# Patient Record
Sex: MALE | Race: WHITE | NOT HISPANIC OR LATINO | Employment: OTHER | URBAN - METROPOLITAN AREA
[De-identification: names, ages, dates, MRNs, and addresses within clinical notes are randomized per-mention and may not be internally consistent; named-entity substitution may affect disease eponyms.]

---

## 2017-01-31 ENCOUNTER — TELEPHONE (OUTPATIENT)
Dept: CARDIOLOGY | Facility: CLINIC | Age: 62
End: 2017-01-31

## 2017-01-31 NOTE — TELEPHONE ENCOUNTER
Prior Authorization Retail Medication Request  Medication/Dose: Vorapaxar 2.08 mg   Diagnosis and ICD code: PAD I73.9/ Critical limb ischemia I99.8  New/Renewal/Insurance Change PA: renewal  Previously Tried and Failed Therapies: clopidogrel    Insurance ID (if provided): [id number]   Insurance Phone (if provided): [phone]   Any additional info from fax request:     If you received a fax notification from an outside Pharmacy:  Pharmacy Name:[facility]  Pharmacy #: [phone]  Pharmacy Fax: [phone]

## 2017-02-16 ENCOUNTER — OFFICE VISIT (OUTPATIENT)
Dept: GASTROENTEROLOGY | Facility: CLINIC | Age: 62
End: 2017-02-16
Attending: INTERNAL MEDICINE
Payer: COMMERCIAL

## 2017-02-16 VITALS
HEART RATE: 60 BPM | WEIGHT: 213 LBS | TEMPERATURE: 97.8 F | BODY MASS INDEX: 31.55 KG/M2 | SYSTOLIC BLOOD PRESSURE: 114 MMHG | DIASTOLIC BLOOD PRESSURE: 75 MMHG | HEIGHT: 69 IN | OXYGEN SATURATION: 94 %

## 2017-02-16 DIAGNOSIS — K74.60 CIRRHOSIS OF LIVER WITHOUT ASCITES, UNSPECIFIED HEPATIC CIRRHOSIS TYPE (H): ICD-10-CM

## 2017-02-16 DIAGNOSIS — K74.60 CIRRHOSIS OF LIVER WITHOUT ASCITES, UNSPECIFIED HEPATIC CIRRHOSIS TYPE (H): Primary | ICD-10-CM

## 2017-02-16 LAB
AFP SERPL-MCNC: 3.4 UG/L (ref 0–8)
ALBUMIN SERPL-MCNC: 3.6 G/DL (ref 3.4–5)
ALP SERPL-CCNC: 65 U/L (ref 40–150)
ALT SERPL W P-5'-P-CCNC: 20 U/L (ref 0–70)
ANION GAP SERPL CALCULATED.3IONS-SCNC: 9 MMOL/L (ref 3–14)
AST SERPL W P-5'-P-CCNC: 20 U/L (ref 0–45)
BILIRUB DIRECT SERPL-MCNC: 0.1 MG/DL (ref 0–0.2)
BILIRUB SERPL-MCNC: 0.4 MG/DL (ref 0.2–1.3)
BUN SERPL-MCNC: 19 MG/DL (ref 7–30)
CALCIUM SERPL-MCNC: 9 MG/DL (ref 8.5–10.1)
CHLORIDE SERPL-SCNC: 102 MMOL/L (ref 94–109)
CO2 SERPL-SCNC: 26 MMOL/L (ref 20–32)
CREAT SERPL-MCNC: 0.8 MG/DL (ref 0.66–1.25)
ERYTHROCYTE [DISTWIDTH] IN BLOOD BY AUTOMATED COUNT: 14.1 % (ref 10–15)
GFR SERPL CREATININE-BSD FRML MDRD: ABNORMAL ML/MIN/1.7M2
GLUCOSE SERPL-MCNC: 175 MG/DL (ref 70–99)
HCT VFR BLD AUTO: 42.9 % (ref 40–53)
HGB BLD-MCNC: 14.7 G/DL (ref 13.3–17.7)
INR PPP: 1.03 (ref 0.86–1.14)
MCH RBC QN AUTO: 29.5 PG (ref 26.5–33)
MCHC RBC AUTO-ENTMCNC: 34.3 G/DL (ref 31.5–36.5)
MCV RBC AUTO: 86 FL (ref 78–100)
PLATELET # BLD AUTO: 176 10E9/L (ref 150–450)
POTASSIUM SERPL-SCNC: 4.2 MMOL/L (ref 3.4–5.3)
PROT SERPL-MCNC: 8 G/DL (ref 6.8–8.8)
RBC # BLD AUTO: 4.99 10E12/L (ref 4.4–5.9)
SODIUM SERPL-SCNC: 137 MMOL/L (ref 133–144)
WBC # BLD AUTO: 7.8 10E9/L (ref 4–11)

## 2017-02-16 PROCEDURE — 80076 HEPATIC FUNCTION PANEL: CPT | Performed by: INTERNAL MEDICINE

## 2017-02-16 PROCEDURE — 80048 BASIC METABOLIC PNL TOTAL CA: CPT | Performed by: INTERNAL MEDICINE

## 2017-02-16 PROCEDURE — G0008 ADMIN INFLUENZA VIRUS VAC: HCPCS | Mod: ZF

## 2017-02-16 PROCEDURE — 85027 COMPLETE CBC AUTOMATED: CPT | Performed by: INTERNAL MEDICINE

## 2017-02-16 PROCEDURE — 90670 PCV13 VACCINE IM: CPT | Mod: ZF

## 2017-02-16 PROCEDURE — G0009 ADMIN PNEUMOCOCCAL VACCINE: HCPCS | Mod: ZF

## 2017-02-16 PROCEDURE — 25000128 H RX IP 250 OP 636: Mod: ZF

## 2017-02-16 PROCEDURE — 82105 ALPHA-FETOPROTEIN SERUM: CPT | Performed by: INTERNAL MEDICINE

## 2017-02-16 PROCEDURE — 90686 IIV4 VACC NO PRSV 0.5 ML IM: CPT | Mod: ZF

## 2017-02-16 PROCEDURE — 36415 COLL VENOUS BLD VENIPUNCTURE: CPT | Performed by: INTERNAL MEDICINE

## 2017-02-16 PROCEDURE — 99212 OFFICE O/P EST SF 10 MIN: CPT | Mod: 25,ZF

## 2017-02-16 PROCEDURE — 85610 PROTHROMBIN TIME: CPT | Performed by: INTERNAL MEDICINE

## 2017-02-16 ASSESSMENT — PAIN SCALES - GENERAL: PAINLEVEL: NO PAIN (0)

## 2017-02-16 NOTE — NURSING NOTE
Alexandro Pool      1.  Has the patient received the information for the influenza vaccine? YES    2.  Does the patient have any of the following contraindications?     Allergy to eggs? No     Allergic reaction to previous influenza vaccines? No     Any other problems to previous influenza vaccines? No     Paralyzed by Guillain-Maumelle syndrome? No     Currently pregnant? NO     Current moderate or severe illness? No     Allergy to contact lens solution? No    3.  The vaccine has been administered in the usual fashion and the patient was instructed to wait 20 minutes before leaving the building in the event of an allergic reaction: YES    Vaccination given by Didier Shrestha CMA.  Recorded by Didier Shrestha

## 2017-02-16 NOTE — PROGRESS NOTES
HISTORY OF PRESENT ILLNESS:  I had the pleasure of seeing Alexandro Pool for followup in the Liver Clinic at the [facility] on 02/16/2017.  Mr. 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  He is a sustained virologic responder to hepatitis C treatment.        He reports he is feeling very well.  He has noticed a boost in energy since we cured his hepatitis C.  He denies any abdominal pain, itching or skin rash or fatigue.  He denies any increased abdominal girth or lower extremity edema.  He denies any fevers or chills, cough or shortness of breath.  He denies any nausea, vomiting, diarrhea or constipation.  He has not had any gastrointestinal bleeding or any overt signs of encephalopathy.  His appetite has been good, and his weight has been relatively stable.       Current Outpatient Prescriptions   Medication     lisinopril (PRINIVIL,ZESTRIL) 2.5 MG tablet     spironolactone (ALDACTONE) 25 MG tablet     order for DME     Vorapaxar Sulfate (ZONTIVITY) 2.08 MG tablet     atorvastatin (LIPITOR) 10 MG tablet     insulin pen needle (BD KWABENA U/F) 32G X 4 MM     insulin lispro (HUMALOG VIAL) SOLN 100 UNIT/ML     hydrocortisone 1 % cream     SENNA PO     morphine (MS CONTIN) 15 MG 12 hr tablet     oxyCODONE (ROXICODONE) 10 MG immediate release tablet     gabapentin (NEURONTIN) 100 MG capsule     insulin glargine (LANTUS) SOLN 100 UNIT/ML     ASPIRIN EC PO     METOPROLOL SUCCINATE ER PO     amLODIPine (NORVASC) 2.5 MG tablet     acetaminophen (TYLENOL) 325 MG tablet     No current facility-administered medications for this visit.      B/P: 114/75, T: 97.8, P: 60, R: Data Unavailable    HEENT exam shows no scleral icterus and no temporal muscle wasting.  Chest is clear.  Abdominal exam shows no increase in girth.  No masses or tenderness to palpation are present.  His liver is 10 cm in span without left lobe enlargement.  No spleen tip is palpable.  Extremity exam shows  no edema.  He is missing his right leg above the knee.  Skin exam shows no stigmata of chronic liver disease.  Neurologic exam shows no asterixis.       Recent Results (from the past 168 hour(s))    Hepatic Panel [LAB20]    Collection Time: 02/16/17  7:55 AM   Result Value Ref Range    Bilirubin Direct 0.1 0.0 - 0.2 mg/dL    Bilirubin Total 0.4 0.2 - 1.3 mg/dL    Albumin 3.6 3.4 - 5.0 g/dL    Protein Total 8.0 6.8 - 8.8 g/dL    Alkaline Phosphatase 65 40 - 150 U/L    ALT 20 0 - 70 U/L    AST 20 0 - 45 U/L   Basic metabolic panel [LAB15]    Collection Time: 02/16/17  7:55 AM   Result Value Ref Range    Sodium 137 133 - 144 mmol/L    Potassium 4.2 3.4 - 5.3 mmol/L    Chloride 102 94 - 109 mmol/L    Carbon Dioxide 26 20 - 32 mmol/L    Anion Gap 9 3 - 14 mmol/L    Glucose 175 (H) 70 - 99 mg/dL    Urea Nitrogen 19 7 - 30 mg/dL    Creatinine 0.80 0.66 - 1.25 mg/dL    GFR Estimate >90  Non  GFR Calc   >60 mL/min/1.7m2    GFR Estimate If Black >90   GFR Calc   >60 mL/min/1.7m2    Calcium 9.0 8.5 - 10.1 mg/dL   CBC with platelets [ETC279]    Collection Time: 02/16/17  7:55 AM   Result Value Ref Range    WBC 7.8 4.0 - 11.0 10e9/L    RBC Count 4.99 4.4 - 5.9 10e12/L    Hemoglobin 14.7 13.3 - 17.7 g/dL    Hematocrit 42.9 40.0 - 53.0 %    MCV 86 78 - 100 fl    MCH 29.5 26.5 - 33.0 pg    MCHC 34.3 31.5 - 36.5 g/dL    RDW 14.1 10.0 - 15.0 %    Platelet Count 176 150 - 450 10e9/L   INR [TNH7494]    Collection Time: 02/16/17  7:55 AM   Result Value Ref Range    INR 1.03 0.86 - 1.14   AFP tumor marker [[id number]]    Collection Time: 02/16/17  7:55 AM   Result Value Ref Range    Alpha Fetoprotein 3.4 0 - 8 ug/L      I did review his ultrasound which shows no mass lesions and no ascites.      My impression is that Mr. 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  His disease is extremely well compensated at this point in time.  He is up-to-date with regard to variceal screening and cancer screening.   He does need a flu shot and will also get a Prevnar 13 vaccine today.  Otherwise, I will not be making any other change to his medical regimen.  I will see him back in the clinic again in 6 months.      Thank you very much for allowing me to participate in the care of this patient.  If you have any questions regarding my recommendations, please do not hesitate to contact me.       Nehemias Cevallos MD      Professor of Medicine  [employer]      Executive Medical Director, Solid Organ Transplant Program  [facility]

## 2017-02-16 NOTE — MR AVS SNAPSHOT
After Visit Summary   2/16/2017    Alexandro Pool    MRN: [id number]           Patient Information     Date Of Birth          1955        Visit Information        Provider Department      2/16/2017 10:15 AM Nehemias Cevallos MD [facility] Hepatology        Today's Diagnoses     Cirrhosis of liver without ascites, unspecified hepatic cirrhosis type (H) [K74.60]    -  1    Cirrhosis of liver without ascites, unspecified hepatic cirrhosis type (H)           Follow-ups after your visit        Follow-up notes from your care team     Return in about 6 months (around 8/16/2017).      Your next 10 appointments already scheduled     May 10, 2017 10:30 AM CDT   US OZZIE DOPPLER NO EXERCISE with UCUSV2   [facility] Imaging Center US ([facility])    [address] [place] [address] [phone]           Please bring a list of your medicines (including vitamins, minerals and over-the-counter drugs). Also, tell your doctor about any allergies you may have. Wear comfortable clothes and leave your valuables at home.  No caffeine or tobacco for 1 hour prior to exam.  Please call the Imaging Department at your exam site with any questions.            May 10, 2017 11:00 AM CDT   US LOWER EXTREMITY ARTERIAL DUPLEX LEFT with UCUSV2   [facility] Imaging Center US ([facility])    [address] [place] [id number] [phone]           Please bring a list of your medicines (including vitamins, minerals and over-the-counter drugs). Also, tell your doctor about any allergies you may have. Wear comfortable clothes and leave your valuables at home.  You do not need to do anything special to prepare for your exam.  Please call the Imaging Department at your exam site with any questions.            May 10, 2017 12:00 PM CDT   (Arrive by 11:45 AM)   Return Visit with Omar Pereyra MD   [facility] Heart Care ([facility])    [address] [place] [id number]   [phone]            Aug 15, 2017  8:00 AM CDT   Lab with  LAB   [facility] Lab ([facility])    [address] [place] [id number] [phone]            Aug 15, 2017  8:30 AM CDT   US ABDOMEN COMPLETE with UCUS1   [facility] Imaging Center US ([facility])    [address] [place] [id number] [phone]           Please bring a list of your medicines (including vitamins, minerals and over-the-counter drugs). Also, tell your doctor about any allergies you may have. Wear comfortable clothes and leave your valuables at home.  Adults: No eating or drinking for 8 hours before the exam. You may take medicine with a small sip of water.  Children: - Children 6+ years: No food or drink for 6 hours before exam. - Children 1-5 years: No food or drink for 4 hours before exam. - Infants, breast-fed: may have breast milk up to 2 hours before exam. - Infants, formula: may have bottle until 4 hours before exam.  Please call the Imaging Department at your exam site with any questions.            Aug 15, 2017  9:30 AM CDT   (Arrive by 9:15 AM)   Return General Liver with Nehemias Cevallos MD   [facility] Hepatology ([facility])    [address] [place] [id number] [phone]              Future tests that were ordered for you today     Open Standing Orders        Priority Remaining Interval Expires Ordered    US abdomen complete [OMT895] Routine 2/2 Every 6 Months 3/18/2018 2/16/2017          Open Future Orders        Priority Expected Expires Ordered     Hepatic Panel [LAB20] Routine 8/15/2017 3/18/2018 2/16/2017    Basic metabolic panel [LAB15] Routine 8/15/2017 3/18/2018 2/16/2017    CBC with platelets [MRK829] Routine 8/15/2017 3/18/2018 2/16/2017    INR [UYP1813] Routine 8/15/2017 3/18/2018  "2017    AFP tumor marker [HOU812] Routine 8/15/2017 3/18/2018 2017            Who to contact     If you have questions or need follow up information about today's clinic visit or your schedule please contact [facility] HEPATOLOGY directly at [phone].  Normal or non-critical lab and imaging results will be communicated to you by MyChart, letter or phone within 4 business days after the clinic has received the results. If you do not hear from us within 7 days, please contact the clinic through [employer] or phone. If you have a critical or abnormal lab result, we will notify you by phone as soon as possible.  Submit refill requests through [employer] or call your pharmacy and they will forward the refill request to us. Please allow 3 business days for your refill to be completed.          Additional Information About Your Visit        [employer] Information     Redux lets you send messages to your doctor, view your test results, renew your prescriptions, schedule appointments and more. To sign up, go to [email or web address] . Click on \"Log in\" on the left side of the screen, which will take you to the Welcome page. Then click on \"Sign up Now\" on the right side of the page.     You will be asked to enter the access code listed below, as well as some personal information. Please follow the directions to create your username and password.     Your access code is: [email or web address]  Expires: 5/3/2017  6:30 AM     Your access code will  in 90 days. If you need help or a new code, please call your [facility] clinic or [phone].        Care EveryWhere ID     This is your Care EveryWhere ID. This could be used by other organizations to access your [facility] medical records  [id number]        Your Vitals Were     Pulse Temperature Height Pulse Oximetry BMI (Body Mass Index)       60 97.8  F (36.6  C) (Oral) 1.753 m (5' 9\") 94% 31.45 kg/m2        Blood Pressure from Last 3 Encounters:   17 114/75   16 " 103/67   08/09/16 127/78    Weight from Last 3 Encounters:   02/16/17 96.6 kg (213 lb)   11/09/16 99.2 kg (218 lb 11.2 oz)   08/09/16 98.4 kg (217 lb)              We Performed the Following     PNEUMOCOCCAL CONJ VACCINE 13 VALENT IM (PCV13)     Schedule follow up appointments          Today's Medication Changes          These changes are accurate as of: 2/16/17 10:45 AM.  If you have any questions, ask your nurse or doctor.               These medicines have changed or have updated prescriptions.        Dose/Directions    insulin glargine 100 UNIT/ML injection   Commonly known as:  LANTUS   This may have changed:  additional instructions   Used for:  Type II or unspecified type diabetes mellitus with neurological manifestations, not stated as uncontrolled        Inject 12 Units Subcutaneous every morning   Quantity:  1 Month   Refills:  0                Primary Care Provider    [facility] Other Clinic                Thank you!     Thank you for choosing [facility] HEPATOLOGY  for your care. Our goal is always to provide you with excellent care. Hearing back from our patients is one way we can continue to improve our services. Please take a few minutes to complete the written survey that you may receive in the mail after your visit with us. Thank you!             Your Updated Medication List - Protect others around you: Learn how to safely use, store and throw away your medicines at www.disposemymeds.org.          This list is accurate as of: 2/16/17 10:45 AM.  Always use your most recent med list.                   Brand Name Dispense Instructions for use    acetaminophen 325 MG tablet    TYLENOL    250 tablet    Take 1 tablet by mouth every 4 hours as needed.       amLODIPine 2.5 MG tablet    NORVASC     Take 2.5 mg by mouth 2 times daily       ASPIRIN EC PO      Take 81 mg by mouth daily       atorvastatin 10 MG tablet    LIPITOR    90 tablet    Take 4 tablets (40 mg) by mouth At Bedtime       gabapentin 100 MG capsule     NEURONTIN    90 capsule    Take 1 capsule (100 mg) by mouth 3 times daily       humaLOG 100 UNIT/ML injection   Generic drug:  insulin lispro      Inject Subcutaneous 3 times daily (before meals) 18 units before each meal       hydrocortisone 1 % cream    CORTAID     Apply topically 2 times daily PRN       insulin glargine 100 UNIT/ML injection    LANTUS    1 Month    Inject 12 Units Subcutaneous every morning       insulin pen needle 32G X 4 MM    BD KWABENA U/F    120 each    Use 4x  daily or as directed.       lisinopril 2.5 MG tablet    PRINIVIL/Zestril    180 tablet    Take 1 tablet (2.5 mg) by mouth 2 times daily       METOPROLOL SUCCINATE ER PO      Take 75 mg by mouth daily       morphine 15 MG 12 hr tablet    MS CONTIN    60 tablet    Take 3 tablets (45 mg) by mouth 2 times daily       order for DME     1 Units    Equipment being ordered: FlexiToNewscron pneumatic compression device.  2-3 times per day to left lower extremity. Current strength - L4       oxyCODONE 10 MG IR tablet    ROXICODONE    60 tablet    Take 1.5 tablets (15 mg) by mouth every 4 hours as needed       SENNA PO      1 Tab , bedtime       spironolactone 25 MG tablet    ALDACTONE    45 tablet    Take 0.5 tablets (12.5 mg) by mouth daily       vorapaxar sulfate 2.08 MG tablet    ZONTIVITY    30 tablet    Take 1 tablet (2.08 mg) by mouth daily

## 2017-03-08 NOTE — TELEPHONE ENCOUNTER
PA Initiation    Medication: Vorapaxar 2.08 mg   Insurance Company: [employer] - Phone [phone] Fax [phone]  Pharmacy Filling the Rx: [facility] - [place], [place] - [facility] [address]  Filling Pharmacy Phone: [phone]  Filling Pharmacy Fax:    Start Date: 3/8/2017    Manually faxed PA to [place] at [phone]

## 2017-03-08 NOTE — TELEPHONE ENCOUNTER
[facility] Prior Authorization Team   Phone: [phone]  Fax: [phone]    [employer] via Cover My Meds is not working, waiting for forms to be faxed to initiate the PA.

## 2017-03-09 NOTE — TELEPHONE ENCOUNTER
Prior Authorization Approval    Authorization Effective Date: 3/7/2017  Authorization Expiration Date: 3/9/2018  Medication: Vorapaxar 2.08 mg - approved  Approved Dose/Quantity:   Reference #: [id number]   Insurance Company: [employer] - Phone [phone] Fax [phone]  Expected CoPay: $0.00     CoPay Card Available:      Foundation Assistance Needed:    Which Pharmacy is filling the prescription (Not needed for infusion/clinic administered): [facility] - [place], [place] - [facility] [address]  Pharmacy Notified: Yes  Patient Notified: Yes

## 2017-05-26 DIAGNOSIS — I25.5 ISCHEMIC CARDIOMYOPATHY: ICD-10-CM

## 2017-05-26 DIAGNOSIS — I10 HYPERTENSION GOAL BP (BLOOD PRESSURE) < 140/90: ICD-10-CM

## 2017-06-05 ENCOUNTER — PRE VISIT (OUTPATIENT)
Dept: CARDIOLOGY | Facility: CLINIC | Age: 62
End: 2017-06-05

## 2017-06-05 NOTE — TELEPHONE ENCOUNTER
OZZIE no exercise : 5/10/17  Patent left SFA to SENIA bypass graft without significant  stenosis. Patent nonligated sidebranch off the in situ bypass at the mid/distal thigh with velocity up to 389 cm/s, previously 568 cm/s.  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 Distal to this branch waveforms are high resistance monophasic  with good systolic upstroke similar prior. Again, correlate with any signs of venous hypertension in the left leg.    Left lower extremity arterial duplex : 5/10/17  Patent left SFA to SENIA bypass graft without significant  stenosis. Patent nonligated sidebranch off the in situ bypass at the mid/distal thigh with velocity up to 389 cm/s, previously 568 cm/s.  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 Distal to this branch waveforms are high resistance monophasic  with good systolic upstroke similar prior. Again, correlate with any signs of venous hypertension in the left leg.    Component      Latest Ref Rng & Units 2/16/2017   Sodium      133 - 144 mmol/L 137   Potassium      3.4 - 5.3 mmol/L 4.2   Chloride      94 - 109 mmol/L 102   Carbon Dioxide      20 - 32 mmol/L 26   Anion Gap      3 - 14 mmol/L 9   Glucose      70 - 99 mg/dL 175 (H)   Urea Nitrogen      7 - 30 mg/dL 19   Creatinine      0.66 - 1.25 mg/dL 0.80   GFR Estimate      >60 mL/min/1.7m2 >90 . . .   GFR Estimate If Black      >60 mL/min/1.7m2 >90 . . .   Calcium      8.5 - 10.1 mg/dL 9.0   Bilirubin Direct      0.0 - 0.2 mg/dL 0.1   Bilirubin Total      0.2 - 1.3 mg/dL 0.4   Albumin      3.4 - 5.0 g/dL 3.6   Protein Total      6.8 - 8.8 g/dL 8.0   Alkaline Phosphatase      40 - 150 U/L 65   ALT      0 - 70 U/L 20   AST      0 - 45 U/L 20

## 2017-06-09 RX ORDER — SPIRONOLACTONE 25 MG/1
TABLET ORAL
Qty: 15 TABLET | Refills: 1 | Status: SHIPPED | OUTPATIENT
Start: 2017-06-09 | End: 2017-06-12

## 2017-06-12 DIAGNOSIS — I25.5 ISCHEMIC CARDIOMYOPATHY: ICD-10-CM

## 2017-06-12 DIAGNOSIS — I10 HYPERTENSION GOAL BP (BLOOD PRESSURE) < 140/90: ICD-10-CM

## 2017-06-12 RX ORDER — SPIRONOLACTONE 25 MG/1
12.5 TABLET ORAL DAILY
Qty: 15 TABLET | Refills: 0 | Status: SHIPPED | OUTPATIENT
Start: 2017-06-12 | End: 2020-09-02

## 2017-06-23 ENCOUNTER — OFFICE VISIT (OUTPATIENT)
Dept: CARDIOLOGY | Facility: CLINIC | Age: 62
End: 2017-06-23
Attending: INTERNAL MEDICINE
Payer: COMMERCIAL

## 2017-06-23 VITALS
HEIGHT: 67 IN | BODY MASS INDEX: 34.23 KG/M2 | DIASTOLIC BLOOD PRESSURE: 77 MMHG | SYSTOLIC BLOOD PRESSURE: 122 MMHG | OXYGEN SATURATION: 94 % | WEIGHT: 218.1 LBS | HEART RATE: 58 BPM

## 2017-06-23 DIAGNOSIS — I10 HYPERTENSION GOAL BP (BLOOD PRESSURE) < 140/90: Primary | ICD-10-CM

## 2017-06-23 DIAGNOSIS — E11.8 TYPE 2 DIABETES MELLITUS WITH COMPLICATION, WITH LONG-TERM CURRENT USE OF INSULIN (H): ICD-10-CM

## 2017-06-23 DIAGNOSIS — E78.5 HYPERLIPIDEMIA LDL GOAL <70: ICD-10-CM

## 2017-06-23 DIAGNOSIS — Z89.611 STATUS POST ABOVE KNEE AMPUTATION OF RIGHT LOWER EXTREMITY: ICD-10-CM

## 2017-06-23 DIAGNOSIS — F17.219 CIGARETTE NICOTINE DEPENDENCE WITH NICOTINE-INDUCED DISORDER: ICD-10-CM

## 2017-06-23 DIAGNOSIS — I73.9 PAD (PERIPHERAL ARTERY DISEASE) (H): ICD-10-CM

## 2017-06-23 DIAGNOSIS — Z79.4 TYPE 2 DIABETES MELLITUS WITH COMPLICATION, WITH LONG-TERM CURRENT USE OF INSULIN (H): ICD-10-CM

## 2017-06-23 PROCEDURE — 99215 OFFICE O/P EST HI 40 MIN: CPT | Mod: ZP | Performed by: INTERNAL MEDICINE

## 2017-06-23 PROCEDURE — 99213 OFFICE O/P EST LOW 20 MIN: CPT | Mod: ZF

## 2017-06-23 PROCEDURE — 99406 BEHAV CHNG SMOKING 3-10 MIN: CPT | Mod: ZP | Performed by: INTERNAL MEDICINE

## 2017-06-23 RX ORDER — NICOTINE 21 MG/24HR
1 PATCH, TRANSDERMAL 24 HOURS TRANSDERMAL EVERY 24 HOURS
Qty: 30 PATCH | Refills: 1 | Status: SHIPPED | OUTPATIENT
Start: 2017-06-23 | End: 2017-08-22

## 2017-06-23 ASSESSMENT — PAIN SCALES - GENERAL: PAINLEVEL: NO PAIN (0)

## 2017-06-23 NOTE — MR AVS SNAPSHOT
After Visit Summary   6/23/2017    Alexandro Pool    MRN: [id number]           Patient Information     Date Of Birth          1955        Visit Information        Provider Department      6/23/2017 1:00 PM Giovani Olson MD [facility]        Today's Diagnoses     Hypertension goal BP (blood pressure) < 140/90    -  1    PAD (peripheral artery disease) (H)        Status post above knee amputation of right lower extremity (H)        Type 2 diabetes mellitus with complication, with long-term current use of insulin (H)        Hyperlipidemia LDL goal <70          Care Instructions    You were seen today in the Cardiovascular Clinic at the [facility].      Cardiology Providers you saw during your visit:  Dr. Olson    Diagnosis:  Critical Limb Ischemia    Results:  OZZIE and left lower extremity duplex reviewed in clinic.    Recommendations:   Continue medications as prescribed     Continue use of Flexitouch pump. Increase to 3 times per day if possible.     Follow up with Dr. Suggs to evaluate the fistula in your left leg and the bypass graft in your left leg.     Follow up with MICHELLE Loja in august     Labs prior to appt with Purvi    Follow-up:  6 months with Dr. Olson      For emergencies call 911.    For any scheduling needs, please call [phone]. Option 1 then option 3    Thank you for your visit today!     Please call if you have any questions or concerns.  Vikas Skinner RN              Follow-ups after your visit        Follow-up notes from your care team     See patient instructions section of the AVS Return in about 6 months (around 12/23/2017) for Dr. Olson, Critical limb ischemia.      Your next 10 appointments already scheduled     Aug 15, 2017  7:45 AM CDT   Lab with [facility] ([facility])    [address] [place] [id number] [phone]            Aug 15, 2017  8:00 AM CDT   Lab  with  LAB    Health Lab ([facility])    [address] [place] [id number]   [phone]            Aug 15, 2017  8:30 AM CDT   US ABDOMEN COMPLETE with [email or web address]   [facility] US ([facility])    [address] [place] [id number] [phone]           Please bring a list of your medicines (including vitamins, minerals and over-the-counter drugs). Also, tell your doctor about any allergies you may have. Wear comfortable clothes and leave your valuables at home.  Adults: No eating or drinking for 8 hours before the exam. You may take medicine with a small sip of water.  Children: - Children 6+ years: No food or drink for 6 hours before exam. - Children 1-5 years: No food or drink for 4 hours before exam. - Infants, breast-fed: may have breast milk up to 2 hours before exam. - Infants, formula: may have bottle until 4 hours before exam.  Please call the Imaging Department at your exam site with any questions.            Aug 15, 2017  9:30 AM CDT   (Arrive by 9:15 AM)   Return General Liver with Nehemias Cevallos MD   [facility] Hepatology ([facility])    [address] [place] [id number]   [phone]            Aug 17, 2017  4:45 PM CDT   (Arrive by 4:30 PM)   Return Vascular Visit with Marisol Suggs MD   [facility] Vascular Clinic ([facility])    [address] [place] [id number]   [phone]            Aug 21, 2017  9:00 AM CDT   (Arrive by 8:45 AM)   New Patient Visit with Purvi Anderson NP   [facility] Heart [facility] ([facility])    [address] [place] [id number]   [phone]            Dec 15, 2017  1:00 PM CST   (Arrive by 12:45 PM)   Return Visit with Giovani Olosn MD   [facility] [place] ([facility])    [address] [place] [address]  "[address] [facility] [address]   [phone]              Future tests that were ordered for you today     Open Future Orders        Priority Expected Expires Ordered    Lipid Profile Routine 7/23/2017 8/23/2017 6/23/2017    Hemoglobin A1c Routine 7/23/2017 8/23/2017 6/23/2017    Albumin Random Urine Quantitative Routine 7/23/2017 8/23/2017 6/23/2017    Basic metabolic panel Routine 7/23/2017 8/23/2017 6/23/2017    T3 Free Routine 7/23/2017 8/23/2017 6/23/2017    T4 free Routine 7/23/2017 8/23/2017 6/23/2017    TSH Routine 7/23/2017 8/23/2017 6/23/2017            Who to contact     If you have questions or need follow up information about today's clinic visit or your schedule please contact [facility] directly at [phone].  Normal or non-critical lab and imaging results will be communicated to you by ValuNethart, letter or phone within 4 business days after the clinic has received the results. If you do not hear from us within 7 days, please contact the clinic through ValuNethart or phone. If you have a critical or abnormal lab result, we will notify you by phone as soon as possible.  Submit refill requests through Chatterous or call your pharmacy and they will forward the refill request to us. Please allow 3 business days for your refill to be completed.          Additional Information About Your Visit        Chatterous Information     Chatterous lets you send messages to your doctor, view your test results, renew your prescriptions, schedule appointments and more. To sign up, go to [email or web address] . Click on \"Log in\" on the left side of the screen, which will take you to the Welcome page. Then click on \"Sign up Now\" on the right side of the page.     You will be asked to enter the access code listed below, as well as some personal information. Please follow the directions to create your username and password.     Your access code is: [email or web address]  Expires: 9/10/2017  6:31 AM     Your access " "code will  in 90 days. If you need help or a new code, please call your [facility] clinic or [phone].        Care EveryWhere ID     This is your Care EveryWhere ID. This could be used by other organizations to access your [facility] medical records  [id number]        Your Vitals Were     Pulse Height Pulse Oximetry BMI (Body Mass Index)          58 1.702 m (5' 7\") 94% 34.16 kg/m2         Blood Pressure from Last 3 Encounters:   17 122/77   17 114/75   16 103/67    Weight from Last 3 Encounters:   17 98.9 kg (218 lb 1.6 oz)   17 96.6 kg (213 lb)   16 99.2 kg (218 lb 11.2 oz)                 Today's Medication Changes          These changes are accurate as of: 17  1:55 PM.  If you have any questions, ask your nurse or doctor.               These medicines have changed or have updated prescriptions.        Dose/Directions    insulin glargine 100 UNIT/ML injection   Commonly known as:  LANTUS   This may have changed:  additional instructions   Used for:  Type II or unspecified type diabetes mellitus with neurological manifestations, not stated as uncontrolled        Inject 12 Units Subcutaneous every morning   Quantity:  1 Month   Refills:  0       oxyCODONE 10 MG IR tablet   Commonly known as:  ROXICODONE   This may have changed:  how much to take   Used for:  S/P AKA (above knee amputation) (H)        Dose:  15 mg   Take 1.5 tablets (15 mg) by mouth every 4 hours as needed   Quantity:  60 tablet   Refills:  0                Primary Care Provider    [facility] Other Clinic                Equal Access to Services     NADEEM MORROW AH: Hadii michelle Freitas, wakayleyda luivonneadaha, qaybta kaalmadavid garcia. So [facility] [phone].    ATENCIÓN: Si habla español, tiene a wong disposición servicios gratuitos de asistencia lingüística. Llame al [phone].    We comply with applicable federal civil rights laws and [place] laws. We do not " discriminate on the basis of race, color, national origin, age, disability sex, sexual orientation or gender identity.            Thank you!     Thank you for choosing [facility]  for your care. Our goal is always to provide you with excellent care. Hearing back from our patients is one way we can continue to improve our services. Please take a few minutes to complete the written survey that you may receive in the mail after your visit with us. Thank you!             Your Updated Medication List - Protect others around you: Learn how to safely use, store and throw away your medicines at www.disposemymeds.org.          This list is accurate as of: 6/23/17  1:55 PM.  Always use your most recent med list.                   Brand Name Dispense Instructions for use Diagnosis    acetaminophen 325 MG tablet    TYLENOL    250 tablet    Take 1 tablet by mouth every 4 hours as needed.    Peripheral arterial disease (H)       amLODIPine 2.5 MG tablet    NORVASC     Take 2.5 mg by mouth 2 times daily        ASPIRIN EC PO      Take 81 mg by mouth daily        atorvastatin 10 MG tablet    LIPITOR    90 tablet    Take 4 tablets (40 mg) by mouth At Bedtime    CAD (coronary artery disease)       gabapentin 100 MG capsule    NEURONTIN    90 capsule    Take 1 capsule (100 mg) by mouth 3 times daily    Critical lower limb ischemia       humaLOG 100 UNIT/ML injection   Generic drug:  insulin lispro      Inject Subcutaneous 3 times daily (before meals) 18 units before each meal        hydrocortisone 1 % cream    CORTAID     Apply topically 2 times daily PRN        insulin glargine 100 UNIT/ML injection    LANTUS    1 Month    Inject 12 Units Subcutaneous every morning    Type II or unspecified type diabetes mellitus with neurological manifestations, not stated as uncontrolled       insulin pen needle 32G X 4 MM    BD KWABENA U/F    120 each    Use 4x  daily or as directed.    Type II or unspecified type diabetes mellitus without  mention of complication, not stated as uncontrolled       lisinopril 2.5 MG tablet    PRINIVIL/Zestril    180 tablet    Take 1 tablet (2.5 mg) by mouth 2 times daily    Hypertension goal BP (blood pressure) < 140/90       METOPROLOL SUCCINATE ER PO      Take 75 mg by mouth daily        morphine 15 MG 12 hr tablet    MS CONTIN    60 tablet    Take 3 tablets (45 mg) by mouth 2 times daily    Critical lower limb ischemia       order for DME     1 Units    Equipment being ordered: FlexiTouch pneumatic compression device.  2-3 times per day to left lower extremity. Current strength - L4        oxyCODONE 10 MG IR tablet    ROXICODONE    60 tablet    Take 1.5 tablets (15 mg) by mouth every 4 hours as needed    S/P AKA (above knee amputation) (H)       SENNA PO      1 Tab , bedtime        spironolactone 25 MG tablet    ALDACTONE    15 tablet    Take 0.5 tablets (12.5 mg) by mouth daily    Ischemic cardiomyopathy, Hypertension goal BP (blood pressure) < 140/90       vorapaxar sulfate 2.08 MG tablet    ZONTIVITY    30 tablet    Take 1 tablet (2.08 mg) by mouth daily    Lower limb ischemia

## 2017-06-23 NOTE — NURSING NOTE
Labs:  Patient was instructed to return for the next laboratory testing in 3 months . Patient demonstrated understanding of this information and agreed to call with further questions or concerns.   Med Reconcile: Reviewed and verified all current medications with the patient. The updated medication list was printed and given to the patient.  New Medication: Nicotine patch per taper protocol.  Patient was educated regarding newly prescribed medication, including discussion of  the indication, administration, side effects, and when to report to MD or RN. Patient demonstrated understanding of this information and agreed to call with further questions or concerns.  Return Appointment: 3 months with Purvi Anderson CNP.  6 months with Dr. Olson.  F/u with Dr. Suggs next 2-3 weeks.  Patient given instructions regarding scheduling next clinic visit. Patient demonstrated understanding of this information and agreed to call with further questions or concerns.  Smoking Cessation: Patient instructed regarding options for smoking cessation and given written information regarding smoking cessation assistance programs. Patient demonstrated understanding of this information and agreed to call with further questions or concerns.  Patient stated he understood all health information given and agreed to call with further questions or concerns.

## 2017-06-23 NOTE — PROGRESS NOTES
HPI:     Alexandro Pool is a 61 year old male who is seen in follow-up for management of severe PAD, right AKA, and left leg critical limb ischemia.     His complex vascular history is notable for gangrenous R foot ulcer resulting in a right AKA in 2011, severe chronic limb ischemia of the left leg status-post SFA stenting in 3/2012 and left femoral to anterior tibial artery bypass with in situ saphenous vein by Dr. Suggs in 9/2013. Mr. Pool also has history of coronary artery disease and a ischemic cardiomyopathy with an LVEF= 49%. He has HTN, HLD, type II DM and continues to smoke about 2 PPD.     Mr. Pool is feeling quite well. 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 He reports occasional cramping in his left leg which typically occurs at night or with use of the Flexitouch however he reports that this is unchanged. He reports a new painful pulsation on the medial aspect of his left thigh which he noticed a few months ago. He has no new foot ulcerations or wounds. He denies chest pain, shortness of breath or palpitations.     PAST MEDICAL HISTORY  Past Medical History:   Diagnosis Date     Acute kidney failure, unspecified (H)      Blood transfusion      CAD (coronary artery disease)      CARDIOVASCULAR SCREENING; LDL GOAL LESS THAN 100      Cirrhosis (H)      Critical lower limb ischemia      Diabetes mellitus (H) 10/2011     Hypertension      Hypertension goal BP (blood pressure) < 140/90      Ischemic cardiomyopathy      Lymphedema of left leg 5/11/2016     Peripheral arterial disease (H)      PVD (peripheral vascular disease) (H)      Right above knee amputation 4/30/2014     Type 2 diabetes, HbA1C goal < 8% (H)        CURRENT MEDICATIONS  Current Outpatient Prescriptions   Medication Sig Dispense Refill     spironolactone (ALDACTONE) 25 MG tablet Take 0.5 tablets (12.5 mg) by mouth daily 15 tablet 0     lisinopril (PRINIVIL,ZESTRIL) 2.5 MG tablet  Take 1 tablet (2.5 mg) by mouth 2 times daily 180 tablet 3     order for DME Equipment being ordered: FlexiTouch pneumatic compression device.  2-3 times per day to left lower extremity.  Current strength - L4 1 Units      Vorapaxar Sulfate (ZONTIVITY) 2.08 MG tablet Take 1 tablet (2.08 mg) by mouth daily 30 tablet 6     atorvastatin (LIPITOR) 10 MG tablet Take 4 tablets (40 mg) by mouth At Bedtime 90 tablet 3     insulin pen needle (BD KWABENA U/F) 32G X 4 MM Use 4x  daily or as directed. 120 each 11     insulin lispro (HUMALOG VIAL) SOLN 100 UNIT/ML Inject Subcutaneous 3 times daily (before meals) 18 units before each meal       hydrocortisone 1 % cream Apply topically 2 times daily PRN       SENNA PO 1 Tab , bedtime       morphine (MS CONTIN) 15 MG 12 hr tablet Take 3 tablets (45 mg) by mouth 2 times daily 60 tablet 0     oxyCODONE (ROXICODONE) 10 MG immediate release tablet Take 1.5 tablets (15 mg) by mouth every 4 hours as needed (Patient taking differently: Take 20 mg by mouth every 4 hours as needed ) 60 tablet 0     gabapentin (NEURONTIN) 100 MG capsule Take 1 capsule (100 mg) by mouth 3 times daily 90 capsule 0     insulin glargine (LANTUS) SOLN 100 UNIT/ML Inject 12 Units Subcutaneous every morning (Patient taking differently: Inject 50 Units Subcutaneous every morning) 1 Month 0     ASPIRIN EC PO Take 81 mg by mouth daily       METOPROLOL SUCCINATE ER PO Take 75 mg by mouth daily       amLODIPine (NORVASC) 2.5 MG tablet Take 2.5 mg by mouth 2 times daily       acetaminophen (TYLENOL) 325 MG tablet Take 1 tablet by mouth every 4 hours as needed. 250 tablet 0       PAST SURGICAL HISTORY:  Past Surgical History:   Procedure Laterality Date     AMPUTATE LEG ABOVE KNEE  11/22/2011    Procedure:AMPUTATE LEG ABOVE KNEE; Revise Right Below Knee Amputation To Above Knee Amputation; Surgeon:MADISON WELLS; Location:[facility] OR     AMPUTATE LEG BELOW KNEE  11/10/2011    Procedure:AMPUTATE LEG BELOW KNEE; Right Below Knee  Amputation; Surgeon:MADISON WELLS; Location:[facility] OR     BYPASS GRAFT FEMOROTIBIAL  9/19/2013    Procedure: BYPASS GRAFT FEMOROTIBIAL;  Left Femorotibial Bypass Graft Insitu ;  Surgeon: Marisol Suggs MD;  Location: [facility] OR     CARDIAC SURGERY      cardiac stent     ESOPHAGOSCOPY, GASTROSCOPY, DUODENOSCOPY (EGD), COMBINED  10/1/2012    Procedure: COMBINED ESOPHAGOSCOPY, GASTROSCOPY, DUODENOSCOPY (EGD);;  Surgeon: Nehemias Cevallos MD;  [facility]: [facility]     ESOPHAGOSCOPY, GASTROSCOPY, DUODENOSCOPY (EGD), COMBINED N/A 3/24/2016    Procedure: COMBINED ESOPHAGOSCOPY, GASTROSCOPY, DUODENOSCOPY (EGD);  Surgeon: Gildardo Marks MD;  Location:  GI     IR STENT VASCULAR LT  3/9/2012          IRRIGATION AND DEBRIDEMENT LOWER EXTREMITY, COMBINED  11/15/2011    Procedure:COMBINED IRRIGATION AND DEBRIDEMENT LOWER EXTREMITY; DRAINAGE OF ABSCESS AT BELOW KNEE AMPUTATION AND KNEE DISARTICULATION.; Surgeon:MADISON WELLS; Location:[facility] OR     NO HISTORY OF SURGERY  7/12/13    derm       ALLERGIES  Allergies   Allergen Reactions     Ciprofloxacin Rash       FAMILY HISTORY  Family History   Problem Relation Age of Onset     Alcohol/Drug Mother      cirrhosis     Alcohol/Drug Father      C.A.D. No family hx of      DIABETES No family hx of      CANCER No family hx of      SOCIAL HISTORY  Social History     Social History     Marital status:      Spouse name: N/A     Number of children: 0     Years of education: N/A     Occupational History      Unknown     Social History Main Topics     Smoking status: Current Every Day Smoker     Packs/day: 2.00     Years: 40.00     Types: Cigarettes     Smokeless tobacco: Never Used      Comment: 1 pack a day     Alcohol use 0.6 oz/week     1 Standard drinks or equivalent per week      Comment: Once a month     Drug use: No      Comment: seldom marijuana     Sexual activity: Not Currently     Other Topics Concern     Parent/Sibling W/ Cabg, Mi Or Angioplasty Before 65f 55m? No     Caffeine Concern  "Yes     Exercise No     Social History Narrative       ROS:   Constitutional: No fever, chills, or sweats. No weight gain/loss   ENT: No visual disturbance, ear ache, epistaxis, sore throat  Allergies/Immunologic: Negative  Respiratory: No cough, hemoptysia  Cardiovascular: As per HPI  GI: No nausea, vomiting, hematemesis, melena, or hematochezia  : No urinary frequency, dysuria, or hematuria  Integument: Negative  Psychiatric: Negative  Neuro: Negative  Endocrinology: Negative   Musculoskeletal: Negative  Vascular: As per HPI    EXAM:  /77 (BP Location: Left arm, Patient Position: Chair, Cuff Size: Adult Large)  Pulse 58  Ht 1.702 m (5' 7\")  Wt 98.9 kg (218 lb 1.6 oz)  SpO2 94%  BMI 34.16 kg/m2  In general, the patient is a wheelchair bound pleasant male in no apparent distress.    HEENT: NC/AT.  PERRLA.  EOMI.  Sclerae white, not injected.  Nares clear.  Pharynx without erythema or exudate.  Dentition intact.    Neck: No adenopathy.  No thyromegaly. Carotids +2/2 bilaterally without bruits.  No jugular venous distension.   Heart: RRR. Normal S1, S2 splits physiologically. No murmur, rub, click, or gallop. The PMI is in the 5th ICS in the midclavicular line. There is no heave.    Lungs: CTA.  Abdomen: Soft, nontender, nondistended. No organomegaly. No AAA.  No bruits.   Extremities: Left lower extremity hyperpigmented with 2-3+ pitting edema. AV fistula present on the medial aspect of thigh. Able to dopple monophasic DP and PT pulses. PT pulse very faint.   Vascular: AV fistula bruit present.    Labs:  LIPID RESULTS:  Lab Results   Component Value Date    CHOL 123 11/26/2012    HDL 20 (L) 11/26/2012    LDL 57 11/26/2012    TRIG 228 (H) 11/26/2012    CHOLHDLRATIO 6.1 (H) 11/26/2012       LIVER ENZYME RESULTS:  Lab Results   Component Value Date    AST 20 02/16/2017    ALT 20 02/16/2017       CBC RESULTS:  Lab Results   Component Value Date    WBC 7.8 02/16/2017    RBC 4.99 02/16/2017    HGB 14.7 " 02/16/2017    HCT 42.9 02/16/2017    MCV 86 02/16/2017    MCH 29.5 02/16/2017    MCHC 34.3 02/16/2017    RDW 14.1 02/16/2017     02/16/2017     BMP RESULTS:  Lab Results   Component Value Date     02/16/2017    POTASSIUM 4.2 02/16/2017    CHLORIDE 102 02/16/2017    CO2 26 02/16/2017    ANIONGAP 9 02/16/2017     (H) 02/16/2017    BUN 19 02/16/2017    CR 0.80 02/16/2017    GFRESTIMATED >90  Non  GFR Calc   02/16/2017    GFRESTBLACK >90   GFR Calc   02/16/2017    BOGDAN 9.0 02/16/2017      A1C RESULTS:  Lab Results   Component Value Date    A1C 6.6 (H) 10/17/2013     Procedures:    Exam: Duplex ultrasound of left lower extremity arteries dated  5/10/2017 11:55 AM      Clinical information: PAD with chronic le ischemia, Peripheral  vascular disease, unspecified . Left SFA to SENIA bypass graft.     Comparison: 11/9/2016.     EIA: Velocity: 157/79 cm/sec.  Waveforms: Monophasic low-resistance  waveforms with significant diastolic flow.  Common femoral artery: 233/100 cm/sec. Waveforms: Monophasic low  resistance waveforms with significant diastolic flow.     SFA to SENIA bypass graft: Monophasic low resistance waveforms with  significant diastolic flow  Proximal anastomosis: 194/91, 189/82 cm/s   Proximal thigh: 93/40  Mid thigh: 112/55     Mid thigh branch off the SFA to SENIA bypass graft: Flow is reversed,  towards the head. Velocity: 388/237 cm/s. Monophasic low resistance  waveforms with significant diastolic flow.     Distal thigh: 60/0 cm/s. Monophasic waveform, without significant  diastolic flow  Knee: 52/0 cm/s. Monophasic waveform, without significant diastolic  flow  Proximal calf: 51/0 cm/s. Monophasic waveforms, without significant  diastolic flow  Mid calf: 57/0 cm/s. Monophasic waveforms, without significant  diastolic flow.  Distal: 43/0 cm/s. Monophasic wave without significant diastolic flow.  Distal anastomosis: 53/12 cm/s. Monophasic waveforms with  good  systolic upstroke and mild diastolic flow.     Distal to anastomosis: 78/0 cm/s. Good systolic upstroke, phasic  waveform with out significant diastolic flow.     ABIs:     Right side:  Arm: 117 mmHg     Left side:   Arm: 118 mmHg   PT at ankle: 76 mmHg   DP at foot: 82 mmHg  First digit: 87 mmHg   OZZIE: 0.69   TBI: 0.74     Impression:  Patent left SFA to SENIA bypass graft without significant  stenosis. Patent nonligated sidebranch off the in situ bypass at the  mid/distal thigh with velocity up to 389 cm/s, previously 568 cm/s.  Waveforms proximal to the side branch are low resistance monophasic  compatible with likely venous fistula through the nonligated side  branch. Distal to this branch waveforms are high resistance monophasic  with good systolic upstroke similar prior. Again, correlate with any  signs of venous hypertension in the left leg.    Vascular ultrasound arterial duplex left lower extremity extremity  bypass graft dated 11/9/2016 12:13 PM     Comparison Study: 11/2/2015 and 4/20/2015     Clinical History: Peripheral arterial disease, previous SFG     Technique: Study includes gray scale images, color Doppler, spectral  Doppler waveforms and velocities with appropriate angles of 60 degrees  or less.     Findings:      Left lower extremity: Left thigh common femoral to anterior tibial  artery bypass graft.     External iliac artery, inflow artery: 242/105 cm/s, monophasic  waveforms.     Common femoral artery proximal to the anastomosis: 211/95 cm/sec with  monophasic waveforms.     Proximal anastomosis: 226/107 cm/sec with monophasic waveforms.     Within graft from proximal to distal: 161/66, 99/49, 115/53, 153/56,  78, 51, 54, and 54 cm/sec with monophasic waveforms.   Non-ligated branch vein off of mid distal thigh graft with velocity of  414/206 cm/s and 568/332 cm/s, previously 375/246 cm/sec. And on  4/20/2015 this measured 425/205 cm/sec.     Distal anastomosis: 95 cm/sec with monophasic  waveforms.     Anterior tibial artery distal to the anastomosis: Antegrade flow with  velocity of 63 cm/sec with monophasic waveforms.  Anterior tibial artery proximal to the anastomosis demonstrates  expected reversal of flow (away from the graft anastomosis) at 85  cm/sec.     Impression:  1. Patent left common femoral to anterior tibial artery bypass graft  demonstrates no evidence of hemodynamically significant stenosis.  Diffuse monophasic waveforms consistent with decreased capillary  resistance/vasodilation.  2. Redemonstration of patent non-ligated side branch coming off of the  in situ bypass at the mid to distal thigh with velocity of 414-568,  increased since prior study, but similar to 4/20/2015. Correlate for  clinical evidence of venous hypertension     Assessment and Plan:     Alexandro Pool is a 61 year old male who is seen in follow-up for management of severe PAD s/p right AKA for non-healing wound in 2011, left leg SFA stenting in 3/2012, as well as left femoral to anterior tibial artery bypass in 9/2013. Mr. Pool reports that he has been feeling quite well lately and denies any new symptoms. He continues to suffer from chronic left leg neuropathic pain secondary to diabetes. He reports very rare left leg cramping that typically occurs at night or with use of the Flexitouch however this has not changed. He recently underwent duplex graft surveillance imaging that shows reduced distal velocities concerning for short to medium term graft failure, as well as an AV fistula which was also noticed on physical exam.     Left lower extremity critical limb ischemia: Patient denies new symptoms however recent arterial duplex shows AV fistula and reduced distal velocities concerning for short-medium term graft failure.   -- Recommend urgent follow-up with Dr. Suggs to evaluate whether angiography is indicated, and to consider need for surgical ligation of fistula  -- Continue ASA 81 mg  -- Continue  use of Flexitouch pump  -- Discussed modifiable risk factors as stated below    Tobacco use disorder: Encourage smoking cessation, patient is willing to try nicotine patch.  -- Nicotine patch 21 mg/24 hours for 2 month followed by 14 mg/24 hours for 2 months then 7 mg/24 hours for 2 month    Dyslipidemia:   -- Continue atorvastatin 40 mg daily.  -- Obtain lipid panel    Diabetes Fannylitus:  -- Obtain hemoglobin A1C    Hypertension: Well controlled, /77.  -- Continue lisinopril 2.5 mg daily and amlodipine 2.5 mg BID    CAD with ischemic cardiomyopathy EF 49%: No active cardiac symptoms.  -- Continue metoprolol succinate 75 mg daily    Giovani Olson MD

## 2017-06-23 NOTE — LETTER
6/23/2017      RE: Alexandro Pool  [address]    [place] [address]       Dear Colleague,    Thank you for the opportunity to participate in the care of your patient, Alexandro Pool, at the [facility] at [facility]. Please see a copy of my visit note below.    HPI:     Alexandro Pool is a 61 year old male who is seen in follow-up for management of severe PAD, right AKA, and left leg critical limb ischemia.     His complex vascular history is notable for gangrenous R foot ulcer resulting in a right AKA in 2011, severe chronic limb ischemia of the left leg status-post SFA stenting in 3/2012 and left femoral to anterior tibial artery bypass with in situ saphenous vein by Dr. Suggs in 9/2013. Mr. Pool also has history of coronary artery disease and a ischemic cardiomyopathy with an LVEF= 49%. He has HTN, HLD, type II DM and continues to smoke about 2 PPD.     Mr. Pool is feeling quite well. 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 He reports occasional cramping in his left leg which typically occurs at night or with use of the Flexitouch however he reports that this is unchanged. He reports a new painful pulsation on the medial aspect of his left thigh which he noticed a few months ago. He has no new foot ulcerations or wounds. He denies chest pain, shortness of breath or palpitations.     PAST MEDICAL HISTORY  Past Medical History:   Diagnosis Date     Acute kidney failure, unspecified (H)      Blood transfusion      CAD (coronary artery disease)      CARDIOVASCULAR SCREENING; LDL GOAL LESS THAN 100      Cirrhosis (H)      Critical lower limb ischemia      Diabetes mellitus (H) 10/2011     Hypertension      Hypertension goal BP (blood pressure) < 140/90      Ischemic cardiomyopathy      Lymphedema of left leg 5/11/2016     Peripheral arterial disease (H)      PVD (peripheral vascular disease)  (H)      Right above knee amputation 4/30/2014     Type 2 diabetes, HbA1C goal < 8% (H)        CURRENT MEDICATIONS  Current Outpatient Prescriptions   Medication Sig Dispense Refill     spironolactone (ALDACTONE) 25 MG tablet Take 0.5 tablets (12.5 mg) by mouth daily 15 tablet 0     lisinopril (PRINIVIL,ZESTRIL) 2.5 MG tablet Take 1 tablet (2.5 mg) by mouth 2 times daily 180 tablet 3     order for DME Equipment being ordered: FlexiTouch pneumatic compression device.  2-3 times per day to left lower extremity.  Current strength - L4 1 Units      Vorapaxar Sulfate (ZONTIVITY) 2.08 MG tablet Take 1 tablet (2.08 mg) by mouth daily 30 tablet 6     atorvastatin (LIPITOR) 10 MG tablet Take 4 tablets (40 mg) by mouth At Bedtime 90 tablet 3     insulin pen needle (BD KWABENA U/F) 32G X 4 MM Use 4x  daily or as directed. 120 each 11     insulin lispro (HUMALOG VIAL) SOLN 100 UNIT/ML Inject Subcutaneous 3 times daily (before meals) 18 units before each meal       hydrocortisone 1 % cream Apply topically 2 times daily PRN       SENNA PO 1 Tab , bedtime       morphine (MS CONTIN) 15 MG 12 hr tablet Take 3 tablets (45 mg) by mouth 2 times daily 60 tablet 0     oxyCODONE (ROXICODONE) 10 MG immediate release tablet Take 1.5 tablets (15 mg) by mouth every 4 hours as needed (Patient taking differently: Take 20 mg by mouth every 4 hours as needed ) 60 tablet 0     gabapentin (NEURONTIN) 100 MG capsule Take 1 capsule (100 mg) by mouth 3 times daily 90 capsule 0     insulin glargine (LANTUS) SOLN 100 UNIT/ML Inject 12 Units Subcutaneous every morning (Patient taking differently: Inject 50 Units Subcutaneous every morning) 1 Month 0     ASPIRIN EC PO Take 81 mg by mouth daily       METOPROLOL SUCCINATE ER PO Take 75 mg by mouth daily       amLODIPine (NORVASC) 2.5 MG tablet Take 2.5 mg by mouth 2 times daily       acetaminophen (TYLENOL) 325 MG tablet Take 1 tablet by mouth every 4 hours as needed. 250 tablet 0       PAST SURGICAL  HISTORY:  Past Surgical History:   Procedure Laterality Date     AMPUTATE LEG ABOVE KNEE  11/22/2011    Procedure:AMPUTATE LEG ABOVE KNEE; Revise Right Below Knee Amputation To Above Knee Amputation; Surgeon:MADISON WELLS; Location:[facility] OR     AMPUTATE LEG BELOW KNEE  11/10/2011    Procedure:AMPUTATE LEG BELOW KNEE; Right Below Knee Amputation; Surgeon:MADISON WELLS; Location:[facility] OR     BYPASS GRAFT FEMOROTIBIAL  9/19/2013    Procedure: BYPASS GRAFT FEMOROTIBIAL;  Left Femorotibial Bypass Graft Insitu ;  Surgeon: Marisol Suggs MD;  Location:  OR     CARDIAC SURGERY      cardiac stent     ESOPHAGOSCOPY, GASTROSCOPY, DUODENOSCOPY (EGD), COMBINED  10/1/2012    Procedure: COMBINED ESOPHAGOSCOPY, GASTROSCOPY, DUODENOSCOPY (EGD);;  Surgeon: Nehemias Cevallos MD;  Location:  GI     ESOPHAGOSCOPY, GASTROSCOPY, DUODENOSCOPY (EGD), COMBINED N/A 3/24/2016    Procedure: COMBINED ESOPHAGOSCOPY, GASTROSCOPY, DUODENOSCOPY (EGD);  Surgeon: Gildardo Marks MD;  Location:  GI     IR STENT VASCULAR LT  3/9/2012          IRRIGATION AND DEBRIDEMENT LOWER EXTREMITY, COMBINED  11/15/2011    Procedure:COMBINED IRRIGATION AND DEBRIDEMENT LOWER EXTREMITY; DRAINAGE OF ABSCESS AT BELOW KNEE AMPUTATION AND KNEE DISARTICULATION.; Surgeon:MADISON WELLS; Location:[facility] OR     NO HISTORY OF SURGERY  7/12/13    derm       ALLERGIES  Allergies   Allergen Reactions     Ciprofloxacin Rash       FAMILY HISTORY  Family History   Problem Relation Age of Onset     Alcohol/Drug Mother      cirrhosis     Alcohol/Drug Father      C.A.D. No family hx of      DIABETES No family hx of      CANCER No family hx of      SOCIAL HISTORY  Social History     Social History     Marital status:      Spouse name: N/A     Number of children: 0     Years of education: N/A     Occupational History      Unknown     Social History Main Topics     Smoking status: Current Every Day Smoker     Packs/day: 2.00     Years: 40.00     Types: Cigarettes     Smokeless  "tobacco: Never Used      Comment: 1 pack a day     Alcohol use 0.6 oz/week     1 Standard drinks or equivalent per week      Comment: Once a month     Drug use: No      Comment: seldom marijuana     Sexual activity: Not Currently     Other Topics Concern     Parent/Sibling W/ Cabg, Mi Or Angioplasty Before 65f 55m? No     Caffeine Concern Yes     Exercise No     Social History Narrative       ROS:   Constitutional: No fever, chills, or sweats. No weight gain/loss   ENT: No visual disturbance, ear ache, epistaxis, sore throat  Allergies/Immunologic: Negative  Respiratory: No cough, hemoptysia  Cardiovascular: As per HPI  GI: No nausea, vomiting, hematemesis, melena, or hematochezia  : No urinary frequency, dysuria, or hematuria  Integument: Negative  Psychiatric: Negative  Neuro: Negative  Endocrinology: Negative   Musculoskeletal: Negative  Vascular: As per HPI    EXAM:  /77 (BP Location: Left arm, Patient Position: Chair, Cuff Size: Adult Large)  Pulse 58  Ht 1.702 m (5' 7\")  Wt 98.9 kg (218 lb 1.6 oz)  SpO2 94%  BMI 34.16 kg/m2  In general, the patient is a wheelchair bound pleasant male in no apparent distress.    HEENT: NC/AT.  PERRLA.  EOMI.  Sclerae white, not injected.  Nares clear.  Pharynx without erythema or exudate.  Dentition intact.    Neck: No adenopathy.  No thyromegaly. Carotids +2/2 bilaterally without bruits.  No jugular venous distension.   Heart: RRR. Normal S1, S2 splits physiologically. No murmur, rub, click, or gallop. The PMI is in the 5th ICS in the midclavicular line. There is no heave.    Lungs: CTA.  Abdomen: Soft, nontender, nondistended. No organomegaly. No AAA.  No bruits.   Extremities: Left lower extremity hyperpigmented with 2-3+ pitting edema. AV fistula present on the medial aspect of thigh. Able to dopple monophasic DP and PT pulses. PT pulse very faint.   Vascular: AV fistula bruit present.    Labs:  LIPID RESULTS:  Lab Results   Component Value Date    CHOL 123 " 11/26/2012    HDL 20 (L) 11/26/2012    LDL 57 11/26/2012    TRIG 228 (H) 11/26/2012    CHOLHDLRATIO 6.1 (H) 11/26/2012       LIVER ENZYME RESULTS:  Lab Results   Component Value Date    AST 20 02/16/2017    ALT 20 02/16/2017       CBC RESULTS:  Lab Results   Component Value Date    WBC 7.8 02/16/2017    RBC 4.99 02/16/2017    HGB 14.7 02/16/2017    HCT 42.9 02/16/2017    MCV 86 02/16/2017    MCH 29.5 02/16/2017    MCHC 34.3 02/16/2017    RDW 14.1 02/16/2017     02/16/2017     BMP RESULTS:  Lab Results   Component Value Date     02/16/2017    POTASSIUM 4.2 02/16/2017    CHLORIDE 102 02/16/2017    CO2 26 02/16/2017    ANIONGAP 9 02/16/2017     (H) 02/16/2017    BUN 19 02/16/2017    CR 0.80 02/16/2017    GFRESTIMATED >90  Non  GFR Calc   02/16/2017    GFRESTBLACK >90   GFR Calc   02/16/2017    BOGDAN 9.0 02/16/2017      A1C RESULTS:  Lab Results   Component Value Date    A1C 6.6 (H) 10/17/2013     Procedures:    Exam: Duplex ultrasound of left lower extremity arteries dated  5/10/2017 11:55 AM      Clinical information: PAD with chronic le ischemia, Peripheral  vascular disease, unspecified . Left SFA to SENIA bypass graft.     Comparison: 11/9/2016.     EIA: Velocity: 157/79 cm/sec.  Waveforms: Monophasic low-resistance  waveforms with significant diastolic flow.  Common femoral artery: 233/100 cm/sec. Waveforms: Monophasic low  resistance waveforms with significant diastolic flow.     SFA to SENIA bypass graft: Monophasic low resistance waveforms with  significant diastolic flow  Proximal anastomosis: 194/91, 189/82 cm/s   Proximal thigh: 93/40  Mid thigh: 112/55     Mid thigh branch off the SFA to SENIA bypass graft: Flow is reversed,  towards the head. Velocity: 388/237 cm/s. Monophasic low resistance  waveforms with significant diastolic flow.     Distal thigh: 60/0 cm/s. Monophasic waveform, without significant  diastolic flow  Knee: 52/0 cm/s. Monophasic waveform,  without significant diastolic  flow  Proximal calf: 51/0 cm/s. Monophasic waveforms, without significant  diastolic flow  Mid calf: 57/0 cm/s. Monophasic waveforms, without significant  diastolic flow.  Distal: 43/0 cm/s. Monophasic wave without significant diastolic flow.  Distal anastomosis: 53/12 cm/s. Monophasic waveforms with good  systolic upstroke and mild diastolic flow.     Distal to anastomosis: 78/0 cm/s. Good systolic upstroke, phasic  waveform with out significant diastolic flow.     ABIs:     Right side:  Arm: 117 mmHg     Left side:   Arm: 118 mmHg   PT at ankle: 76 mmHg   DP at foot: 82 mmHg  First digit: 87 mmHg   OZZIE: 0.69   TBI: 0.74     Impression:  Patent left SFA to SENIA bypass graft without significant  stenosis. Patent nonligated sidebranch off the in situ bypass at the  mid/distal thigh with velocity up to 389 cm/s, previously 568 cm/s.  Waveforms proximal to the side branch are low resistance monophasic  compatible with likely venous fistula through the nonligated side  branch. Distal to this branch waveforms are high resistance monophasic  with good systolic upstroke similar prior. Again, correlate with any  signs of venous hypertension in the left leg.    Vascular ultrasound arterial duplex left lower extremity extremity  bypass graft dated 11/9/2016 12:13 PM     Comparison Study: 11/2/2015 and 4/20/2015     Clinical History: Peripheral arterial disease, previous SFG     Technique: Study includes gray scale images, color Doppler, spectral  Doppler waveforms and velocities with appropriate angles of 60 degrees  or less.     Findings:      Left lower extremity: Left thigh common femoral to anterior tibial  artery bypass graft.     External iliac artery, inflow artery: 242/105 cm/s, monophasic  waveforms.     Common femoral artery proximal to the anastomosis: 211/95 cm/sec with  monophasic waveforms.     Proximal anastomosis: 226/107 cm/sec with monophasic waveforms.     Within graft from  proximal to distal: 161/66, 99/49, 115/53, 153/56,  78, 51, 54, and 54 cm/sec with monophasic waveforms.   Non-ligated branch vein off of mid distal thigh graft with velocity of  414/206 cm/s and 568/332 cm/s, previously 375/246 cm/sec. And on  4/20/2015 this measured 425/205 cm/sec.     Distal anastomosis: 95 cm/sec with monophasic waveforms.     Anterior tibial artery distal to the anastomosis: Antegrade flow with  velocity of 63 cm/sec with monophasic waveforms.  Anterior tibial artery proximal to the anastomosis demonstrates  expected reversal of flow (away from the graft anastomosis) at 85  cm/sec.     Impression:  1. Patent left common femoral to anterior tibial artery bypass graft  demonstrates no evidence of hemodynamically significant stenosis.  Diffuse monophasic waveforms consistent with decreased capillary  resistance/vasodilation.  2. Redemonstration of patent non-ligated side branch coming off of the  in situ bypass at the mid to distal thigh with velocity of 414-568,  increased since prior study, but similar to 4/20/2015. Correlate for  clinical evidence of venous hypertension     Assessment and Plan:     Alexandro Pool is a 61 year old male who is seen in follow-up for management of severe PAD s/p right AKA for non-healing wound in 2011, left leg SFA stenting in 3/2012, as well as left femoral to anterior tibial artery bypass in 9/2013. Mr. Pool reports that he has been feeling quite well lately and denies any new symptoms. He continues to suffer from chronic left leg neuropathic pain secondary to diabetes. He reports very rare left leg cramping that typically occurs at night or with use of the Flexitouch however this has not changed. He recently underwent duplex graft surveillance imaging that shows reduced distal velocities concerning for short to medium term graft failure, as well as an AV fistula which was also noticed on physical exam.     Left lower extremity critical limb ischemia:  Patient denies new symptoms however recent arterial duplex shows AV fistula and reduced distal velocities concerning for short-medium term graft failure.   -- Recommend urgent follow-up with Dr. Suggs to evaluate whether angiography is indicated, and to consider need for surgical ligation of fistula  -- Continue ASA 81 mg  -- Continue use of Flexitouch pump  -- Discussed modifiable risk factors as stated below    Tobacco use disorder: Encourage smoking cessation, patient is willing to try nicotine patch.  -- Nicotine patch 21 mg/24 hours for 2 month followed by 14 mg/24 hours for 2 months then 7 mg/24 hours for 2 month    Dyslipidemia:   -- Continue atorvastatin 40 mg daily.  -- Obtain lipid panel    Diabetes Melllitus:  -- Obtain hemoglobin A1C    Hypertension: Well controlled, /77.  -- Continue lisinopril 2.5 mg daily and amlodipine 2.5 mg BID    CAD with ischemic cardiomyopathy EF 49%: No active cardiac symptoms.  -- Continue metoprolol succinate 75 mg daily    Giovani Olson MD

## 2017-06-23 NOTE — PATIENT INSTRUCTIONS
You were seen today in the Cardiovascular Clinic at the [facility].      Cardiology Providers you saw during your visit:  Dr. Olson    Diagnosis:  Critical Limb Ischemia    Results:  OZZIE and left lower extremity duplex reviewed in clinic.    Recommendations:   Continue medications as prescribed     Continue use of Flexitouch pump. Increase to 3 times per day if possible.     Follow up with Dr. Suggs to evaluate the fistula in your left leg and the bypass graft in your left leg.     Follow up with MICHELLE Loja in august     Labs prior to appt with Purvi     Nicotine patch per prescription.    Follow-up:  6 months with Dr. Olson      For emergencies call [phone].    For any scheduling needs, please call [phone]. Option 1 then option 3    Thank you for your visit today!     Please call if you have any questions or concerns.  Vikas Skinner RN

## 2017-06-23 NOTE — NURSING NOTE
Chief Complaint   Patient presents with     Follow Up For     60 y/o male for follow up of PAD with critical limb ischemia.     Vitals were taken ON BOTH ARMS and medications were reconciled.     ANTONIA Antony  1:00 PM

## 2017-06-29 ENCOUNTER — TELEPHONE (OUTPATIENT)
Dept: OTHER | Facility: CLINIC | Age: 62
End: 2017-06-29

## 2017-06-29 ENCOUNTER — OFFICE VISIT (OUTPATIENT)
Dept: VASCULAR SURGERY | Facility: CLINIC | Age: 62
End: 2017-06-29

## 2017-06-29 VITALS
DIASTOLIC BLOOD PRESSURE: 72 MMHG | SYSTOLIC BLOOD PRESSURE: 130 MMHG | RESPIRATION RATE: 19 BRPM | OXYGEN SATURATION: 99 % | HEART RATE: 67 BPM

## 2017-06-29 DIAGNOSIS — I73.9 PERIPHERAL ARTERY DISEASE (H): Primary | ICD-10-CM

## 2017-06-29 RX ORDER — ATORVASTATIN CALCIUM 10 MG/1
80 TABLET, FILM COATED ORAL AT BEDTIME
Qty: 90 TABLET | Refills: 11 | Status: SHIPPED | OUTPATIENT
Start: 2017-06-29 | End: 2017-06-29

## 2017-06-29 RX ORDER — ATORVASTATIN CALCIUM 80 MG/1
80 TABLET, FILM COATED ORAL AT BEDTIME
Qty: 90 TABLET | Refills: 11 | Status: SHIPPED | OUTPATIENT
Start: 2017-06-29 | End: 2018-07-09

## 2017-06-29 ASSESSMENT — PAIN SCALES - GENERAL: PAINLEVEL: NO PAIN (0)

## 2017-06-29 NOTE — MR AVS SNAPSHOT
After Visit Summary   6/29/2017    Alexandro Pool    MRN: [id number]           Patient Information     Date Of Birth          1955        Visit Information        Provider Department      6/29/2017 2:30 PM Marisol Suggs MD [facility]        Today's Diagnoses     Peripheral artery disease (H)    -  1       Follow-ups after your visit        Additional Services     PAD REHAB REFERRAL       Your provider has referred you to: UM: [facility] ([facility]) - [place] [phone]   [email or web address]    Exercise is limited by claudication: right AKA- is very interested in upper extremity exercise  Any other exercise limitations?  Right above knee ampuation                  Your next 10 appointments already scheduled     Aug 15, 2017  7:45 AM CDT   Lab with  LAB    Health Lab ([facility])    [address] [place] [address] [id number] [phone]            Aug 15, 2017  8:00 AM CDT   Lab with  LAB    Health Lab ([facility])    [address] [place] [address] [id number] [phone]            Aug 15, 2017  8:30 AM CDT   US ABDOMEN COMPLETE with UCUS1   [facility] ([facility])    [address] [place] [address] [id number] [phone]           Please bring a list of your medicines (including vitamins, minerals and over-the-counter drugs). Also, tell your doctor about any allergies you may have. Wear comfortable clothes and leave your valuables at home.  Adults: No eating or drinking for 8 hours before the exam. You may take medicine with a small sip of water.  Children: - Children 6+ years: No food or drink for 6 hours before exam. - Children 1-5 years: No food or drink for 4 hours before exam. - Infants, breast-fed: may have breast milk up to 2 hours before exam. -  Infants, formula: may have bottle until 4 hours before exam.  Please call the Imaging Department at your exam site with any questions.            Aug 15, 2017  9:30 AM CDT   (Arrive by 9:15 AM)   Return General Liver with Nehemias Cevallos MD   [facility] ([facility])    [address] [place] [id number]   [phone]            Aug 21, 2017  9:00 AM CDT   (Arrive by 8:45 AM)   New Patient Visit with Purvi Anderson NP   [facility] ([facility])    [address] [place] [address] [id number] [phone]            Dec 15, 2017  1:00 PM CST   (Arrive by 12:45 PM)   Return Visit with Giovani Olson MD   [facility] ([facility])    [address] [place] [id number] [phone]              Who to contact     Please call your clinic at [phone] to:    Ask questions about your health    Make or cancel appointments    Discuss your medicines    Learn about your test results    Speak to your doctor   If you have compliments or concerns about an experience at your clinic, or if you wish to file a complaint, please contact [facility] Patient Relations at [phone] or email us at [email or web address]         Additional Information About Your Visit        MyChart Information     Infl[employer]et is an electronic gateway that provides easy, online access to your medical records. With Memobox, you can request a clinic appointment, read your test results, renew a prescription or communicate with your care team.     To sign up for [employer] visit the website at [email or web address]   You will be asked to enter the access code listed below, as well as some personal information. Please follow the directions to create your username and password.     Your access code is: [email or web address]  Expires: 9/10/2017  6:31 AM      Your access code will  in 90 days. If you need help or a new code, please contact your [facility] or call [phone] for assistance.        Care EveryWhere ID     This is your Care EveryWhere ID. This could be used by other organizations to access your [facility] medical records  [id number]        Your Vitals Were     Pulse Respirations Pulse Oximetry             67 19 99%          Blood Pressure from Last 3 Encounters:   17 130/72   17 122/77   17 114/75    Weight from Last 3 Encounters:   17 218 lb 1.6 oz   17 213 lb   16 218 lb 11.2 oz              We Performed the Following     PAD REHAB REFERRAL          Today's Medication Changes          These changes are accurate as of: 17  2:31 PM.  If you have any questions, ask your nurse or doctor.               Start taking these medicines.        Dose/Directions    atorvastatin 80 MG tablet   Commonly known as:  LIPITOR   Used for:  Peripheral artery disease (H)   Started by:  Marisol Suggs MD        Dose:  80 mg   Take 1 tablet (80 mg) by mouth At Bedtime   Quantity:  90 tablet   Refills:  11         These medicines have changed or have updated prescriptions.        Dose/Directions    insulin glargine 100 UNIT/ML injection   Commonly known as:  LANTUS   This may have changed:  additional instructions   Used for:  DM neuro manif type II        Inject 12 Units Subcutaneous every morning   Quantity:  1 Month   Refills:  0       oxyCODONE 10 MG IR tablet   Commonly known as:  ROXICODONE   This may have changed:  how much to take   Used for:  S/P AKA (above knee amputation) (H)        Dose:  15 mg   Take 1.5 tablets (15 mg) by mouth every 4 hours as needed   Quantity:  60 tablet   Refills:  0            Where to get your medicines      These medications were sent to [facility] - [place], [place] - [address]  [facility] [address], [place] [address]     Phone:  [phone]      atorvastatin 80 MG tablet                Primary Care Provider    Md Other Clinic                Equal Access to Services     AMRIK MORROW : Hadii michelle Freitas, roberto crane, maryloudavid walter. So [facility] [phone].    ATENCIÓN: Si habla español, tiene a wong disposición servicios gratuitos de asistencia lingüística. Llame al [phone].    We comply with applicable federal civil rights laws and [place] laws. We do not discriminate on the basis of race, color, national origin, age, disability sex, sexual orientation or gender identity.            Thank you!     Thank you for choosing [facility]  for your care. Our goal is always to provide you with excellent care. Hearing back from our patients is one way we can continue to improve our services. Please take a few minutes to complete the written survey that you may receive in the mail after your visit with us. Thank you!             Your Updated Medication List - Protect others around you: Learn how to safely use, store and throw away your medicines at www.disposemymeds.org.          This list is accurate as of: 6/29/17  2:31 PM.  Always use your most recent med list.                   Brand Name Dispense Instructions for use Diagnosis    acetaminophen 325 MG tablet    TYLENOL    250 tablet    Take 1 tablet by mouth every 4 hours as needed.    Peripheral arterial disease (H)       amLODIPine 2.5 MG tablet    NORVASC     Take 2.5 mg by mouth 2 times daily        ASPIRIN EC PO      Take 81 mg by mouth daily        atorvastatin 80 MG tablet    LIPITOR    90 tablet    Take 1 tablet (80 mg) by mouth At Bedtime    Peripheral artery disease (H)       gabapentin 100 MG capsule    NEURONTIN    90 capsule    Take 1 capsule (100 mg) by mouth 3 times daily    Critical lower limb ischemia       humaLOG 100 UNIT/ML injection   Generic drug:  insulin lispro      Inject Subcutaneous 3 times daily  (before meals) 18 units before each meal        hydrocortisone 1 % cream    CORTAID     Apply topically 2 times daily PRN        insulin glargine 100 UNIT/ML injection    LANTUS    1 Month    Inject 12 Units Subcutaneous every morning    DM neuro manif type II       insulin pen needle 32G X 4 MM    BD KWABENA U/F    120 each    Use 4x  daily or as directed.    Type II or unspecified type diabetes mellitus without mention of complication, not stated as uncontrolled       lisinopril 2.5 MG tablet    PRINIVIL/Zestril    180 tablet    Take 1 tablet (2.5 mg) by mouth 2 times daily    Hypertension goal BP (blood pressure) < 140/90       METOPROLOL SUCCINATE ER PO      Take 75 mg by mouth daily        morphine 15 MG 12 hr tablet    MS CONTIN    60 tablet    Take 3 tablets (45 mg) by mouth 2 times daily    Critical lower limb ischemia       * nicotine 21 MG/24HR 24 hr patch    NICODERM CQ    30 patch    Place 1 patch onto the skin every 24 hours    Cigarette nicotine dependence with nicotine-induced disorder       * nicotine 14 MG/24HR 24 hr patch    NICODERM CQ    30 patch    Place 1 patch onto the skin every 24 hours    Cigarette nicotine dependence with nicotine-induced disorder       * nicotine 7 MG/24HR 24 hr patch    NICODERM CQ    30 patch    Place 1 patch onto the skin every 24 hours    Cigarette nicotine dependence with nicotine-induced disorder       order for DME     1 Units    Equipment being ordered: FlexiTouch pneumatic compression device.  2-3 times per day to left lower extremity. Current strength - L4        oxyCODONE 10 MG IR tablet    ROXICODONE    60 tablet    Take 1.5 tablets (15 mg) by mouth every 4 hours as needed    S/P AKA (above knee amputation) (H)       SENNA PO      1 Tab , bedtime        spironolactone 25 MG tablet    ALDACTONE    15 tablet    Take 0.5 tablets (12.5 mg) by mouth daily    Ischemic cardiomyopathy, Hypertension goal BP (blood pressure) < 140/90       vorapaxar sulfate 2.08 MG tablet     ZONTIVITY    30 tablet    Take 1 tablet (2.08 mg) by mouth daily    Lower limb ischemia       * Notice:  This list has 3 medication(s) that are the same as other medications prescribed for you. Read the directions carefully, and ask your doctor or other care provider to review them with you.

## 2017-06-29 NOTE — PROGRESS NOTES
VASCULAR SURGERY CLINIC ESTABLISHED PATIENT NOTE    HPI:  Mr. Pool is a 61- year old male who presents today for follow-up status post left femoral to anterior tibial artery bypass with in-situ saphenous vein and intra-operative ultrasound-guided ligation of parasitic branches. On 6/23/2017 he saw Dr. Olson with concern of left thigh arteriovenous fistula and decreased distal bypass velocities on ultrasound.    SUBJECTIVE: Endorses feeling well. Denies rest pain, however he is neuropathic. Denies tissue loss. He has begun smoking cessation and is using a patch.     OBJECTIVE:  Vital signs:  130/72  67  19    Prior to Admission medications    Medication Sig Start Date End Date Taking? Authorizing Provider   atorvastatin (LIPITOR) 80 MG tablet Take 1 tablet (80 mg) by mouth At Bedtime 6/29/17  Yes Viridiana Smith APRN CNP   nicotine (NICODERM CQ) 21 MG/24HR 24 hr patch Place 1 patch onto the skin every 24 hours 6/23/17 8/22/17  Purvi Anderson NP   nicotine (NICODERM CQ) 14 MG/24HR 24 hr patch Place 1 patch onto the skin every 24 hours 6/23/17 8/22/17  Purvi Anderson NP   nicotine (NICODERM CQ) 7 MG/24HR 24 hr patch Place 1 patch onto the skin every 24 hours 6/23/17 8/22/17  Purvi Anderson NP   spironolactone (ALDACTONE) 25 MG tablet Take 0.5 tablets (12.5 mg) by mouth daily 6/12/17   Giovani Olson MD   lisinopril (PRINIVIL,ZESTRIL) 2.5 MG tablet Take 1 tablet (2.5 mg) by mouth 2 times daily 11/1/16   Greg Olguin MD   order for DME Equipment being ordered: FlexiTouch pneumatic compression device.  2-3 times per day to left lower extremity.  Current strength - L4 5/11/16   Omar Pereyra MD   Vorapaxar Sulfate (ZONTIVITY) 2.08 MG tablet Take 1 tablet (2.08 mg) by mouth daily 3/18/15   Omar Pereyra MD   insulin pen needle (BD KWABENA U/F) 32G X 4 MM Use 4x  daily or as directed. 8/18/14   Kourtney Wong, APRN CNP   insulin lispro (HUMALOG VIAL) SOLN 100 UNIT/ML  Inject Subcutaneous 3 times daily (before meals) 18 units before each meal    Reported, Patient   hydrocortisone 1 % cream Apply topically 2 times daily PRN    Reported, Patient   SENNA PO 1 Tab , bedtime    Reported, Patient   morphine (MS CONTIN) 15 MG 12 hr tablet Take 3 tablets (45 mg) by mouth 2 times daily 10/19/13   Vikas Pop MD   oxyCODONE (ROXICODONE) 10 MG immediate release tablet Take 1.5 tablets (15 mg) by mouth every 4 hours as needed  Patient taking differently: Take 20 mg by mouth every 4 hours as needed  10/19/13   Vikas Pop MD   gabapentin (NEURONTIN) 100 MG capsule Take 1 capsule (100 mg) by mouth 3 times daily 10/19/13   Vikas Pop MD   insulin glargine (LANTUS) SOLN 100 UNIT/ML Inject 12 Units Subcutaneous every morning  Patient taking differently: Inject 50 Units Subcutaneous every morning 10/19/13   Vikas Pop MD   ASPIRIN EC PO Take 81 mg by mouth daily    Unknown, Entered By History   METOPROLOL SUCCINATE ER PO Take 75 mg by mouth daily    Unknown, Entered By History   amLODIPine (NORVASC) 2.5 MG tablet Take 2.5 mg by mouth 2 times daily    Reported, Patient   acetaminophen (TYLENOL) 325 MG tablet Take 1 tablet by mouth every 4 hours as needed. 3/10/12   Faviola Gusman MD       PHYSICAL EXAM:  NEURO/PSYCH: The patient is alert and oriented.  Appropriate.  Moves all extremities.   SKIN: Color appropriate for race, warm, dry.   PULMONARY: Does not require supplemental oxygen; no acute distress.   EXTREMITIES: Left lower extremity dark, however warm and well-perfused. Distal bypass palpable distal to knee on left medial calf. No tissue loss. Palpable arteriovenous fistula left upper medial thigh.     5/10/2017 OZZIE RESULTS:  Right side:  Arm: 117 mmHg     Left side:   Arm: 118 mmHg   PT at ankle: 76 mmHg   DP at foot: 82 mmHg  First digit: 87 mmHg   OZZIE: 0.69   TBI: 0.74    5/10/2017 ULTRASOUND DUPLEX LEFT LOWER EXTREMITY:  Findings:      Left lower  extremity:     External iliac     EIA: Velocity: 157/79 cm/sec.  Waveforms: Monophasic low-resistance  waveforms with significant diastolic flow.  Common femoral artery: 233/100 cm/sec. Waveforms: Monophasic low  resistance waveforms with significant diastolic flow.     SFA to SENIA bypass graft: Monophasic low resistance waveforms with  significant diastolic flow  Proximal anastomosis: 194/91, 189/82 cm/s   Proximal thigh: 93/40  Mid thigh: 112/55     Mid thigh branch off the SFA to SENIA bypass graft: Flow is reversed,  towards the head. Velocity: 388/237 cm/s. Monophasic low resistance  waveforms with significant diastolic flow.     Distal thigh: 60/0 cm/s. Monophasic waveform, without significant  diastolic flow  Knee: 52/0 cm/s. Monophasic waveform, without significant diastolic  flow  Proximal calf: 51/0 cm/s. Monophasic waveforms, without significant  diastolic flow  Mid calf: 57/0 cm/s. Monophasic waveforms, without significant  diastolic flow.  Distal: 43/0 cm/s. Monophasic wave without significant diastolic flow.  Distal anastomosis: 53/12 cm/s. Monophasic waveforms with good  systolic upstroke and mild diastolic flow.     Distal to anastomosis: 78/0 cm/s. Good systolic upstroke, phasic  waveform with out significant diastolic flow.     ABIs:     Right side:  Arm: 117 mmHg     Left side:   Arm: 118 mmHg   PT at ankle: 76 mmHg   DP at foot: 82 mmHg  First digit: 87 mmHg   OZZIE: 0.69   TBI: 0.74  Impression:  Patent left SFA to SENIA bypass graft without significant  stenosis. Patent nonligated sidebranch off the in situ bypass at the  mid/distal thigh with velocity up to 389 cm/s, previously 568 cm/s.  Waveforms proximal to the side branch are low resistance monophasic  compatible with likely venous fistula through the nonligated side  branch. Distal to this branch waveforms are high resistance monophasic  with good systolic upstroke similar prior. Again, correlate with any  signs of venous hypertension in the  left leg.    ASSESSMENT/PLAN:  #1 Critical limb ischemia, secondary to atherosclerosis  #2 Status post right above knee amputation, 2011  #3 Status post left superficial femoral artery stent, 03/2012  #4 Status post status post left femoral to anterior tibial artery bypass with in-situ saphenous vein and intra-operative ultrasound-guided ligation of parasitic branches, 2013 at [facility]  #5 Arteriovenous fistula, left upper medial thigh, likely secondary to parasitic side branch of left lower extremity bypass  - will have IR do diagnostic left lower extremity angiogram  - Dr. Suggs will repair left lower extremity AVF  - refer to PAD rehab; there is evidence for improved lower extremity blood flow with upper extremity exercise  - is quitting smoking, highly encouraged  - increase atorvastatin to 80 mg daily; one-year prescription provided with instruction to have PCP refill when needed  - I will contact Dr. Belle, PCP, [phone]  - I will inbox Dr. Olson with our plan    ANTI-PLATELET: Aspirin  ANTI-COAGULANT: Not warranted  STATIN: Increase atorvastatin to 80 mg daily  DIABETES MEDICATIONS: Insulin  TOBACCO CESSATION: Beginning smoking cessation    Please contact Dr. Suggs's Vascular Surgery Service with questions or concerns.    JOHAN Hubbard, CNP  Division of Vascular Surgery  [facility]  Pager: [phone]

## 2017-06-29 NOTE — NURSING NOTE
Chief Complaint   Patient presents with     RECHECK     Follow up left groin fistula/ PVD       Vitals:    06/29/17 1352   BP: 130/72   BP Location: Right arm   Pulse: 67   Resp: 19   SpO2: 99%       There is no height or weight on file to calculate BMI.              Jenny Borden LPN

## 2017-06-29 NOTE — LETTER
6/29/2017       RE: Alexandro Pool  [address]    [place] [address]     Dear Colleague,    Thank you for referring your patient, Alexandro Pool, to the [facility] at [facility]. Please see a copy of my visit note below.    VASCULAR SURGERY CLINIC ESTABLISHED PATIENT NOTE    HPI:  Mr. Pool is a 61- year old male who presents today for follow-up status post left femoral to anterior tibial artery bypass with in-situ saphenous vein and intra-operative ultrasound-guided ligation of parasitic branches. On 6/23/2017 he saw Dr. Olson with concern of left thigh arteriovenous fistula and decreased distal bypass velocities on ultrasound.    SUBJECTIVE: Endorses feeling well. Denies rest pain, however he is neuropathic. Denies tissue loss. He has begun smoking cessation and is using a patch.     OBJECTIVE:  Vital signs:  130/72  67  19    Prior to Admission medications    Medication Sig Start Date End Date Taking? Authorizing Provider   atorvastatin (LIPITOR) 80 MG tablet Take 1 tablet (80 mg) by mouth At Bedtime 6/29/17  Yes Viridiana Smith, APRN CNP   nicotine (NICODERM CQ) 21 MG/24HR 24 hr patch Place 1 patch onto the skin every 24 hours 6/23/17 8/22/17  Purvi Anderson NP   nicotine (NICODERM CQ) 14 MG/24HR 24 hr patch Place 1 patch onto the skin every 24 hours 6/23/17 8/22/17  Purvi Anderson NP   nicotine (NICODERM CQ) 7 MG/24HR 24 hr patch Place 1 patch onto the skin every 24 hours 6/23/17 8/22/17  Purvi Anderson NP   spironolactone (ALDACTONE) 25 MG tablet Take 0.5 tablets (12.5 mg) by mouth daily 6/12/17   Giovani Olson MD   lisinopril (PRINIVIL,ZESTRIL) 2.5 MG tablet Take 1 tablet (2.5 mg) by mouth 2 times daily 11/1/16   Greg Olguin MD   order for DME Equipment being ordered: FlexiTouch pneumatic compression device.  2-3 times per day to left lower extremity.  Current strength - L4 5/11/16   Roddy  Omar Merino MD   Vorapaxar Sulfate (ZONTIVITY) 2.08 MG tablet Take 1 tablet (2.08 mg) by mouth daily 3/18/15   Omar Pereyra MD   insulin pen needle (BD KWABENA U/F) 32G X 4 MM Use 4x  daily or as directed. 8/18/14   Marvin, Kourtney Garcia, JOHAN MARTINEZ   insulin lispro (HUMALOG VIAL) SOLN 100 UNIT/ML Inject Subcutaneous 3 times daily (before meals) 18 units before each meal    Reported, Patient   hydrocortisone 1 % cream Apply topically 2 times daily PRN    Reported, Patient   SENNA PO 1 Tab , bedtime    Reported, Patient   morphine (MS CONTIN) 15 MG 12 hr tablet Take 3 tablets (45 mg) by mouth 2 times daily 10/19/13   Vikas Pop MD   oxyCODONE (ROXICODONE) 10 MG immediate release tablet Take 1.5 tablets (15 mg) by mouth every 4 hours as needed  Patient taking differently: Take 20 mg by mouth every 4 hours as needed  10/19/13   Vikas Pop MD   gabapentin (NEURONTIN) 100 MG capsule Take 1 capsule (100 mg) by mouth 3 times daily 10/19/13   Vikas Pop MD   insulin glargine (LANTUS) SOLN 100 UNIT/ML Inject 12 Units Subcutaneous every morning  Patient taking differently: Inject 50 Units Subcutaneous every morning 10/19/13   Vikas Pop MD   ASPIRIN EC PO Take 81 mg by mouth daily    Unknown, Entered By History   METOPROLOL SUCCINATE ER PO Take 75 mg by mouth daily    Unknown, Entered By History   amLODIPine (NORVASC) 2.5 MG tablet Take 2.5 mg by mouth 2 times daily    Reported, Patient   acetaminophen (TYLENOL) 325 MG tablet Take 1 tablet by mouth every 4 hours as needed. 3/10/12   Faviola Gusman MD     PHYSICAL EXAM:  NEURO/PSYCH: The patient is alert and oriented.  Appropriate.  Moves all extremities.   SKIN: Color appropriate for race, warm, dry.   PULMONARY: Does not require supplemental oxygen; no acute distress.   EXTREMITIES: Left lower extremity dark, however warm and well-perfused. Distal bypass palpable distal to knee on left medial calf. No tissue loss. Palpable  arteriovenous fistula left upper medial thigh.     5/10/2017 OZZIE RESULTS:  Right side:  Arm: 117 mmHg     Left side:   Arm: 118 mmHg   PT at ankle: 76 mmHg   DP at foot: 82 mmHg  First digit: 87 mmHg   OZZIE: 0.69   TBI: 0.74    5/10/2017 ULTRASOUND DUPLEX LEFT LOWER EXTREMITY:  Findings:      Left lower extremity:     External iliac     EIA: Velocity: 157/79 cm/sec.  Waveforms: Monophasic low-resistance  waveforms with significant diastolic flow.  Common femoral artery: 233/100 cm/sec. Waveforms: Monophasic low  resistance waveforms with significant diastolic flow.     SFA to SENIA bypass graft: Monophasic low resistance waveforms with  significant diastolic flow  Proximal anastomosis: 194/91, 189/82 cm/s   Proximal thigh: 93/40  Mid thigh: 112/55     Mid thigh branch off the SFA to SENIA bypass graft: Flow is reversed,  towards the head. Velocity: 388/237 cm/s. Monophasic low resistance  waveforms with significant diastolic flow.     Distal thigh: 60/0 cm/s. Monophasic waveform, without significant  diastolic flow  Knee: 52/0 cm/s. Monophasic waveform, without significant diastolic  flow  Proximal calf: 51/0 cm/s. Monophasic waveforms, without significant  diastolic flow  Mid calf: 57/0 cm/s. Monophasic waveforms, without significant  diastolic flow.  Distal: 43/0 cm/s. Monophasic wave without significant diastolic flow.  Distal anastomosis: 53/12 cm/s. Monophasic waveforms with good  systolic upstroke and mild diastolic flow.     Distal to anastomosis: 78/0 cm/s. Good systolic upstroke, phasic  waveform with out significant diastolic flow.     ABIs:     Right side:  Arm: 117 mmHg     Left side:   Arm: 118 mmHg   PT at ankle: 76 mmHg   DP at foot: 82 mmHg  First digit: 87 mmHg   OZZIE: 0.69   TBI: 0.74  Impression:  Patent left SFA to SENIA bypass graft without significant  stenosis. Patent nonligated sidebranch off the in situ bypass at the  mid/distal thigh with velocity up to 389 cm/s, previously 568 cm/s.  Waveforms  proximal to the side branch are low resistance monophasic  compatible with likely venous fistula through the nonligated side  branch. Distal to this branch waveforms are high resistance monophasic  with good systolic upstroke similar prior. Again, correlate with any  signs of venous hypertension in the left leg.    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 Suggs will repair left lower extremity AVF  - refer to [facility] re[facility]; there is evidence for improved lower extremity blood flow with upper extremity exercise  - is quitting smoking, highly encouraged  - increase atorvastatin to 80 mg daily; one-year prescription provided with instruction to have PCP refill when needed  - I will contact Dr. Belle, PCP, [phone]  - I will inbox Dr. Olson with our plan    ANTI-PLATELET: Aspirin  ANTI-COAGULANT: Not warranted  STATIN: Increase atorvastatin to 80 mg daily  DIABETES MEDICATIONS: Insulin  TOBACCO CESSATION: Beginning smoking cessation    Please contact Dr. Suggs's Vascular Surgery Service with questions or concerns.    JOHAN Hubbard, CNP  Division of Vascular Surgery  [facility]  Pager: [phone]    I have seen and assessed this patient with the above provider and agree with her above documentation, impression and plan.  Patient has a parasitic branch of of his in situ bypass.  I plan to ligate this: should iprove his graft flow and also alleviate some thigh swelling from venous  hypertension.  I would like an angiogram first however to see that there are not more branches that we need to get rid of.  If this cannot be easily arranged with IR, I will do the angiogram and ligation at the same setting in the hybrid room.  I spent>50% of this 25 min visit in counseling.    Again, thank you for allowing me to participate in the care of your patient.      Sincerely,    Marisol Suggs MD

## 2017-07-08 NOTE — PROGRESS NOTES
I have seen and assessed this patient with the above provider and agree with her above documentation, impression and plan.  Patient has a parasitic branch of of his in situ bypass.  I plan to ligate this: should iprove his graft flow and also alleviate some thigh swelling from venous hypertension.  I would like an angiogram first however to see that there are not more branches that we need to get rid of.  If this cannot be easily arranged with IR, I will do the angiogram and ligation at the same setting in the hybrid room.    I spent>50% of this 25 min visit in counseling.

## 2017-07-17 DIAGNOSIS — T82.898A: Primary | ICD-10-CM

## 2017-07-18 ENCOUNTER — TELEPHONE (OUTPATIENT)
Dept: INTERVENTIONAL RADIOLOGY/VASCULAR | Facility: CLINIC | Age: 62
End: 2017-07-18

## 2017-07-19 ENCOUNTER — TELEPHONE (OUTPATIENT)
Dept: VASCULAR SURGERY | Facility: CLINIC | Age: 62
End: 2017-07-19

## 2017-07-19 NOTE — TELEPHONE ENCOUNTER
Spoke with Alexandro Pool regarding appointment tomorrow in Interventional Radiology.  Mr Pool aware of appointment and pre procedure instuctions.

## 2017-07-20 ENCOUNTER — APPOINTMENT (OUTPATIENT)
Dept: MEDSURG UNIT | Facility: CLINIC | Age: 62
End: 2017-07-20
Attending: SURGERY
Payer: COMMERCIAL

## 2017-07-20 ENCOUNTER — HOSPITAL ENCOUNTER (OUTPATIENT)
Facility: CLINIC | Age: 62
Discharge: HOME | End: 2017-07-20
Attending: SURGERY | Admitting: SURGERY
Payer: COMMERCIAL

## 2017-07-20 ENCOUNTER — APPOINTMENT (OUTPATIENT)
Dept: INTERVENTIONAL RADIOLOGY/VASCULAR | Facility: CLINIC | Age: 62
End: 2017-07-20
Attending: SURGERY
Payer: COMMERCIAL

## 2017-07-20 VITALS
TEMPERATURE: 98.1 F | DIASTOLIC BLOOD PRESSURE: 72 MMHG | HEART RATE: 66 BPM | SYSTOLIC BLOOD PRESSURE: 123 MMHG | RESPIRATION RATE: 16 BRPM | OXYGEN SATURATION: 97 %

## 2017-07-20 DIAGNOSIS — T82.898A: ICD-10-CM

## 2017-07-20 LAB
CREAT SERPL-MCNC: 0.81 MG/DL (ref 0.66–1.25)
ERYTHROCYTE [DISTWIDTH] IN BLOOD BY AUTOMATED COUNT: 14.8 % (ref 10–15)
GFR SERPL CREATININE-BSD FRML MDRD: NORMAL ML/MIN/1.7M2
HCT VFR BLD AUTO: 42.3 % (ref 40–53)
HGB BLD-MCNC: 14.1 G/DL (ref 13.3–17.7)
INR PPP: 1.11 (ref 0.86–1.14)
MCH RBC QN AUTO: 29.1 PG (ref 26.5–33)
MCHC RBC AUTO-ENTMCNC: 33.3 G/DL (ref 31.5–36.5)
MCV RBC AUTO: 87 FL (ref 78–100)
PLATELET # BLD AUTO: 141 10E9/L (ref 150–450)
RBC # BLD AUTO: 4.85 10E12/L (ref 4.4–5.9)
WBC # BLD AUTO: 9.1 10E9/L (ref 4–11)

## 2017-07-20 PROCEDURE — 82565 ASSAY OF CREATININE: CPT | Performed by: RADIOLOGY

## 2017-07-20 PROCEDURE — 27210908 ZZH NEEDLE CR4

## 2017-07-20 PROCEDURE — 85610 PROTHROMBIN TIME: CPT | Performed by: RADIOLOGY

## 2017-07-20 PROCEDURE — 25000128 H RX IP 250 OP 636: Performed by: PHYSICIAN ASSISTANT

## 2017-07-20 PROCEDURE — 40000168 ZZH STATISTIC PP CARE STAGE 3

## 2017-07-20 PROCEDURE — C1769 GUIDE WIRE: HCPCS

## 2017-07-20 PROCEDURE — C1887 CATHETER, GUIDING: HCPCS

## 2017-07-20 PROCEDURE — 99153 MOD SED SAME PHYS/QHP EA: CPT

## 2017-07-20 PROCEDURE — C1760 CLOSURE DEV, VASC: HCPCS

## 2017-07-20 PROCEDURE — 85027 COMPLETE CBC AUTOMATED: CPT | Performed by: RADIOLOGY

## 2017-07-20 PROCEDURE — 27210732 ZZH ACCESSORY CR1

## 2017-07-20 PROCEDURE — 25000128 H RX IP 250 OP 636: Performed by: RADIOLOGY

## 2017-07-20 PROCEDURE — 36246 INS CATH ABD/L-EXT ART 2ND: CPT

## 2017-07-20 PROCEDURE — 25000125 ZZHC RX 250: Performed by: RADIOLOGY

## 2017-07-20 PROCEDURE — 27210804 ZZH SHEATH CR3

## 2017-07-20 PROCEDURE — 75710 ARTERY X-RAYS ARM/LEG: CPT | Mod: LT

## 2017-07-20 PROCEDURE — 27210780 ZZH KIT CR3

## 2017-07-20 PROCEDURE — 27210905 ZZH KIT CR7

## 2017-07-20 PROCEDURE — 40000065 ZZH STATISTIC EKG NON-CHARGEABLE

## 2017-07-20 PROCEDURE — 99152 MOD SED SAME PHYS/QHP 5/>YRS: CPT

## 2017-07-20 PROCEDURE — 75774 ARTERY X-RAY EACH VESSEL: CPT

## 2017-07-20 PROCEDURE — 25000132 ZZH RX MED GY IP 250 OP 250 PS 637: Performed by: RADIOLOGY

## 2017-07-20 RX ORDER — LIDOCAINE 40 MG/G
CREAM TOPICAL
Status: DISCONTINUED | OUTPATIENT
Start: 2017-07-20 | End: 2017-07-20 | Stop reason: HOSPADM

## 2017-07-20 RX ORDER — SODIUM CHLORIDE 9 MG/ML
INJECTION, SOLUTION INTRAVENOUS CONTINUOUS
Status: DISCONTINUED | OUTPATIENT
Start: 2017-07-20 | End: 2017-07-20 | Stop reason: HOSPADM

## 2017-07-20 RX ORDER — ACETAMINOPHEN 500 MG
500-1000 TABLET ORAL EVERY 6 HOURS PRN
Status: DISCONTINUED | OUTPATIENT
Start: 2017-07-20 | End: 2017-07-20 | Stop reason: HOSPADM

## 2017-07-20 RX ORDER — NALOXONE HYDROCHLORIDE 0.4 MG/ML
.1-.4 INJECTION, SOLUTION INTRAMUSCULAR; INTRAVENOUS; SUBCUTANEOUS
Status: DISCONTINUED | OUTPATIENT
Start: 2017-07-20 | End: 2017-07-20 | Stop reason: HOSPADM

## 2017-07-20 RX ORDER — IODIXANOL 320 MG/ML
150 INJECTION, SOLUTION INTRAVASCULAR ONCE
Status: COMPLETED | OUTPATIENT
Start: 2017-07-20 | End: 2017-07-20

## 2017-07-20 RX ORDER — FLUMAZENIL 0.1 MG/ML
0.2 INJECTION, SOLUTION INTRAVENOUS
Status: DISCONTINUED | OUTPATIENT
Start: 2017-07-20 | End: 2017-07-20 | Stop reason: HOSPADM

## 2017-07-20 RX ORDER — FENTANYL CITRATE 50 UG/ML
25-50 INJECTION, SOLUTION INTRAMUSCULAR; INTRAVENOUS EVERY 5 MIN PRN
Status: DISCONTINUED | OUTPATIENT
Start: 2017-07-20 | End: 2017-07-20 | Stop reason: HOSPADM

## 2017-07-20 RX ADMIN — IODIXANOL 50 ML: 320 INJECTION, SOLUTION INTRAVASCULAR at 13:39

## 2017-07-20 RX ADMIN — MIDAZOLAM 4 MG: 1 INJECTION INTRAMUSCULAR; INTRAVENOUS at 13:38

## 2017-07-20 RX ADMIN — ACETAMINOPHEN 1000 MG: 500 TABLET, FILM COATED ORAL at 14:33

## 2017-07-20 RX ADMIN — SODIUM CHLORIDE: 9 INJECTION, SOLUTION INTRAVENOUS at 11:06

## 2017-07-20 RX ADMIN — LIDOCAINE HYDROCHLORIDE 8 ML: 10 INJECTION, SOLUTION EPIDURAL; INFILTRATION; INTRACAUDAL; PERINEURAL at 13:38

## 2017-07-20 RX ADMIN — FENTANYL CITRATE 200 MCG: 50 INJECTION, SOLUTION INTRAMUSCULAR; INTRAVENOUS at 13:37

## 2017-07-20 NOTE — IP AVS SNAPSHOT
MRN:[id number]                      After Visit Summary   7/20/2017    Alexandro Pool    MRN: [id number]           Visit Information        Department      7/20/2017 10:06 AM [address] [facility]          Review of your medicines      UNREVIEWED medicines. Ask your doctor about these medicines        Dose / Directions    acetaminophen 325 MG tablet   Commonly known as:  TYLENOL   Used for:  Peripheral arterial disease (H)        Dose:  1 tablet   Take 1 tablet by mouth every 4 hours as needed.   Quantity:  250 tablet   Refills:  0       amLODIPine 2.5 MG tablet   Commonly known as:  NORVASC        Dose:  2.5 mg   Take 2.5 mg by mouth 2 times daily   Refills:  0       ASPIRIN EC PO        Dose:  81 mg   Take 81 mg by mouth daily   Refills:  0       atorvastatin 80 MG tablet   Commonly known as:  LIPITOR   Used for:  Peripheral artery disease (H)        Dose:  80 mg   Take 1 tablet (80 mg) by mouth At Bedtime   Quantity:  90 tablet   Refills:  11       gabapentin 100 MG capsule   Commonly known as:  NEURONTIN   Used for:  Critical lower limb ischemia        Dose:  100 mg   Take 1 capsule (100 mg) by mouth 3 times daily   Quantity:  90 capsule   Refills:  0       humaLOG 100 UNIT/ML injection   Generic drug:  insulin lispro        Inject Subcutaneous 3 times daily (before meals) 18 units before each meal   Refills:  0       hydrocortisone 1 % cream   Commonly known as:  CORTAID        Apply topically 2 times daily PRN   Refills:  0       insulin glargine 100 UNIT/ML injection   Commonly known as:  LANTUS   Used for:  DM neuro manif type II        Inject 12 Units Subcutaneous every morning   Quantity:  1 Month   Refills:  0       lisinopril 2.5 MG tablet   Commonly known as:  PRINIVIL/Zestril   Used for:  Hypertension goal BP (blood pressure) < 140/90        Dose:  2.5 mg   Take 1 tablet (2.5 mg) by mouth 2 times daily   Quantity:  180 tablet   Refills:  3       METOPROLOL SUCCINATE ER PO         Dose:  75 mg   Take 75 mg by mouth daily   Refills:  0       morphine 15 MG 12 hr tablet   Commonly known as:  MS CONTIN   Used for:  Critical lower limb ischemia        Dose:  45 mg   Take 3 tablets (45 mg) by mouth 2 times daily   Quantity:  60 tablet   Refills:  0       * nicotine 21 MG/24HR 24 hr patch   Commonly known as:  NICODERM CQ   Used for:  Cigarette nicotine dependence with nicotine-induced disorder        Dose:  1 patch   Place 1 patch onto the skin every 24 hours   Quantity:  30 patch   Refills:  1       * nicotine 14 MG/24HR 24 hr patch   Commonly known as:  NICODERM CQ   Used for:  Cigarette nicotine dependence with nicotine-induced disorder        Dose:  1 patch   Place 1 patch onto the skin every 24 hours   Quantity:  30 patch   Refills:  1       * nicotine 7 MG/24HR 24 hr patch   Commonly known as:  NICODERM CQ   Used for:  Cigarette nicotine dependence with nicotine-induced disorder        Dose:  1 patch   Place 1 patch onto the skin every 24 hours   Quantity:  30 patch   Refills:  1       oxyCODONE 10 MG IR tablet   Commonly known as:  ROXICODONE   Used for:  S/P AKA (above knee amputation) (H)        Dose:  15 mg   Take 1.5 tablets (15 mg) by mouth every 4 hours as needed   Quantity:  60 tablet   Refills:  0       SENNA PO        1 Tab , bedtime   Refills:  0       spironolactone 25 MG tablet   Commonly known as:  ALDACTONE   Used for:  Ischemic cardiomyopathy, Hypertension goal BP (blood pressure) < 140/90        Dose:  12.5 mg   Take 0.5 tablets (12.5 mg) by mouth daily   Quantity:  15 tablet   Refills:  0       vorapaxar sulfate 2.08 MG tablet   Commonly known as:  ZONTIVITY   Used for:  Lower limb ischemia        Dose:  2.08 mg   Take 1 tablet (2.08 mg) by mouth daily   Quantity:  30 tablet   Refills:  6       * Notice:  This list has 3 medication(s) that are the same as other medications prescribed for you. Read the directions carefully, and ask your doctor or other care provider to  review them with you.      CONTINUE these medicines which have NOT CHANGED        Dose / Directions    insulin pen needle 32G X 4 MM   Commonly known as:  BD KWABENA U/F   Used for:  Type II or unspecified type diabetes mellitus without mention of complication, not stated as uncontrolled        Use 4x  daily or as directed.   Quantity:  120 each   Refills:  11       order for DME        Equipment being ordered: FlexiTouch pneumatic compression device.  2-3 times per day to left lower extremity. Current strength - L4   Quantity:  1 Units   Refills:  0                Protect others around you: Learn how to safely use, store and throw away your medicines at www.disposemymeds.org.         Follow-ups after your visit        Your next 10 appointments already scheduled     Aug 15, 2017  7:45 AM CDT   Lab with  LAB    [facility] ([facility])    [address] [place] [id number]   [phone]            Aug 15, 2017  8:00 AM CDT   Lab with  LAB   [facility] ([facility])    [address] [place] [id number]   [phone]            Aug 15, 2017  8:30 AM CDT   US ABDOMEN COMPLETE with UCUS1   [facility] ([facility])    [address] [place] [id number] [phone]           Please bring a list of your medicines (including vitamins, minerals and over-the-counter drugs). Also, tell your doctor about any allergies you may have. Wear comfortable clothes and leave your valuables at home.  Adults: No eating or drinking for 8 hours before the exam. You may take medicine with a small sip of water.  Children: - Children 6+ years: No food or drink for 6 hours before exam. - Children 1-5 years: No food or drink for 4 hours before exam. - Infants, breast-fed: may have breast milk up to 2 hours before exam. - Infants, formula: may have bottle until 4 hours before  exam.  Please call the Imaging Department at your exam site with any questions.            Aug 15, 2017  9:30 AM CDT   (Arrive by 9:15 AM)   Return General Liver with Nehemias Cevallos MD   [facility] ([facility])    [address] [place] [id number]   [phone]            Aug 21, 2017  9:30 AM CDT   (Arrive by 9:15 AM)   New Patient Visit with Purvi Anderson NP   [facility] ([facility])    [address] [place] [address]   [phone]            Dec 15, 2017  1:00 PM CST   (Arrive by 12:45 PM)   Return Visit with Giovani Olson MD   [facility] ([facility])    [address] [place] [phone]   [phone]               Care Instructions        Further instructions from your care team       [facility]   Interventional Radiology  Discharge Instructions Post Peripheral Angiogram     AFTER YOU GO HOME          Do relax and take it easy for 24 hours.       Do drink plenty of fluids.       Do resume your regular diet, unless otherwise instructed by your Primary Physician.       DO NOT smoke for at least 24 hours, if you were given any sedation.       DO NOT drink alcoholic beverages the day of your procedure.       Do not drive or operate machinery at home or at work for 24 hours.          DO NOT do any strenuous exercise or lifting for at least 2 days following your Procedure.       DO NOT take a bath or shower for at least 12 hours following your procedure.       DO NOT make any important or legal decisions for 24 hours following your procedure.    CALL THE PHYSICIAN IF:      - You start bleeding from the procedure site.  If you do start to bleed from the site, lie down flat and hold pressure on the site. A small lump or bruise is common at the puncture site.Your physician will tell you if you need to  "return to the hospital.      - You develop numbness, coolness or a change in color of the arm or leg that was punctured.      - You experience increased pain or redness at the puncture site.      - You develop hives or a rash or unexplained itching.      - You develop a temperature of 101 degrees F or greater    Instructions for closure device: Mynx Closure device          [facility] INTERVENTIONAL RADIOLOGY DEPARTMENT         Procedure Physician:  Dr. Fragoso, Dr Szymanski                             Date of Procedure: 2017       Telephone Numbers:   [phone]......Monday-Friday 8:00 to 4:30 pm                                        [phone].....After 4:30 pm Monday-Friday, Weekends and  Holidays. Ask for the Interventional Radiologist on call. Someone is on call 24 hrs/day.              Additional Information About Your Visit        [employer] Information     [employer] lets you send messages to your doctor, view your test results, renew your prescriptions, schedule appointments and more. To sign up, go to [email or web address] . Click on \"Log in\" on the left side of the screen, which will take you to the Welcome page. Then click on \"Sign up Now\" on the right side of the page.     You will be asked to enter the access code listed below, as well as some personal information. Please follow the directions to create your username and password.     Your access code is: [email or web address]  Expires: 9/10/2017  6:31 AM     Your access code will  in 90 days. If you need help or a new code, please call your [place] clinic or [phone].        Care EveryWhere ID     This is your Care EveryWhere ID. This could be used by other organizations to access your [place] medical records  [id number]        Your Vitals Were     Blood Pressure Pulse Temperature Respirations Pulse Oximetry       123/72 66 98.1  F (36.7  C) (Oral) 16 97%        Primary Care Provider    [facility] Other [facility]      Equal Access to Services     AMRIK MORROW " AH: Hadii michelle canoperlitaeduard Fortino, waaxda luqadaha, qaybta kaana arellano, waxcamron ryder zahracourtney gillazchwiliam florez. So [facility] [phone].    ATENCIÓN: Si habla español, tiene a wong disposición servicios gratuitos de asistencia lingüística. Llame al [phone].    We comply with applicable federal civil rights laws and [place] laws. We do not discriminate on the basis of race, color, national origin, age, disability sex, sexual orientation or gender identity.            Thank you!     Thank you for choosing [facility] for your care. Our goal is always to provide you with excellent care. Hearing back from our patients is one way we can continue to improve our services. Please take a few minutes to complete the written survey that you may receive in the mail after you visit with us. Thank you!             Medication List: This is a list of all your medications and when to take them. Check marks below indicate your daily home schedule. Keep this list as a reference.      Medications           Morning Afternoon Evening Bedtime As Needed    acetaminophen 325 MG tablet   Commonly known as:  TYLENOL   Take 1 tablet by mouth every 4 hours as needed.   Last time this was given:  1,000 mg on 7/20/2017  2:33 PM                                amLODIPine 2.5 MG tablet   Commonly known as:  NORVASC   Take 2.5 mg by mouth 2 times daily                                ASPIRIN EC PO   Take 81 mg by mouth daily                                atorvastatin 80 MG tablet   Commonly known as:  LIPITOR   Take 1 tablet (80 mg) by mouth At Bedtime                                gabapentin 100 MG capsule   Commonly known as:  NEURONTIN   Take 1 capsule (100 mg) by mouth 3 times daily                                humaLOG 100 UNIT/ML injection   Inject Subcutaneous 3 times daily (before meals) 18 units before each meal   Generic drug:  insulin lispro                                hydrocortisone 1 % cream   Commonly known as:  CORTAID    Apply topically 2 times daily PRN                                insulin glargine 100 UNIT/ML injection   Commonly known as:  LANTUS   Inject 12 Units Subcutaneous every morning                                insulin pen needle 32G X 4 MM   Commonly known as:  BD KWABENA U/F   Use 4x  daily or as directed.                                lisinopril 2.5 MG tablet   Commonly known as:  PRINIVIL/Zestril   Take 1 tablet (2.5 mg) by mouth 2 times daily                                METOPROLOL SUCCINATE ER PO   Take 75 mg by mouth daily                                morphine 15 MG 12 hr tablet   Commonly known as:  MS CONTIN   Take 3 tablets (45 mg) by mouth 2 times daily                                * nicotine 21 MG/24HR 24 hr patch   Commonly known as:  NICODERM CQ   Place 1 patch onto the skin every 24 hours                                * nicotine 14 MG/24HR 24 hr patch   Commonly known as:  NICODERM CQ   Place 1 patch onto the skin every 24 hours                                * nicotine 7 MG/24HR 24 hr patch   Commonly known as:  NICODERM CQ   Place 1 patch onto the skin every 24 hours                                order for DME   Equipment being ordered: FlexiTouch pneumatic compression device.  2-3 times per day to left lower extremity. Current strength - L4                                oxyCODONE 10 MG IR tablet   Commonly known as:  ROXICODONE   Take 1.5 tablets (15 mg) by mouth every 4 hours as needed                                SENNA PO   1 Tab , bedtime                                spironolactone 25 MG tablet   Commonly known as:  ALDACTONE   Take 0.5 tablets (12.5 mg) by mouth daily                                vorapaxar sulfate 2.08 MG tablet   Commonly known as:  ZONTIVITY   Take 1 tablet (2.08 mg) by mouth daily                                * Notice:  This list has 3 medication(s) that are the same as other medications prescribed for you. Read the directions carefully, and ask your doctor or  other care provider to review them with you.

## 2017-07-20 NOTE — IR NOTE
Interventional Radiology Intra-procedural Nursing Note    Patient Name: Alexandro Pool  Medical Record Number: [id number]  Today's Date: July 20, 2017  Procedure: Diagnostic left lower extremity angiogram and possible intervention.  Report given to: Grace JOE    Other Notes:  Pt arrived from 2A to IR 5. Consent confirmed and questions answered. Pt placed supine on table for procedure. Pt c/o leg cramps in left leg rating 10/10 on scale. Relieved with medications and massage.  [facility]. Mynx closure device deployed @ 1335. Flat bedrest for 3 hours. 1630.  Pt back to 2A until D/C.    Fentanyl:200mcg  Versed:4mg    Michelle Pleitez

## 2017-07-20 NOTE — DISCHARGE INSTRUCTIONS
[facility]   Interventional Radiology  Discharge Instructions Post Peripheral Angiogram     AFTER YOU GO HOME          Do relax and take it easy for 24 hours.       Do drink plenty of fluids.       Do resume your regular diet, unless otherwise instructed by your Primary Physician.       DO NOT smoke for at least 24 hours, if you were given any sedation.       DO NOT drink alcoholic beverages the day of your procedure.       Do not drive or operate machinery at home or at work for 24 hours.          DO NOT do any strenuous exercise or lifting for at least 2 days following your Procedure.       DO NOT take a bath or shower for at least 12 hours following your procedure.       DO NOT make any important or legal decisions for 24 hours following your procedure.    CALL THE PHYSICIAN IF:      - You start bleeding from the procedure site.  If you do start to bleed from the site, lie down flat and hold pressure on the site. A small lump or bruise is common at the puncture site.Your physician will tell you if you need to return to the hospital.      - You develop numbness, coolness or a change in color of the arm or leg that was punctured.      - You experience increased pain or redness at the puncture site.      - You develop hives or a rash or unexplained itching.      - You develop a temperature of 101 degrees F or greater    Instructions for closure device: Mynx Closure device          [facility] INTERVENTIONAL RADIOLOGY DEPARTMENT         Procedure Physician:  Dr. Fragoso, Dr Szymanski                             Date of Procedure: July 20, 2017       Telephone Numbers:   [phone]......Monday-Friday 8:00 to 4:30 pm                                        [phone].....After 4:30 pm Monday-Friday, Weekends and  Holidays. Ask for the Interventional Radiologist on call. Someone is on call 24 hrs/day.

## 2017-07-20 NOTE — BRIEF OP NOTE
Interventional Radiology Brief Post Procedure Note    Procedure:     Proceduralist: Ana Szymanski MD    Assistant: IR Fellow Physician, Sam Fragoso and None    Time Out: Prior to the start of the procedure and with procedural staff participation, I verbally confirmed the patient s identity using two indicators, relevant allergies, that the procedure was appropriate and matched the consent or emergent situation, and that the correct equipment/implants were available. Immediately prior to starting the procedure I conducted the Time Out with the procedural staff and re-confirmed the patient s name, procedure, and site/side. (The Joint Commission universal protocol was followed.)  Yes        Sedation: IR Nurse Monitored Care   Post Procedure Summary:  Prior to the start of the procedure and with procedural staff participation, I verbally confirmed the patient s identity using two indicators, relevant allergies, that the procedure was appropriate and matched the consent or emergent situation, and that the correct equipment/implants were available. Immediately prior to starting the procedure I conducted the Time Out with the procedural staff and re-confirmed the patient s name, procedure, and site/side. (The Joint Commission universal protocol was followed.)  Yes       Sedatives: Fentanyl and Midazolam (Versed)    Vital signs, airway and pulse oximetry were monitored and remained stable throughout the procedure and sedation was maintained until the procedure was complete.  The patient was monitored by staff until sedation discharge criteria were met.    Patient tolerance: Patient tolerated the procedure well with no immediate complications.    Time of sedation in minutes: 60 Minutes minutes from beginning to end of physician one to one monitoring.          Findings: left leg angiogram demonstrating AV fistula from graft in the medial thigh    Estimated Blood Loss: Less than 10 ml    Fluoroscopy Time:   minute(s)    SPECIMENS: None    Complications: 1. None     Condition: Stable    Plan: 2a    Comments: See dictated procedure note for full details.    Giovani Fragoso MD

## 2017-07-20 NOTE — PROGRESS NOTES
Alexandro is here for a lower extremity angiogram of his left leg.  Pre-procedure assessment is complete.  He has not been consented.  Labs have been collected.  He is prepped and ready for procedure.

## 2017-07-20 NOTE — PROGRESS NOTES
Pt back from IR s/p left lower ext angiogram.  VSS.  Pt sleepy but arouse to voice.  Pt denies any pain.  Right groin site F/D/I. Will hold on giving pt po for now, pt very sleepy.  Pt will be taking medical transport home.

## 2017-07-20 NOTE — IP AVS SNAPSHOT
[address] [address]                                       After Visit Summary   7/20/2017    Alexandro Pool    MRN: [id number]           After Visit Summary Signature Page     I have received my discharge instructions, and my questions have been answered. I have discussed any challenges I see with this plan with the nurse or doctor.    ..........................................................................................................................................  Patient/Patient Representative Signature      ..........................................................................................................................................  Patient Representative Print Name and Relationship to Patient    ..................................................               ................................................  Date                                            Time    ..........................................................................................................................................  Reviewed by Signature/Title    ...................................................              ..............................................  Date                                                            Time

## 2017-07-20 NOTE — PROGRESS NOTES
Alexandro had significant pain when he began his bedrest.  Tylenol, heat and massage were done to help with his cramping in his legs with relief as he was later able to sleep.  At 1615 Alexandro stated he needed to use the bathroom and could not wait any longer.  He refused a urinal or bedpan so IR fellow was notified and gave permission to shorten bedrest by 30 minutes.  Groin site remained intact after her used the bathroom.  Discharge instructions were reviewed prior to discharge.  [facility] called [facility]

## 2017-07-21 DIAGNOSIS — I25.5 ISCHEMIC CARDIOMYOPATHY: ICD-10-CM

## 2017-07-21 DIAGNOSIS — I10 HYPERTENSION GOAL BP (BLOOD PRESSURE) < 140/90: ICD-10-CM

## 2017-07-21 LAB — INTERPRETATION ECG - MUSE: NORMAL

## 2017-07-24 RX ORDER — SPIRONOLACTONE 25 MG/1
TABLET ORAL
Qty: 15 TABLET | Refills: 11 | OUTPATIENT
Start: 2017-07-24

## 2017-07-24 NOTE — TELEPHONE ENCOUNTER
Routed to Vikas Skinner for refill  Sent to wrong office in e request error  Med and pharm loaded  David Vernon, RN, BSN

## 2017-08-15 ENCOUNTER — OFFICE VISIT (OUTPATIENT)
Dept: GASTROENTEROLOGY | Facility: CLINIC | Age: 62
End: 2017-08-15
Attending: INTERNAL MEDICINE
Payer: COMMERCIAL

## 2017-08-15 VITALS
WEIGHT: 213.6 LBS | SYSTOLIC BLOOD PRESSURE: 144 MMHG | OXYGEN SATURATION: 98 % | DIASTOLIC BLOOD PRESSURE: 73 MMHG | TEMPERATURE: 97.4 F | RESPIRATION RATE: 18 BRPM | HEIGHT: 67 IN | HEART RATE: 60 BPM | BODY MASS INDEX: 33.53 KG/M2

## 2017-08-15 DIAGNOSIS — E11.8 TYPE 2 DIABETES MELLITUS WITH COMPLICATION, WITH LONG-TERM CURRENT USE OF INSULIN (H): ICD-10-CM

## 2017-08-15 DIAGNOSIS — K74.60 CIRRHOSIS OF LIVER WITHOUT ASCITES, UNSPECIFIED HEPATIC CIRRHOSIS TYPE (H): Primary | ICD-10-CM

## 2017-08-15 DIAGNOSIS — K74.60 CIRRHOSIS OF LIVER WITHOUT ASCITES, UNSPECIFIED HEPATIC CIRRHOSIS TYPE (H): ICD-10-CM

## 2017-08-15 DIAGNOSIS — E78.5 HYPERLIPIDEMIA LDL GOAL <70: ICD-10-CM

## 2017-08-15 DIAGNOSIS — I73.9 PAD (PERIPHERAL ARTERY DISEASE) (H): ICD-10-CM

## 2017-08-15 DIAGNOSIS — Z79.4 TYPE 2 DIABETES MELLITUS WITH COMPLICATION, WITH LONG-TERM CURRENT USE OF INSULIN (H): ICD-10-CM

## 2017-08-15 LAB
AFP SERPL-MCNC: 4.2 UG/L (ref 0–8)
ALBUMIN SERPL-MCNC: 3.5 G/DL (ref 3.4–5)
ALP SERPL-CCNC: 83 U/L (ref 40–150)
ALT SERPL W P-5'-P-CCNC: 24 U/L (ref 0–70)
ANION GAP SERPL CALCULATED.3IONS-SCNC: 8 MMOL/L (ref 3–14)
AST SERPL W P-5'-P-CCNC: 22 U/L (ref 0–45)
BILIRUB DIRECT SERPL-MCNC: 0.1 MG/DL (ref 0–0.2)
BILIRUB SERPL-MCNC: 0.4 MG/DL (ref 0.2–1.3)
BUN SERPL-MCNC: 16 MG/DL (ref 7–30)
CALCIUM SERPL-MCNC: 8.8 MG/DL (ref 8.5–10.1)
CHLORIDE SERPL-SCNC: 101 MMOL/L (ref 94–109)
CHOLEST SERPL-MCNC: 102 MG/DL
CO2 SERPL-SCNC: 26 MMOL/L (ref 20–32)
CREAT SERPL-MCNC: 0.83 MG/DL (ref 0.66–1.25)
CREAT UR-MCNC: 90 MG/DL
ERYTHROCYTE [DISTWIDTH] IN BLOOD BY AUTOMATED COUNT: 14.5 % (ref 10–15)
GFR SERPL CREATININE-BSD FRML MDRD: ABNORMAL ML/MIN/1.7M2
GLUCOSE SERPL-MCNC: 112 MG/DL (ref 70–99)
HBA1C MFR BLD: 7.2 % (ref 4.3–6)
HCT VFR BLD AUTO: 42.8 % (ref 40–53)
HDLC SERPL-MCNC: 26 MG/DL
HGB BLD-MCNC: 14 G/DL (ref 13.3–17.7)
INR PPP: 1.13 (ref 0.86–1.14)
LDLC SERPL CALC-MCNC: 35 MG/DL
MCH RBC QN AUTO: 28.3 PG (ref 26.5–33)
MCHC RBC AUTO-ENTMCNC: 32.7 G/DL (ref 31.5–36.5)
MCV RBC AUTO: 87 FL (ref 78–100)
MICROALBUMIN UR-MCNC: 67 MG/L
MICROALBUMIN/CREAT UR: 75.14 MG/G CR (ref 0–17)
NONHDLC SERPL-MCNC: 77 MG/DL
PLATELET # BLD AUTO: 151 10E9/L (ref 150–450)
POTASSIUM SERPL-SCNC: 4.2 MMOL/L (ref 3.4–5.3)
PROT SERPL-MCNC: 8.1 G/DL (ref 6.8–8.8)
RBC # BLD AUTO: 4.94 10E12/L (ref 4.4–5.9)
SODIUM SERPL-SCNC: 135 MMOL/L (ref 133–144)
T3FREE SERPL-MCNC: 2.8 PG/ML (ref 2.3–4.2)
T4 FREE SERPL-MCNC: 1.19 NG/DL (ref 0.76–1.46)
TRIGL SERPL-MCNC: 209 MG/DL
TSH SERPL DL<=0.005 MIU/L-ACNC: 1.21 MU/L (ref 0.4–4)
WBC # BLD AUTO: 7.3 10E9/L (ref 4–11)

## 2017-08-15 PROCEDURE — 85027 COMPLETE CBC AUTOMATED: CPT | Performed by: INTERNAL MEDICINE

## 2017-08-15 PROCEDURE — 80048 BASIC METABOLIC PNL TOTAL CA: CPT | Performed by: INTERNAL MEDICINE

## 2017-08-15 PROCEDURE — 99213 OFFICE O/P EST LOW 20 MIN: CPT | Mod: ZF

## 2017-08-15 PROCEDURE — 82105 ALPHA-FETOPROTEIN SERUM: CPT | Performed by: INTERNAL MEDICINE

## 2017-08-15 PROCEDURE — 85610 PROTHROMBIN TIME: CPT | Performed by: INTERNAL MEDICINE

## 2017-08-15 PROCEDURE — 80076 HEPATIC FUNCTION PANEL: CPT | Performed by: INTERNAL MEDICINE

## 2017-08-15 PROCEDURE — 36415 COLL VENOUS BLD VENIPUNCTURE: CPT | Performed by: INTERNAL MEDICINE

## 2017-08-15 ASSESSMENT — PAIN SCALES - GENERAL: PAINLEVEL: NO PAIN (0)

## 2017-08-15 NOTE — NURSING NOTE
"Chief Complaint   Patient presents with     RECHECK     Cirrhosis       Initial /73 (BP Location: Right arm, Patient Position: Sitting, Cuff Size: Adult Large)  Pulse 60  Temp 97.4  F (36.3  C) (Oral)  Resp 18  Ht 1.702 m (5' 7.01\")  Wt 96.9 kg (213 lb 9.6 oz)  SpO2 98%  BMI 33.45 kg/m2 Estimated body mass index is 33.45 kg/(m^2) as calculated from the following:    Height as of this encounter: 1.702 m (5' 7.01\").    Weight as of this encounter: 96.9 kg (213 lb 9.6 oz).  Medication Reconciliation: complete     Michell Lara [facility]  8/15/2017 9:27 AM          "

## 2017-08-15 NOTE — MR AVS SNAPSHOT
After Visit Summary   8/15/2017    Alexandro Pool    MRN: [id number]           Patient Information     Date Of Birth          1955        Visit Information        Provider Department      8/15/2017 9:30 AM Nehemias Cevallos MD [facility] Hepatology        Today's Diagnoses     Cirrhosis of liver without ascites, unspecified hepatic cirrhosis type (H)    -  1       Follow-ups after your visit        Additional Services     GASTROENTEROLOGY ADULT REF PROCEDURE ONLY       Last Lab Result: Creatinine (mg/dL)       Date                     Value                 08/15/2017               0.83             ----------  Body mass index is 33.45 kg/(m^2).     Needed:  No  Language:  English    Patient will be contacted to schedule procedure.     Please be aware that coverage of these services is subject to the terms and limitations of your health insurance plan.  Call member services at your health plan with any benefit or coverage questions.  Any procedures must be performed at a [place] facility OR coordinated by your clinic's referral office.    Please bring the following with you to your appointment:    (1) Any X-Rays, CTs or MRIs which have been performed.  Contact the facility where they were done to arrange for  prior to your scheduled appointment.    (2) List of current medications   (3) This referral request   (4) Any documents/labs given to you for this referral                  Follow-up notes from your care team     Return in about 6 months (around 2/15/2018).      Your next 10 appointments already scheduled     Aug 21, 2017  9:30 AM CDT   (Arrive by 9:15 AM)   New Patient Visit with PAULETTE Caputo [facility] ([facility])    [address] [place] [address]  3rd [facility] [address] [phone]            Oct 12, 2017   Procedure with Akilah Lawson MD   [facility], [place], Endoscopy ([facility],  [facility])    [address] [address]   [phone]           The [facility] is located on the corner of [facility] and [facility] on the [facility]. It is easily accessible from virtually any point in the [facility] area, via [address] and [address].            Dec 15, 2017  1:00 PM CST   (Arrive by 12:45 PM)   Return Visit with Giovani Olson MD   [facility] ([facility])    [address] [place] [id number] [phone]            Feb 13, 2018  8:00 AM CST   Lab with  LAB   [facility] ([facility])    [address] [place] [id number] [phone]            Feb 13, 2018  8:30 AM CST   US ABDOMEN COMPLETE with UCUS1   [facility] ([facility])    [address] [place] [id number] [phone]           Please bring a list of your medicines (including vitamins, minerals and over-the-counter drugs). Also, tell your doctor about any allergies you may have. Wear comfortable clothes and leave your valuables at home.  Adults: No eating or drinking for 8 hours before the exam. You may take medicine with a small sip of water.  Children: - Children 6+ years: No food or drink for 6 hours before exam. - Children 1-5 years: No food or drink for 4 hours before exam. - Infants, breast-fed: may have breast milk up to 2 hours before exam. - Infants, formula: may have bottle until 4 hours before exam.  Please call the Imaging Department at your exam site with any questions.            Feb 13, 2018  9:30 AM CST   (Arrive by 9:15 AM)   Return General Liver with MD JEWELL Del Cid [facility]at[facility] ([facility])    [address] [place] [id number] [phone]              Future tests that were ordered for you today     Open Standing  "Orders        Priority Remaining Interval Expires Ordered    US abdomen complete [BIN981] Routine 2/2 Every 6 Months 8/15/2018 8/15/2017          Open Future Orders        Priority Expected Expires Ordered    Hepatic Panel [LAB20] Routine 2/11/2018 8/15/2018 8/15/2017    Basic metabolic panel [LAB15] Routine 2/11/2018 8/15/2018 8/15/2017    CBC with platelets [EHV059] Routine 2/11/2018 8/15/2018 8/15/2017    INR [FZB2816] Routine 2/11/2018 8/15/2018 8/15/2017    AFP tumor marker [KHC676] Routine 2/11/2018 8/15/2018 8/15/2017    Dexa hip/pelvis/spine with lateral Routine  9/14/2017 8/15/2017            Who to contact     If you have questions or need follow up information about today's clinic visit or your schedule please contact [facility] HEPATOLOGY directly at [phone].  Normal or non-critical lab and imaging results will be communicated to you by [employer], letter or phone within 4 business days after the clinic has received the results. If you do not hear from us within 7 days, please contact the clinic through [employer] or phone. If you have a critical or abnormal lab result, we will notify you by phone as soon as possible.  Submit refill requests through [employer] or call your pharmacy and they will forward the refill request to us. Please allow 3 business days for your refill to be completed.          Additional Information About Your Visit        Care EveryWhere ID     This is your Care EveryWhere ID. This could be used by other organizations to access your [facility] medical records  [id number]        Your Vitals Were     Pulse Temperature Respirations Height Pulse Oximetry BMI (Body Mass Index)    60 97.4  F (36.3  C) (Oral) 18 1.702 m (5' 7.01\") 98% 33.45 kg/m2       Blood Pressure from Last 3 Encounters:   08/15/17 144/73   07/20/17 123/72   06/29/17 130/72    Weight from Last 3 Encounters:   08/15/17 96.9 kg (213 lb 9.6 oz)   06/23/17 98.9 kg (218 lb 1.6 oz)   02/16/17 96.6 kg (213 lb)              We " Performed the Following     GASTROENTEROLOGY ADULT REF PROCEDURE ONLY     Schedule follow up appointments          Today's Medication Changes          These changes are accurate as of: 8/15/17 10:17 AM.  If you have any questions, ask your nurse or doctor.               These medicines have changed or have updated prescriptions.        Dose/Directions    insulin glargine 100 UNIT/ML injection   Commonly known as:  LANTUS   This may have changed:  additional instructions   Used for:  DM neuro manif type II        Inject 12 Units Subcutaneous every morning   Quantity:  1 Month   Refills:  0       oxyCODONE 10 MG IR tablet   Commonly known as:  ROXICODONE   This may have changed:  how much to take   Used for:  S/P AKA (above knee amputation) (H)        Dose:  15 mg   Take 1.5 tablets (15 mg) by mouth every 4 hours as needed   Quantity:  60 tablet   Refills:  0                Primary Care Provider    Md Other Clinic                Equal Access to Services     AMRIK MORROW : Perry Freitas, roberto crane, mey arellano, david florez. So [facility] [phone].    ATENCIÓN: Si habla español, tiene a wong disposición servicios gratuitos de asistencia lingüística. Llame al [phone].    We comply with applicable federal civil rights laws and [place] laws. We do not discriminate on the basis of race, color, national origin, age, disability sex, sexual orientation or gender identity.            Thank you!     Thank you for choosing [facility] HEPATOLOGY  for your care. Our goal is always to provide you with excellent care. Hearing back from our patients is one way we can continue to improve our services. Please take a few minutes to complete the written survey that you may receive in the mail after your visit with us. Thank you!             Your Updated Medication List - Protect others around you: Learn how to safely use, store and throw away your medicines at  www.disposemymeds.org.          This list is accurate as of: 8/15/17 10:17 AM.  Always use your most recent med list.                   Brand Name Dispense Instructions for use Diagnosis    acetaminophen 325 MG tablet    TYLENOL    250 tablet    Take 1 tablet by mouth every 4 hours as needed.    Peripheral arterial disease (H)       amLODIPine 2.5 MG tablet    NORVASC     Take 2.5 mg by mouth 2 times daily        ASPIRIN EC PO      Take 81 mg by mouth daily        atorvastatin 80 MG tablet    LIPITOR    90 tablet    Take 1 tablet (80 mg) by mouth At Bedtime    Peripheral artery disease (H)       gabapentin 100 MG capsule    NEURONTIN    90 capsule    Take 1 capsule (100 mg) by mouth 3 times daily    Critical lower limb ischemia       humaLOG 100 UNIT/ML injection   Generic drug:  insulin lispro      Inject Subcutaneous 3 times daily (before meals) 18 units before each meal        hydrocortisone 1 % cream    CORTAID     Apply topically 2 times daily PRN        insulin glargine 100 UNIT/ML injection    LANTUS    1 Month    Inject 12 Units Subcutaneous every morning    DM neuro manif type II       insulin pen needle 32G X 4 MM    BD KWABENA U/F    120 each    Use 4x  daily or as directed.    Type II or unspecified type diabetes mellitus without mention of complication, not stated as uncontrolled       lisinopril 2.5 MG tablet    PRINIVIL/Zestril    180 tablet    Take 1 tablet (2.5 mg) by mouth 2 times daily    Hypertension goal BP (blood pressure) < 140/90       METOPROLOL SUCCINATE ER PO      Take 75 mg by mouth daily        morphine 15 MG 12 hr tablet    MS CONTIN    60 tablet    Take 3 tablets (45 mg) by mouth 2 times daily    Critical lower limb ischemia       * nicotine 21 MG/24HR 24 hr patch    NICODERM CQ    30 patch    Place 1 patch onto the skin every 24 hours    Cigarette nicotine dependence with nicotine-induced disorder       * nicotine 14 MG/24HR 24 hr patch    NICODERM CQ    30 patch    Place 1 patch onto  the skin every 24 hours    Cigarette nicotine dependence with nicotine-induced disorder       * nicotine 7 MG/24HR 24 hr patch    NICODERM CQ    30 patch    Place 1 patch onto the skin every 24 hours    Cigarette nicotine dependence with nicotine-induced disorder       order for DME     1 Units    Equipment being ordered: FlexiTouch pneumatic compression device.  2-3 times per day to left lower extremity. Current strength - L4        oxyCODONE 10 MG IR tablet    ROXICODONE    60 tablet    Take 1.5 tablets (15 mg) by mouth every 4 hours as needed    S/P AKA (above knee amputation) (H)       SENNA PO      1 Tab , bedtime        spironolactone 25 MG tablet    ALDACTONE    15 tablet    Take 0.5 tablets (12.5 mg) by mouth daily    Ischemic cardiomyopathy, Hypertension goal BP (blood pressure) < 140/90       vorapaxar sulfate 2.08 MG tablet    ZONTIVITY    30 tablet    Take 1 tablet (2.08 mg) by mouth daily    Lower limb ischemia       * Notice:  This list has 3 medication(s) that are the same as other medications prescribed for you. Read the directions carefully, and ask your doctor or other care provider to review them with you.

## 2017-08-15 NOTE — PROGRESS NOTES
I had the pleasure of seeing Alexandro Pool for followup in the Liver Clinic at the [facility] on 08/15/2017.  Mr. 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  He is a sustained virologic responder to hepatitis C treatment.      He is doing well at this visit.  He denies any abdominal pain, itching or skin rash or fatigue.  He denies any increased abdominal girth or lower extremity edema.  He denies any fevers or chills, cough or shortness of breath.  He denies any nausea, vomiting, diarrhea or constipation.  His appetite has been good, and his weight is actually down 5 pounds since he was last seen.  There have been no other new events since he was last seen.       Current Outpatient Prescriptions   Medication     atorvastatin (LIPITOR) 80 MG tablet     nicotine (NICODERM CQ) 21 MG/24HR 24 hr patch     spironolactone (ALDACTONE) 25 MG tablet     lisinopril (PRINIVIL,ZESTRIL) 2.5 MG tablet     Vorapaxar Sulfate (ZONTIVITY) 2.08 MG tablet     insulin pen needle (BD KWABENA U/F) 32G X 4 MM     insulin lispro (HUMALOG VIAL) SOLN 100 UNIT/ML     hydrocortisone 1 % cream     SENNA PO     morphine (MS CONTIN) 15 MG 12 hr tablet     oxyCODONE (ROXICODONE) 10 MG immediate release tablet     gabapentin (NEURONTIN) 100 MG capsule     insulin glargine (LANTUS) SOLN 100 UNIT/ML     ASPIRIN EC PO     METOPROLOL SUCCINATE ER PO     amLODIPine (NORVASC) 2.5 MG tablet     acetaminophen (TYLENOL) 325 MG tablet     nicotine (NICODERM CQ) 14 MG/24HR 24 hr patch     nicotine (NICODERM CQ) 7 MG/24HR 24 hr patch     order for DME     No current facility-administered medications for this visit.      B/P: 144/73, T: 97.4, P: 60, R: 18    HEENT exam shows no scleral icterus and no temporal muscle wasting.  Chest is clear.  His abdominal exam shows no increase in girth.  No masses or tenderness to palpation are present.  His liver is 10 cm in span without left lobe enlargement.   No spleen tip is palpable, and extremity exam shows a right leg amputation and 1+ edema on the left leg.  Skin exam shows no stigmata of chronic liver disease.  Neurologic exam shows no asterixis.       Recent Results (from the past 168 hour(s))    Hepatic Panel [LAB20]    Collection Time: 08/15/17  7:24 AM   Result Value Ref Range    Bilirubin Direct 0.1 0.0 - 0.2 mg/dL    Bilirubin Total 0.4 0.2 - 1.3 mg/dL    Albumin 3.5 3.4 - 5.0 g/dL    Protein Total 8.1 6.8 - 8.8 g/dL    Alkaline Phosphatase 83 40 - 150 U/L    ALT 24 0 - 70 U/L    AST 22 0 - 45 U/L   Basic metabolic panel [LAB15]    Collection Time: 08/15/17  7:24 AM   Result Value Ref Range    Sodium 135 133 - 144 mmol/L    Potassium 4.2 3.4 - 5.3 mmol/L    Chloride 101 94 - 109 mmol/L    Carbon Dioxide 26 20 - 32 mmol/L    Anion Gap 8 3 - 14 mmol/L    Glucose 112 (H) 70 - 99 mg/dL    Urea Nitrogen 16 7 - 30 mg/dL    Creatinine 0.83 0.66 - 1.25 mg/dL    GFR Estimate >90  Non  GFR Calc   >60 mL/min/1.7m2    GFR Estimate If Black >90   GFR Calc   >60 mL/min/1.7m2    Calcium 8.8 8.5 - 10.1 mg/dL   CBC with platelets [YUV434]    Collection Time: 08/15/17  7:24 AM   Result Value Ref Range    WBC 7.3 4.0 - 11.0 10e9/L    RBC Count 4.94 4.4 - 5.9 10e12/L    Hemoglobin 14.0 13.3 - 17.7 g/dL    Hematocrit 42.8 40.0 - 53.0 %    MCV 87 78 - 100 fl    MCH 28.3 26.5 - 33.0 pg    MCHC 32.7 31.5 - 36.5 g/dL    RDW 14.5 10.0 - 15.0 %    Platelet Count 151 150 - 450 10e9/L   INR [APK1510]    Collection Time: 08/15/17  7:24 AM   Result Value Ref Range    INR 1.13 0.86 - 1.14   AFP tumor marker [[id number]]    Collection Time: 08/15/17  7:24 AM   Result Value Ref Range    Alpha Fetoprotein 4.2 0 - 8 ug/L   Lipid Profile    Collection Time: 08/15/17  7:24 AM   Result Value Ref Range    Cholesterol 102 <200 mg/dL    Triglycerides 209 (H) <150 mg/dL    HDL Cholesterol 26 (L) >39 mg/dL    LDL Cholesterol Calculated 35 <100 mg/dL    Non HDL Cholesterol 77  <130 mg/dL   Hemoglobin A1c    Collection Time: 08/15/17  7:24 AM   Result Value Ref Range    Hemoglobin A1C 7.2 (H) 4.3 - 6.0 %   T3 Free    Collection Time: 08/15/17  7:24 AM   Result Value Ref Range    Free T3 2.8 2.3 - 4.2 pg/mL   T4 free    Collection Time: 08/15/17  7:24 AM   Result Value Ref Range    T4 Free 1.19 0.76 - 1.46 ng/dL   TSH    Collection Time: 08/15/17  7:24 AM   Result Value Ref Range    TSH 1.21 0.40 - 4.00 mU/L   Albumin Random Urine Quantitative    Collection Time: 08/15/17  7:32 AM   Result Value Ref Range    Creatinine Urine 90 mg/dL    Albumin Urine mg/L 67 mg/L    Albumin Urine mg/g Cr 75.14 (H) 0 - 17 mg/g Cr      I did review his ultrasound which shows a cirrhotic-appearing liver, no ascites and no mass lesions.      My impression is that Mr. 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  As I mentioned, he is a sustained virologic responder to hepatitis C treatment.      He is up-to-date with regard to vaccines and variceal screening.  He has never had a colonoscopy; and thus, I will arrange for him to have colorectal cancer screening.  He also has never had a DEXA scan, which we will arrange for him today.  I have encouraged him to continue with working on discontinuing his smoking, and I will see him back in the clinic again in 6 months.      Thank you very much for allowing me to participate in the care of this patient.  If you have any questions regarding my recommendations, please do not hesitate to contact me.       Nehemias Cevallos MD      Professor of Medicine  [employer]      Executive Medical Director, Solid Organ Transplant Program  [facility]

## 2017-08-15 NOTE — LETTER
8/15/2017      RE: Alexandro Pool  [address]    [place] [address]       I had the pleasure of seeing Alexandro Pool for followup in the Liver Clinic at the [facility] on 08/15/2017.  Mr. 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  He is a sustained virologic responder to hepatitis C treatment.      He is doing well at this visit.  He denies any abdominal pain, itching or skin rash or fatigue.  He denies any increased abdominal girth or lower extremity edema.  He denies any fevers or chills, cough or shortness of breath.  He denies any nausea, vomiting, diarrhea or constipation.  His appetite has been good, and his weight is actually down 5 pounds since he was last seen.  There have been no other new events since he was last seen.       Current Outpatient Prescriptions   Medication     atorvastatin (LIPITOR) 80 MG tablet     nicotine (NICODERM CQ) 21 MG/24HR 24 hr patch     spironolactone (ALDACTONE) 25 MG tablet     lisinopril (PRINIVIL,ZESTRIL) 2.5 MG tablet     Vorapaxar Sulfate (ZONTIVITY) 2.08 MG tablet     insulin pen needle (BD KWABENA U/F) 32G X 4 MM     insulin lispro (HUMALOG VIAL) SOLN 100 UNIT/ML     hydrocortisone 1 % cream     SENNA PO     morphine (MS CONTIN) 15 MG 12 hr tablet     oxyCODONE (ROXICODONE) 10 MG immediate release tablet     gabapentin (NEURONTIN) 100 MG capsule     insulin glargine (LANTUS) SOLN 100 UNIT/ML     ASPIRIN EC PO     METOPROLOL SUCCINATE ER PO     amLODIPine (NORVASC) 2.5 MG tablet     acetaminophen (TYLENOL) 325 MG tablet     nicotine (NICODERM CQ) 14 MG/24HR 24 hr patch     nicotine (NICODERM CQ) 7 MG/24HR 24 hr patch     order for DME     No current facility-administered medications for this visit.      B/P: 144/73, T: 97.4, P: 60, R: 18    HEENT exam shows no scleral icterus and no temporal muscle wasting.  Chest is clear.  His abdominal exam shows no increase in girth.  No masses or  tenderness to palpation are present.  His liver is 10 cm in span without left lobe enlargement.  No spleen tip is palpable, and extremity exam shows a right leg amputation and 1+ edema on the left leg.  Skin exam shows no stigmata of chronic liver disease.  Neurologic exam shows no asterixis.       Recent Results (from the past 168 hour(s))    Hepatic Panel [LAB20]    Collection Time: 08/15/17  7:24 AM   Result Value Ref Range    Bilirubin Direct 0.1 0.0 - 0.2 mg/dL    Bilirubin Total 0.4 0.2 - 1.3 mg/dL    Albumin 3.5 3.4 - 5.0 g/dL    Protein Total 8.1 6.8 - 8.8 g/dL    Alkaline Phosphatase 83 40 - 150 U/L    ALT 24 0 - 70 U/L    AST 22 0 - 45 U/L   Basic metabolic panel [LAB15]    Collection Time: 08/15/17  7:24 AM   Result Value Ref Range    Sodium 135 133 - 144 mmol/L    Potassium 4.2 3.4 - 5.3 mmol/L    Chloride 101 94 - 109 mmol/L    Carbon Dioxide 26 20 - 32 mmol/L    Anion Gap 8 3 - 14 mmol/L    Glucose 112 (H) 70 - 99 mg/dL    Urea Nitrogen 16 7 - 30 mg/dL    Creatinine 0.83 0.66 - 1.25 mg/dL    GFR Estimate >90  Non  GFR Calc   >60 mL/min/1.7m2    GFR Estimate If Black >90   GFR Calc   >60 mL/min/1.7m2    Calcium 8.8 8.5 - 10.1 mg/dL   CBC with platelets [RVS825]    Collection Time: 08/15/17  7:24 AM   Result Value Ref Range    WBC 7.3 4.0 - 11.0 10e9/L    RBC Count 4.94 4.4 - 5.9 10e12/L    Hemoglobin 14.0 13.3 - 17.7 g/dL    Hematocrit 42.8 40.0 - 53.0 %    MCV 87 78 - 100 fl    MCH 28.3 26.5 - 33.0 pg    MCHC 32.7 31.5 - 36.5 g/dL    RDW 14.5 10.0 - 15.0 %    Platelet Count 151 150 - 450 10e9/L   INR [[id number]]    Collection Time: 08/15/17  7:24 AM   Result Value Ref Range    INR 1.13 0.86 - 1.14   AFP tumor marker [[id number]]    Collection Time: 08/15/17  7:24 AM   Result Value Ref Range    Alpha Fetoprotein 4.2 0 - 8 ug/L   Lipid Profile    Collection Time: 08/15/17  7:24 AM   Result Value Ref Range    Cholesterol 102 <200 mg/dL    Triglycerides 209 (H) <150 mg/dL    HDL  Cholesterol 26 (L) >39 mg/dL    LDL Cholesterol Calculated 35 <100 mg/dL    Non HDL Cholesterol 77 <130 mg/dL   Hemoglobin A1c    Collection Time: 08/15/17  7:24 AM   Result Value Ref Range    Hemoglobin A1C 7.2 (H) 4.3 - 6.0 %   T3 Free    Collection Time: 08/15/17  7:24 AM   Result Value Ref Range    Free T3 2.8 2.3 - 4.2 pg/mL   T4 free    Collection Time: 08/15/17  7:24 AM   Result Value Ref Range    T4 Free 1.19 0.76 - 1.46 ng/dL   TSH    Collection Time: 08/15/17  7:24 AM   Result Value Ref Range    TSH 1.21 0.40 - 4.00 mU/L   Albumin Random Urine Quantitative    Collection Time: 08/15/17  7:32 AM   Result Value Ref Range    Creatinine Urine 90 mg/dL    Albumin Urine mg/L 67 mg/L    Albumin Urine mg/g Cr 75.14 (H) 0 - 17 mg/g Cr      I did review his ultrasound which shows a cirrhotic-appearing liver, no ascites and no mass lesions.      My impression is that Mr. 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  As I mentioned, he is a sustained virologic responder to hepatitis C treatment.      He is up-to-date with regard to vaccines and variceal screening.  He has never had a colonoscopy; and thus, I will arrange for him to have colorectal cancer screening.  He also has never had a DEXA scan, which we will arrange for him today.  I have encouraged him to continue with working on discontinuing his smoking, and I will see him back in the clinic again in 6 months.      Thank you very much for allowing me to participate in the care of this patient.  If you have any questions regarding my recommendations, please do not hesitate to contact me.       Nehemias Cevallos MD      Professor of Medicine  [employer]      Executive Medical Director, Solid Organ Transplant Program  [facility]

## 2017-08-21 ENCOUNTER — PRE VISIT (OUTPATIENT)
Dept: CARDIOLOGY | Facility: CLINIC | Age: 62
End: 2017-08-21

## 2017-08-21 ENCOUNTER — OFFICE VISIT (OUTPATIENT)
Dept: CARDIOLOGY | Facility: CLINIC | Age: 62
End: 2017-08-21
Attending: NURSE PRACTITIONER
Payer: COMMERCIAL

## 2017-08-21 VITALS
SYSTOLIC BLOOD PRESSURE: 113 MMHG | OXYGEN SATURATION: 94 % | DIASTOLIC BLOOD PRESSURE: 69 MMHG | HEIGHT: 67 IN | WEIGHT: 213 LBS | HEART RATE: 57 BPM | BODY MASS INDEX: 33.43 KG/M2

## 2017-08-21 DIAGNOSIS — I73.9 PAD (PERIPHERAL ARTERY DISEASE) (H): ICD-10-CM

## 2017-08-21 DIAGNOSIS — Z79.4 TYPE 2 DIABETES MELLITUS WITH COMPLICATION, WITH LONG-TERM CURRENT USE OF INSULIN (H): ICD-10-CM

## 2017-08-21 DIAGNOSIS — Z89.611 STATUS POST ABOVE KNEE AMPUTATION OF RIGHT LOWER EXTREMITY: ICD-10-CM

## 2017-08-21 DIAGNOSIS — I70.229 CRITICAL LOWER LIMB ISCHEMIA (H): ICD-10-CM

## 2017-08-21 DIAGNOSIS — E78.5 HYPERLIPIDEMIA LDL GOAL <70: Primary | ICD-10-CM

## 2017-08-21 DIAGNOSIS — F17.219 CIGARETTE NICOTINE DEPENDENCE WITH NICOTINE-INDUCED DISORDER: ICD-10-CM

## 2017-08-21 DIAGNOSIS — I10 HYPERTENSION GOAL BP (BLOOD PRESSURE) < 140/90: ICD-10-CM

## 2017-08-21 DIAGNOSIS — E11.8 TYPE 2 DIABETES MELLITUS WITH COMPLICATION, WITH LONG-TERM CURRENT USE OF INSULIN (H): ICD-10-CM

## 2017-08-21 PROCEDURE — 99214 OFFICE O/P EST MOD 30 MIN: CPT | Mod: ZP | Performed by: NURSE PRACTITIONER

## 2017-08-21 ASSESSMENT — PAIN SCALES - GENERAL: PAINLEVEL: NO PAIN (0)

## 2017-08-21 NOTE — PROGRESS NOTES
Cardiology Clinic Note  August 21, 2017      HPI:  Alexandro Pool is a 61 year old male who is seen in follow-up for management of severe PAD, HTN and HLD. His complex vascular history is notable for right AKA in 2011 and critical limb ischemia of the left leg s/p SFA stenting in 2012 and left femoral to anterior tibial artery bypass with in situ saphenous vein by Dr. Suggs in 2013. Past medical history also notable for type II DM, tobacco use disorder, coronary artery disease and ischemic cardiomyopathy with an LVEF= 49%.   Interval History:   His last arterial duplex showed possible AV fistula off the bypass graft, also noted to have reduced distal velocities concerning for short-medium term graft failure. He was referred to vascular surgery and underwent left lower extremity angiography which demonstrated patent bypass grafts and an AVF arising in the superficial medial thigh from the in situ saphenous vein femoral-tibial bypass graft. Per vascular surgery note he will likely have the AVF repaired, timing to be determined.   Today Mr. Pool is feeling quite well. He reports chronic left lower extremity neuropathy and rare cramping in his left calf. He continues to use the flexitouch twice daily. He was prescribed a Nicotine patch at his last visit, he does not wear the patch consistently and continues to smoke 1.5 packs per day. He was referred to PAD rehab but declined due to cost. He denies chest pain, shortness of breath or palpitations.   Past medical history:  Past Medical History:   Diagnosis Date     Acute kidney failure, unspecified (H)      Blood transfusion      CAD (coronary artery disease)      CARDIOVASCULAR SCREENING; LDL GOAL LESS THAN 100      Cirrhosis (H)      Critical lower limb ischemia      Diabetes mellitus (H) 10/2011     Hypertension      Hypertension goal BP (blood pressure) < 140/90      Ischemic cardiomyopathy      Lymphedema of left leg 5/11/2016     Peripheral arterial disease  (H)      PVD (peripheral vascular disease) (H)      Right above knee amputation 4/30/2014     Type 2 diabetes, HbA1C goal < 8% (H)      Medications:    Current Outpatient Prescriptions on File Prior to Visit:  atorvastatin (LIPITOR) 80 MG tablet Take 1 tablet (80 mg) by mouth At Bedtime   nicotine (NICODERM CQ) 21 MG/24HR 24 hr patch Place 1 patch onto the skin every 24 hours   nicotine (NICODERM CQ) 14 MG/24HR 24 hr patch Place 1 patch onto the skin every 24 hours (Patient not taking: Reported on 8/15/2017)   nicotine (NICODERM CQ) 7 MG/24HR 24 hr patch Place 1 patch onto the skin every 24 hours (Patient not taking: Reported on 8/15/2017)   spironolactone (ALDACTONE) 25 MG tablet Take 0.5 tablets (12.5 mg) by mouth daily   lisinopril (PRINIVIL,ZESTRIL) 2.5 MG tablet Take 1 tablet (2.5 mg) by mouth 2 times daily   order for DME Equipment being ordered: FlexiTouch pneumatic compression device.  2-3 times per day to left lower extremity.Current strength - L4   Vorapaxar Sulfate (ZONTIVITY) 2.08 MG tablet Take 1 tablet (2.08 mg) by mouth daily   insulin pen needle (BD KWABENA U/F) 32G X 4 MM Use 4x  daily or as directed.   insulin lispro (HUMALOG VIAL) SOLN 100 UNIT/ML Inject Subcutaneous 3 times daily (before meals) 18 units before each meal   hydrocortisone 1 % cream Apply topically 2 times daily PRN   SENNA PO 1 Tab , bedtime   morphine (MS CONTIN) 15 MG 12 hr tablet Take 3 tablets (45 mg) by mouth 2 times daily   oxyCODONE (ROXICODONE) 10 MG immediate release tablet Take 1.5 tablets (15 mg) by mouth every 4 hours as needed (Patient taking differently: Take 20 mg by mouth every 4 hours as needed )   gabapentin (NEURONTIN) 100 MG capsule Take 1 capsule (100 mg) by mouth 3 times daily   insulin glargine (LANTUS) SOLN 100 UNIT/ML Inject 12 Units Subcutaneous every morning (Patient taking differently: Inject 50 Units Subcutaneous every morning)   ASPIRIN EC PO Take 81 mg by mouth daily   METOPROLOL SUCCINATE ER PO Take 75  "mg by mouth daily   amLODIPine (NORVASC) 2.5 MG tablet Take 2.5 mg by mouth 2 times daily   acetaminophen (TYLENOL) 325 MG tablet Take 1 tablet by mouth every 4 hours as needed.     No current facility-administered medications on file prior to visit.     Allergies:    Allergies   Allergen Reactions     Ciprofloxacin Rash     Family and social history:  Family History   Problem Relation Age of Onset     Alcohol/Drug Mother      cirrhosis     Alcohol/Drug Father      C.A.D. No family hx of      DIABETES No family hx of      CANCER No family hx of        Social History     Social History     Marital status:      Spouse name: N/A     Number of children: 0     Years of education: N/A     Occupational History      Unknown     Social History Main Topics     Smoking status: Current Every Day Smoker     Packs/day: 1.50     Years: 40.00     Types: Cigarettes     Smokeless tobacco: Never Used      Comment: working on quitting     Alcohol use 0.6 oz/week     1 Standard drinks or equivalent per week      Comment: Once a month or less     Drug use: No     Sexual activity: Not Currently     Other Topics Concern     Parent/Sibling W/ Cabg, Mi Or Angioplasty Before 65f 55m? No     Caffeine Concern Yes     Exercise No     Social History Narrative     Review of Systems:  Skin: No new skin rash or ulcers.  Eyes: No red eye.  Ears/Nose/Throat: No ear discharge, nasal congestion, sore throat or dysphagia.  Respiratory: No cough or hemoptysis.  Cardiovascular: See HPI.    Gastrointestinal: No abdominal pain, nausea, vomiting, hematemesis or melena.  Musculoskeletal: No polyarthralgia or myalgias.  Neurologic: No headaches, seizure or focal weakness.    Vital signs:  /69 (BP Location: Right arm, Patient Position: Chair, Cuff Size: Adult Regular)  Pulse 57  Ht 1.702 m (5' 7\")  Wt 96.6 kg (213 lb)  SpO2 94%  BMI 33.36 kg/m2   Wt Readings from Last 2 Encounters:   08/21/17 96.6 kg (213 lb)   08/15/17 96.9 kg (213 lb 9.6 oz) "     Physical Exam:  Gen: NAD.    HEENT: No conjunctival pallor or scleral icterus, MMM. Clear oropharynx.    Neck: No JVD. No thyroid enlargement or cervical adenopathy.    Chest: Clear to auscultation bilaterally.    CV: Distant, normal first and second heart sounds. No murmurs or gallop appreciated.    Abdomen: Soft, non-tender, non-distended, BS+.  Ext: Right stump without ulcers/wounds, left lower extremity hyperpigmented and moderately edematous without ulceration.   AVF present of left medial aspect of thigh. 1+ DP present, unable to palpate PT.   Skin: No skin rash or ulcers.  Neuro: alert, oriented and appropriately conversant.    Psych: Normal affect and speech.    Labs:  Last Basic Metabolic Panel:  Lab Results   Component Value Date     08/15/2017      Lab Results   Component Value Date    POTASSIUM 4.2 08/15/2017     Lab Results   Component Value Date    CHLORIDE 101 08/15/2017     Lab Results   Component Value Date    BOGDAN 8.8 08/15/2017     Lab Results   Component Value Date    CO2 26 08/15/2017     Lab Results   Component Value Date    BUN 16 08/15/2017     Lab Results   Component Value Date    CR 0.83 08/15/2017     Lab Results   Component Value Date     08/15/2017       Lab Results   Component Value Date    WBC 7.3 08/15/2017     Lab Results   Component Value Date    RBC 4.94 08/15/2017     Lab Results   Component Value Date    HGB 14.0 08/15/2017     Lab Results   Component Value Date    HCT 42.8 08/15/2017     Lab Results   Component Value Date    MCV 87 08/15/2017     Lab Results   Component Value Date    MCH 28.3 08/15/2017     Lab Results   Component Value Date    MCHC 32.7 08/15/2017     Lab Results   Component Value Date    RDW 14.5 08/15/2017     Lab Results   Component Value Date     08/15/2017     Recent Labs   Lab Test  08/15/17   0724  11/26/12   1012  05/21/12   0946   CHOL  102  123  108   HDL  26*  20*  28*   LDL  35  57  55   TRIG  209*  228*  124   CHOLHDLRATIO    --   6.1*  3.9     Diagnostics:    Left lower extremity angiography 7/20/17:    INDICATION: Left lower femoral-tibial bypass graft using in situ  saphenous vein. There is concern regarding a large venous branch.     PROCEDURE/FINDINGS:   Written and verbal informed consent was obtained. Patient was brought  to the fluoroscopy suite and placed supine. Right groin was prepped  and draped in the usual sterile fashion. The right femoral head was  marked by fluoroscopy.     The common femoral bifurcation was identified by ultrasound. The  common femoral artery had a significant amount of shadowing calcified  plaque distally. The proximal CFA appear more widely patent. Skin  overlying was anesthetized with 1% lidocaine. Under ultrasound  guidance, the common femoral artery was accessed with a micropuncture  needle. Images of the patent artery and the needle tip in the artery  were saved for the permanent medical record.     Micropuncture sheath was exchanged for a 0.035 Bentson and a 5 Stateless  sheath introduced. An Omni Flush catheter was inserted over the wire  to the aortic bifurcation and a pelvic angiogram performed. A 0.035  angled Glidewire was then used to introduce the catheter into the  external iliac artery and lower extremity angiogram performed.     Angiographic findings:  Bilateral multifocal moderate disease in the common and external iliac  arteries. There is an occluded stent in the native SFA. A widely  patent femorotibial bypass graft is seen, with no significant stenosis  at the proximal femoral anastomosis or the distal anterior tibial  artery anastomosis. The anterior tibial artery distal to the  anastomosis is widely patent, as is the dorsalis pedis artery. The  bypass graft itself is superficial in the medial subcutaneous soft  tissues of the thigh.     In the mid thigh, there is a large branch off the saphenous vein  bypass graft that arises medially and drains prior a large venous  network into  the femoral vein.     Given that the patient is already planned for surgical intervention  with vascular surgery, and the fact that the branch vein is very  superficial in the subcutaneous tissues of the thigh, this branch vein  was not embolized with coils or plugs.     Catheter was removed over a Bentson wire. A right CFA angiogram  demonstrated high placement in the right CFA. A 4/5 [place] minx  closure device was deployed and manual pressure held to provide  adequate hemostasis.      IMPRESSION:  1. Large venous branch arising in the superficial medial thigh from  the in situ saphenous vein femoro-tibial bypass graft. It drains via  an enlarged venous network into the femoral vein.  2. The proximal and distal graft anastomoses are widely patent.  3. Widely patent runoff via the SENIA and dorsalis pedis artery.    Exam: Duplex ultrasound of left lower extremity arteries dated  5/10/2017 11:55 AM       Clinical information: PAD with chronic le ischemia, Peripheral  vascular disease, unspecified . Left SFA to SENIA bypass graft.      Comparison: 11/9/2016.      EIA: Velocity: 157/79 cm/sec.  Waveforms: Monophasic low-resistance  waveforms with significant diastolic flow.  Common femoral artery: 233/100 cm/sec. Waveforms: Monophasic low  resistance waveforms with significant diastolic flow.      SFA to SENIA bypass graft: Monophasic low resistance waveforms with  significant diastolic flow  Proximal anastomosis: 194/91, 189/82 cm/s   Proximal thigh: 93/40  Mid thigh: 112/55      Mid thigh branch off the SFA to SENIA bypass graft: Flow is reversed,  towards the head. Velocity: 388/237 cm/s. Monophasic low resistance  waveforms with significant diastolic flow.      Distal thigh: 60/0 cm/s. Monophasic waveform, without significant  diastolic flow  Knee: 52/0 cm/s. Monophasic waveform, without significant diastolic  flow  Proximal calf: 51/0 cm/s. Monophasic waveforms, without significant  diastolic flow  Mid calf: 57/0 cm/s.  Monophasic waveforms, without significant  diastolic flow.  Distal: 43/0 cm/s. Monophasic wave without significant diastolic flow.  Distal anastomosis: 53/12 cm/s. Monophasic waveforms with good  systolic upstroke and mild diastolic flow.      Distal to anastomosis: 78/0 cm/s. Good systolic upstroke, phasic  waveform with out significant diastolic flow.      ABIs:      Right side:  Arm: 117 mmHg      Left side:   Arm: 118 mmHg   PT at ankle: 76 mmHg   DP at foot: 82 mmHg  First digit: 87 mmHg   OZZIE: 0.69   TBI: 0.74      Impression:  Patent left SFA to SENIA bypass graft without significant  stenosis. Patent nonligated sidebranch off the in situ bypass at the  mid/distal thigh with velocity up to 389 cm/s, previously 568 cm/s.  Waveforms proximal to the side branch are low resistance monophasic  compatible with likely venous fistula through the nonligated side  branch. Distal to this branch waveforms are high resistance monophasic  with good systolic upstroke similar prior. Again, correlate with any  signs of venous hypertension in the left leg.     Vascular ultrasound arterial duplex left lower extremity extremity  bypass graft dated 11/9/2016 12:13 PM      Comparison Study: 11/2/2015 and 4/20/2015      Clinical History: Peripheral arterial disease, previous SFG      Technique: Study includes gray scale images, color Doppler, spectral  Doppler waveforms and velocities with appropriate angles of 60 degrees  or less.      Findings:       Left lower extremity: Left thigh common femoral to anterior tibial  artery bypass graft.      External iliac artery, inflow artery: 242/105 cm/s, monophasic  waveforms.      Common femoral artery proximal to the anastomosis: 211/95 cm/sec with  monophasic waveforms.      Proximal anastomosis: 226/107 cm/sec with monophasic waveforms.      Within graft from proximal to distal: 161/66, 99/49, 115/53, 153/56,  78, 51, 54, and 54 cm/sec with monophasic waveforms.   Non-ligated branch vein  off of mid distal thigh graft with velocity of  414/206 cm/s and 568/332 cm/s, previously 375/246 cm/sec. And on  4/20/2015 this measured 425/205 cm/sec.      Distal anastomosis: 95 cm/sec with monophasic waveforms.      Anterior tibial artery distal to the anastomosis: Antegrade flow with  velocity of 63 cm/sec with monophasic waveforms.  Anterior tibial artery proximal to the anastomosis demonstrates  expected reversal of flow (away from the graft anastomosis) at 85  cm/sec.      Impression:  1. Patent left common femoral to anterior tibial artery bypass graft  demonstrates no evidence of hemodynamically significant stenosis.  Diffuse monophasic waveforms consistent with decreased capillary  resistance/vasodilation.  2. Redemonstration of patent non-ligated side branch coming off of the  in situ bypass at the mid to distal thigh with velocity of 414-568,  increased since prior study, but similar to 4/20/2015. Correlate for  clinical evidence of venous hypertension      Assessment and Plan:      Alexandro Pool is a 61 year old male who is seen in follow-up for management of severe PAD, HTN and HLD.    1. Severe PAD, status-post right AKA and left lower extremity SFA stenting in 2012 and femoral-tibial bypass in 2013:   His last arterial duplex showed possible AV fistula off the bypass graft, also noted to have reduced distal velocities concerning for short-medium term graft failure. He was referred to vascular surgery and underwent left lower extremity angiography which demonstrated patent bypass grafts and an AVF arising in the superficial medial thigh from the in situ saphenous vein femoral-tibial bypass graft. Per vascular surgery note he will likely have the AVF repaired, timing to be determined.  -- Continue ASA 81 mg  -- Continue use of Flexitouch pump twice daily  -- Will have PAD rehab call the patient to discuss cost  -- Discussed modifiable risk factors as stated below    2. Dyslipidemia: Currently  tolerating atorvastin 80 mg daily, last LDL 35.  -- Continue atorvastatin 80 mg daily  -- Lipid panel in 2 months     3. Hypertension: Well controlled.  -- Continue lisinopril 2.5 mg daily and amlodipine 2.5 mg BID     4.Tobacco use disorder: Prescribed Nicotine patch at his last visit, does not wear the patch consistently and continues to smoke 1.5 PPD.  -- Encouraged smoking cessation, discussed alternative options, patient is not interested at this time    5. Diabetes Melllitus: Managed by PCP Dr. Belle. Last Hgb A1C 7.2. Checks blood sugar 3 x day, usually run in the 150's. Occasionally gets low in the afternoons/evenings.   -- Continue management per PCP     6. CAD with ischemic cardiomyopathy EF 49%: Stable, denies chest pain or dyspnea.  -- Continue metoprolol succinate 75 mg daily and spironolactone 12.5 mg daily     Follow-up: RTC to see me in 2 months with labs prior.

## 2017-08-21 NOTE — PATIENT INSTRUCTIONS
You were seen today in the Cardiovascular Clinic at the [facility].    Cardiology provider you saw during your visit Purvi Anderson NP    1. Your cholesterol is good, continue Lipitor 80 mg daily.  2. Will get in touch with Dr. Suggs to discuss timing of fistula repair.   3. Return to clinic in 2 months for follow-up with labs prior.     Questions and [phone].   First press #1 for the University and then press #3 for Medical Questions to reach the Cardiology triage nurse.     On Call Cardiologist for after hours or on weekends: [phone], press option #4 and ask to speak to the on-call Cardiologist.

## 2017-08-21 NOTE — LETTER
8/21/2017      RE: Alexandro Pool  [address]    [place] [address]       Dear Colleague,    Thank you for the opportunity to participate in the care of your patient, Alexandro Pool, at the [facility]. Please see a copy of my visit note below.    Cardiology Clinic Note  August 21, 2017      HPI:  Alexandro Pool is a 61 year old male who is seen in follow-up for management of severe PAD, HTN and HLD. His complex vascular history is notable for right AKA in 2011 and critical limb ischemia of the left leg s/p SFA stenting in 2012 and left femoral to anterior tibial artery bypass with in situ saphenous vein by Dr. Suggs in 2013. Past medical history also notable for type II DM, tobacco use disorder, coronary artery disease and ischemic cardiomyopathy with an LVEF= 49%.   Interval History:   His last arterial duplex showed possible AV fistula off the bypass graft, also noted to have reduced distal velocities concerning for short-medium term graft failure. He was referred to vascular surgery and underwent left lower extremity angiography which demonstrated patent bypass grafts and an AVF arising in the superficial medial thigh from the in situ saphenous vein femoral-tibial bypass graft. Per vascular surgery note he will likely have the AVF repaired, timing to be determined.   Today Mr. Pool is feeling quite well. He reports chronic left lower extremity neuropathy and rare cramping in his left calf. He continues to use the flexitouch twice daily. He was prescribed a Nicotine patch at his last visit, he does not wear the patch consistently and continues to smoke 1.5 packs per day. He was referred to PAD rehab but declined due to cost. He denies chest pain, shortness of breath or palpitations.   Past medical history:  Past Medical History:   Diagnosis Date     Acute kidney failure, unspecified (H)      Blood transfusion      CAD  (coronary artery disease)      CARDIOVASCULAR SCREENING; LDL GOAL LESS THAN 100      Cirrhosis (H)      Critical lower limb ischemia      Diabetes mellitus (H) 10/2011     Hypertension      Hypertension goal BP (blood pressure) < 140/90      Ischemic cardiomyopathy      Lymphedema of left leg 5/11/2016     Peripheral arterial disease (H)      PVD (peripheral vascular disease) (H)      Right above knee amputation 4/30/2014     Type 2 diabetes, HbA1C goal < 8% (H)      Medications:    Current Outpatient Prescriptions on File Prior to Visit:  atorvastatin (LIPITOR) 80 MG tablet Take 1 tablet (80 mg) by mouth At Bedtime   nicotine (NICODERM CQ) 21 MG/24HR 24 hr patch Place 1 patch onto the skin every 24 hours   nicotine (NICODERM CQ) 14 MG/24HR 24 hr patch Place 1 patch onto the skin every 24 hours (Patient not taking: Reported on 8/15/2017)   nicotine (NICODERM CQ) 7 MG/24HR 24 hr patch Place 1 patch onto the skin every 24 hours (Patient not taking: Reported on 8/15/2017)   spironolactone (ALDACTONE) 25 MG tablet Take 0.5 tablets (12.5 mg) by mouth daily   lisinopril (PRINIVIL,ZESTRIL) 2.5 MG tablet Take 1 tablet (2.5 mg) by mouth 2 times daily   order for DME Equipment being ordered: FlexiTouch pneumatic compression device.  2-3 times per day to left lower extremity.Current strength - L4   Vorapaxar Sulfate (ZONTIVITY) 2.08 MG tablet Take 1 tablet (2.08 mg) by mouth daily   insulin pen needle (BD KWABENA U/F) 32G X 4 MM Use 4x  daily or as directed.   insulin lispro (HUMALOG VIAL) SOLN 100 UNIT/ML Inject Subcutaneous 3 times daily (before meals) 18 units before each meal   hydrocortisone 1 % cream Apply topically 2 times daily PRN   SENNA PO 1 Tab , bedtime   morphine (MS CONTIN) 15 MG 12 hr tablet Take 3 tablets (45 mg) by mouth 2 times daily   oxyCODONE (ROXICODONE) 10 MG immediate release tablet Take 1.5 tablets (15 mg) by mouth every 4 hours as needed (Patient taking differently: Take 20 mg by mouth every 4 hours as  needed )   gabapentin (NEURONTIN) 100 MG capsule Take 1 capsule (100 mg) by mouth 3 times daily   insulin glargine (LANTUS) SOLN 100 UNIT/ML Inject 12 Units Subcutaneous every morning (Patient taking differently: Inject 50 Units Subcutaneous every morning)   ASPIRIN EC PO Take 81 mg by mouth daily   METOPROLOL SUCCINATE ER PO Take 75 mg by mouth daily   amLODIPine (NORVASC) 2.5 MG tablet Take 2.5 mg by mouth 2 times daily   acetaminophen (TYLENOL) 325 MG tablet Take 1 tablet by mouth every 4 hours as needed.     No current facility-administered medications on file prior to visit.     Allergies:    Allergies   Allergen Reactions     Ciprofloxacin Rash     Family and social history:  Family History   Problem Relation Age of Onset     Alcohol/Drug Mother      cirrhosis     Alcohol/Drug Father      C.A.D. No family hx of      DIABETES No family hx of      CANCER No family hx of        Social History     Social History     Marital status:      Spouse name: N/A     Number of children: 0     Years of education: N/A     Occupational History      Unknown     Social History Main Topics     Smoking status: Current Every Day Smoker     Packs/day: 1.50     Years: 40.00     Types: Cigarettes     Smokeless tobacco: Never Used      Comment: working on quitting     Alcohol use 0.6 oz/week     1 Standard drinks or equivalent per week      Comment: Once a month or less     Drug use: No     Sexual activity: Not Currently     Other Topics Concern     Parent/Sibling W/ Cabg, Mi Or Angioplasty Before 65f 55m? No     Caffeine Concern Yes     Exercise No     Social History Narrative     Review of Systems:  Skin: No new skin rash or ulcers.  Eyes: No red eye.  Ears/Nose/Throat: No ear discharge, nasal congestion, sore throat or dysphagia.  Respiratory: No cough or hemoptysis.  Cardiovascular: See HPI.    Gastrointestinal: No abdominal pain, nausea, vomiting, hematemesis or melena.  Musculoskeletal: No polyarthralgia or  "myalgias.  Neurologic: No headaches, seizure or focal weakness.    Vital signs:  /69 (BP Location: Right arm, Patient Position: Chair, Cuff Size: Adult Regular)  Pulse 57  Ht 1.702 m (5' 7\")  Wt 96.6 kg (213 lb)  SpO2 94%  BMI 33.36 kg/m2   Wt Readings from Last 2 Encounters:   08/21/17 96.6 kg (213 lb)   08/15/17 96.9 kg (213 lb 9.6 oz)     Physical Exam:  Gen: NAD.    HEENT: No conjunctival pallor or scleral icterus, MMM. Clear oropharynx.    Neck: No JVD. No thyroid enlargement or cervical adenopathy.    Chest: Clear to auscultation bilaterally.    CV: Distant, normal first and second heart sounds. No murmurs or gallop appreciated.    Abdomen: Soft, non-tender, non-distended, BS+.  Ext: Right stump without ulcers/wounds, left lower extremity hyperpigmented and moderately edematous without ulceration.   AVF present of left medial aspect of thigh. 1+ DP present, unable to palpate PT.   Skin: No skin rash or ulcers.  Neuro: alert, oriented and appropriately conversant.    Psych: Normal affect and speech.    Labs:  Last Basic Metabolic Panel:  Lab Results   Component Value Date     08/15/2017      Lab Results   Component Value Date    POTASSIUM 4.2 08/15/2017     Lab Results   Component Value Date    CHLORIDE 101 08/15/2017     Lab Results   Component Value Date    BOGDAN 8.8 08/15/2017     Lab Results   Component Value Date    CO2 26 08/15/2017     Lab Results   Component Value Date    BUN 16 08/15/2017     Lab Results   Component Value Date    CR 0.83 08/15/2017     Lab Results   Component Value Date     08/15/2017       Lab Results   Component Value Date    WBC 7.3 08/15/2017     Lab Results   Component Value Date    RBC 4.94 08/15/2017     Lab Results   Component Value Date    HGB 14.0 08/15/2017     Lab Results   Component Value Date    HCT 42.8 08/15/2017     Lab Results   Component Value Date    MCV 87 08/15/2017     Lab Results   Component Value Date    MCH 28.3 08/15/2017     Lab Results "   Component Value Date    MCHC 32.7 08/15/2017     Lab Results   Component Value Date    RDW 14.5 08/15/2017     Lab Results   Component Value Date     08/15/2017     Recent Labs   Lab Test  08/15/17   0724  11/26/12   1012  05/21/12   0946   CHOL  102  123  108   HDL  26*  20*  28*   LDL  35  57  55   TRIG  209*  228*  124   CHOLHDLRATIO   --   6.1*  3.9     Diagnostics:    Left lower extremity angiography 7/20/17:    INDICATION: Left lower femoral-tibial bypass graft using in situ  saphenous vein. There is concern regarding a large venous branch.     PROCEDURE/FINDINGS:   Written and verbal informed consent was obtained. Patient was brought  to the fluoroscopy suite and placed supine. Right groin was prepped  and draped in the usual sterile fashion. The right femoral head was  marked by fluoroscopy.     The common femoral bifurcation was identified by ultrasound. The  common femoral artery had a significant amount of shadowing calcified  plaque distally. The proximal CFA appear more widely patent. Skin  overlying was anesthetized with 1% lidocaine. Under ultrasound  guidance, the common femoral artery was accessed with a micropuncture  needle. Images of the patent artery and the needle tip in the artery  were saved for the permanent medical record.     Micropuncture sheath was exchanged for a 0.035 Bentson and a 5 Arabic  sheath introduced. An Omni Flush catheter was inserted over the wire  to the aortic bifurcation and a pelvic angiogram performed. A 0.035  angled Glidewire was then used to introduce the catheter into the  external iliac artery and lower extremity angiogram performed.     Angiographic findings:  Bilateral multifocal moderate disease in the common and external iliac  arteries. There is an occluded stent in the native SFA. A widely  patent femorotibial bypass graft is seen, with no significant stenosis  at the proximal femoral anastomosis or the distal anterior tibial  artery anastomosis.  The anterior tibial artery distal to the  anastomosis is widely patent, as is the dorsalis pedis artery. The  bypass graft itself is superficial in the medial subcutaneous soft  tissues of the thigh.     In the mid thigh, there is a large branch off the saphenous vein  bypass graft that arises medially and drains prior a large venous  network into the femoral vein.     Given that the patient is already planned for surgical intervention  with vascular surgery, and the fact that the branch vein is very  superficial in the subcutaneous tissues of the thigh, this branch vein  was not embolized with coils or plugs.     Catheter was removed over a Bentson wire. A right CFA angiogram  demonstrated high placement in the right CFA. A 4/5 [place] minx  closure device was deployed and manual pressure held to provide  adequate hemostasis.      IMPRESSION:  1. Large venous branch arising in the superficial medial thigh from  the in situ saphenous vein femoro-tibial bypass graft. It drains via  an enlarged venous network into the femoral vein.  2. The proximal and distal graft anastomoses are widely patent.  3. Widely patent runoff via the SENIA and dorsalis pedis artery.    Exam: Duplex ultrasound of left lower extremity arteries dated  5/10/2017 11:55 AM       Clinical information: PAD with chronic le ischemia, Peripheral  vascular disease, unspecified . Left SFA to SENIA bypass graft.      Comparison: 11/9/2016.      EIA: Velocity: 157/79 cm/sec.  Waveforms: Monophasic low-resistance  waveforms with significant diastolic flow.  Common femoral artery: 233/100 cm/sec. Waveforms: Monophasic low  resistance waveforms with significant diastolic flow.      SFA to SENIA bypass graft: Monophasic low resistance waveforms with  significant diastolic flow  Proximal anastomosis: 194/91, 189/82 cm/s   Proximal thigh: 93/40  Mid thigh: 112/55      Mid thigh branch off the SFA to SENIA bypass graft: Flow is reversed,  towards the head. Velocity:  388/237 cm/s. Monophasic low resistance  waveforms with significant diastolic flow.      Distal thigh: 60/0 cm/s. Monophasic waveform, without significant  diastolic flow  Knee: 52/0 cm/s. Monophasic waveform, without significant diastolic  flow  Proximal calf: 51/0 cm/s. Monophasic waveforms, without significant  diastolic flow  Mid calf: 57/0 cm/s. Monophasic waveforms, without significant  diastolic flow.  Distal: 43/0 cm/s. Monophasic wave without significant diastolic flow.  Distal anastomosis: 53/12 cm/s. Monophasic waveforms with good  systolic upstroke and mild diastolic flow.      Distal to anastomosis: 78/0 cm/s. Good systolic upstroke, phasic  waveform with out significant diastolic flow.      ABIs:      Right side:  Arm: 117 mmHg      Left side:   Arm: 118 mmHg   PT at ankle: 76 mmHg   DP at foot: 82 mmHg  First digit: 87 mmHg   OZZIE: 0.69   TBI: 0.74      Impression:  Patent left SFA to SENIA bypass graft without significant  stenosis. Patent nonligated sidebranch off the in situ bypass at the  mid/distal thigh with velocity up to 389 cm/s, previously 568 cm/s.  Waveforms proximal to the side branch are low resistance monophasic  compatible with likely venous fistula through the nonligated side  branch. Distal to this branch waveforms are high resistance monophasic  with good systolic upstroke similar prior. Again, correlate with any  signs of venous hypertension in the left leg.     Vascular ultrasound arterial duplex left lower extremity extremity  bypass graft dated 11/9/2016 12:13 PM      Comparison Study: 11/2/2015 and 4/20/2015      Clinical History: Peripheral arterial disease, previous SFG      Technique: Study includes gray scale images, color Doppler, spectral  Doppler waveforms and velocities with appropriate angles of 60 degrees  or less.      Findings:       Left lower extremity: Left thigh common femoral to anterior tibial  artery bypass graft.      External iliac artery, inflow artery:  242/105 cm/s, monophasic  waveforms.      Common femoral artery proximal to the anastomosis: 211/95 cm/sec with  monophasic waveforms.      Proximal anastomosis: 226/107 cm/sec with monophasic waveforms.      Within graft from proximal to distal: 161/66, 99/49, 115/53, 153/56,  78, 51, 54, and 54 cm/sec with monophasic waveforms.   Non-ligated branch vein off of mid distal thigh graft with velocity of  414/206 cm/s and 568/332 cm/s, previously 375/246 cm/sec. And on  4/20/2015 this measured 425/205 cm/sec.      Distal anastomosis: 95 cm/sec with monophasic waveforms.      Anterior tibial artery distal to the anastomosis: Antegrade flow with  velocity of 63 cm/sec with monophasic waveforms.  Anterior tibial artery proximal to the anastomosis demonstrates  expected reversal of flow (away from the graft anastomosis) at 85  cm/sec.      Impression:  1. Patent left common femoral to anterior tibial artery bypass graft  demonstrates no evidence of hemodynamically significant stenosis.  Diffuse monophasic waveforms consistent with decreased capillary  resistance/vasodilation.  2. Redemonstration of patent non-ligated side branch coming off of the  in situ bypass at the mid to distal thigh with velocity of 414-568,  increased since prior study, but similar to 4/20/2015. Correlate for  clinical evidence of venous hypertension      Assessment and Plan:      Alexandro Pool is a 61 year old male who is seen in follow-up for management of severe PAD, HTN and HLD.    1. Severe PAD, status-post right AKA and left lower extremity SFA stenting in 2012 and femoral-tibial bypass in 2013:   His last arterial duplex showed possible AV fistula off the bypass graft, also noted to have reduced distal velocities concerning for short-medium term graft failure. He was referred to vascular surgery and underwent left lower extremity angiography which demonstrated patent bypass grafts and an AVF arising in the superficial medial thigh from  the in situ saphenous vein femoral-tibial bypass graft. Per vascular surgery note he will likely have the AVF repaired, timing to be determined.  -- Continue ASA 81 mg  -- Continue use of Flexitouch pump twice daily  -- Will have PAD rehab call the patient to discuss cost  -- Discussed modifiable risk factors as stated below    2. Dyslipidemia: Currently tolerating atorvastin 80 mg daily, last LDL 35.  -- Continue atorvastatin 80 mg daily  -- Lipid panel in 2 months     3. Hypertension: Well controlled.  -- Continue lisinopril 2.5 mg daily and amlodipine 2.5 mg BID     4.Tobacco use disorder:  Prescribed Nicotine patch at his last visit, does not wear the patch consistently and continues to smoke 1.5 PPD.  -- Encouraged smoking cessation, discussed alternative options, patient is not interested at this time    5. Diabetes Melllitus: Managed by PCP Dr. Belle. Last Hgb A1C 7.2. Checks blood sugar 3 x day, usually run in the 150's. Occasionally gets low in the afternoons/evenings.   -- Continue management per PCP     6. CAD with ischemic cardiomyopathy EF 49%: Stable, denies chest pain or dyspnea.  -- Continue metoprolol succinate 75 mg daily and spironolactone 12.5 mg daily     Follow-up: RTC to see me in 2 months with labs prior.         Purvi Anderson NP

## 2017-08-21 NOTE — NURSING NOTE
Chief Complaint   Patient presents with     New Patient     62 y/o male for follow up of PAD with critical limb ischemia.     Vitals were taken and medications were reconciled.  ANTONIA Coleman  8:26 AM

## 2017-08-21 NOTE — MR AVS SNAPSHOT
After Visit Summary   2017    Alexandro Pool    MRN: [id number]           Patient Information     Date Of Birth          1955        Visit Information        Provider Department      2017 9:30 AM Purvi Anderson NP [facility]        Today's Diagnoses     Hypertension goal BP (blood pressure) < 140/90    -  1    Hyperlipidemia LDL goal <70        PAD (peripheral artery disease) (H)        Critical limb ischemia (left leg)        Status post above knee amputation of right lower extremity (H)          Care Instructions    You were seen today in the Cardiovascular Clinic at the [facility].    Cardiology provider you saw during your visit Purvi Anderson NP    1. Your cholesterol is good, continue Lipitor 80 mg daily.  2. Will get in touch with Dr. Suggs to discuss timing of fistula repair.   3. Return to clinic in 2 months for follow-up with labs prior.     Questions and [phone].   First press #1 for the University and then press #3 for Medical Questions to reach the Cardiology triage nurse.     On Call Cardiologist for after hours or on weekends: [phone], press option #4 and ask to speak to the on-call Cardiologist.             Follow-ups after your visit        Follow-up notes from your care team     Return in about 2 months (around 10/21/2017).      Your next 10 appointments already scheduled     Oct 12, 2017   Procedure with Akilah Lawson MD   [facility] [place] Endoscopy ([facility])    [address] [facility] [address]   [phone]           The [facility] is located on the corner of [address] and [address] [address] [facility] on the [facility]. It is easily accessible from virtually any point in the [facility] area, via [address] and [address].            Oct 16, 2017 10:15 AM CDT   Lab with  LAB    Health Lab ([facility])     [address] [place] [address]   [phone]            Oct 16, 2017 10:30 AM CDT   (Arrive by 10:15 AM)   Return Visit with Purvi Anderson NP   [facility]    [address] [place] [address]   [phone]            Dec 15, 2017  1:00 PM CST   (Arrive by 12:45 PM)   Return Visit with Giovani Olson MD   [facility]    [address] [place] [address]   [phone]            Feb 13, 2018  8:00 AM CST   Lab with  LAB   [facility]    [address] [place] [address]   [phone]            Feb 13, 2018  8:30 AM CST   US ABDOMEN COMPLETE with UCUS1   [facility] ([facility]    [address] [place] [id number] [phone]           Please bring a list of your medicines (including vitamins, minerals and over-the-counter drugs). Also, tell your doctor about any allergies you may have. Wear comfortable clothes and leave your valuables at home.  Adults: No eating or drinking for 8 hours before the exam. You may take medicine with a small sip of water.  Children: - Children 6+ years: No food or drink for 6 hours before exam. - Children 1-5 years: No food or drink for 4 hours before exam. - Infants, breast-fed: may have breast milk up to 2 hours before exam. - Infants, formula: may have bottle until 4 hours before exam.  Please call the Imaging Department at your exam site with any questions.            Feb 13, 2018  9:30 AM CST   (Arrive by 9:15 AM)   Return General Liver with Nehemias Cevallos MD   [facility]    [address] [place] [address]   [phone]              Future tests that were ordered for you today     Open  "Future Orders        Priority Expected Expires Ordered    Lipid panel reflex to direct LDL Routine 10/20/2017 8/21/2018 8/21/2017            Who to contact     If you have questions or need follow up information about today's clinic visit or your schedule please contact [facility] directly at [phone].  Normal or non-critical lab and imaging results will be communicated to you by MyChart, letter or phone within 4 business days after the clinic has received the results. If you do not hear from us within 7 days, please contact the clinic through MyChart or phone. If you have a critical or abnormal lab result, we will notify you by phone as soon as possible.  Submit refill requests through [employer] or call your pharmacy and they will forward the refill request to us. Please allow 3 business days for your refill to be completed.          Additional Information About Your Visit        Care EveryWhere ID     This is your Care EveryWhere ID. This could be used by other organizations to access your [facility] medical records  [id number]        Your Vitals Were     Pulse Height Pulse Oximetry BMI (Body Mass Index)          57 1.702 m (5' 7\") 94% 33.36 kg/m2         Blood Pressure from Last 3 Encounters:   08/21/17 113/69   08/15/17 144/73   07/20/17 123/72    Weight from Last 3 Encounters:   08/21/17 96.6 kg (213 lb)   08/15/17 96.9 kg (213 lb 9.6 oz)   06/23/17 98.9 kg (218 lb 1.6 oz)                 Today's Medication Changes          These changes are accurate as of: 8/21/17  9:49 AM.  If you have any questions, ask your nurse or doctor.               These medicines have changed or have updated prescriptions.        Dose/Directions    insulin glargine 100 UNIT/ML injection   Commonly known as:  LANTUS   This may have changed:  additional instructions   Used for:  DM neuro manif type II        Inject 12 Units Subcutaneous every morning   Quantity:  1 Month   Refills:  0       oxyCODONE 10 MG IR tablet "   Commonly known as:  ROXICODONE   This may have changed:  how much to take   Used for:  S/P AKA (above knee amputation) (H)        Dose:  15 mg   Take 1.5 tablets (15 mg) by mouth every 4 hours as needed   Quantity:  60 tablet   Refills:  0                Primary Care Provider    [facility] Other Clinic                Equal Access to Services     AMRIK MORROW AH: Hadii michelle rowe hadperlitao Sokannanali, waaxda luqadaha, qaybta kaalmada adekimdebbie, david gillzachwiliam florez. So [facility] [phone].    ATENCIÓN: Si habla español, tiene a wong disposición servicios gratuitos de asistencia lingüística. Durgaame al [phone].    We comply with applicable federal civil rights laws and [place] laws. We do not discriminate on the basis of race, color, national origin, age, disability sex, sexual orientation or gender identity.            Thank you!     Thank you for choosing [facility]  for your care. Our goal is always to provide you with excellent care. Hearing back from our patients is one way we can continue to improve our services. Please take a few minutes to complete the written survey that you may receive in the mail after your visit with us. Thank you!             Your Updated Medication List - Protect others around you: Learn how to safely use, store and throw away your medicines at www.disposemymeds.org.          This list is accurate as of: 8/21/17  9:49 AM.  Always use your most recent med list.                   Brand Name Dispense Instructions for use Diagnosis    acetaminophen 325 MG tablet    TYLENOL    250 tablet    Take 1 tablet by mouth every 4 hours as needed.    Peripheral arterial disease (H)       amLODIPine 2.5 MG tablet    NORVASC     Take 2.5 mg by mouth 2 times daily        ASPIRIN EC PO      Take 81 mg by mouth daily        atorvastatin 80 MG tablet    LIPITOR    90 tablet    Take 1 tablet (80 mg) by mouth At Bedtime    Peripheral artery disease (H)       gabapentin 100 MG capsule     NEURONTIN    90 capsule    Take 1 capsule (100 mg) by mouth 3 times daily    Critical lower limb ischemia       humaLOG 100 UNIT/ML injection   Generic drug:  insulin lispro      Inject Subcutaneous 3 times daily (before meals) 18 units before each meal        hydrocortisone 1 % cream    CORTAID     Apply topically 2 times daily PRN        insulin glargine 100 UNIT/ML injection    LANTUS    1 Month    Inject 12 Units Subcutaneous every morning    DM neuro manif type II       insulin pen needle 32G X 4 MM    BD KWABENA U/F    120 each    Use 4x  daily or as directed.    Type II or unspecified type diabetes mellitus without mention of complication, not stated as uncontrolled       lisinopril 2.5 MG tablet    PRINIVIL/Zestril    180 tablet    Take 1 tablet (2.5 mg) by mouth 2 times daily    Hypertension goal BP (blood pressure) < 140/90       METOPROLOL SUCCINATE ER PO      Take 75 mg by mouth daily        morphine 15 MG 12 hr tablet    MS CONTIN    60 tablet    Take 3 tablets (45 mg) by mouth 2 times daily    Critical lower limb ischemia       * nicotine 21 MG/24HR 24 hr patch    NICODERM CQ    30 patch    Place 1 patch onto the skin every 24 hours    Cigarette nicotine dependence with nicotine-induced disorder       * nicotine 14 MG/24HR 24 hr patch    NICODERM CQ    30 patch    Place 1 patch onto the skin every 24 hours    Cigarette nicotine dependence with nicotine-induced disorder       * nicotine 7 MG/24HR 24 hr patch    NICODERM CQ    30 patch    Place 1 patch onto the skin every 24 hours    Cigarette nicotine dependence with nicotine-induced disorder       order for DME     1 Units    Equipment being ordered: FlexiTouch pneumatic compression device.  2-3 times per day to left lower extremity. Current strength - L4        oxyCODONE 10 MG IR tablet    ROXICODONE    60 tablet    Take 1.5 tablets (15 mg) by mouth every 4 hours as needed    S/P AKA (above knee amputation) (H)       SENNA PO      1 Tab , bedtime         spironolactone 25 MG tablet    ALDACTONE    15 tablet    Take 0.5 tablets (12.5 mg) by mouth daily    Ischemic cardiomyopathy, Hypertension goal BP (blood pressure) < 140/90       vorapaxar sulfate 2.08 MG tablet    ZONTIVITY    30 tablet    Take 1 tablet (2.08 mg) by mouth daily    Lower limb ischemia       * Notice:  This list has 3 medication(s) that are the same as other medications prescribed for you. Read the directions carefully, and ask your doctor or other care provider to review them with you.

## 2017-08-21 NOTE — TELEPHONE ENCOUNTER
Component      Latest Ref Rng & Units 8/15/2017   Cholesterol      <200 mg/dL 102   Triglycerides      <150 mg/dL 209 (H)   HDL Cholesterol      >39 mg/dL 26 (L)   LDL Cholesterol Calculated      <100 mg/dL 35   Non HDL Cholesterol      <130 mg/dL 77   Creatinine Urine      mg/dL 90   Albumin Urine mg/L      mg/L 67   Albumin Urine mg/g Cr      0 - 17 mg/g Cr 75.14 (H)   Hemoglobin A1C      4.3 - 6.0 % 7.2 (H)   Free T3      2.3 - 4.2 pg/mL 2.8   T4 Free      0.76 - 1.46 ng/dL 1.19   TSH      0.40 - 4.00 mU/L 1.21

## 2017-09-13 ENCOUNTER — ALLIED HEALTH/NURSE VISIT (OUTPATIENT)
Dept: CARDIAC REHAB | Facility: CLINIC | Age: 62
End: 2017-09-13

## 2017-09-19 ENCOUNTER — HOSPITAL ENCOUNTER (OUTPATIENT)
Facility: CLINIC | Age: 62
Discharge: HOME | End: 2017-09-19
Attending: SURGERY | Admitting: SURGERY
Payer: COMMERCIAL

## 2017-09-19 VITALS
BODY MASS INDEX: 33.43 KG/M2 | OXYGEN SATURATION: 99 % | TEMPERATURE: 98.6 F | WEIGHT: 212.96 LBS | HEIGHT: 67 IN | DIASTOLIC BLOOD PRESSURE: 80 MMHG | SYSTOLIC BLOOD PRESSURE: 140 MMHG | RESPIRATION RATE: 16 BRPM

## 2017-09-19 LAB
ABO + RH BLD: NORMAL
ABO + RH BLD: NORMAL
BASOPHILS # BLD AUTO: 0 10E9/L (ref 0–0.2)
BASOPHILS NFR BLD AUTO: 0.6 %
BLD GP AB SCN SERPL QL: NORMAL
BLOOD BANK CMNT PATIENT-IMP: NORMAL
CREAT SERPL-MCNC: 0.7 MG/DL (ref 0.66–1.25)
DIFFERENTIAL METHOD BLD: ABNORMAL
EOSINOPHIL # BLD AUTO: 0.3 10E9/L (ref 0–0.7)
EOSINOPHIL NFR BLD AUTO: 4.6 %
ERYTHROCYTE [DISTWIDTH] IN BLOOD BY AUTOMATED COUNT: 14.5 % (ref 10–15)
GFR SERPL CREATININE-BSD FRML MDRD: >90 ML/MIN/1.7M2
GLUCOSE BLDC GLUCOMTR-MCNC: 92 MG/DL (ref 70–99)
HBA1C MFR BLD: 7.5 % (ref 4.3–6)
HCT VFR BLD AUTO: 42.5 % (ref 40–53)
HGB BLD-MCNC: 14.1 G/DL (ref 13.3–17.7)
IMM GRANULOCYTES # BLD: 0 10E9/L (ref 0–0.4)
IMM GRANULOCYTES NFR BLD: 0.3 %
LYMPHOCYTES # BLD AUTO: 1.6 10E9/L (ref 0.8–5.3)
LYMPHOCYTES NFR BLD AUTO: 23.3 %
MCH RBC QN AUTO: 29.2 PG (ref 26.5–33)
MCHC RBC AUTO-ENTMCNC: 33.2 G/DL (ref 31.5–36.5)
MCV RBC AUTO: 88 FL (ref 78–100)
MONOCYTES # BLD AUTO: 0.7 10E9/L (ref 0–1.3)
MONOCYTES NFR BLD AUTO: 9.9 %
NEUTROPHILS # BLD AUTO: 4.1 10E9/L (ref 1.6–8.3)
NEUTROPHILS NFR BLD AUTO: 61.3 %
NRBC # BLD AUTO: 0 10*3/UL
NRBC BLD AUTO-RTO: 0 /100
PLATELET # BLD AUTO: 126 10E9/L (ref 150–450)
POTASSIUM SERPL-SCNC: 4.4 MMOL/L (ref 3.4–5.3)
RBC # BLD AUTO: 4.83 10E12/L (ref 4.4–5.9)
SPECIMEN EXP DATE BLD: NORMAL
WBC # BLD AUTO: 6.8 10E9/L (ref 4–11)

## 2017-09-19 PROCEDURE — 86850 RBC ANTIBODY SCREEN: CPT | Performed by: STUDENT IN AN ORGANIZED HEALTH CARE EDUCATION/TRAINING PROGRAM

## 2017-09-19 PROCEDURE — 25000125 ZZHC RX 250: Performed by: SURGERY

## 2017-09-19 PROCEDURE — 86850 RBC ANTIBODY SCREEN: CPT | Performed by: CLINICAL NURSE SPECIALIST

## 2017-09-19 PROCEDURE — 84132 ASSAY OF SERUM POTASSIUM: CPT | Performed by: CLINICAL NURSE SPECIALIST

## 2017-09-19 PROCEDURE — 86900 BLOOD TYPING SEROLOGIC ABO: CPT | Performed by: CLINICAL NURSE SPECIALIST

## 2017-09-19 PROCEDURE — 25000132 ZZH RX MED GY IP 250 OP 250 PS 637: Performed by: STUDENT IN AN ORGANIZED HEALTH CARE EDUCATION/TRAINING PROGRAM

## 2017-09-19 PROCEDURE — 86901 BLOOD TYPING SEROLOGIC RH(D): CPT | Performed by: STUDENT IN AN ORGANIZED HEALTH CARE EDUCATION/TRAINING PROGRAM

## 2017-09-19 PROCEDURE — 36000062 ZZH SURGERY LEVEL 4 1ST 30 MIN - UMMC: Performed by: SURGERY

## 2017-09-19 PROCEDURE — 71000027 ZZH RECOVERY PHASE 2 EACH 15 MINS: Performed by: SURGERY

## 2017-09-19 PROCEDURE — 82962 GLUCOSE BLOOD TEST: CPT

## 2017-09-19 PROCEDURE — 86900 BLOOD TYPING SEROLOGIC ABO: CPT | Performed by: STUDENT IN AN ORGANIZED HEALTH CARE EDUCATION/TRAINING PROGRAM

## 2017-09-19 PROCEDURE — 86901 BLOOD TYPING SEROLOGIC RH(D): CPT | Performed by: CLINICAL NURSE SPECIALIST

## 2017-09-19 PROCEDURE — 40000170 ZZH STATISTIC PRE-PROCEDURE ASSESSMENT II: Performed by: SURGERY

## 2017-09-19 PROCEDURE — 36000064 ZZH SURGERY LEVEL 4 EA 15 ADDTL MIN - UMMC: Performed by: SURGERY

## 2017-09-19 PROCEDURE — 27210794 ZZH OR GENERAL SUPPLY STERILE: Performed by: SURGERY

## 2017-09-19 PROCEDURE — 36415 COLL VENOUS BLD VENIPUNCTURE: CPT | Performed by: CLINICAL NURSE SPECIALIST

## 2017-09-19 PROCEDURE — 82565 ASSAY OF CREATININE: CPT | Performed by: CLINICAL NURSE SPECIALIST

## 2017-09-19 PROCEDURE — 83036 HEMOGLOBIN GLYCOSYLATED A1C: CPT | Performed by: CLINICAL NURSE SPECIALIST

## 2017-09-19 PROCEDURE — 85025 COMPLETE CBC W/AUTO DIFF WBC: CPT | Performed by: CLINICAL NURSE SPECIALIST

## 2017-09-19 RX ORDER — NALOXONE HYDROCHLORIDE 0.4 MG/ML
.1-.4 INJECTION, SOLUTION INTRAMUSCULAR; INTRAVENOUS; SUBCUTANEOUS
Status: DISCONTINUED | OUTPATIENT
Start: 2017-09-19 | End: 2017-09-19 | Stop reason: HOSPADM

## 2017-09-19 RX ORDER — FENTANYL CITRATE 50 UG/ML
25-50 INJECTION, SOLUTION INTRAMUSCULAR; INTRAVENOUS
Status: DISCONTINUED | OUTPATIENT
Start: 2017-09-19 | End: 2017-09-19 | Stop reason: HOSPADM

## 2017-09-19 RX ORDER — LIDOCAINE HYDROCHLORIDE AND EPINEPHRINE 10; 10 MG/ML; UG/ML
INJECTION, SOLUTION INFILTRATION; PERINEURAL PRN
Status: DISCONTINUED | OUTPATIENT
Start: 2017-09-19 | End: 2017-09-19 | Stop reason: HOSPADM

## 2017-09-19 RX ORDER — GABAPENTIN 300 MG/1
300 CAPSULE ORAL ONCE
Status: COMPLETED | OUTPATIENT
Start: 2017-09-19 | End: 2017-09-19

## 2017-09-19 RX ORDER — ONDANSETRON 4 MG/1
4 TABLET, ORALLY DISINTEGRATING ORAL EVERY 30 MIN PRN
Status: DISCONTINUED | OUTPATIENT
Start: 2017-09-19 | End: 2017-09-19 | Stop reason: HOSPADM

## 2017-09-19 RX ORDER — ONDANSETRON 2 MG/ML
4 INJECTION INTRAMUSCULAR; INTRAVENOUS EVERY 30 MIN PRN
Status: DISCONTINUED | OUTPATIENT
Start: 2017-09-19 | End: 2017-09-19 | Stop reason: HOSPADM

## 2017-09-19 RX ORDER — SODIUM CHLORIDE, SODIUM LACTATE, POTASSIUM CHLORIDE, CALCIUM CHLORIDE 600; 310; 30; 20 MG/100ML; MG/100ML; MG/100ML; MG/100ML
INJECTION, SOLUTION INTRAVENOUS CONTINUOUS
Status: DISCONTINUED | OUTPATIENT
Start: 2017-09-19 | End: 2017-09-19 | Stop reason: HOSPADM

## 2017-09-19 RX ORDER — IPRATROPIUM BROMIDE AND ALBUTEROL SULFATE 2.5; .5 MG/3ML; MG/3ML
3 SOLUTION RESPIRATORY (INHALATION) ONCE
Status: DISCONTINUED | OUTPATIENT
Start: 2017-09-19 | End: 2017-09-19 | Stop reason: HOSPADM

## 2017-09-19 RX ORDER — LABETALOL HYDROCHLORIDE 5 MG/ML
5-10 INJECTION, SOLUTION INTRAVENOUS EVERY 10 MIN PRN
Status: DISCONTINUED | OUTPATIENT
Start: 2017-09-19 | End: 2017-09-19 | Stop reason: HOSPADM

## 2017-09-19 RX ORDER — MEPERIDINE HYDROCHLORIDE 25 MG/ML
12.5 INJECTION INTRAMUSCULAR; INTRAVENOUS; SUBCUTANEOUS
Status: DISCONTINUED | OUTPATIENT
Start: 2017-09-19 | End: 2017-09-19 | Stop reason: HOSPADM

## 2017-09-19 RX ORDER — FENTANYL CITRATE 50 UG/ML
25-50 INJECTION, SOLUTION INTRAMUSCULAR; INTRAVENOUS EVERY 5 MIN PRN
Status: DISCONTINUED | OUTPATIENT
Start: 2017-09-19 | End: 2017-09-19 | Stop reason: HOSPADM

## 2017-09-19 RX ORDER — HYDROMORPHONE HYDROCHLORIDE 1 MG/ML
.3-.5 INJECTION, SOLUTION INTRAMUSCULAR; INTRAVENOUS; SUBCUTANEOUS EVERY 5 MIN PRN
Status: DISCONTINUED | OUTPATIENT
Start: 2017-09-19 | End: 2017-09-19 | Stop reason: HOSPADM

## 2017-09-19 RX ORDER — ACETAMINOPHEN 325 MG/1
975 TABLET ORAL ONCE
Status: COMPLETED | OUTPATIENT
Start: 2017-09-19 | End: 2017-09-19

## 2017-09-19 RX ORDER — ACETAMINOPHEN 325 MG/1
650 TABLET ORAL
Status: DISCONTINUED | OUTPATIENT
Start: 2017-09-19 | End: 2017-09-19 | Stop reason: HOSPADM

## 2017-09-19 RX ORDER — HYDROMORPHONE HYDROCHLORIDE 1 MG/ML
.3-.5 INJECTION, SOLUTION INTRAMUSCULAR; INTRAVENOUS; SUBCUTANEOUS EVERY 10 MIN PRN
Status: DISCONTINUED | OUTPATIENT
Start: 2017-09-19 | End: 2017-09-19 | Stop reason: HOSPADM

## 2017-09-19 RX ORDER — CEFAZOLIN SODIUM 2 G/100ML
2 INJECTION, SOLUTION INTRAVENOUS
Status: DISCONTINUED | OUTPATIENT
Start: 2017-09-19 | End: 2017-09-19 | Stop reason: HOSPADM

## 2017-09-19 RX ADMIN — GABAPENTIN 300 MG: 300 CAPSULE ORAL at 12:55

## 2017-09-19 RX ADMIN — ACETAMINOPHEN 975 MG: 325 TABLET ORAL at 12:56

## 2017-09-19 NOTE — DISCHARGE INSTRUCTIONS
[facility]  Same-Day Surgery   Adult Discharge Orders & Instructions     For 24 hours after surgery    1. Get plenty of rest.  A responsible adult must stay with you for at least 24 hours after you leave the hospital.   2. Do not drive or use heavy equipment.  If you have weakness or tingling, don't drive or use heavy equipment until this feeling goes away.  3. Do not drink alcohol.  4. Avoid strenuous or risky activities.  Ask for help when climbing stairs.   5. You may feel lightheaded.  IF so, sit for a few minutes before standing.  Have someone help you get up.   6. If you have nausea (feel sick to your stomach): Drink only clear liquids such as apple juice, ginger ale, broth or 7-Up.  Rest may also help.  Be sure to drink enough fluids.  Move to a regular diet as you feel able.  7. You may have a slight fever. Call the doctor if your fever is over 100 F (37.7 C) (taken under the tongue) or lasts longer than 24 hours.  8. You may have a dry mouth, a sore throat, muscle aches or trouble sleeping.  These should go away after 24 hours.  9. Do not make important or legal decisions.   Call your doctor for any of the followin.  Signs of infection (fever, growing tenderness at the surgery site, a large amount of drainage or bleeding, severe pain, foul-smelling drainage, redness, swelling).    2. It has been over 8 to 10 hours since surgery and you are still not able to urinate (pass water).    3.  Headache for over 24 hours.      To contact a doctor, call Dr Suggs's office at [phone] or:        [phone] and ask for the resident on call for vascular surgery (answered 24 hours a day)      Emergency Department:    [facility]: [phone]       (TTY for hearing impaired: [phone])

## 2017-09-19 NOTE — OR NURSING
Pt scheduled for general anesthesia but had no one to stay with him for 24 hrs post procedure. Pt lives in an assisted living facility and has a nurse who passes meds a couple of times a day. Notified Dr. Suggs of situation and MD stated that he is going to do the procedure under 'local' so no sedation will be given & responsible adult is not needed after. Dr. Suggs aware that if pt needs any sedation during the procedure that he will need to change pt's status to register to outpatient. Notified WAQAS Zendejas of change in care. Dr. Zendejas spoke w/ pt re: the anesthesia change & signed off. Attempted to call Assisted Living Facility at [phone] but no answer.

## 2017-09-19 NOTE — OP NOTE
DATE OF OPERATION:  09/19/2017       LOCATION:  [facility] main operating room.      PREOPERATIVE DIAGNOSIS:  Left leg peripheral vascular disease with parasitic branch off fem-pop bypass and venous hypertension.      POSTOPERATIVE DIAGNOSIS:  Left leg peripheral vascular disease with parasitic branch off fem-pop bypass and venous hypertension.      PROCEDURE:  Ligation of parasitic branch.      SURGEON:  Marisol Suggs MD      FIRST ASSISTANT:  Oliver Osman MD      ANESTHESIA:  Local anesthesia only, this is 5 mL of lidocaine with epinephrine.      FINDINGS:  Large parasitic branch of fem-pop bypass, ligated with 3-0 silk ties.  After this, excellent flow and palpable pedal pulse in the bypass graft and decreased distention of the area of venous dilation.      ESTIMATED BLOOD LOSS:  Less than 10 mL.      CONDITION:  Procedure was well tolerated.      BRIEF CLINICAL HISTORY:  Mr. Alexandro Pool is a 62-year-old gentleman for whom I performed a fem-pop bypass with in situ saphenous vein for a critical limb ischemia.  He has done well, but has developed a bulge in his thigh and on duplex and fistulogram was found to have a large parasitic branch off of the fem-pop bypass vein, plan for ligation of this branch.      DESCRIPTION OF PROCEDURE:  The patient was prepped and draped for access to his thigh after ultrasound was performed and the vein marked carefully on the skin.  Local anesthesia was infused in the skin and subcutaneous tissue directly over the parasitic branch.  A 2.5 cm incision was made directly over it and extended down to subcutaneous tissue and the vein was identified.  There was some thickening and scarring, but we were able to easily dissect out this parasitic branch.  We then ligated with 3-0 silk ties and then confirmed that there was still flow in the bypass graft beyond.  We then closed the wound with a 4-0 Vicryl subcuticular and Dermabond over top.  Procedure was well tolerated.         MARISOL  MD DION             D: 2017 14:26   T: 2017 16:28   MT: JACKIE      Name:     NEIDA PADILLA   MRN:      [id number]        Account:        [id number]   :      1955           Procedure Date: 2017      Document: [id number]       cc: EVELYN MARCIAL MD

## 2017-09-19 NOTE — IP AVS SNAPSHOT
MRN:[id number]                      After Visit Summary   9/19/2017    Alexandro Pool    MRN: [id number]           Thank you!     Thank you for choosing [facility] for your care. Our goal is always to provide you with excellent care. Hearing back from our patients is one way we can continue to improve our services. Please take a few minutes to complete the written survey that you may receive in the mail after you visit with us. Thank you!        Patient Information     Date Of Birth          1955        About your hospital stay     You were admitted on:  September 19, 2017 You last received care in the:  Same Day Surgery [facility]    You were discharged on:  September 19, 2017       Who to Call     For medical emergencies, please call 911.  For non-urgent questions about your medical care, please call your primary care provider or clinic, [phone]  For questions related to your surgery, please call your surgery clinic        Attending Provider     Provider Marisol Kennedy MD Vascular Surgery       Primary Care Provider Office Phone # Fax #    Roger Belle [phone]      After Care Instructions     Discharge Instructions       Follow up appointment as instructed by Surgeon and or RN            Dressing       You have derma-bond over your incision. Leaving dressing intact. It will eventually fall off on its own.            Shower       No shower for 24 hours post procedure. May shower Postoperative Day (POD)  1                  Your next 10 appointments already scheduled     Oct 12, 2017   Procedure with Akilah Lawson MD   [facility], [place], Endoscopy ([facility], [facility])    [address]  [facility] [place] [address] [phone]           The [facility] is located on the corner of [facility] [address] [facility] and [facility] on the [facility]. It is easily accessible from  virtually any point in the [facility] area, via [address] and [address].            Oct 16, 2017 10:15 AM CDT   Lab with  LAB   [facility] Lab ([facility])    [address] [place] [address]   [phone]            Oct 16, 2017 10:30 AM CDT   (Arrive by 10:15 AM)   Return Visit with Purvi Anderson NP   [facility] ([facility])    [address] [place] [address]   [phone]            Dec 15, 2017  1:00 PM CST   (Arrive by 12:45 PM)   Return Visit with Giovani Olson MD   [facility] ([facility])    [address] [place] [address]   [phone]            Feb 13, 2018  8:00 AM CST   Lab with  LAB   [facility] ([facility])    [address] [place] [address]   [phone]            Feb 13, 2018  8:30 AM CST   US ABDOMEN COMPLETE with UCUS1   [facility] US ([facility])    [address] [place] [address]   [phone]           Please bring a list of your medicines (including vitamins, minerals and over-the-counter drugs). Also, tell your doctor about any allergies you may have. Wear comfortable clothes and leave your valuables at home.  Adults: No eating or drinking for 8 hours before the exam. You may take medicine with a small sip of water.  Children: - Children 6+ years: No food or drink for 6 hours before exam. - Children 1-5 years: No food or drink for 4 hours before exam. - Infants, breast-fed: may have breast milk up to 2 hours before exam. - Infants, formula: may have bottle until 4 hours before exam.  Please call the Imaging Department at your exam site with any questions.            Feb 13, 2018  9:30 AM CST   (Arrive by 9:15 AM)   Return General Liver with Nehemias Cevallos MD   [facility] Hepatology ([facility] [place])    [address]  [address] [facility] [address] [phone]              Further instructions from your care team       [facility]  Same-Day Surgery   Adult Discharge Orders & Instructions     For 24 hours after surgery    1. Get plenty of rest.  A responsible adult must stay with you for at least 24 hours after you leave the hospital.   2. Do not drive or use heavy equipment.  If you have weakness or tingling, don't drive or use heavy equipment until this feeling goes away.  3. Do not drink alcohol.  4. Avoid strenuous or risky activities.  Ask for help when climbing stairs.   5. You may feel lightheaded.  IF so, sit for a few minutes before standing.  Have someone help you get up.   6. If you have nausea (feel sick to your stomach): Drink only clear liquids such as apple juice, ginger ale, broth or 7-Up.  Rest may also help.  Be sure to drink enough fluids.  Move to a regular diet as you feel able.  7. You may have a slight fever. Call the doctor if your fever is over 100 F (37.7 C) (taken under the tongue) or lasts longer than 24 hours.  8. You may have a dry mouth, a sore throat, muscle aches or trouble sleeping.  These should go away after 24 hours.  9. Do not make important or legal decisions.   Call your doctor for any of the followin.  Signs of infection (fever, growing tenderness at the surgery site, a large amount of drainage or bleeding, severe pain, foul-smelling drainage, redness, swelling).    2. It has been over 8 to 10 hours since surgery and you are still not able to urinate (pass water).    3.  Headache for over 24 hours.      To contact a doctor, call Dr Suggs's office at [phone] or:        [phone] and ask for the resident on call for vascular surgery (answered 24 hours a day)      Emergency Department:    [facility]: [phone]       (TTY for hearing impaired: [phone])          Pending  "Results     No orders found from 9/17/2017 to 9/20/2017.            Admission Information     Date & Time Provider Department Dept. Phone    9/19/2017 Marisol Suggs MD Same Day Surgery [facility] [phone]      Your Vitals Were     Blood Pressure Temperature Respirations Height Weight Pulse Oximetry    140/73 (Cuff Size: Adult Regular) 98.1  F (36.7  C) (Oral) 15 1.702 m (5' 7\") 96.6 kg (212 lb 15.4 oz) 100%    BMI (Body Mass Index)                   33.35 kg/m2           Care EveryWhere ID     This is your Care EveryWhere ID. This could be used by other organizations to access your [place] medical records  [id number]        Equal Access to Services     AMRIK MORROW AH: Perry Freitas, wakayleyda luqadaha, qaybta kaalmada charityyadebbie, david florez. So [facility] [phone].    ATENCIÓN: Si habla español, tiene a wong disposición servicios gratuitos de asistencia lingüística. [facility] [phone].    We comply with applicable federal civil rights laws and [place] laws. We do not discriminate on the basis of race, color, national origin, age, disability sex, sexual orientation or gender identity.               Review of your medicines      CONTINUE these medicines which may have CHANGED, or have new prescriptions. If we are uncertain of the size of tablets/capsules you have at home, strength may be listed as something that might have changed.        Dose / Directions    insulin glargine 100 UNIT/ML injection   Commonly known as:  LANTUS   This may have changed:    - when to take this  - additional instructions   Used for:  DM neuro manif type II        Inject 12 Units Subcutaneous every morning   Quantity:  1 Month   Refills:  0       oxyCODONE 10 MG IR tablet   Commonly known as:  ROXICODONE   This may have changed:  how much to take   Used for:  S/P AKA (above knee amputation) (H)        Dose:  15 mg   Take 1.5 tablets (15 mg) by mouth every 4 hours as needed   Quantity:  " 60 tablet   Refills:  0         CONTINUE these medicines which have NOT CHANGED        Dose / Directions    acetaminophen 325 MG tablet   Commonly known as:  TYLENOL   Used for:  Peripheral arterial disease (H)        Dose:  1 tablet   Take 1 tablet by mouth every 4 hours as needed.   Quantity:  250 tablet   Refills:  0       amLODIPine 2.5 MG tablet   Commonly known as:  NORVASC        Dose:  2.5 mg   Take 2.5 mg by mouth 2 times daily   Refills:  0       ASPIRIN EC PO        Dose:  81 mg   Take 81 mg by mouth daily   Refills:  0       atorvastatin 80 MG tablet   Commonly known as:  LIPITOR   Used for:  Peripheral artery disease (H)        Dose:  80 mg   Take 1 tablet (80 mg) by mouth At Bedtime   Quantity:  90 tablet   Refills:  11       gabapentin 100 MG capsule   Commonly known as:  NEURONTIN   Used for:  Critical lower limb ischemia        Dose:  100 mg   Take 1 capsule (100 mg) by mouth 3 times daily   Quantity:  90 capsule   Refills:  0       humaLOG 100 UNIT/ML injection   Generic drug:  insulin lispro        Inject Subcutaneous 3 times daily (before meals) 18 units before each meal   Refills:  0       hydrocortisone 1 % cream   Commonly known as:  CORTAID        Apply topically 2 times daily PRN   Refills:  0       insulin pen needle 32G X 4 MM   Commonly known as:  BD KWABENA U/F   Used for:  Type II or unspecified type diabetes mellitus without mention of complication, not stated as uncontrolled        Use 4x  daily or as directed.   Quantity:  120 each   Refills:  11       lisinopril 2.5 MG tablet   Commonly known as:  PRINIVIL/Zestril   Used for:  Hypertension goal BP (blood pressure) < 140/90        Dose:  2.5 mg   Take 1 tablet (2.5 mg) by mouth 2 times daily   Quantity:  180 tablet   Refills:  3       METOPROLOL SUCCINATE ER PO        Dose:  75 mg   Take 75 mg by mouth daily   Refills:  0       morphine 15 MG 12 hr tablet   Commonly known as:  MS CONTIN   Used for:  Critical lower limb ischemia         Dose:  45 mg   Take 3 tablets (45 mg) by mouth 2 times daily   Quantity:  60 tablet   Refills:  0       order for DME        Equipment being ordered: FlexiTouch pneumatic compression device.  2-3 times per day to left lower extremity. Current strength - L4   Quantity:  1 Units   Refills:  0       SENNA PO        1 Tab , bedtime   Refills:  0       spironolactone 25 MG tablet   Commonly known as:  ALDACTONE   Used for:  Ischemic cardiomyopathy, Hypertension goal BP (blood pressure) < 140/90        Dose:  12.5 mg   Take 0.5 tablets (12.5 mg) by mouth daily   Quantity:  15 tablet   Refills:  0       vorapaxar sulfate 2.08 MG tablet   Commonly known as:  ZONTIVITY   Used for:  Lower limb ischemia        Dose:  2.08 mg   Take 1 tablet (2.08 mg) by mouth daily   Quantity:  30 tablet   Refills:  6                Protect others around you: Learn how to safely use, store and throw away your medicines at www.disposemymeds.org.             Medication List: This is a list of all your medications and when to take them. Check marks below indicate your daily home schedule. Keep this list as a reference.      Medications           Morning Afternoon Evening Bedtime As Needed    acetaminophen 325 MG tablet   Commonly known as:  TYLENOL   Take 1 tablet by mouth every 4 hours as needed.   Last time this was given:  975 mg on 9/19/2017 12:56 PM                                amLODIPine 2.5 MG tablet   Commonly known as:  NORVASC   Take 2.5 mg by mouth 2 times daily                                ASPIRIN EC PO   Take 81 mg by mouth daily                                atorvastatin 80 MG tablet   Commonly known as:  LIPITOR   Take 1 tablet (80 mg) by mouth At Bedtime                                gabapentin 100 MG capsule   Commonly known as:  NEURONTIN   Take 1 capsule (100 mg) by mouth 3 times daily   Last time this was given:  300 mg on 9/19/2017 12:55 PM                                humaLOG 100 UNIT/ML injection   Inject  Subcutaneous 3 times daily (before meals) 18 units before each meal   Generic drug:  insulin lispro                                hydrocortisone 1 % cream   Commonly known as:  CORTAID   Apply topically 2 times daily PRN                                insulin glargine 100 UNIT/ML injection   Commonly known as:  LANTUS   Inject 12 Units Subcutaneous every morning                                insulin pen needle 32G X 4 MM   Commonly known as:  BD KWABENA U/F   Use 4x  daily or as directed.                                lisinopril 2.5 MG tablet   Commonly known as:  PRINIVIL/Zestril   Take 1 tablet (2.5 mg) by mouth 2 times daily                                METOPROLOL SUCCINATE ER PO   Take 75 mg by mouth daily                                morphine 15 MG 12 hr tablet   Commonly known as:  MS CONTIN   Take 3 tablets (45 mg) by mouth 2 times daily                                order for DME   Equipment being ordered: FlexiTouch pneumatic compression device.  2-3 times per day to left lower extremity. Current strength - L4                                oxyCODONE 10 MG IR tablet   Commonly known as:  ROXICODONE   Take 1.5 tablets (15 mg) by mouth every 4 hours as needed                                SENNA PO   1 Tab , bedtime                                spironolactone 25 MG tablet   Commonly known as:  ALDACTONE   Take 0.5 tablets (12.5 mg) by mouth daily                                vorapaxar sulfate 2.08 MG tablet   Commonly known as:  ZONTIVITY   Take 1 tablet (2.08 mg) by mouth daily

## 2017-09-19 NOTE — BRIEF OP NOTE
[facility], [place]    Brief Operative Note    Pre-operative diagnosis: Fistula Malfunction   Post-operative diagnosis * No post-op diagnosis entered *  Procedure: Procedure(s):  Ligation Parasitic Branch To Left Lower Extremity  - Wound Class: I-Clean  Surgeon: Surgeon(s) and Role:     * Marisol Suggs MD - Primary     * Oliver Osman MD - Assisting  Anesthesia: General   Estimated blood loss: None  Drains: None  Specimens: * No specimens in log *  Findings:   Parasitic branch ligated with 3-0 ties. Graft and foot with palpable pulse. Please see dictated op note for details..  Complications: None.  Implants: None.

## 2017-09-26 ENCOUNTER — OFFICE VISIT (OUTPATIENT)
Dept: VASCULAR SURGERY | Facility: CLINIC | Age: 62
End: 2017-09-26

## 2017-09-26 VITALS
OXYGEN SATURATION: 96 % | HEART RATE: 65 BPM | SYSTOLIC BLOOD PRESSURE: 119 MMHG | DIASTOLIC BLOOD PRESSURE: 73 MMHG | RESPIRATION RATE: 22 BRPM

## 2017-09-26 DIAGNOSIS — I73.9 PAD (PERIPHERAL ARTERY DISEASE) (H): Primary | ICD-10-CM

## 2017-09-26 ASSESSMENT — PAIN SCALES - GENERAL: PAINLEVEL: NO PAIN (1)

## 2017-09-26 NOTE — LETTER
9/26/2017       RE: Alexandro Pool  [address]    [place] [address]     Dear Colleague,    Thank you for referring your patient, Alexandro Pool, to the [facility] VASCULAR [facility]INIC at [facility]. Please see a copy of my visit note below.    VASCULAR SURGERY CLINIC ESTABLISHED PATIENT NOTE    HPI:  Mr. Pool is a 61- year old male who presents today for follow-up status post left femoral to anterior tibial artery bypass with in-situ saphenous vein.  On 9/19/2017, he underwent left leg ligation of parasitic branch off fem-pop bypass with Dr. Suggs.  He returns to clinic today for incision check.     SUBJECTIVE: Endorses feeling well. Denies any fever chills, left leg incision drainage,  and night sweats.  Reports smoking a pack of cigarettes every 2 days.       OBJECTIVE:  Vital signs:  /73 (BP Location: Right arm)  Pulse 65  Resp 22  SpO2 96%      Prior to Admission medications    Medication Sig Start Date End Date Taking? Authorizing Provider   atorvastatin (LIPITOR) 80 MG tablet Take 1 tablet (80 mg) by mouth At Bedtime 6/29/17  Yes Viridiana Smith APRN CNP   nicotine (NICODERM CQ) 21 MG/24HR 24 hr patch Place 1 patch onto the skin every 24 hours 6/23/17 8/22/17  Purvi Anderson NP   nicotine (NICODERM CQ) 14 MG/24HR 24 hr patch Place 1 patch onto the skin every 24 hours 6/23/17 8/22/17  Purvi Anderson NP   nicotine (NICODERM CQ) 7 MG/24HR 24 hr patch Place 1 patch onto the skin every 24 hours 6/23/17 8/22/17  Purvi Anderson NP   spironolactone (ALDACTONE) 25 MG tablet Take 0.5 tablets (12.5 mg) by mouth daily 6/12/17   Giovani Olson MD   lisinopril (PRINIVIL,ZESTRIL) 2.5 MG tablet Take 1 tablet (2.5 mg) by mouth 2 times daily 11/1/16   Greg Olguin MD   order for DME Equipment being ordered: FlexiTouch pneumatic compression device.  2-3 times per day to left lower extremity.  Current strength - L4  5/11/16   Omar Pereyra MD   Vorapaxar Sulfate (ZONTIVITY) 2.08 MG tablet Take 1 tablet (2.08 mg) by mouth daily 3/18/15   Omar Pereyra MD   insulin pen needle (BD KWABENA U/F) 32G X 4 MM Use 4x  daily or as directed. 8/18/14   Marvin, Kourtney Garcia, APRN CNP   insulin lispro (HUMALOG VIAL) SOLN 100 UNIT/ML Inject Subcutaneous 3 times daily (before meals) 18 units before each meal    Reported, Patient   hydrocortisone 1 % cream Apply topically 2 times daily PRN    Reported, Patient   SENNA PO 1 Tab , bedtime    Reported, Patient   morphine (MS CONTIN) 15 MG 12 hr tablet Take 3 tablets (45 mg) by mouth 2 times daily 10/19/13   Vikas Pop MD   oxyCODONE (ROXICODONE) 10 MG immediate release tablet Take 1.5 tablets (15 mg) by mouth every 4 hours as needed  Patient taking differently: Take 20 mg by mouth every 4 hours as needed  10/19/13   Vikas Pop MD   gabapentin (NEURONTIN) 100 MG capsule Take 1 capsule (100 mg) by mouth 3 times daily 10/19/13   Vikas Pop MD   insulin glargine (LANTUS) SOLN 100 UNIT/ML Inject 12 Units Subcutaneous every morning  Patient taking differently: Inject 50 Units Subcutaneous every morning 10/19/13   Vikas Pop MD   ASPIRIN EC PO Take 81 mg by mouth daily    Unknown, Entered By History   METOPROLOL SUCCINATE ER PO Take 75 mg by mouth daily    Unknown, Entered By History   amLODIPine (NORVASC) 2.5 MG tablet Take 2.5 mg by mouth 2 times daily    Reported, Patient   acetaminophen (TYLENOL) 325 MG tablet Take 1 tablet by mouth every 4 hours as needed. 3/10/12   Faviola Gusman MD       PHYSICAL EXAM:  NEURO/PSYCH: The patient is alert and oriented.  Appropriate.  Moves all extremities.   SKIN: Color appropriate for race, warm, dry.   PULMONARY: Does not require supplemental oxygen; no acute distress.   EXTREMITIES: Left medial thigh incision healing nicely, non-tender with palpation.  Left lower extremity warm and well-perfused. Palpable  arteriovenous fistula left upper medial thigh.  Distal bypass palpable distal to knee on left medial calf.        ASSESSMENT/PLAN:  #1 Critical limb ischemia, secondary to atherosclerosis  #2 Status post right above knee amputation, 2011  #3 Status post left superficial femoral artery stent, 03/2012  #4 Status post status post left femoral to anterior tibial artery bypass with in-situ saphenous vein and intra-operative ultrasound-guided ligation of parasitic branches, on 9/19/2017 at [facility] with Dr. 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 Suggs on 9/19/2017    - incision healing nicely     - continue ASA and Lipitor   - smoking cessation strongly encouraged.    - follow up office visit with ABIs and ultrasound in 2-3 weeks with Dr. Suggs         Please contact Dr. Suggs's Vascular Surgery Service with questions or concerns.    Payton PRADO, CNS  Division of Vascular Surgery  [facility]  Pager [phone]

## 2017-09-26 NOTE — PROGRESS NOTES
VASCULAR SURGERY CLINIC ESTABLISHED PATIENT NOTE    HPI:  Mr. Pool is a 61- year old male who presents today for follow-up status post left femoral to anterior tibial artery bypass with in-situ saphenous vein.  On 9/19/2017, he underwent left leg ligation of parasitic branch off fem-pop bypass with Dr. Suggs.  He returns to clinic today for incision check.     SUBJECTIVE: Endorses feeling well. Denies any fever chills, left leg incision drainage,  and night sweats.  Reports smoking a pack of cigarettes every 2 days.       OBJECTIVE:  Vital signs:  /73 (BP Location: Right arm)  Pulse 65  Resp 22  SpO2 96%      Prior to Admission medications    Medication Sig Start Date End Date Taking? Authorizing Provider   atorvastatin (LIPITOR) 80 MG tablet Take 1 tablet (80 mg) by mouth At Bedtime 6/29/17  Yes Viridiana Smith APRN CNP   nicotine (NICODERM CQ) 21 MG/24HR 24 hr patch Place 1 patch onto the skin every 24 hours 6/23/17 8/22/17  Purvi Anderson NP   nicotine (NICODERM CQ) 14 MG/24HR 24 hr patch Place 1 patch onto the skin every 24 hours 6/23/17 8/22/17  Purvi Anderson NP   nicotine (NICODERM CQ) 7 MG/24HR 24 hr patch Place 1 patch onto the skin every 24 hours 6/23/17 8/22/17  Purvi Anderson NP   spironolactone (ALDACTONE) 25 MG tablet Take 0.5 tablets (12.5 mg) by mouth daily 6/12/17   Giovani Olson MD   lisinopril (PRINIVIL,ZESTRIL) 2.5 MG tablet Take 1 tablet (2.5 mg) by mouth 2 times daily 11/1/16   Greg Olguin MD   order for DME Equipment being ordered: FlexiTouch pneumatic compression device.  2-3 times per day to left lower extremity.  Current strength - L4 5/11/16   Omar Pereyra MD   Vorapaxar Sulfate (ZONTIVITY) 2.08 MG tablet Take 1 tablet (2.08 mg) by mouth daily 3/18/15   Omar Pereyra MD   insulin pen needle (BD KWABENA U/F) 32G X 4 MM Use 4x  daily or as directed. 8/18/14   Kourtney Wong APRN CNP   insulin lispro (HUMALOG VIAL) SOLN  100 UNIT/ML Inject Subcutaneous 3 times daily (before meals) 18 units before each meal    Reported, Patient   hydrocortisone 1 % cream Apply topically 2 times daily PRN    Reported, Patient   SENNA PO 1 Tab , bedtime    Reported, Patient   morphine (MS CONTIN) 15 MG 12 hr tablet Take 3 tablets (45 mg) by mouth 2 times daily 10/19/13   Vikas Pop MD   oxyCODONE (ROXICODONE) 10 MG immediate release tablet Take 1.5 tablets (15 mg) by mouth every 4 hours as needed  Patient taking differently: Take 20 mg by mouth every 4 hours as needed  10/19/13   Vikas Pop MD   gabapentin (NEURONTIN) 100 MG capsule Take 1 capsule (100 mg) by mouth 3 times daily 10/19/13   Vikas Pop MD   insulin glargine (LANTUS) SOLN 100 UNIT/ML Inject 12 Units Subcutaneous every morning  Patient taking differently: Inject 50 Units Subcutaneous every morning 10/19/13   Vikas Pop MD   ASPIRIN EC PO Take 81 mg by mouth daily    Unknown, Entered By History   METOPROLOL SUCCINATE ER PO Take 75 mg by mouth daily    Unknown, Entered By History   amLODIPine (NORVASC) 2.5 MG tablet Take 2.5 mg by mouth 2 times daily    Reported, Patient   acetaminophen (TYLENOL) 325 MG tablet Take 1 tablet by mouth every 4 hours as needed. 3/10/12   Faviola Gusman MD       PHYSICAL EXAM:  NEURO/PSYCH: The patient is alert and oriented.  Appropriate.  Moves all extremities.   SKIN: Color appropriate for race, warm, dry.   PULMONARY: Does not require supplemental oxygen; no acute distress.   EXTREMITIES: Left medial thigh incision healing nicely, non-tender with palpation.  Left lower extremity warm and well-perfused. Palpable arteriovenous fistula left upper medial thigh.  Distal bypass palpable distal to knee on left medial calf.        ASSESSMENT/PLAN:  #1 Critical limb ischemia, secondary to atherosclerosis  #2 Status post right above knee amputation, 2011  #3 Status post left superficial femoral artery stent, 03/2012  #4 Status  post status post left femoral to anterior tibial artery bypass with in-situ saphenous vein and intra-operative ultrasound-guided ligation of parasitic branches, on 9/19/2017 at [facility] with Dr. 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 Suggs on 9/19/2017    - incision healing nicely     - continue ASA and Lipitor   - smoking cessation strongly encouraged.    - follow up office visit with ABIs and ultrasound in 2-3 weeks with Dr. Suggs         Please contact Dr. Suggs's Vascular Surgery Service with questions or concerns.    Payton PRADO, CNS  Division of Vascular Surgery  [facility]  Pager [phone]

## 2017-09-26 NOTE — MR AVS SNAPSHOT
After Visit Summary   9/26/2017    Alexandro Pool    MRN: [id number]           Patient Information     Date Of Birth          1955        Visit Information        Provider Department      9/26/2017 12:00 PM Payton Friedman APRN [facility]        Today's Diagnoses     PAD (peripheral artery disease) (H)    -  1      Care Instructions    Follow up ultrasound, ABIs and office visit with Dr. Suggs in 2-3 weeks    With questions, concerns, or to request an appointment, please call either:    Delmis Lopez, Care Coordinator RN, Vascular Surgery  [phone]    Vascular Call Center  [phone]    To contact someone after 5 pm, on a weekend, or on a Holiday, please call:  [facility]  [phone], option 4 to have a member of the Vascular Surgery Service paged.          Follow-ups after your visit        Follow-up notes from your care team     Return in about 2 weeks (around 10/10/2017).      Your next 10 appointments already scheduled     Oct 12, 2017   Procedure with Akilah Lawson MD   [facility] [place] ([facility], [facility])    [address] [place] [id number] [phone]           The [facility] is located on the corner of [facility] and [facility] on the [facility]. It is easily accessible from virtually any point in the [place] area, via [address] and [address].            Oct 20, 2017 10:15 AM CDT   Lab with  LAB    Health Lab ([facility])    [address]  1st [facility] [address] [phone]            Oct 20, 2017 10:30 AM CDT   (Arrive by 10:15 AM)   Return Visit with Purvi Anderson NP   [facility] ([facility])    [address]  3rd Floor  [facility] [id number] [phone]            Oct 26, 2017  2:00 PM CDT   US OZZIE DOPPLER NO  EXERCISE with RUSTV2   [facility] ([facility])    [address] [place] [id number] [phone]           Please bring a list of your medicines (including vitamins, minerals and over-the-counter drugs). Also, tell your doctor about any allergies you may have. Wear comfortable clothes and leave your valuables at home.  No caffeine or tobacco for 1 hour prior to exam.  Please call the Imaging Department at your exam site with any questions.            Oct 26, 2017  2:30 PM CDT   US LOWER EXTREMITY ARTERIAL DUPLEX LEFT with [address] [facility] ([facility])    [address] [place] [id number] [phone]           Please bring a list of your medicines (including vitamins, minerals and over-the-counter drugs). Also, tell your doctor about any allergies you may have. Wear comfortable clothes and leave your valuables at home.  You do not need to do anything special to prepare for your exam.  Please call the Imaging Department at your exam site with any questions.            Oct 26, 2017  3:45 PM CDT   (Arrive by 3:30 PM)   Return Vascular Visit with Marisol Suggs MD   [facility] ([facility])    [address] [place] [id number] [phone]            Dec 15, 2017  1:00 PM CST   (Arrive by 12:45 PM)   Return Visit with Giovani Olson MD   [facility] ([facility])    [address] [place] [address] [phone]            Feb 13, 2018  8:00 AM CST   Lab with  LAB    Health Lab ([facility])    [address] [place] [address] [phone]            Feb 13, 2018  8:30 AM CST   US ABDOMEN COMPLETE with US1   [facility] ([facility])    [address] [place] [address]  Se  1st Floor  [facility] [id number] [phone]           Please bring a list of your medicines (including vitamins, minerals and over-the-counter drugs). Also, tell your doctor about any allergies you may have. Wear comfortable clothes and leave your valuables at home.  Adults: No eating or drinking for 8 hours before the exam. You may take medicine with a small sip of water.  Children: - Children 6+ years: No food or drink for 6 hours before exam. - Children 1-5 years: No food or drink for 4 hours before exam. - Infants, breast-fed: may have breast milk up to 2 hours before exam. - Infants, formula: may have bottle until 4 hours before exam.  Please call the Imaging Department at your exam site with any questions.            Feb 13, 2018  9:30 AM CST   (Arrive by 9:15 AM)   Return General Liver with Nehemias Cevallos MD   [facility] ([facility] [place])    [address]  3rd [facility] [id number] [phone]              Future tests that were ordered for you today     Open Future Orders        Priority Expected Expires Ordered    US Lower Extremity Arterial Duplex Left Routine 9/26/2017 9/26/2018 9/26/2017    US OZZIE Doppler No Exercise Routine 9/26/2017 9/26/2018 9/26/2017            Who to contact     Please call your clinic at [phone] to:    Ask questions about your health    Make or cancel appointments    Discuss your medicines    Learn about your test results    Speak to your doctor   If you have compliments or concerns about an experience at your clinic, or if you wish to file a complaint, please contact [facility] Patient Relations at [phone] or email us at [email or web address]         Additional Information About Your Visit        Care EveryWhere ID     This is your Care EveryWhere ID. This could be used by other organizations to access your [place] medical records  [id number]        Your Vitals Were      Pulse Respirations Pulse Oximetry             65 22 96%          Blood Pressure from Last 3 Encounters:   09/26/17 119/73   09/19/17 140/80   08/21/17 113/69    Weight from Last 3 Encounters:   09/19/17 96.6 kg (212 lb 15.4 oz)   08/21/17 96.6 kg (213 lb)   08/15/17 96.9 kg (213 lb 9.6 oz)                 Today's Medication Changes          These changes are accurate as of: 9/26/17 12:18 PM.  If you have any questions, ask your nurse or doctor.               These medicines have changed or have updated prescriptions.        Dose/Directions    insulin glargine 100 UNIT/ML injection   Commonly known as:  LANTUS   This may have changed:    - when to take this  - additional instructions   Used for:  DM neuro manif type II        Inject 12 Units Subcutaneous every morning   Quantity:  1 Month   Refills:  0       oxyCODONE 10 MG IR tablet   Commonly known as:  ROXICODONE   This may have changed:  how much to take   Used for:  S/P AKA (above knee amputation) (H)        Dose:  15 mg   Take 1.5 tablets (15 mg) by mouth every 4 hours as needed   Quantity:  60 tablet   Refills:  0                Primary Care Provider Office Phone # Fax #    Roger Belle [phone]       [address] [address]        Equal Access to Services     AMRIK MORROW AH: Hadii michelle rowe hadasho Soomaali, waaxda luqadaha, qaybta kaalmada adeegyada, david parks . So [facility] [phone].    ATENCIÓN: Si habla español, tiene a wong disposición servicios gratuitos de asistencia lingüística. Llame al [phone].    We comply with applicable federal civil rights laws and [place] laws. We do not discriminate on the basis of race, color, national origin, age, disability sex, sexual orientation or gender identity.            Thank you!     Thank you for choosing [facility]  for your care. Our goal is always to provide you with excellent care. Hearing back from our  patients is one way we can continue to improve our services. Please take a few minutes to complete the written survey that you may receive in the mail after your visit with us. Thank you!             Your Updated Medication List - Protect others around you: Learn how to safely use, store and throw away your medicines at www.disposemymeds.org.          This list is accurate as of: 9/26/17 12:18 PM.  Always use your most recent med list.                   Brand Name Dispense Instructions for use Diagnosis    acetaminophen 325 MG tablet    TYLENOL    250 tablet    Take 1 tablet by mouth every 4 hours as needed.    Peripheral arterial disease (H)       amLODIPine 2.5 MG tablet    NORVASC     Take 2.5 mg by mouth 2 times daily        ASPIRIN EC PO      Take 81 mg by mouth daily        atorvastatin 80 MG tablet    LIPITOR    90 tablet    Take 1 tablet (80 mg) by mouth At Bedtime    Peripheral artery disease (H)       gabapentin 100 MG capsule    NEURONTIN    90 capsule    Take 1 capsule (100 mg) by mouth 3 times daily    Critical lower limb ischemia       humaLOG 100 UNIT/ML injection   Generic drug:  insulin lispro      Inject Subcutaneous 3 times daily (before meals) 18 units before each meal        hydrocortisone 1 % cream    CORTAID     Apply topically 2 times daily PRN        insulin glargine 100 UNIT/ML injection    LANTUS    1 Month    Inject 12 Units Subcutaneous every morning    DM neuro manif type II       insulin pen needle 32G X 4 MM    BD KWABENA U/F    120 each    Use 4x  daily or as directed.    Type II or unspecified type diabetes mellitus without mention of complication, not stated as uncontrolled       lisinopril 2.5 MG tablet    PRINIVIL/Zestril    180 tablet    Take 1 tablet (2.5 mg) by mouth 2 times daily    Hypertension goal BP (blood pressure) < 140/90       METOPROLOL SUCCINATE ER PO      Take 75 mg by mouth daily        morphine 15 MG 12 hr tablet    MS CONTIN    60 tablet    Take 3 tablets (45 mg)  by mouth 2 times daily    Critical lower limb ischemia       order for DME     1 Units    Equipment being ordered: FlexiTouch pneumatic compression device.  2-3 times per day to left lower extremity. Current strength - L4        oxyCODONE 10 MG IR tablet    ROXICODONE    60 tablet    Take 1.5 tablets (15 mg) by mouth every 4 hours as needed    S/P AKA (above knee amputation) (H)       SENNA PO      1 Tab , bedtime        spironolactone 25 MG tablet    ALDACTONE    15 tablet    Take 0.5 tablets (12.5 mg) by mouth daily    Ischemic cardiomyopathy, Hypertension goal BP (blood pressure) < 140/90       vorapaxar sulfate 2.08 MG tablet    ZONTIVITY    30 tablet    Take 1 tablet (2.08 mg) by mouth daily    Lower limb ischemia

## 2017-09-26 NOTE — PATIENT INSTRUCTIONS
Follow up ultrasound, ABIs and office visit with Dr. Suggs in 2-3 weeks    With questions, concerns, or to request an appointment, please call either:    Delmis Lopez, Care Coordinator RN, Vascular Surgery  [phone]    Vascular Call Center  [phone]    To contact someone after 5 pm, on a weekend, or on a Holiday, please call:  [facility]  [phone], option 4 to have a member of the Vascular Surgery Service paged.

## 2017-09-26 NOTE — NURSING NOTE
Chief Complaint   Patient presents with     RECHECK     Follow up hospital stay.  Left groin wound        Vitals:    09/26/17 1143   BP: 119/73   BP Location: Right arm   Pulse: 65   Resp: 22   SpO2: 96%       There is no height or weight on file to calculate BMI.               Jenny Borden LPN

## 2017-10-05 ENCOUNTER — TELEPHONE (OUTPATIENT)
Dept: GASTROENTEROLOGY | Facility: CLINIC | Age: 62
End: 2017-10-05

## 2017-10-05 DIAGNOSIS — Z12.11 ENCOUNTER FOR SCREENING COLONOSCOPY: Primary | ICD-10-CM

## 2017-10-05 NOTE — TELEPHONE ENCOUNTER
Patient scheduled for Colonoscopy    Indication for procedure. Cirrhosis of liver without ascites, unspecified hepatic cirrhosis type    Referring Provider. Nehemias Cevallos MD, JOHAN Dominique CNP    ? Not Needed    Arrival time verified? Yes, 0830    Facility location verified? Yes, [address]    Instructions given regarding prep and procedure    Prep Type Golytely    Are you taking any anticoagulants or blood thinners? Aspirin    Instructions given? Stopped    Electronic implanted devices? No    Pre procedure teaching completed? Yes    Transportation from procedure? Medical Transportation    H&P / Pre op physical completed? To be completed on 10/06/17

## 2017-10-06 ENCOUNTER — TRANSFERRED RECORDS (OUTPATIENT)
Dept: HEALTH INFORMATION MANAGEMENT | Facility: CLINIC | Age: 62
End: 2017-10-06

## 2017-10-10 ENCOUNTER — TRANSFERRED RECORDS (OUTPATIENT)
Dept: HEALTH INFORMATION MANAGEMENT | Facility: CLINIC | Age: 62
End: 2017-10-10

## 2017-10-12 ENCOUNTER — SURGERY (OUTPATIENT)
Age: 62
End: 2017-10-12

## 2017-10-12 ENCOUNTER — ANESTHESIA (OUTPATIENT)
Dept: GASTROENTEROLOGY | Facility: CLINIC | Age: 62
End: 2017-10-12
Payer: COMMERCIAL

## 2017-10-12 ENCOUNTER — ANESTHESIA EVENT (OUTPATIENT)
Dept: GASTROENTEROLOGY | Facility: CLINIC | Age: 62
End: 2017-10-12
Payer: COMMERCIAL

## 2017-10-12 ENCOUNTER — HOSPITAL ENCOUNTER (OUTPATIENT)
Facility: CLINIC | Age: 62
Discharge: HOME | End: 2017-10-12
Attending: INTERNAL MEDICINE | Admitting: INTERNAL MEDICINE
Payer: COMMERCIAL

## 2017-10-12 VITALS
DIASTOLIC BLOOD PRESSURE: 111 MMHG | HEIGHT: 67 IN | WEIGHT: 211 LBS | BODY MASS INDEX: 33.12 KG/M2 | TEMPERATURE: 97.6 F | RESPIRATION RATE: 13 BRPM | OXYGEN SATURATION: 97 % | SYSTOLIC BLOOD PRESSURE: 134 MMHG | HEART RATE: 91 BPM

## 2017-10-12 LAB — COLONOSCOPY: NORMAL

## 2017-10-12 PROCEDURE — 45385 COLONOSCOPY W/LESION REMOVAL: CPT | Performed by: INTERNAL MEDICINE

## 2017-10-12 PROCEDURE — 25000128 H RX IP 250 OP 636: Performed by: NURSE ANESTHETIST, CERTIFIED REGISTERED

## 2017-10-12 PROCEDURE — 37000009 ZZH ANESTHESIA TECHNICAL FEE, EACH ADDTL 15 MIN: Performed by: INTERNAL MEDICINE

## 2017-10-12 PROCEDURE — 88305 TISSUE EXAM BY PATHOLOGIST: CPT | Performed by: INTERNAL MEDICINE

## 2017-10-12 PROCEDURE — 37000008 ZZH ANESTHESIA TECHNICAL FEE, 1ST 30 MIN: Performed by: INTERNAL MEDICINE

## 2017-10-12 PROCEDURE — 25000125 ZZHC RX 250: Performed by: NURSE ANESTHETIST, CERTIFIED REGISTERED

## 2017-10-12 RX ORDER — FENTANYL CITRATE 50 UG/ML
INJECTION, SOLUTION INTRAMUSCULAR; INTRAVENOUS PRN
Status: DISCONTINUED | OUTPATIENT
Start: 2017-10-12 | End: 2017-10-12

## 2017-10-12 RX ORDER — ONDANSETRON 2 MG/ML
4 INJECTION INTRAMUSCULAR; INTRAVENOUS EVERY 30 MIN PRN
Status: CANCELLED | OUTPATIENT
Start: 2017-10-12

## 2017-10-12 RX ORDER — ONDANSETRON 4 MG/1
4 TABLET, ORALLY DISINTEGRATING ORAL EVERY 30 MIN PRN
Status: CANCELLED | OUTPATIENT
Start: 2017-10-12

## 2017-10-12 RX ORDER — SODIUM CHLORIDE, SODIUM LACTATE, POTASSIUM CHLORIDE, CALCIUM CHLORIDE 600; 310; 30; 20 MG/100ML; MG/100ML; MG/100ML; MG/100ML
INJECTION, SOLUTION INTRAVENOUS CONTINUOUS PRN
Status: DISCONTINUED | OUTPATIENT
Start: 2017-10-12 | End: 2017-10-12

## 2017-10-12 RX ORDER — SODIUM CHLORIDE, SODIUM LACTATE, POTASSIUM CHLORIDE, CALCIUM CHLORIDE 600; 310; 30; 20 MG/100ML; MG/100ML; MG/100ML; MG/100ML
INJECTION, SOLUTION INTRAVENOUS CONTINUOUS
Status: CANCELLED | OUTPATIENT
Start: 2017-10-12

## 2017-10-12 RX ORDER — MEPERIDINE HYDROCHLORIDE 25 MG/ML
12.5 INJECTION INTRAMUSCULAR; INTRAVENOUS; SUBCUTANEOUS
Status: CANCELLED | OUTPATIENT
Start: 2017-10-12

## 2017-10-12 RX ORDER — NALOXONE HYDROCHLORIDE 0.4 MG/ML
.1-.4 INJECTION, SOLUTION INTRAMUSCULAR; INTRAVENOUS; SUBCUTANEOUS
Status: CANCELLED | OUTPATIENT
Start: 2017-10-12 | End: 2017-10-13

## 2017-10-12 RX ORDER — PROPOFOL 10 MG/ML
INJECTION, EMULSION INTRAVENOUS CONTINUOUS PRN
Status: DISCONTINUED | OUTPATIENT
Start: 2017-10-12 | End: 2017-10-12

## 2017-10-12 RX ORDER — GLYCOPYRROLATE 0.2 MG/ML
INJECTION, SOLUTION INTRAMUSCULAR; INTRAVENOUS PRN
Status: DISCONTINUED | OUTPATIENT
Start: 2017-10-12 | End: 2017-10-12

## 2017-10-12 RX ADMIN — FENTANYL CITRATE 100 MCG: 50 INJECTION, SOLUTION INTRAMUSCULAR; INTRAVENOUS at 09:53

## 2017-10-12 RX ADMIN — PROPOFOL 100 MCG/KG/MIN: 10 INJECTION, EMULSION INTRAVENOUS at 09:53

## 2017-10-12 RX ADMIN — Medication 0.2 MG: at 10:02

## 2017-10-12 RX ADMIN — MIDAZOLAM HYDROCHLORIDE 2 MG: 1 INJECTION, SOLUTION INTRAMUSCULAR; INTRAVENOUS at 09:49

## 2017-10-12 RX ADMIN — SODIUM CHLORIDE, POTASSIUM CHLORIDE, SODIUM LACTATE AND CALCIUM CHLORIDE: 600; 310; 30; 20 INJECTION, SOLUTION INTRAVENOUS at 09:49

## 2017-10-12 NOTE — IP AVS SNAPSHOT
[facility], [place], Endoscopy    [address] [facility] [address]    Phone:  [phone]                                       After Visit Summary   10/12/2017    Alexandro Pool    MRN: [id number]           After Visit Summary Signature Page     I have received my discharge instructions, and my questions have been answered. I have discussed any challenges I see with this plan with the nurse or doctor.    ..........................................................................................................................................  Patient/Patient Representative Signature      ..........................................................................................................................................  Patient Representative Print Name and Relationship to Patient    ..................................................               ................................................  Date                                            Time    ..........................................................................................................................................  Reviewed by Signature/Title    ...................................................              ..............................................  Date                                                            Time

## 2017-10-12 NOTE — DISCHARGE INSTRUCTIONS
[facility] Colonoscopy/Flexible Sigmoidoscopy with Monitored Anesthesia Care  For 24 hours after your procedure  Sedation:  1. Get plenty of rest. A responsible adult must stay with you for at least 24 hours after you leave the hospital.   2. Do not drive or use heavy equipment. If you have weakness or tingling, don't drive or use heavy equipment until this feeling goes away.  3. Do not drink alcohol.  4. Avoid strenuous or risky activities. Ask for help when climbing stairs.   5. You may feel lightheaded. IF so, sit for a few minutes before standing. Have someone help you get up.   6. If you have nausea (feel sick to your stomach): Drink only clear liquids such as apple juice, ginger ale, broth or 7-Up. Rest may also help. Be sure to drink enough fluids. Move to a regular diet as you feel able.  7. You may have a slight fever. Call the doctor if your fever is over 100 F (37.7 C) (taken under the tongue) or lasts longer than 24 hours.  8. You may have a dry mouth, a sore throat, muscle aches or trouble sleeping. These should go away after 24 hours.  9. Do not make important or legal decisions.   Procedural:  1. You may return to your normal diet and medicines.  2. Air was placed in your colon during the exam in order to see it. Walking helps to pass the air.  3. You may take Tylenol (acetaminophen) for pain unless your doctor has told you not to.   Do not take aspirin or ibuprofen (Advil, Motrin, or other anti-inflammatory  drugs) for _3____ days.  Call your doctor for any of the following  1. Unusual pain in belly or chest pain not relieved with passing air.  2. More than 1 to 2 Tablespoons of bleeding from your rectum.  3. It has been over 8 to 10 hours since surgery and you are still not able to urinate (pass water).  4. Headache for over 24 hours.  ____ We took small tissue samples or polyps to study. Your doctor will call you with the results within two weeks.  If you have severe pain, bleeding, or shortness of  breath, go to an emergency room.  If you have questions, call:  Endoscopy: Monday to Friday, 7 a.m. to 4:30 p.m. [phone] (We may have to call you back)  After hours: Hospital  [phone] (Ask for the GI fellow on call)

## 2017-10-12 NOTE — OR NURSING
Colonoscopy done under MAC.  Polypectomy x2 w/ hot snare at 2 effect, max watt 25.  Ground on R hip w/ skin C/D/I after removal.  Specimens sent to lab

## 2017-10-12 NOTE — ANESTHESIA POSTPROCEDURE EVALUATION
Patient: Alexandro Pool    Procedure(s):  Colonoscopy - Wound Class: II-Clean Contaminated    Diagnosis:Screening  Diagnosis Additional Information: No value filed.    Anesthesia Type:  MAC    Note:  Anesthesia Post Evaluation    Patient location during evaluation: PACU  Patient participation: Able to fully participate in evaluation  Level of consciousness: awake  Pain management: adequate  Airway patency: patent  Cardiovascular status: acceptable  Respiratory status: acceptable  Hydration status: acceptable  PONV: none     Anesthetic complications: None          Last vitals:  Vitals:    10/12/17 0919   BP: 150/64   Resp: 19   Temp: 36.7  C (98.1  F)   SpO2: 97%         Electronically Signed By: Gianni Gonzales MD  October 12, 2017  10:50 AM

## 2017-10-12 NOTE — ANESTHESIA CARE TRANSFER NOTE
Patient: Alexandro Pool    Procedure(s):  Colonoscopy - Wound Class: II-Clean Contaminated    Diagnosis: Screening  Diagnosis Additional Information: No value filed.    Anesthesia Type:   MAC     Note:  Airway :Room Air  Patient transferred to:Phase II  Comments: Pt to recovery room.  Spontaneous respirations.   Report given to RN.  Handoff Report: Identifed the Patient, Identified the Reponsible Provider, Reviewed the pertinent medical history, Discussed the surgical course, Reviewed Intra-OP anesthesia mangement and issues during anesthesia, Set expectations for post-procedure period and Allowed opportunity for questions and acknowledgement of understanding      Vitals: (Last set prior to Anesthesia Care Transfer)    CRNA VITALS  10/12/2017 1010 - 10/12/2017 1044      10/12/2017             NIBP: (!)  145/93    Pulse: 93    NIBP Mean: 108    Ht Rate: 93    SpO2: 97 %    Resp Rate (set): 10                Electronically Signed By: JOHAN Sullivan CRNA  October 12, 2017  10:44 AM

## 2017-10-12 NOTE — IP AVS SNAPSHOT
MRN:[id number]                      After Visit Summary   10/12/2017    Alexandro Pool    MRN: [id number]           Thank you!     Thank you for choosing [facility] for your care. Our goal is always to provide you with excellent care. Hearing back from our patients is one way we can continue to improve our services. Please take a few minutes to complete the written survey that you may receive in the mail after you visit with us. Thank you!        Patient Information     Date Of Birth          1955        About your hospital stay     You were admitted on:  October 12, 2017 You last received care in the:  [facility], [facility]    You were discharged on:  October 12, 2017       Who to Call     For medical emergencies, please call 911.  For non-urgent questions about your medical care, please call your primary care provider or clinic, [phone]  For questions related to your surgery, please call your surgery clinic        Attending Provider     Provider Akilah Balderas MD Internal Medicine       Primary Care Provider Office Phone # Fax #    Roger Belle [phone]      Your next 10 appointments already scheduled     Oct 20, 2017 10:15 AM CDT   Lab with  LAB   [facility] ([facility])    [address] [place] [id number] [phone]            Oct 20, 2017 10:30 AM CDT   (Arrive by 10:15 AM)   Return Visit with Purvi Anderson NP   [facility] ([facility])    [address] [place] [address] [phone]            Oct 26, 2017  2:00 PM CDT   US OZZIE DOPPLER NO EXERCISE 1-2 LVLS BILAT with UCUSV2   [facility] ([facility])    [address] [place] [address] [phone]           Please bring a list of your medicines (including vitamins, minerals and  over-the-counter drugs). Also, tell your doctor about any allergies you may have. Wear comfortable clothes and leave your valuables at home.  No caffeine or tobacco for 1 hour prior to exam.  Please call the Imaging Department at your exam site with any questions.            Oct 26, 2017  2:30 PM CDT   US LOWER EXTREMITY ARTERIAL DUPLEX LEFT with [address] [facility] ([facility])    [address] [place] [address] [phone]           Please bring a list of your medicines (including vitamins, minerals and over-the-counter drugs). Also, tell your doctor about any allergies you may have. Wear comfortable clothes and leave your valuables at home.  You do not need to do anything special to prepare for your exam.  Please call the Imaging Department at your exam site with any questions.            Oct 26, 2017  3:45 PM CDT   (Arrive by 3:30 PM)   Return Vascular Visit with Marisol Suggs MD   [facility] ([facility])    [address] [place] [address]   [phone]            Dec 15, 2017  1:00 PM CST   (Arrive by 12:45 PM)   Return Visit with Giovani Olson MD   [facility] ([facility])    [address] [place] [address]   [phone]            Feb 13, 2018  8:00 AM CST   Lab with  LAB    [facility] ([facility])    [address] [place] [phone]   [phone]            Feb 13, 2018  8:30 AM CST   US ABDOMEN COMPLETE with [address] [place] [address] Imaging Center US ([facility])    [address] [place] [id number] [phone]           Please bring a list of your medicines (including vitamins, minerals and over-the-counter drugs). Also, tell your doctor about any allergies you may have. Wear comfortable clothes and  leave your valuables at home.  Adults: No eating or drinking for 8 hours before the exam. You may take medicine with a small sip of water.  Children: - Children 6+ years: No food or drink for 6 hours before exam. - Children 1-5 years: No food or drink for 4 hours before exam. - Infants, breast-fed: may have breast milk up to 2 hours before exam. - Infants, formula: may have bottle until 4 hours before exam.  Please call the Imaging Department at your exam site with any questions.            Feb 13, 2018  9:30 AM CST   (Arrive by 9:15 AM)   Return General Liver with Nehemias Cevallos MD   [facility] ([facility])    [address] [facility]  [address] [facility] [address] [phone]              Further instructions from your care team       [facility] Colonoscopy/Flexible Sigmoidoscopy with Monitored Anesthesia Care  For 24 hours after your procedure  Sedation:  1. Get plenty of rest. A responsible adult must stay with you for at least 24 hours after you leave the hospital.   2. Do not drive or use heavy equipment. If you have weakness or tingling, don't drive or use heavy equipment until this feeling goes away.  3. Do not drink alcohol.  4. Avoid strenuous or risky activities. Ask for help when climbing stairs.   5. You may feel lightheaded. IF so, sit for a few minutes before standing. Have someone help you get up.   6. If you have nausea (feel sick to your stomach): Drink only clear liquids such as apple juice, ginger ale, broth or 7-Up. Rest may also help. Be sure to drink enough fluids. Move to a regular diet as you feel able.  7. You may have a slight fever. Call the doctor if your fever is over 100 F (37.7 C) (taken under the tongue) or lasts longer than 24 hours.  8. You may have a dry mouth, a sore throat, muscle aches or trouble sleeping. These should go away after 24 hours.  9. Do not make important or legal decisions.   Procedural:  1. You may return to your normal diet and  "medicines.  2. Air was placed in your colon during the exam in order to see it. Walking helps to pass the air.  3. You may take Tylenol (acetaminophen) for pain unless your doctor has told you not to.   Do not take aspirin or ibuprofen (Advil, Motrin, or other anti-inflammatory  drugs) for _3____ days.  Call your doctor for any of the following  1. Unusual pain in belly or chest pain not relieved with passing air.  2. More than 1 to 2 Tablespoons of bleeding from your rectum.  3. It has been over 8 to 10 hours since surgery and you are still not able to urinate (pass water).  4. Headache for over 24 hours.  ____ We took small tissue samples or polyps to study. Your doctor will call you with the results within two weeks.  If you have severe pain, bleeding, or shortness of breath, go to an emergency room.  If you have questions, call:  Endoscopy: Monday to Friday, 7 a.m. to 4:30 p.m. [phone] (We may have to call you back)  After hours: Hospital  [phone] (Ask for the GI fellow on call)      Pending Results     No orders found from 10/10/2017 to 10/13/2017.            Admission Information     Date & Time Provider Department Dept. Phone    10/12/2017 Akilah Lawson MD [facility], Endoscopy [phone]      Your Vitals Were     Blood Pressure Temperature Respirations Height Weight Pulse Oximetry    150/64 (Cuff Size: Adult Regular) 98.1  F (36.7  C) (Oral) 19 1.702 m (5' 7\") 95.7 kg (211 lb) 97%    BMI (Body Mass Index)                   33.05 kg/m2           Care EveryWhere ID     This is your Care EveryWhere ID. This could be used by other organizations to access your [facility] medical records  [id number]        Equal Access to Services     AMRIK MORROW : Perry Freitas, wacarolina crane, qaybta kaana arellano, david florez. So [facility] [phone].    ATENCIÓN: Si habla español, tiene a wong disposición servicios gratuitos de asistencia " lingüísticjackieJose Juan Chadwick al [phone].    We comply with applicable federal civil rights laws and [place] laws. We do not discriminate on the basis of race, color, national origin, age, disability, sex, sexual orientation, or gender identity.               Review of your medicines      UNREVIEWED medicines. Ask your doctor about these medicines        Dose / Directions    acetaminophen 325 MG tablet   Commonly known as:  TYLENOL   Used for:  Peripheral arterial disease (H)        Dose:  1 tablet   Take 1 tablet by mouth every 4 hours as needed.   Quantity:  250 tablet   Refills:  0       amLODIPine 2.5 MG tablet   Commonly known as:  NORVASC        Dose:  2.5 mg   Take 2.5 mg by mouth 2 times daily   Refills:  0       ASPIRIN EC PO        Dose:  81 mg   Take 81 mg by mouth daily   Refills:  0       atorvastatin 80 MG tablet   Commonly known as:  LIPITOR   Used for:  Peripheral artery disease (H)        Dose:  80 mg   Take 1 tablet (80 mg) by mouth At Bedtime   Quantity:  90 tablet   Refills:  11       gabapentin 100 MG capsule   Commonly known as:  NEURONTIN   Used for:  Critical lower limb ischemia        Dose:  100 mg   Take 1 capsule (100 mg) by mouth 3 times daily   Quantity:  90 capsule   Refills:  0       humaLOG 100 UNIT/ML injection   Generic drug:  insulin lispro        Inject Subcutaneous 3 times daily (before meals) 18 units before each meal   Refills:  0       hydrocortisone 1 % cream   Commonly known as:  CORTAID        Apply topically 2 times daily PRN   Refills:  0       insulin glargine 100 UNIT/ML injection   Commonly known as:  LANTUS   Used for:  Type II or unspecified type diabetes mellitus with neurological manifestations, not stated as uncontrolled(250.60) (H)        Inject 12 Units Subcutaneous every morning   Quantity:  1 Month   Refills:  0       lisinopril 2.5 MG tablet   Commonly known as:  PRINIVIL/Zestril   Used for:  Hypertension goal BP (blood pressure) < 140/90        Dose:  2.5 mg    Take 1 tablet (2.5 mg) by mouth 2 times daily   Quantity:  180 tablet   Refills:  3       METOPROLOL SUCCINATE ER PO        Dose:  75 mg   Take 75 mg by mouth daily   Refills:  0       morphine 15 MG 12 hr tablet   Commonly known as:  MS CONTIN   Used for:  Critical lower limb ischemia        Dose:  45 mg   Take 3 tablets (45 mg) by mouth 2 times daily   Quantity:  60 tablet   Refills:  0       oxyCODONE 10 MG IR tablet   Commonly known as:  ROXICODONE   Used for:  S/P AKA (above knee amputation) (H)        Dose:  15 mg   Take 1.5 tablets (15 mg) by mouth every 4 hours as needed   Quantity:  60 tablet   Refills:  0       SENNA PO        1 Tab , bedtime   Refills:  0       spironolactone 25 MG tablet   Commonly known as:  ALDACTONE   Used for:  Ischemic cardiomyopathy, Hypertension goal BP (blood pressure) < 140/90        Dose:  12.5 mg   Take 0.5 tablets (12.5 mg) by mouth daily   Quantity:  15 tablet   Refills:  0       vorapaxar sulfate 2.08 MG tablet   Commonly known as:  ZONTIVITY   Used for:  Lower limb ischemia        Dose:  2.08 mg   Take 1 tablet (2.08 mg) by mouth daily   Quantity:  30 tablet   Refills:  6         CONTINUE these medicines which have NOT CHANGED        Dose / Directions    insulin pen needle 32G X 4 MM   Commonly known as:  BD KWABENA U/F   Used for:  Type II or unspecified type diabetes mellitus without mention of complication, not stated as uncontrolled        Use 4x  daily or as directed.   Quantity:  120 each   Refills:  11       order for DME        Equipment being ordered: FlexiTouch pneumatic compression device.  2-3 times per day to left lower extremity. Current strength - L4   Quantity:  1 Units   Refills:  0                Protect others around you: Learn how to safely use, store and throw away your medicines at www.disposemymeds.org.             Medication List: This is a list of all your medications and when to take them. Check marks below indicate your daily home schedule. Keep  this list as a reference.      Medications           Morning Afternoon Evening Bedtime As Needed    acetaminophen 325 MG tablet   Commonly known as:  TYLENOL   Take 1 tablet by mouth every 4 hours as needed.                                amLODIPine 2.5 MG tablet   Commonly known as:  NORVASC   Take 2.5 mg by mouth 2 times daily                                ASPIRIN EC PO   Take 81 mg by mouth daily                                atorvastatin 80 MG tablet   Commonly known as:  LIPITOR   Take 1 tablet (80 mg) by mouth At Bedtime                                gabapentin 100 MG capsule   Commonly known as:  NEURONTIN   Take 1 capsule (100 mg) by mouth 3 times daily                                humaLOG 100 UNIT/ML injection   Inject Subcutaneous 3 times daily (before meals) 18 units before each meal   Generic drug:  insulin lispro                                hydrocortisone 1 % cream   Commonly known as:  CORTAID   Apply topically 2 times daily PRN                                insulin glargine 100 UNIT/ML injection   Commonly known as:  LANTUS   Inject 12 Units Subcutaneous every morning                                insulin pen needle 32G X 4 MM   Commonly known as:  BD KWABENA U/F   Use 4x  daily or as directed.                                lisinopril 2.5 MG tablet   Commonly known as:  PRINIVIL/Zestril   Take 1 tablet (2.5 mg) by mouth 2 times daily                                METOPROLOL SUCCINATE ER PO   Take 75 mg by mouth daily                                morphine 15 MG 12 hr tablet   Commonly known as:  MS CONTIN   Take 3 tablets (45 mg) by mouth 2 times daily                                order for DME   Equipment being ordered: FlexiTouch pneumatic compression device.  2-3 times per day to left lower extremity. Current strength - L4                                oxyCODONE 10 MG IR tablet   Commonly known as:  ROXICODONE   Take 1.5 tablets (15 mg) by mouth every 4 hours as needed                                 SENNA PO   1 Tab , bedtime                                spironolactone 25 MG tablet   Commonly known as:  ALDACTONE   Take 0.5 tablets (12.5 mg) by mouth daily                                vorapaxar sulfate 2.08 MG tablet   Commonly known as:  ZONTIVITY   Take 1 tablet (2.08 mg) by mouth daily

## 2017-10-12 NOTE — LETTER
October 13, 2017       TO: Alexandro Pool  [address]    [place] [address]       Dear Mr. Pool,    We are writing to inform you of your test results.    Repeat colonoscopy in 3- 5 years      The polyp tissue removed from your colon showed no evidence of cancer, but was identified as adenomatous.  These types of polyps can progress to cancer if left in the colon.    Although your polyp tissue was completely removed, we know that you may develop more polyps in the future.  The results and recommendations regarding a follow-up colonoscopy were sent to your primary physician.  He or she will review our recommendation in the context of your other medical problems.  Please remember that our recommendation is only for individuals who have no symptoms.  If you experience a persistent change in bowel habits, or develop new abdominal pain or bleeding in your stools, please consult your primary care physician.    If you have questions, please feel free to make an appointment with your primary care physician or gastroenterology clinic.      Resulted Orders   Surgical pathology exam   Result Value Ref Range    Copath Report       Patient Name: ALEXANDRO POOL  MR#: [id number]  Specimen #: [id number]  Collected: 10/12/2017  Received: 10/12/2017  Reported: 10/13/2017 12:31  Ordering Phy(s): QIANA MUSE    For improved result formatting, select 'View Enhanced Report Format'  under Linked Documents section.    SPECIMEN(S):  A: Ascending polyp  B: Descending polyp    FINAL DIAGNOSIS:  A. COLON, ASCENDING, POLYP, POLYPECTOMY:  - Tubular adenoma  - Negative for high grade dysplasia    B. COLON, DESCENDING, POLYP, POLYPECTOMY:  - Tubular adenoma  - Negative for high grade dysplasia    I have personally reviewed all specimens  and/or slides, including the  listed special stains, and used them with my medical judgement to  determine or confirm the final diagnosis.    Electronically signed out  "by:  Mc Jaimes M.D., [facility]    CLINICAL HISTORY:  Screening colonoscopy. Two medium polyps in the descending colon and in  the ascending colon.  GROSS:  A:  The specimen is received in formalin with prop er patient  identification, labeled \"ascending polyp\".  The specimen consists of a  0.6 x 0.5 x 0.3 cm tan-pink polyp.  No attached stalk is identified.  The specimen is bisected and entirely submitted cassette A1.    B:  The specimen is received in formalin with proper patient  identification, labeled \"descending polyp\".  The specimen consists of a  0.2 cm in greatest dimension, polypoid tan soft tissue which is entirely  submitted in cassette B1. (Dictated by: Laith Alves 10/12/2017 11:33  AM)    MICROSCOPIC:  Microscopic examination was performed.    CPT Codes:  A: 82649-HS3  B: 95569-XF2    TESTING LAB LOCATION:  [facility]  [address] [place]   [address] [phone]    COLLECTION SITE:  Client: [facility]  Location: [facility] (B)    Resident  OW           It was a pleasure to see you at your recent visit. Please let me know if you have any questions or concerns.     Clinic Staff - [phone] option 3     Sincerely,     Akilah Lawson MD  [address] [place] [address], Mail Code [id number]  [place]  [address].        "

## 2017-10-12 NOTE — ANESTHESIA PREPROCEDURE EVALUATION
Anesthesia Evaluation         Anesthesia Plan      History & Physical Review  History and physical reviewed and following examination; no interval change.    ASA Status:  3 .    NPO Status:  > 6 hours    Plan for MAC with Intravenous induction. Maintenance will be TIVA.  Reason for MAC:  Difficulty with conscious sedation (QS)         Postoperative Care      Consents  Anesthetic plan, risks, benefits and alternatives discussed with:  Patient..          ANESTHESIA PREOP EVALUATION    NPO Status:  Yes, NPO    Procedure: Procedure(s):  Colonoscopy - Wound Class: II-Clean Contaminated    HPI: Presents for colonoscopy     PMHx/PSHx/ROS:  PAST MEDICAL HISTORY:   Past Medical History:   Diagnosis Date     Acute kidney failure, unspecified      Blood transfusion      CAD (coronary artery disease)      CARDIOVASCULAR SCREENING; LDL GOAL LESS THAN 100      Cirrhosis (H)      Critical lower limb ischemia      Diabetes mellitus (H) 10/2011     Hypertension      Hypertension goal BP (blood pressure) < 140/90      Ischemic cardiomyopathy      Lymphedema of left leg 5/11/2016     Peripheral arterial disease (H)      PVD (peripheral vascular disease) (H)      Right above knee amputation 4/30/2014     Type 2 diabetes, HbA1C goal < 8% (H)        PAST SURGICAL HISTORY:   Past Surgical History:   Procedure Laterality Date     AMPUTATE LEG ABOVE KNEE  11/22/2011    Procedure:AMPUTATE LEG ABOVE KNEE; Revise Right Below Knee Amputation To Above Knee Amputation; Surgeon:MADISON WELLS; Location:[facility]     AMPUTATE LEG BELOW KNEE  11/10/2011    Procedure:AMPUTATE LEG BELOW KNEE; Right Below Knee Amputation; Surgeon:MADISON WELLS; Location:[facility]     BYPASS GRAFT FEMOROTIBIAL  9/19/2013    Procedure: BYPASS GRAFT FEMOROTIBIAL;  Left Femorotibial Bypass Graft Insitu ;  Surgeon: Marisol Suggs MD;  Location: [facility]     CARDIAC SURGERY      cardiac stent     ESOPHAGOSCOPY, GASTROSCOPY, DUODENOSCOPY (EGD), COMBINED  10/1/2012    Procedure:  COMBINED ESOPHAGOSCOPY, GASTROSCOPY, DUODENOSCOPY (EGD);;  Surgeon: Nehemias Cevallos MD;  Location: [facility]     ESOPHAGOSCOPY, GASTROSCOPY, DUODENOSCOPY (EGD), COMBINED N/A 3/24/2016    Procedure: COMBINED ESOPHAGOSCOPY, GASTROSCOPY, DUODENOSCOPY (EGD);  Surgeon: Gildardo Marks MD;  Location: [facility]     IR STENT VASCULAR LT  3/9/2012          IRRIGATION AND DEBRIDEMENT LOWER EXTREMITY, COMBINED  11/15/2011    Procedure:COMBINED IRRIGATION AND DEBRIDEMENT LOWER EXTREMITY; DRAINAGE OF ABSCESS AT BELOW KNEE AMPUTATION AND KNEE DISARTICULATION.; Surgeon:MADISON WELLS; Location:[facility]     NO HISTORY OF SURGERY  7/12/13    derm     VASCULAR REPAIR LOWER EXTREMITY Left 9/19/2017    Procedure: VASCULAR REPAIR LOWER EXTREMITY;  Ligation Parasitic Branch To Left Lower Extremity ;  Surgeon: Marisol Suggs MD;  Location: [facility]       FAMILY HISTORY:   Family History   Problem Relation Age of Onset     Alcohol/Drug Mother      cirrhosis     Alcohol/Drug Father      C.A.D. No family hx of      DIABETES No family hx of      CANCER No family hx of          Past Anes Hx: No personal or family h/o anesthesia problems    Soc Hx:   Tobacco:   EtOH:     Allergies:   Allergies   Allergen Reactions     Ciprofloxacin Rash       Meds:   Prescriptions Prior to Admission   Medication Sig Dispense Refill Last Dose     atorvastatin (LIPITOR) 80 MG tablet Take 1 tablet (80 mg) by mouth At Bedtime 90 tablet 11 10/12/2017     spironolactone (ALDACTONE) 25 MG tablet Take 0.5 tablets (12.5 mg) by mouth daily 15 tablet 0 10/12/2017     lisinopril (PRINIVIL,ZESTRIL) 2.5 MG tablet Take 1 tablet (2.5 mg) by mouth 2 times daily 180 tablet 3 10/12/2017     Vorapaxar Sulfate (ZONTIVITY) 2.08 MG tablet Take 1 tablet (2.08 mg) by mouth daily 30 tablet 6 10/11/2017     insulin lispro (HUMALOG VIAL) SOLN 100 UNIT/ML Inject Subcutaneous 3 times daily (before meals) 18 units before each meal   10/11/2017     SENNA PO 1 Tab , bedtime   10/11/2017     morphine (MS  CONTIN) 15 MG 12 hr tablet Take 3 tablets (45 mg) by mouth 2 times daily 60 tablet 0 10/12/2017     oxyCODONE (ROXICODONE) 10 MG immediate release tablet Take 1.5 tablets (15 mg) by mouth every 4 hours as needed (Patient taking differently: Take 20 mg by mouth every 4 hours as needed ) 60 tablet 0 Past Week     gabapentin (NEURONTIN) 100 MG capsule Take 1 capsule (100 mg) by mouth 3 times daily 90 capsule 0 10/12/2017     insulin glargine (LANTUS) SOLN 100 UNIT/ML Inject 12 Units Subcutaneous every morning (Patient taking differently: At Bedtime Inject 50 Units Subcutaneous) 1 Month 0 10/11/2017     METOPROLOL SUCCINATE ER PO Take 75 mg by mouth daily   10/12/2017     amLODIPine (NORVASC) 2.5 MG tablet Take 2.5 mg by mouth 2 times daily   10/12/2017     acetaminophen (TYLENOL) 325 MG tablet Take 1 tablet by mouth every 4 hours as needed. 250 tablet 0 Past Month     order for DME Equipment being ordered: FlexiTouch pneumatic compression device.  2-3 times per day to left lower extremity.  Current strength - L4 1 Units  Taking     insulin pen needle (BD KWABENA U/F) 32G X 4 MM Use 4x  daily or as directed. 120 each 11 Taking     hydrocortisone 1 % cream Apply topically 2 times daily PRN   Taking     ASPIRIN EC PO Take 81 mg by mouth daily   10/9/2017       No current outpatient prescriptions on file.       Physical Exam:  VS: T 98.1, P Data Unavailable, /64, R 19, SpO2 97% Weight   Wt Readings from Last 2 Encounters:   10/12/17 95.7 kg (211 lb)   09/19/17 96.6 kg (212 lb 15.4 oz)         Airway: MP 2, TM>3FB, Neck full ROM  Dentition: no loose teeth  Heart: RRR  Lungs: CTAB      BMP:  Lab Results   Component Value Date     08/15/2017      Lab Results   Component Value Date    POTASSIUM 4.4 09/19/2017     Lab Results   Component Value Date    CHLORIDE 101 08/15/2017     Lab Results   Component Value Date    BOGDAN 8.8 08/15/2017     Lab Results   Component Value Date    CO2 26 08/15/2017     Lab Results    Component Value Date    BUN 16 08/15/2017     Lab Results   Component Value Date    CR 0.70 09/19/2017     Lab Results   Component Value Date     08/15/2017        CBC:  Lab Results   Component Value Date    WBC 6.8 09/19/2017     Lab Results   Component Value Date    HGB 14.1 09/19/2017     Lab Results   Component Value Date    HCT 42.5 09/19/2017     Lab Results   Component Value Date     09/19/2017        Coags/Type and Screen  Lab Results   Component Value Date    INR 1.13 08/15/2017     No results found for: PT  Type and Screen:      Assessment/Plan:  - ASA 3  - MAC with standard ASA monitors, IV induction, balanced anesthetic  - PIV  - Antibiotics per surgery  - PONV prophylaxis  - Blood products available, possible administration discussed with patient  Gianni Gonzales MD    10/12/2017  9:21 AM

## 2017-10-13 LAB — COPATH REPORT: NORMAL

## 2017-10-19 ENCOUNTER — PRE VISIT (OUTPATIENT)
Dept: CARDIOLOGY | Facility: CLINIC | Age: 62
End: 2017-10-19

## 2017-10-19 NOTE — TELEPHONE ENCOUNTER
Labs : 10/19/17  Prior to appt    Vascular Surgery : 9/26/17  #1 Critical limb ischemia, secondary to atherosclerosis  #2 Status post right above knee amputation, 2011  #3 Status post left superficial femoral artery stent, 03/2012  #4 Status post status post left femoral to anterior tibial artery bypass with in-situ saphenous vein and intra-operative ultrasound-guided ligation of parasitic branches, on 9/19/2017 at [facility] with Dr. 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 Suggs on 9/19/2017     - incision healing nicely      - continue ASA and Lipitor   - smoking cessation strongly encouraged.    Left LE angiogram : 7/20/17  Bilateral multifocal moderate disease in the common and external iliac arteries. There is an occluded stent in the native SFA. A widely patent femorotibial bypass graft is seen, with no significant stenosis at the proximal  femoral anastomosis or the distal anterior tibial artery anastomosis. The anterior tibial artery distal to the anastomosis is widely patent, as is the dorsalis pedis artery. The bypass graft itself is superficial in the medial subcutaneous soft tissues of the thigh.     In the mid thigh, there is a large branch off the saphenous vein bypass graft that arises medially and drains prior a large venous network into the femoral vein.     Given that the patient is already planned for surgical intervention with vascular surgery, and the fact that the branch vein is very superficial in the subcutaneous tissues of the thigh, this branch vein was not embolized with coils or plugs.

## 2017-10-20 ENCOUNTER — OFFICE VISIT (OUTPATIENT)
Dept: CARDIOLOGY | Facility: CLINIC | Age: 62
End: 2017-10-20
Attending: NURSE PRACTITIONER
Payer: COMMERCIAL

## 2017-10-20 VITALS
DIASTOLIC BLOOD PRESSURE: 73 MMHG | BODY MASS INDEX: 33.12 KG/M2 | OXYGEN SATURATION: 94 % | HEIGHT: 67 IN | HEART RATE: 59 BPM | WEIGHT: 211 LBS | SYSTOLIC BLOOD PRESSURE: 120 MMHG

## 2017-10-20 DIAGNOSIS — I10 HYPERTENSION GOAL BP (BLOOD PRESSURE) < 140/90: ICD-10-CM

## 2017-10-20 DIAGNOSIS — I73.9 PAD (PERIPHERAL ARTERY DISEASE) (H): ICD-10-CM

## 2017-10-20 DIAGNOSIS — E78.5 HYPERLIPIDEMIA LDL GOAL <70: ICD-10-CM

## 2017-10-20 DIAGNOSIS — E78.5 HYPERLIPIDEMIA LDL GOAL <70: Primary | ICD-10-CM

## 2017-10-20 LAB
CHOLEST SERPL-MCNC: 108 MG/DL
HDLC SERPL-MCNC: 24 MG/DL
LDLC SERPL CALC-MCNC: 48 MG/DL
NONHDLC SERPL-MCNC: 84 MG/DL
TRIGL SERPL-MCNC: 182 MG/DL

## 2017-10-20 PROCEDURE — 36415 COLL VENOUS BLD VENIPUNCTURE: CPT | Performed by: NURSE PRACTITIONER

## 2017-10-20 PROCEDURE — 99212 OFFICE O/P EST SF 10 MIN: CPT | Mod: ZF

## 2017-10-20 PROCEDURE — 99214 OFFICE O/P EST MOD 30 MIN: CPT | Mod: ZP | Performed by: NURSE PRACTITIONER

## 2017-10-20 PROCEDURE — 80061 LIPID PANEL: CPT | Performed by: NURSE PRACTITIONER

## 2017-10-20 ASSESSMENT — PAIN SCALES - GENERAL: PAINLEVEL: NO PAIN (0)

## 2017-10-20 NOTE — NURSING NOTE
Chief Complaint   Patient presents with     Follow Up For     61 y/o male for f/u s/p ligation of parasitic branch of bypass graft.     Vitals were taken and medications were reconciled.    Liseth Swenson CMA     10:39 AM

## 2017-10-20 NOTE — LETTER
10/20/2017      RE: Alexandro Pool  [address]    [place] [address]       Dear Colleague,    Thank you for the opportunity to participate in the care of your patient, Alexandro Pool, at the [facility] at [facility]. Please see a copy of my visit note below.    Cardiology Clinic Note  August 21, 2017      HPI:  Alexandro Pool is a 61 year old male who is seen in follow-up for management of severe PAD, HTN and HLD. His complex vascular history is notable for right AKA in 2011 and critical limb ischemia of the left leg s/p SFA stenting in 2012 and left femoral to anterior tibial artery bypass with in situ saphenous vein by Dr. Suggs in 2013. Past medical history also notable for type II DM, tobacco use disorder, coronary artery disease and ischemic cardiomyopathy with an LVEF= 49%.   Interval History:   Recently noted to have a AV fistula off his left fem-pop bypass graft. On 9/19/17 he underwent ligation of parasitic branch off left fem-pop bypass without complication. Today Mr. Pool is feeling well. He is now participating in PAD rehab 1-2 days a week. He continues to use the flexitouch twice daily. He was prescribed a Nicotine patch at his last visit, he does not wear the patch consistently and continues to smoke 2 packs per day. He denies claudication, chest pain, shortness of breath or palpitations.   Past medical history:  Past Medical History:   Diagnosis Date     Acute kidney failure, unspecified      Blood transfusion      CAD (coronary artery disease)      CARDIOVASCULAR SCREENING; LDL GOAL LESS THAN 100      Cirrhosis (H)      Critical lower limb ischemia      Diabetes mellitus (H) 10/2011     Hypertension      Hypertension goal BP (blood pressure) < 140/90      Ischemic cardiomyopathy      Lymphedema of left leg 5/11/2016     Peripheral arterial disease (H)      PVD (peripheral vascular disease) (H)      Right above knee  amputation 4/30/2014     Type 2 diabetes, HbA1C goal < 8% (H)      Medications:    Current Outpatient Prescriptions on File Prior to Visit:  atorvastatin (LIPITOR) 80 MG tablet Take 1 tablet (80 mg) by mouth At Bedtime   spironolactone (ALDACTONE) 25 MG tablet Take 0.5 tablets (12.5 mg) by mouth daily   lisinopril (PRINIVIL,ZESTRIL) 2.5 MG tablet Take 1 tablet (2.5 mg) by mouth 2 times daily   order for DME Equipment being ordered: FlexiTouch pneumatic compression device.  2-3 times per day to left lower extremity.Current strength - L4   Vorapaxar Sulfate (ZONTIVITY) 2.08 MG tablet Take 1 tablet (2.08 mg) by mouth daily   insulin pen needle (BD KWABENA U/F) 32G X 4 MM Use 4x  daily or as directed.   insulin lispro (HUMALOG VIAL) SOLN 100 UNIT/ML Inject Subcutaneous 3 times daily (before meals) 18 units before each meal   hydrocortisone 1 % cream Apply topically 2 times daily PRN   SENNA PO 1 Tab , bedtime   morphine (MS CONTIN) 15 MG 12 hr tablet Take 3 tablets (45 mg) by mouth 2 times daily   oxyCODONE (ROXICODONE) 10 MG immediate release tablet Take 1.5 tablets (15 mg) by mouth every 4 hours as needed (Patient taking differently: Take 20 mg by mouth every 4 hours as needed )   gabapentin (NEURONTIN) 100 MG capsule Take 1 capsule (100 mg) by mouth 3 times daily   insulin glargine (LANTUS) SOLN 100 UNIT/ML Inject 12 Units Subcutaneous every morning (Patient taking differently: At Bedtime Inject 50 Units Subcutaneous)   ASPIRIN EC PO Take 81 mg by mouth daily   METOPROLOL SUCCINATE ER PO Take 75 mg by mouth daily   amLODIPine (NORVASC) 2.5 MG tablet Take 2.5 mg by mouth 2 times daily   acetaminophen (TYLENOL) 325 MG tablet Take 1 tablet by mouth every 4 hours as needed.     No current facility-administered medications on file prior to visit.     Allergies:    Allergies   Allergen Reactions     Ciprofloxacin Rash     Family and social history:  Family History   Problem Relation Age of Onset     Alcohol/Drug Mother       "cirrhosis     Alcohol/Drug Father      C.A.D. No family hx of      DIABETES No family hx of      CANCER No family hx of        Social History     Social History     Marital status:      Spouse name: N/A     Number of children: 0     Years of education: N/A     Occupational History      Unknown     Social History Main Topics     Smoking status: Current Every Day Smoker     Packs/day: 1.50     Years: 40.00     Types: Cigarettes     Smokeless tobacco: Never Used      Comment: working on quitting     Alcohol use 0.6 oz/week     1 Standard drinks or equivalent per week      Comment: Once a month or less     Drug use: No     Sexual activity: Not Currently     Other Topics Concern     Parent/Sibling W/ Cabg, Mi Or Angioplasty Before 65f 55m? No     Caffeine Concern Yes     Exercise No     Social History Narrative     Review of Systems:  Skin: No new skin rash or ulcers.  Eyes: No red eye.  Ears/Nose/Throat: No ear discharge, nasal congestion, sore throat or dysphagia.  Respiratory: No cough or hemoptysis.  Cardiovascular: See HPI.    Gastrointestinal: No abdominal pain, nausea, vomiting, hematemesis or melena.  Musculoskeletal: No polyarthralgia or myalgias.  Neurologic: No headaches, seizure or focal weakness.    Vital signs:  /73 (BP Location: Left arm, Patient Position: Chair, Cuff Size: Adult Regular)  Pulse 59  Ht 1.702 m (5' 7\")  Wt 95.7 kg (211 lb)  SpO2 94%  BMI 33.05 kg/m2   Wt Readings from Last 2 Encounters:   10/20/17 95.7 kg (211 lb)   10/12/17 95.7 kg (211 lb)     Physical Exam:  Gen: NAD.    HEENT: No conjunctival pallor or scleral icterus, MMM. Clear oropharynx.    Neck: No JVD. No thyroid enlargement or cervical adenopathy.    Chest: Clear to auscultation bilaterally.    CV: Distant, normal first and second heart sounds. No murmurs or gallop appreciated.    Abdomen: Soft, non-tender, non-distended, BS+.  Ext: Right stump without ulcers/wounds, left lower extremity hyperpigmented and " moderately edematous without ulceration. AVF no longer present. 1+ DP present, unable to palpate PT.   Neuro: alert, oriented and appropriately conversant.    Psych: Normal affect and speech.    Labs:    Recent Labs   Lab Test  10/20/17   1015  08/15/17   0724  11/26/12   1012  05/21/12   0946   CHOL  108  102  123  108   HDL  24*  26*  20*  28*   LDL  48  35  57  55   TRIG  182*  209*  228*  124   CHOLHDLRATIO   --    --   6.1*  3.9     Diagnostics:    Left lower extremity angiography 7/20/17:    INDICATION: Left lower femoral-tibial bypass graft using in situ  saphenous vein. There is concern regarding a large venous branch.     PROCEDURE/FINDINGS:   Written and verbal informed consent was obtained. Patient was brought  to the fluoroscopy suite and placed supine. Right groin was prepped  and draped in the usual sterile fashion. The right femoral head was  marked by fluoroscopy.     The common femoral bifurcation was identified by ultrasound. The  common femoral artery had a significant amount of shadowing calcified  plaque distally. The proximal CFA appear more widely patent. Skin  overlying was anesthetized with 1% lidocaine. Under ultrasound  guidance, the common femoral artery was accessed with a micropuncture  needle. Images of the patent artery and the needle tip in the artery  were saved for the permanent medical record.     Micropuncture sheath was exchanged for a 0.035 Bentson and a 5 Hebrew  sheath introduced. An Omni Flush catheter was inserted over the wire  to the aortic bifurcation and a pelvic angiogram performed. A 0.035  angled Glidewire was then used to introduce the catheter into the  external iliac artery and lower extremity angiogram performed.     Angiographic findings:  Bilateral multifocal moderate disease in the common and external iliac  arteries. There is an occluded stent in the native SFA. A widely  patent femorotibial bypass graft is seen, with no significant stenosis  at the proximal  femoral anastomosis or the distal anterior tibial  artery anastomosis. The anterior tibial artery distal to the  anastomosis is widely patent, as is the dorsalis pedis artery. The  bypass graft itself is superficial in the medial subcutaneous soft  tissues of the thigh.     In the mid thigh, there is a large branch off the saphenous vein  bypass graft that arises medially and drains prior a large venous  network into the femoral vein.     Given that the patient is already planned for surgical intervention  with vascular surgery, and the fact that the branch vein is very  superficial in the subcutaneous tissues of the thigh, this branch vein  was not embolized with coils or plugs.     Catheter was removed over a BBOXX wire. A right CFA angiogram  demonstrated high placement in the right CFA. A 4/5 Uzbek minx  closure device was deployed and manual pressure held to provide  adequate hemostasis.      IMPRESSION:  1. Large venous branch arising in the superficial medial thigh from  the in situ saphenous vein femoro-tibial bypass graft. It drains via  an enlarged venous network into the femoral vein.  2. The proximal and distal graft anastomoses are widely patent.  3. Widely patent runoff via the SENIA and dorsalis pedis artery.    Exam: Duplex ultrasound of left lower extremity arteries dated  5/10/2017 11:55 AM       Clinical information: PAD with chronic le ischemia, Peripheral  vascular disease, unspecified . Left SFA to SENIA bypass graft.      Comparison: 11/9/2016.      EIA: Velocity: 157/79 cm/sec.  Waveforms: Monophasic low-resistance  waveforms with significant diastolic flow.  Common femoral artery: 233/100 cm/sec. Waveforms: Monophasic low  resistance waveforms with significant diastolic flow.      SFA to SENIA bypass graft: Monophasic low resistance waveforms with  significant diastolic flow  Proximal anastomosis: 194/91, 189/82 cm/s   Proximal thigh: 93/40  Mid thigh: 112/55      Mid thigh branch off the SFA  to SENIA bypass graft: Flow is reversed,  towards the head. Velocity: 388/237 cm/s. Monophasic low resistance  waveforms with significant diastolic flow.      Distal thigh: 60/0 cm/s. Monophasic waveform, without significant  diastolic flow  Knee: 52/0 cm/s. Monophasic waveform, without significant diastolic  flow  Proximal calf: 51/0 cm/s. Monophasic waveforms, without significant  diastolic flow  Mid calf: 57/0 cm/s. Monophasic waveforms, without significant  diastolic flow.  Distal: 43/0 cm/s. Monophasic wave without significant diastolic flow.  Distal anastomosis: 53/12 cm/s. Monophasic waveforms with good  systolic upstroke and mild diastolic flow.      Distal to anastomosis: 78/0 cm/s. Good systolic upstroke, phasic  waveform with out significant diastolic flow.      ABIs:      Right side:  Arm: 117 mmHg      Left side:   Arm: 118 mmHg   PT at ankle: 76 mmHg   DP at foot: 82 mmHg  First digit: 87 mmHg   OZZIE: 0.69   TBI: 0.74      Impression:  Patent left SFA to SENIA bypass graft without significant  stenosis. Patent nonligated sidebranch off the in situ bypass at the  mid/distal thigh with velocity up to 389 cm/s, previously 568 cm/s.  Waveforms proximal to the side branch are low resistance monophasic  compatible with likely venous fistula through the nonligated side  branch. Distal to this branch waveforms are high resistance monophasic  with good systolic upstroke similar prior. Again, correlate with any  signs of venous hypertension in the left leg.     Vascular ultrasound arterial duplex left lower extremity extremity  bypass graft dated 11/9/2016 12:13 PM      Comparison Study: 11/2/2015 and 4/20/2015      Clinical History: Peripheral arterial disease, previous SFG      Technique: Study includes gray scale images, color Doppler, spectral  Doppler waveforms and velocities with appropriate angles of 60 degrees  or less.      Findings:       Left lower extremity: Left thigh common femoral to anterior  tibial  artery bypass graft.      External iliac artery, inflow artery: 242/105 cm/s, monophasic  waveforms.      Common femoral artery proximal to the anastomosis: 211/95 cm/sec with  monophasic waveforms.      Proximal anastomosis: 226/107 cm/sec with monophasic waveforms.      Within graft from proximal to distal: 161/66, 99/49, 115/53, 153/56,  78, 51, 54, and 54 cm/sec with monophasic waveforms.   Non-ligated branch vein off of mid distal thigh graft with velocity of  414/206 cm/s and 568/332 cm/s, previously 375/246 cm/sec. And on  4/20/2015 this measured 425/205 cm/sec.      Distal anastomosis: 95 cm/sec with monophasic waveforms.      Anterior tibial artery distal to the anastomosis: Antegrade flow with  velocity of 63 cm/sec with monophasic waveforms.  Anterior tibial artery proximal to the anastomosis demonstrates  expected reversal of flow (away from the graft anastomosis) at 85  cm/sec.      Impression:  1. Patent left common femoral to anterior tibial artery bypass graft  demonstrates no evidence of hemodynamically significant stenosis.  Diffuse monophasic waveforms consistent with decreased capillary  resistance/vasodilation.  2. Redemonstration of patent non-ligated side branch coming off of the  in situ bypass at the mid to distal thigh with velocity of 414-568,  increased since prior study, but similar to 4/20/2015. Correlate for  clinical evidence of venous hypertension      Assessment and Plan:      Alexandro Pool is a 61 year old male who is seen in follow-up for management of severe PAD, HTN and HLD.    1. Severe PAD, status-post right AKA and left lower extremity SFA stenting in 2012 and femoral-tibial bypass in 2013: Recently noted to have a AV fistula off his left fem-pop bypass graft. On 9/19/17 he underwent left leg ligation of parasitic branch off fem-pop bypass without complication.  -- Continue ASA 81 mg  -- Continue use of Flexitouch pump twice daily  -- Continue PAD rehab   --  Discussed modifiable risk factors as stated below    2. Dyslipidemia: LDL at goal.   -- Continue atorvastatin 80 mg daily    3. Hypertension: Well controlled.  -- Continue lisinopril 2.5 mg daily and amlodipine 2.5 mg BID     4.Tobacco use disorder: Prescribed Nicotine patch at his last visit, does not wear the patch consistently and continues to smoke 2 PPD.  -- Encouraged smoking cessation, discussed alternative options, patient is not interested at this time    5. Diabetes Melllitus: Managed by PCP Dr. Belle. Last Hgb A1C 7.2. Checks blood sugar 3 x day, usually run in the 150's. Occasionally gets low in the afternoons/evenings.   -- Continue management per PCP     6. CAD with ischemic cardiomyopathy EF 49%: Stable, denies chest pain or dyspnea.  -- Continue metoprolol succinate 75 mg daily and spironolactone 12.5 mg daily     Follow-up: With Dr. Olson December 15th.      Purvi Anderson NP

## 2017-10-20 NOTE — PATIENT INSTRUCTIONS
You were seen today in the Cardiovascular Clinic at the [facility].    Cardiology provider you saw during your visit Purvi Anderson NP.    1. Your vitals and labs are stable.  2. Continue current medication regimen.  3. Follow-up with Dr. Olson in December as scheduled.    Questions and [phone].   First press #1 for the University and then press #3 for Medical Questions to reach the Cardiology triage nurse.     On Call Cardiologist for after hours or on weekends: [phone], press option #4 and ask to speak to the on-call Cardiologist.

## 2017-10-20 NOTE — PROGRESS NOTES
Cardiology Clinic Note  August 21, 2017      HPI:  Alexandro Pool is a 61 year old male who is seen in follow-up for management of severe PAD, HTN and HLD. His complex vascular history is notable for right AKA in 2011 and critical limb ischemia of the left leg s/p SFA stenting in 2012 and left femoral to anterior tibial artery bypass with in situ saphenous vein by Dr. Suggs in 2013. Past medical history also notable for type II DM, tobacco use disorder, coronary artery disease and ischemic cardiomyopathy with an LVEF= 49%.   Interval History:   Recently noted to have a AV fistula off his left fem-pop bypass graft. On 9/19/17 he underwent ligation of parasitic branch off left fem-pop bypass without complication. Today Mr. Pool is feeling well. He is now participating in PAD rehab 1-2 days a week. He continues to use the flexitouch twice daily. He was prescribed a Nicotine patch at his last visit, he does not wear the patch consistently and continues to smoke 2 packs per day. He denies claudication, chest pain, shortness of breath or palpitations.   Past medical history:  Past Medical History:   Diagnosis Date     Acute kidney failure, unspecified      Blood transfusion      CAD (coronary artery disease)      CARDIOVASCULAR SCREENING; LDL GOAL LESS THAN 100      Cirrhosis (H)      Critical lower limb ischemia      Diabetes mellitus (H) 10/2011     Hypertension      Hypertension goal BP (blood pressure) < 140/90      Ischemic cardiomyopathy      Lymphedema of left leg 5/11/2016     Peripheral arterial disease (H)      PVD (peripheral vascular disease) (H)      Right above knee amputation 4/30/2014     Type 2 diabetes, HbA1C goal < 8% (H)      Medications:    Current Outpatient Prescriptions on File Prior to Visit:  atorvastatin (LIPITOR) 80 MG tablet Take 1 tablet (80 mg) by mouth At Bedtime   spironolactone (ALDACTONE) 25 MG tablet Take 0.5 tablets (12.5 mg) by mouth daily   lisinopril (PRINIVIL,ZESTRIL) 2.5 MG  tablet Take 1 tablet (2.5 mg) by mouth 2 times daily   order for DME Equipment being ordered: FlexiTouch pneumatic compression device.  2-3 times per day to left lower extremity.Current strength - L4   Vorapaxar Sulfate (ZONTIVITY) 2.08 MG tablet Take 1 tablet (2.08 mg) by mouth daily   insulin pen needle (BD KWABENA U/F) 32G X 4 MM Use 4x  daily or as directed.   insulin lispro (HUMALOG VIAL) SOLN 100 UNIT/ML Inject Subcutaneous 3 times daily (before meals) 18 units before each meal   hydrocortisone 1 % cream Apply topically 2 times daily PRN   SENNA PO 1 Tab , bedtime   morphine (MS CONTIN) 15 MG 12 hr tablet Take 3 tablets (45 mg) by mouth 2 times daily   oxyCODONE (ROXICODONE) 10 MG immediate release tablet Take 1.5 tablets (15 mg) by mouth every 4 hours as needed (Patient taking differently: Take 20 mg by mouth every 4 hours as needed )   gabapentin (NEURONTIN) 100 MG capsule Take 1 capsule (100 mg) by mouth 3 times daily   insulin glargine (LANTUS) SOLN 100 UNIT/ML Inject 12 Units Subcutaneous every morning (Patient taking differently: At Bedtime Inject 50 Units Subcutaneous)   ASPIRIN EC PO Take 81 mg by mouth daily   METOPROLOL SUCCINATE ER PO Take 75 mg by mouth daily   amLODIPine (NORVASC) 2.5 MG tablet Take 2.5 mg by mouth 2 times daily   acetaminophen (TYLENOL) 325 MG tablet Take 1 tablet by mouth every 4 hours as needed.     No current facility-administered medications on file prior to visit.     Allergies:    Allergies   Allergen Reactions     Ciprofloxacin Rash     Family and social history:  Family History   Problem Relation Age of Onset     Alcohol/Drug Mother      cirrhosis     Alcohol/Drug Father      C.A.D. No family hx of      DIABETES No family hx of      CANCER No family hx of        Social History     Social History     Marital status:      Spouse name: N/A     Number of children: 0     Years of education: N/A     Occupational History      Unknown     Social History Main Topics      "Smoking status: Current Every Day Smoker     Packs/day: 1.50     Years: 40.00     Types: Cigarettes     Smokeless tobacco: Never Used      Comment: working on quitting     Alcohol use 0.6 oz/week     1 Standard drinks or equivalent per week      Comment: Once a month or less     Drug use: No     Sexual activity: Not Currently     Other Topics Concern     Parent/Sibling W/ Cabg, Mi Or Angioplasty Before 65f 55m? No     Caffeine Concern Yes     Exercise No     Social History Narrative     Review of Systems:  Skin: No new skin rash or ulcers.  Eyes: No red eye.  Ears/Nose/Throat: No ear discharge, nasal congestion, sore throat or dysphagia.  Respiratory: No cough or hemoptysis.  Cardiovascular: See HPI.    Gastrointestinal: No abdominal pain, nausea, vomiting, hematemesis or melena.  Musculoskeletal: No polyarthralgia or myalgias.  Neurologic: No headaches, seizure or focal weakness.    Vital signs:  /73 (BP Location: Left arm, Patient Position: Chair, Cuff Size: Adult Regular)  Pulse 59  Ht 1.702 m (5' 7\")  Wt 95.7 kg (211 lb)  SpO2 94%  BMI 33.05 kg/m2   Wt Readings from Last 2 Encounters:   10/20/17 95.7 kg (211 lb)   10/12/17 95.7 kg (211 lb)     Physical Exam:  Gen: NAD.    HEENT: No conjunctival pallor or scleral icterus, MMM. Clear oropharynx.    Neck: No JVD. No thyroid enlargement or cervical adenopathy.    Chest: Clear to auscultation bilaterally.    CV: Distant, normal first and second heart sounds. No murmurs or gallop appreciated.    Abdomen: Soft, non-tender, non-distended, BS+.  Ext: Right stump without ulcers/wounds, left lower extremity hyperpigmented and moderately edematous without ulceration. AVF no longer present. 1+ DP present, unable to palpate PT.   Neuro: alert, oriented and appropriately conversant.    Psych: Normal affect and speech.    Labs:    Recent Labs   Lab Test  10/20/17   1015  08/15/17   0724  11/26/12   1012  05/21/12   0946   CHOL  108  102  123  108   HDL  24*  26*  20* "  28*   LDL  48  35  57  55   TRIG  182*  209*  228*  124   CHOLHDLRATIO   --    --   6.1*  3.9     Diagnostics:    Left lower extremity angiography 7/20/17:    INDICATION: Left lower femoral-tibial bypass graft using in situ  saphenous vein. There is concern regarding a large venous branch.     PROCEDURE/FINDINGS:   Written and verbal informed consent was obtained. Patient was brought  to the fluoroscopy suite and placed supine. Right groin was prepped  and draped in the usual sterile fashion. The right femoral head was  marked by fluoroscopy.     The common femoral bifurcation was identified by ultrasound. The  common femoral artery had a significant amount of shadowing calcified  plaque distally. The proximal CFA appear more widely patent. Skin  overlying was anesthetized with 1% lidocaine. Under ultrasound  guidance, the common femoral artery was accessed with a micropuncture  needle. Images of the patent artery and the needle tip in the artery  were saved for the permanent medical record.     Micropuncture sheath was exchanged for a 0.035 Bentson and a 5 Hungarian  sheath introduced. An Omni Flush catheter was inserted over the wire  to the aortic bifurcation and a pelvic angiogram performed. A 0.035  angled Glidewire was then used to introduce the catheter into the  external iliac artery and lower extremity angiogram performed.     Angiographic findings:  Bilateral multifocal moderate disease in the common and external iliac  arteries. There is an occluded stent in the native SFA. A widely  patent femorotibial bypass graft is seen, with no significant stenosis  at the proximal femoral anastomosis or the distal anterior tibial  artery anastomosis. The anterior tibial artery distal to the  anastomosis is widely patent, as is the dorsalis pedis artery. The  bypass graft itself is superficial in the medial subcutaneous soft  tissues of the thigh.     In the mid thigh, there is a large branch off the saphenous  vein  bypass graft that arises medially and drains prior a large venous  network into the femoral vein.     Given that the patient is already planned for surgical intervention  with vascular surgery, and the fact that the branch vein is very  superficial in the subcutaneous tissues of the thigh, this branch vein  was not embolized with coils or plugs.     Catheter was removed over a Bentson wire. A right CFA angiogram  demonstrated high placement in the right CFA. A 4/5 Georgian minx  closure device was deployed and manual pressure held to provide  adequate hemostasis.      IMPRESSION:  1. Large venous branch arising in the superficial medial thigh from  the in situ saphenous vein femoro-tibial bypass graft. It drains via  an enlarged venous network into the femoral vein.  2. The proximal and distal graft anastomoses are widely patent.  3. Widely patent runoff via the SENIA and dorsalis pedis artery.    Exam: Duplex ultrasound of left lower extremity arteries dated  5/10/2017 11:55 AM       Clinical information: PAD with chronic le ischemia, Peripheral  vascular disease, unspecified . Left SFA to SENIA bypass graft.      Comparison: 11/9/2016.      EIA: Velocity: 157/79 cm/sec.  Waveforms: Monophasic low-resistance  waveforms with significant diastolic flow.  Common femoral artery: 233/100 cm/sec. Waveforms: Monophasic low  resistance waveforms with significant diastolic flow.      SFA to SENIA bypass graft: Monophasic low resistance waveforms with  significant diastolic flow  Proximal anastomosis: 194/91, 189/82 cm/s   Proximal thigh: 93/40  Mid thigh: 112/55      Mid thigh branch off the SFA to SENIA bypass graft: Flow is reversed,  towards the head. Velocity: 388/237 cm/s. Monophasic low resistance  waveforms with significant diastolic flow.      Distal thigh: 60/0 cm/s. Monophasic waveform, without significant  diastolic flow  Knee: 52/0 cm/s. Monophasic waveform, without significant diastolic  flow  Proximal calf: 51/0  cm/s. Monophasic waveforms, without significant  diastolic flow  Mid calf: 57/0 cm/s. Monophasic waveforms, without significant  diastolic flow.  Distal: 43/0 cm/s. Monophasic wave without significant diastolic flow.  Distal anastomosis: 53/12 cm/s. Monophasic waveforms with good  systolic upstroke and mild diastolic flow.      Distal to anastomosis: 78/0 cm/s. Good systolic upstroke, phasic  waveform with out significant diastolic flow.      ABIs:      Right side:  Arm: 117 mmHg      Left side:   Arm: 118 mmHg   PT at ankle: 76 mmHg   DP at foot: 82 mmHg  First digit: 87 mmHg   OZZIE: 0.69   TBI: 0.74      Impression:  Patent left SFA to SENIA bypass graft without significant  stenosis. Patent nonligated sidebranch off the in situ bypass at the  mid/distal thigh with velocity up to 389 cm/s, previously 568 cm/s.  Waveforms proximal to the side branch are low resistance monophasic  compatible with likely venous fistula through the nonligated side  branch. Distal to this branch waveforms are high resistance monophasic  with good systolic upstroke similar prior. Again, correlate with any  signs of venous hypertension in the left leg.     Vascular ultrasound arterial duplex left lower extremity extremity  bypass graft dated 11/9/2016 12:13 PM      Comparison Study: 11/2/2015 and 4/20/2015      Clinical History: Peripheral arterial disease, previous SFG      Technique: Study includes gray scale images, color Doppler, spectral  Doppler waveforms and velocities with appropriate angles of 60 degrees  or less.      Findings:       Left lower extremity: Left thigh common femoral to anterior tibial  artery bypass graft.      External iliac artery, inflow artery: 242/105 cm/s, monophasic  waveforms.      Common femoral artery proximal to the anastomosis: 211/95 cm/sec with  monophasic waveforms.      Proximal anastomosis: 226/107 cm/sec with monophasic waveforms.      Within graft from proximal to distal: 161/66, 99/49, 115/53,  153/56,  78, 51, 54, and 54 cm/sec with monophasic waveforms.   Non-ligated branch vein off of mid distal thigh graft with velocity of  414/206 cm/s and 568/332 cm/s, previously 375/246 cm/sec. And on  4/20/2015 this measured 425/205 cm/sec.      Distal anastomosis: 95 cm/sec with monophasic waveforms.      Anterior tibial artery distal to the anastomosis: Antegrade flow with  velocity of 63 cm/sec with monophasic waveforms.  Anterior tibial artery proximal to the anastomosis demonstrates  expected reversal of flow (away from the graft anastomosis) at 85  cm/sec.      Impression:  1. Patent left common femoral to anterior tibial artery bypass graft  demonstrates no evidence of hemodynamically significant stenosis.  Diffuse monophasic waveforms consistent with decreased capillary  resistance/vasodilation.  2. Redemonstration of patent non-ligated side branch coming off of the  in situ bypass at the mid to distal thigh with velocity of 414-568,  increased since prior study, but similar to 4/20/2015. Correlate for  clinical evidence of venous hypertension      Assessment and Plan:      Alexandro Pool is a 61 year old male who is seen in follow-up for management of severe PAD, HTN and HLD.    1. Severe PAD, status-post right AKA and left lower extremity SFA stenting in 2012 and femoral-tibial bypass in 2013: Recently noted to have a AV fistula off his left fem-pop bypass graft. On 9/19/17 he underwent left leg ligation of parasitic branch off fem-pop bypass without complication.  -- Continue ASA 81 mg  -- Continue use of Flexitouch pump twice daily  -- Continue PAD rehab   -- Discussed modifiable risk factors as stated below    2. Dyslipidemia: LDL at goal.   -- Continue atorvastatin 80 mg daily    3. Hypertension: Well controlled.  -- Continue lisinopril 2.5 mg daily and amlodipine 2.5 mg BID     4.Tobacco use disorder: Prescribed Nicotine patch at his last visit, does not wear the patch consistently and continues  to smoke 2 PPD.  -- Encouraged smoking cessation, discussed alternative options, patient is not interested at this time    5. Diabetes Melllitus: Managed by PCP Dr. Belle. Last Hgb A1C 7.2. Checks blood sugar 3 x day, usually run in the 150's. Occasionally gets low in the afternoons/evenings.   -- Continue management per PCP     6. CAD with ischemic cardiomyopathy EF 49%: Stable, denies chest pain or dyspnea.  -- Continue metoprolol succinate 75 mg daily and spironolactone 12.5 mg daily     Follow-up: With Dr. Olson December 15th.

## 2017-10-20 NOTE — MR AVS SNAPSHOT
After Visit Summary   10/20/2017    Alexandro Pool    MRN: [id number]           Patient Information     Date Of Birth          1955        Visit Information        Provider Department      10/20/2017 10:30 AM Purvi Anderson NP [facility]        Care Instructions    You were seen today in the Cardiovascular Clinic at the [facility].    Cardiology provider you saw during your visit Purvi Anderson NP.    1. Your vitals and labs are stable.  2. Continue current medication regimen.  3. Follow-up with Dr. Olson in December as scheduled.    Questions and [phone].   First press #1 for the University and then press #3 for Medical Questions to reach the Cardiology triage nurse.     On Call Cardiologist for after hours or on weekends: [phone], press option #4 and ask to speak to the on-call Cardiologist.             Follow-ups after your visit        Follow-up notes from your care team     See patient instructions section of the AVS Return if symptoms worsen or fail to improve, for Purvi Anderson CNP.      Your next 10 appointments already scheduled     Oct 26, 2017  2:00 PM CDT   US OZZIE DOPPLER NO EXERCISE 1-2 LVLS BILAT with [address] [facility]  ([facility])    [address] [address] [phone]           Please bring a list of your medicines (including vitamins, minerals and over-the-counter drugs). Also, tell your doctor about any allergies you may have. Wear comfortable clothes and leave your valuables at home.  No caffeine or tobacco for 1 hour prior to exam.  Please call the Imaging Department at your exam site with any questions.            Oct 26, 2017  2:30 PM CDT   US LOWER EXTREMITY ARTERIAL DUPLEX LEFT with [address] [facility]  ([facility])    [address] [address] [phone]            Please bring a list of your medicines (including vitamins, minerals and over-the-counter drugs). Also, tell your doctor about any allergies you may have. Wear comfortable clothes and leave your valuables at home.  You do not need to do anything special to prepare for your exam.  Please call the Imaging Department at your exam site with any questions.            Oct 26, 2017  3:45 PM CDT   (Arrive by 3:30 PM)   Return Vascular Visit with Marisol Suggs MD   [facility] ([facility])    [address] [place] [address]   [phone]            Dec 15, 2017  1:00 PM CST   (Arrive by 12:45 PM)   Return Visit with Giovani Olson MD   [facility] ([facility])    [address] [place] [address]   [phone]            Feb 13, 2018  8:00 AM CST   Lab with  LAB   [facility] ([facility])    [address] [place] [address]   [phone]            Feb 13, 2018  8:30 AM CST   US ABDOMEN COMPLETE with UCUS1   [facility] ([facility])    [address] [place] [address]   [phone]           Please bring a list of your medicines (including vitamins, minerals and over-the-counter drugs). Also, tell your doctor about any allergies you may have. Wear comfortable clothes and leave your valuables at home.  Adults: No eating or drinking for 8 hours before the exam. You may take medicine with a small sip of water.  Children: - Children 6+ years: No food or drink for 6 hours before exam. - Children 1-5 years: No food or drink for 4 hours before exam. - Infants, breast-fed: may have breast milk up to 2 hours before exam. - Infants, formula: may have bottle until 4 hours before exam.  Please call the Imaging Department at your exam site with any questions.            Feb 13, 2018   "9:30 AM CST   (Arrive by 9:15 AM)   Return General Liver with Nehemias Cevallos MD   [facility] ([facility] [place] [facility])    [address]  3rd Floor  [facility] [address] [phone]              Who to contact     If you have questions or need follow up information about today's clinic visit or your schedule please contact [facility] directly at [phone].  Normal or non-critical lab and imaging results will be communicated to you by MyChart, letter or phone within 4 business days after the clinic has received the results. If you do not hear from us within 7 days, please contact the clinic through MyChart or phone. If you have a critical or abnormal lab result, we will notify you by phone as soon as possible.  Submit refill requests through [facility] or call your pharmacy and they will forward the refill request to us. Please allow 3 business days for your refill to be completed.          Additional Information About Your Visit        Care EveryWhere ID     This is your Care EveryWhere ID. This could be used by other organizations to access your [facility] medical records  [id number]        Your Vitals Were     Pulse Height Pulse Oximetry BMI (Body Mass Index)          59 1.702 m (5' 7\") 94% 33.05 kg/m2         Blood Pressure from Last 3 Encounters:   10/20/17 120/73   10/12/17 (!) 134/111   09/26/17 119/73    Weight from Last 3 Encounters:   10/20/17 95.7 kg (211 lb)   10/12/17 95.7 kg (211 lb)   09/19/17 96.6 kg (212 lb 15.4 oz)              Today, you had the following     No orders found for display         Today's Medication Changes          These changes are accurate as of: 10/20/17 11:14 AM.  If you have any questions, ask your nurse or doctor.               These medicines have changed or have updated prescriptions.        Dose/Directions    insulin glargine 100 UNIT/ML injection   Commonly known as:  LANTUS   This may have changed:    - when to take this  - " additional instructions   Used for:  Type II or unspecified type diabetes mellitus with neurological manifestations, not stated as uncontrolled(250.60) (H)        Inject 12 Units Subcutaneous every morning   Quantity:  1 Month   Refills:  0       oxyCODONE 10 MG IR tablet   Commonly known as:  ROXICODONE   This may have changed:  how much to take   Used for:  S/P AKA (above knee amputation) (H)        Dose:  15 mg   Take 1.5 tablets (15 mg) by mouth every 4 hours as needed   Quantity:  60 tablet   Refills:  0                Primary Care Provider Office Phone # Fax #    Roger Belle [phone]       [facility] [address] [address]        Equal Access to Services     AMRIK MORROW : Perry oseguerao Sodebra, waaxda luqadaha, qaybta kaalmada adedlaiyada, david florez. So [facility] [phone].    ATENCIÓN: Si habla español, tiene a wong disposición servicios gratuitos de asistencia lingüística. Llame al [phone].    We comply with applicable federal civil rights laws and [place] laws. We do not discriminate on the basis of race, color, national origin, age, disability, sex, sexual orientation, or gender identity.            Thank you!     Thank you for choosing [facility]  for your care. Our goal is always to provide you with excellent care. Hearing back from our patients is one way we can continue to improve our services. Please take a few minutes to complete the written survey that you may receive in the mail after your visit with us. Thank you!             Your Updated Medication List - Protect others around you: Learn how to safely use, store and throw away your medicines at www.disposemymeds.org.          This list is accurate as of: 10/20/17 11:14 AM.  Always use your most recent med list.                   Brand Name Dispense Instructions for use Diagnosis    acetaminophen 325 MG tablet    TYLENOL    250 tablet    Take 1  tablet by mouth every 4 hours as needed.    Peripheral arterial disease (H)       amLODIPine 2.5 MG tablet    NORVASC     Take 2.5 mg by mouth 2 times daily        ASPIRIN EC PO      Take 81 mg by mouth daily        atorvastatin 80 MG tablet    LIPITOR    90 tablet    Take 1 tablet (80 mg) by mouth At Bedtime    Peripheral artery disease (H)       gabapentin 100 MG capsule    NEURONTIN    90 capsule    Take 1 capsule (100 mg) by mouth 3 times daily    Critical lower limb ischemia       humaLOG 100 UNIT/ML injection   Generic drug:  insulin lispro      Inject Subcutaneous 3 times daily (before meals) 18 units before each meal        hydrocortisone 1 % cream    CORTAID     Apply topically 2 times daily PRN        insulin glargine 100 UNIT/ML injection    LANTUS    1 Month    Inject 12 Units Subcutaneous every morning    Type II or unspecified type diabetes mellitus with neurological manifestations, not stated as uncontrolled(250.60) (H)       insulin pen needle 32G X 4 MM    BD KWABENA U/F    120 each    Use 4x  daily or as directed.    Type II or unspecified type diabetes mellitus without mention of complication, not stated as uncontrolled       lisinopril 2.5 MG tablet    PRINIVIL/Zestril    180 tablet    Take 1 tablet (2.5 mg) by mouth 2 times daily    Hypertension goal BP (blood pressure) < 140/90       METOPROLOL SUCCINATE ER PO      Take 75 mg by mouth daily        morphine 15 MG 12 hr tablet    MS CONTIN    60 tablet    Take 3 tablets (45 mg) by mouth 2 times daily    Critical lower limb ischemia       order for DME     1 Units    Equipment being ordered: FlexiTouch pneumatic compression device.  2-3 times per day to left lower extremity. Current strength - L4        oxyCODONE 10 MG IR tablet    ROXICODONE    60 tablet    Take 1.5 tablets (15 mg) by mouth every 4 hours as needed    S/P AKA (above knee amputation) (H)       SENNA PO      1 Tab , bedtime        spironolactone 25 MG tablet    ALDACTONE    15 tablet     Take 0.5 tablets (12.5 mg) by mouth daily    Ischemic cardiomyopathy, Hypertension goal BP (blood pressure) < 140/90       vorapaxar sulfate 2.08 MG tablet    ZONTIVITY    30 tablet    Take 1 tablet (2.08 mg) by mouth daily    Lower limb ischemia

## 2017-10-28 DIAGNOSIS — I10 HYPERTENSION GOAL BP (BLOOD PRESSURE) < 140/90: ICD-10-CM

## 2017-11-01 RX ORDER — LISINOPRIL 2.5 MG/1
TABLET ORAL
Qty: 180 TABLET | Refills: 3 | Status: SHIPPED | OUTPATIENT
Start: 2017-11-01 | End: 2020-09-02

## 2018-02-13 ENCOUNTER — OFFICE VISIT (OUTPATIENT)
Dept: GASTROENTEROLOGY | Facility: CLINIC | Age: 63
End: 2018-02-13
Attending: INTERNAL MEDICINE
Payer: COMMERCIAL

## 2018-02-13 ENCOUNTER — RADIANT APPOINTMENT (OUTPATIENT)
Dept: ULTRASOUND IMAGING | Facility: CLINIC | Age: 63
End: 2018-02-13
Attending: INTERNAL MEDICINE
Payer: COMMERCIAL

## 2018-02-13 VITALS
TEMPERATURE: 97.7 F | DIASTOLIC BLOOD PRESSURE: 81 MMHG | HEIGHT: 67 IN | OXYGEN SATURATION: 99 % | BODY MASS INDEX: 34.06 KG/M2 | WEIGHT: 217 LBS | SYSTOLIC BLOOD PRESSURE: 129 MMHG | RESPIRATION RATE: 20 BRPM | HEART RATE: 60 BPM

## 2018-02-13 DIAGNOSIS — K74.60 CIRRHOSIS OF LIVER WITHOUT ASCITES, UNSPECIFIED HEPATIC CIRRHOSIS TYPE (H): ICD-10-CM

## 2018-02-13 DIAGNOSIS — K74.60 CIRRHOSIS OF LIVER WITHOUT ASCITES, UNSPECIFIED HEPATIC CIRRHOSIS TYPE (H): Primary | ICD-10-CM

## 2018-02-13 LAB
AFP SERPL-MCNC: 2.6 UG/L (ref 0–8)
ALBUMIN SERPL-MCNC: 3.5 G/DL (ref 3.4–5)
ALP SERPL-CCNC: 70 U/L (ref 40–150)
ALT SERPL W P-5'-P-CCNC: 29 U/L (ref 0–70)
ANION GAP SERPL CALCULATED.3IONS-SCNC: 6 MMOL/L (ref 3–14)
AST SERPL W P-5'-P-CCNC: 25 U/L (ref 0–45)
BILIRUB DIRECT SERPL-MCNC: 0.1 MG/DL (ref 0–0.2)
BILIRUB SERPL-MCNC: 0.4 MG/DL (ref 0.2–1.3)
BUN SERPL-MCNC: 20 MG/DL (ref 7–30)
CALCIUM SERPL-MCNC: 9.1 MG/DL (ref 8.5–10.1)
CHLORIDE SERPL-SCNC: 100 MMOL/L (ref 94–109)
CO2 SERPL-SCNC: 28 MMOL/L (ref 20–32)
CREAT SERPL-MCNC: 0.86 MG/DL (ref 0.66–1.25)
ERYTHROCYTE [DISTWIDTH] IN BLOOD BY AUTOMATED COUNT: 14.6 % (ref 10–15)
GFR SERPL CREATININE-BSD FRML MDRD: 90 ML/MIN/1.7M2
GLUCOSE SERPL-MCNC: 120 MG/DL (ref 70–99)
HCT VFR BLD AUTO: 41.8 % (ref 40–53)
HGB BLD-MCNC: 13.9 G/DL (ref 13.3–17.7)
INR PPP: 1.02 (ref 0.86–1.14)
MCH RBC QN AUTO: 29 PG (ref 26.5–33)
MCHC RBC AUTO-ENTMCNC: 33.3 G/DL (ref 31.5–36.5)
MCV RBC AUTO: 87 FL (ref 78–100)
PLATELET # BLD AUTO: 122 10E9/L (ref 150–450)
POTASSIUM SERPL-SCNC: 4.4 MMOL/L (ref 3.4–5.3)
PROT SERPL-MCNC: 8 G/DL (ref 6.8–8.8)
RADIOLOGIST FLAGS: ABNORMAL
RBC # BLD AUTO: 4.8 10E12/L (ref 4.4–5.9)
SODIUM SERPL-SCNC: 134 MMOL/L (ref 133–144)
WBC # BLD AUTO: 6.6 10E9/L (ref 4–11)

## 2018-02-13 PROCEDURE — 80048 BASIC METABOLIC PNL TOTAL CA: CPT | Performed by: INTERNAL MEDICINE

## 2018-02-13 PROCEDURE — 82105 ALPHA-FETOPROTEIN SERUM: CPT | Performed by: INTERNAL MEDICINE

## 2018-02-13 PROCEDURE — 80076 HEPATIC FUNCTION PANEL: CPT | Performed by: INTERNAL MEDICINE

## 2018-02-13 PROCEDURE — 85610 PROTHROMBIN TIME: CPT | Performed by: INTERNAL MEDICINE

## 2018-02-13 PROCEDURE — 85027 COMPLETE CBC AUTOMATED: CPT | Performed by: INTERNAL MEDICINE

## 2018-02-13 PROCEDURE — G0463 HOSPITAL OUTPT CLINIC VISIT: HCPCS | Mod: ZF

## 2018-02-13 PROCEDURE — 36415 COLL VENOUS BLD VENIPUNCTURE: CPT | Performed by: INTERNAL MEDICINE

## 2018-02-13 ASSESSMENT — PAIN SCALES - GENERAL: PAINLEVEL: NO PAIN (0)

## 2018-02-13 NOTE — PATIENT INSTRUCTIONS
Preventive Care:    Diabetic Eye Exam Screening: During our visit today, we discussed that it is recommended you receive diabetic eye exam screening. Please call or make an appointment with your primary care provider to discuss this with them. You may also call the [facility] scheduling line ([phone]) to set up an eye exam at one of the [facility].

## 2018-02-13 NOTE — PROGRESS NOTES
I had the pleasure of seeing Alexandro Pool for followup in the Liver Clinic at the [facility] on 02/13/2018.  Mr. 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  He is a sustained virologic responder to hepatitis C treatment back in 2014.      For the most part, he is doing well.  His only issue is that he continues to smoke, although a number of people have been on him about trying to quit.  He denies any abdominal pain, itching or skin rash or fatigue.  He denies any increased abdominal girth or lower extremity edema.  He denies any fevers or chills, cough or shortness of breath.  He denies any nausea, vomiting, diarrhea or diarrhea.  He is somewhat prone to constipation.  His appetite has been good, and he believes his weight has stayed about the same.  There have been no other new events since he was last seen.     Current Outpatient Prescriptions   Medication     lisinopril (PRINIVIL/ZESTRIL) 2.5 MG tablet     atorvastatin (LIPITOR) 80 MG tablet     spironolactone (ALDACTONE) 25 MG tablet     order for DME     Vorapaxar Sulfate (ZONTIVITY) 2.08 MG tablet     insulin pen needle (BD KWABENA U/F) 32G X 4 MM     insulin lispro (HUMALOG VIAL) SOLN 100 UNIT/ML     hydrocortisone 1 % cream     SENNA PO     morphine (MS CONTIN) 15 MG 12 hr tablet     oxyCODONE (ROXICODONE) 10 MG immediate release tablet     gabapentin (NEURONTIN) 100 MG capsule     insulin glargine (LANTUS) SOLN 100 UNIT/ML     ASPIRIN EC PO     METOPROLOL SUCCINATE ER PO     amLODIPine (NORVASC) 2.5 MG tablet     acetaminophen (TYLENOL) 325 MG tablet     No current facility-administered medications for this visit.      B/P: 129/81, T: 97.7, P: 60, R: 20    HEENT exam shows no scleral icterus and no temporal muscle wasting.  His chest is clear.  His abdominal exam shows no increase in girth.  No masses or tenderness to palpation are present.  His liver is 10 cm in span without left lobe enlargement.  No  spleen tip is palpable, and extremity exam shows no edema.  Skin exam shows no stigmata of chronic liver disease.  Neurologic exam shows no asterixis.     Recent Results (from the past 168 hour(s))   Hepatic Panel [LAB20]    Collection Time: 02/13/18  7:54 AM   Result Value Ref Range    Bilirubin Direct 0.1 0.0 - 0.2 mg/dL    Bilirubin Total 0.4 0.2 - 1.3 mg/dL    Albumin 3.5 3.4 - 5.0 g/dL    Protein Total 8.0 6.8 - 8.8 g/dL    Alkaline Phosphatase 70 40 - 150 U/L    ALT 29 0 - 70 U/L    AST 25 0 - 45 U/L   Basic metabolic panel [LAB15]    Collection Time: 02/13/18  7:54 AM   Result Value Ref Range    Sodium 134 133 - 144 mmol/L    Potassium 4.4 3.4 - 5.3 mmol/L    Chloride 100 94 - 109 mmol/L    Carbon Dioxide 28 20 - 32 mmol/L    Anion Gap 6 3 - 14 mmol/L    Glucose 120 (H) 70 - 99 mg/dL    Urea Nitrogen 20 7 - 30 mg/dL    Creatinine 0.86 0.66 - 1.25 mg/dL    GFR Estimate 90 >60 mL/min/1.7m2    GFR Estimate If Black >90 >60 mL/min/1.7m2    Calcium 9.1 8.5 - 10.1 mg/dL   CBC with platelets [VEE956]    Collection Time: 02/13/18  7:54 AM   Result Value Ref Range    WBC 6.6 4.0 - 11.0 10e9/L    RBC Count 4.80 4.4 - 5.9 10e12/L    Hemoglobin 13.9 13.3 - 17.7 g/dL    Hematocrit 41.8 40.0 - 53.0 %    MCV 87 78 - 100 fl    MCH 29.0 26.5 - 33.0 pg    MCHC 33.3 31.5 - 36.5 g/dL    RDW 14.6 10.0 - 15.0 %    Platelet Count 122 (L) 150 - 450 10e9/L   INR [[id number]]    Collection Time: 02/13/18  7:54 AM   Result Value Ref Range    INR 1.02 0.86 - 1.14      I did review his ultrasound which shows stable liver cysts.  No ascites and no other mass lesions are noted.      My impression is that Mr. 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  He is a sustained virologic responder to hepatitis C treatment.  His last endoscopy was 2 years ago and showed no esophageal varices and that was 2 years after we cured him of his hepatitis C.  I am not really planning on repeating his variceal screening.  He is up-to-date with regard  to vaccines and colorectal cancer screening.  He did have 2 tubular adenomas on a screening colonoscopy in October; and thus, he will need a followup one in 5 years.  I, otherwise, will not be making any change to his medical regimen.  I will see him back in the clinic again in 6 months.      Thank you very much for allowing me to participate in the care of this patient.  If you have any questions regarding my recommendations, please do not hesitate to contact me.       Nehemias Cevallos MD      Professor of Medicine  [employer]      Executive Medical Director, Solid Organ Transplant Program  [facility]

## 2018-02-13 NOTE — LETTER
2/13/2018      RE: Alexandro Pool  [address]    [place] [address]       I had the pleasure of seeing Alexandro Pool for followup in the Liver Clinic at the [facility] on 02/13/2018.  Mr. 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  He is a sustained virologic responder to hepatitis C treatment back in 2014.      For the most part, he is doing well.  His only issue is that he continues to smoke, although a number of people have been on him about trying to quit.  He denies any abdominal pain, itching or skin rash or fatigue.  He denies any increased abdominal girth or lower extremity edema.  He denies any fevers or chills, cough or shortness of breath.  He denies any nausea, vomiting, diarrhea or diarrhea.  He is somewhat prone to constipation.  His appetite has been good, and he believes his weight has stayed about the same.  There have been no other new events since he was last seen.     Current Outpatient Prescriptions   Medication     lisinopril (PRINIVIL/ZESTRIL) 2.5 MG tablet     atorvastatin (LIPITOR) 80 MG tablet     spironolactone (ALDACTONE) 25 MG tablet     order for DME     Vorapaxar Sulfate (ZONTIVITY) 2.08 MG tablet     insulin pen needle (BD KWABENA U/F) 32G X 4 MM     insulin lispro (HUMALOG VIAL) SOLN 100 UNIT/ML     hydrocortisone 1 % cream     SENNA PO     morphine (MS CONTIN) 15 MG 12 hr tablet     oxyCODONE (ROXICODONE) 10 MG immediate release tablet     gabapentin (NEURONTIN) 100 MG capsule     insulin glargine (LANTUS) SOLN 100 UNIT/ML     ASPIRIN EC PO     METOPROLOL SUCCINATE ER PO     amLODIPine (NORVASC) 2.5 MG tablet     acetaminophen (TYLENOL) 325 MG tablet     No current facility-administered medications for this visit.      B/P: 129/81, T: 97.7, P: 60, R: 20    HEENT exam shows no scleral icterus and no temporal muscle wasting.  His chest is clear.  His abdominal exam shows no increase in girth.  No masses or  tenderness to palpation are present.  His liver is 10 cm in span without left lobe enlargement.  No spleen tip is palpable, and extremity exam shows no edema.  Skin exam shows no stigmata of chronic liver disease.  Neurologic exam shows no asterixis.     Recent Results (from the past 168 hour(s))   Hepatic Panel [LAB20]    Collection Time: 02/13/18  7:54 AM   Result Value Ref Range    Bilirubin Direct 0.1 0.0 - 0.2 mg/dL    Bilirubin Total 0.4 0.2 - 1.3 mg/dL    Albumin 3.5 3.4 - 5.0 g/dL    Protein Total 8.0 6.8 - 8.8 g/dL    Alkaline Phosphatase 70 40 - 150 U/L    ALT 29 0 - 70 U/L    AST 25 0 - 45 U/L   Basic metabolic panel [LAB15]    Collection Time: 02/13/18  7:54 AM   Result Value Ref Range    Sodium 134 133 - 144 mmol/L    Potassium 4.4 3.4 - 5.3 mmol/L    Chloride 100 94 - 109 mmol/L    Carbon Dioxide 28 20 - 32 mmol/L    Anion Gap 6 3 - 14 mmol/L    Glucose 120 (H) 70 - 99 mg/dL    Urea Nitrogen 20 7 - 30 mg/dL    Creatinine 0.86 0.66 - 1.25 mg/dL    GFR Estimate 90 >60 mL/min/1.7m2    GFR Estimate If Black >90 >60 mL/min/1.7m2    Calcium 9.1 8.5 - 10.1 mg/dL   CBC with platelets [[id number]]    Collection Time: 02/13/18  7:54 AM   Result Value Ref Range    WBC 6.6 4.0 - 11.0 10e9/L    RBC Count 4.80 4.4 - 5.9 10e12/L    Hemoglobin 13.9 13.3 - 17.7 g/dL    Hematocrit 41.8 40.0 - 53.0 %    MCV 87 78 - 100 fl    MCH 29.0 26.5 - 33.0 pg    MCHC 33.3 31.5 - 36.5 g/dL    RDW 14.6 10.0 - 15.0 %    Platelet Count 122 (L) 150 - 450 10e9/L   INR [[id number]]    Collection Time: 02/13/18  7:54 AM   Result Value Ref Range    INR 1.02 0.86 - 1.14      I did review his ultrasound which shows stable liver cysts.  No ascites and no other mass lesions are noted.      My impression is that Mr. 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  He is a sustained virologic responder to hepatitis C treatment.  His last endoscopy was 2 years ago and showed no esophageal varices and that was 2 years after we cured him of his  hepatitis C.  I am not really planning on repeating his variceal screening.  He is up-to-date with regard to vaccines and colorectal cancer screening.  He did have 2 tubular adenomas on a screening colonoscopy in October; and thus, he will need a followup one in 5 years.  I, otherwise, will not be making any change to his medical regimen.  I will see him back in the clinic again in 6 months.      Thank you very much for allowing me to participate in the care of this patient.  If you have any questions regarding my recommendations, please do not hesitate to contact me.       Nehemias Cevallos MD      Professor of Medicine  [employer]      Executive Medical Director, Solid Organ Transplant Program  [employer] [facility]

## 2018-02-13 NOTE — MR AVS SNAPSHOT
After Visit Summary   2/13/2018    Alexandro Pool    MRN: [id number]           Patient Information     Date Of Birth          1955        Visit Information        Provider Department      2/13/2018 9:30 AM Nehemias Cevallos MD [facility]        Today's Diagnoses     Cirrhosis of liver without ascites, unspecified hepatic cirrhosis type (H)    -  1      Care Instructions    Preventive Care:    Diabetic Eye Exam Screening: During our visit today, we discussed that it is recommended you receive diabetic eye exam screening. Please call or make an appointment with your primary care provider to discuss this with them. You may also call the [facility] scheduling line ([phone]) to set up an eye exam at one of the [facility].                Follow-ups after your visit        Follow-up notes from your care team     Return in about 6 months (around 8/13/2018).      Your next 10 appointments already scheduled     May 18, 2018  1:00 PM CDT   (Arrive by 12:45 PM)   Return Visit with Giovani Olson MD   [facility] ([facility])    [address] [place] [address]  [address]  [facility] [id number] [phone]            Aug 14, 2018 10:00 AM CDT   Lab with  LAB   [facility] ([facility])    [address] [place] [address] [phone]            Aug 14, 2018 10:30 AM CDT   US ABDOMEN COMPLETE with UCUS2   [facility] ([facility])    [address] [place] [id number] [phone]           Please bring a list of your medicines (including vitamins, minerals and over-the-counter drugs). Also, tell your doctor about any allergies you may have. Wear comfortable clothes and leave your valuables at home.  Adults: No eating or drinking for 8 hours before the exam. You may take medicine with a small sip of water.  Children: - Children 6+  years: No food or drink for 6 hours before exam. - Children 1-5 years: No food or drink for 4 hours before exam. - Infants, breast-fed: may have breast milk up to 2 hours before exam. - Infants, formula: may have bottle until 4 hours before exam.  Please call the Imaging Department at your exam site with any questions.            Aug 14, 2018 11:45 AM CDT   (Arrive by 11:30 AM)   Return General Liver with Nehemias Cevallos MD   [facility] ([facility] [place])    [address] [place] [address]  [address] [place] [address] [phone]              Future tests that were ordered for you today     Open Standing Orders        Priority Remaining Interval Expires Ordered    US abdomen complete [AZY429] Routine 2/2 Every 6 Months 2/13/2019 2/13/2018          Open Future Orders        Priority Expected Expires Ordered    Hepatic Panel [LAB20] Routine 8/12/2018 2/13/2019 2/13/2018    Basic metabolic panel [LAB15] Routine 8/12/2018 2/13/2019 2/13/2018    CBC with platelets [JZC282] Routine 8/12/2018 2/13/2019 2/13/2018    INR [PPV5295] Routine 8/12/2018 2/13/2019 2/13/2018    AFP tumor marker [DOM974] Routine 8/12/2018 2/13/2019 2/13/2018            Who to contact     If you have questions or need follow up information about today's clinic visit or your schedule please contact [facility] directly at [phone].  Normal or non-critical lab and imaging results will be communicated to you by MyChart, letter or phone within 4 business days after the clinic has received the results. If you do not hear from us within 7 days, please contact the clinic through Garen[employer] or phone. If you have a critical or abnormal lab result, we will notify you by phone as soon as possible.  Submit refill requests through [employer] or call your pharmacy and they will forward the refill request to us. Please allow 3 business days for your refill to be completed.          Additional Information About Your Visit       "  MyChart Information     Customer.io lets you send messages to your doctor, view your test results, renew your prescriptions, schedule appointments and more. To sign up, go to [email or web address] . Click on \"Log in\" on the left side of the screen, which will take you to the Welcome page. Then click on \"Sign up Now\" on the right side of the page.     You will be asked to enter the access code listed below, as well as some personal information. Please follow the directions to create your username and password.     Your access code is: [email or web address]  Expires: 3/1/2018  6:30 AM     Your access code will  in 90 days. If you need help or a new code, please call your [facility] clinic or [phone].        Care EveryWhere ID     This is your Care EveryWhere ID. This could be used by other organizations to access your [facility] medical records  [id number]        Your Vitals Were     Pulse Temperature Respirations Height Pulse Oximetry BMI (Body Mass Index)    60 97.7  F (36.5  C) (Oral) 20 1.702 m (5' 7.01\") 99% 33.98 kg/m2       Blood Pressure from Last 3 Encounters:   18 129/81   10/20/17 120/73   10/12/17 (!) 134/111    Weight from Last 3 Encounters:   18 98.4 kg (217 lb)   10/20/17 95.7 kg (211 lb)   10/12/17 95.7 kg (211 lb)              We Performed the Following     Schedule follow up appointments          Today's Medication Changes          These changes are accurate as of 18  9:30 AM.  If you have any questions, ask your nurse or doctor.               These medicines have changed or have updated prescriptions.        Dose/Directions    insulin glargine 100 UNIT/ML injection   Commonly known as:  LANTUS   This may have changed:    - when to take this  - additional instructions   Used for:  Type II or unspecified type diabetes mellitus with neurological manifestations, not stated as uncontrolled(250.60) (H)        Inject 12 Units Subcutaneous every morning   Quantity:  1 Month   Refills:  0    "    oxyCODONE IR 10 MG tablet   Commonly known as:  ROXICODONE   This may have changed:  how much to take   Used for:  S/P AKA (above knee amputation) (H)        Dose:  15 mg   Take 1.5 tablets (15 mg) by mouth every 4 hours as needed   Quantity:  60 tablet   Refills:  0                Primary Care Provider Office Phone # Fax #    Roger REBOLLAR Yoshi, PAULETTE [phone]       [facility] [address] [facility] [address]        Equal Access to Services     [facility] Hospital[facility]ESSANDRA AH: Hadii aad ku hadasho Soomaali, waaxda luqadaha, qaybta kaalmada adeegyada, waxay idiin hayaan adeeg khdanielito parks . So [facility] [phone].    ATENCIÓN: Si habla español, tiene a wong disposición servicios gratuitos de asistencia lingüística. Ll[facility] [phone].    We comply with applicable federal civil rights laws and [place] laws. We do not discriminate on the basis of race, color, national origin, age, disability, sex, sexual orientation, or gender identity.            Thank you!     Thank you for choosing [facility]  for your care. Our goal is always to provide you with excellent care. Hearing back from our patients is one way we can continue to improve our services. Please take a few minutes to complete the written survey that you may receive in the mail after your visit with us. Thank you!             Your Updated Medication List - Protect others around you: Learn how to safely use, store and throw away your medicines at www.disposemymeds.org.          This list is accurate as of 2/13/18  9:30 AM.  Always use your most recent med list.                   Brand Name Dispense Instructions for use Diagnosis    acetaminophen 325 MG tablet    TYLENOL    250 tablet    Take 1 tablet by mouth every 4 hours as needed.    Peripheral arterial disease (H)       amLODIPine 2.5 MG tablet    NORVASC     Take 2.5 mg by mouth 2 times daily        ASPIRIN EC PO      Take 81 mg by mouth daily        atorvastatin 80 MG tablet     LIPITOR    90 tablet    Take 1 tablet (80 mg) by mouth At Bedtime    Peripheral artery disease (H)       gabapentin 100 MG capsule    NEURONTIN    90 capsule    Take 1 capsule (100 mg) by mouth 3 times daily    Critical lower limb ischemia       humaLOG 100 UNIT/ML injection   Generic drug:  insulin lispro      Inject Subcutaneous 3 times daily (before meals) 18 units before each meal        hydrocortisone 1 % cream    CORTAID     Apply topically 2 times daily PRN        insulin glargine 100 UNIT/ML injection    LANTUS    1 Month    Inject 12 Units Subcutaneous every morning    Type II or unspecified type diabetes mellitus with neurological manifestations, not stated as uncontrolled(250.60) (H)       insulin pen needle 32G X 4 MM    BD KWABENA U/F    120 each    Use 4x  daily or as directed.    Type II or unspecified type diabetes mellitus without mention of complication, not stated as uncontrolled       lisinopril 2.5 MG tablet    PRINIVIL/Zestril    180 tablet    TAKE 1 TABLET (2.5 MG) BY MOUTH 2 TIMES DAILY    Hypertension goal BP (blood pressure) < 140/90       METOPROLOL SUCCINATE ER PO      Take 75 mg by mouth daily        morphine 15 MG 12 hr tablet    MS CONTIN    60 tablet    Take 3 tablets (45 mg) by mouth 2 times daily    Critical lower limb ischemia       order for DME     1 Units    Equipment being ordered: FlexiTouch pneumatic compression device.  2-3 times per day to left lower extremity. Current strength - L4        oxyCODONE IR 10 MG tablet    ROXICODONE    60 tablet    Take 1.5 tablets (15 mg) by mouth every 4 hours as needed    S/P AKA (above knee amputation) (H)       SENNA PO      1 Tab , bedtime        spironolactone 25 MG tablet    ALDACTONE    15 tablet    Take 0.5 tablets (12.5 mg) by mouth daily    Ischemic cardiomyopathy, Hypertension goal BP (blood pressure) < 140/90       vorapaxar sulfate 2.08 MG tablet    ZONTIVITY    30 tablet    Take 1 tablet (2.08 mg) by mouth daily    Lower limb  ischemia

## 2018-02-13 NOTE — NURSING NOTE
"Chief Complaint   Patient presents with     RECHECK     Cirrhosis       Initial /81 (BP Location: Right arm, Patient Position: Sitting, Cuff Size: Adult Large)  Pulse 60  Temp 97.7  F (36.5  C) (Oral)  Resp 20  Ht 1.702 m (5' 7.01\")  Wt 98.4 kg (217 lb)  SpO2 99%  BMI 33.98 kg/m2 Estimated body mass index is 33.98 kg/(m^2) as calculated from the following:    Height as of this encounter: 1.702 m (5' 7.01\").    Weight as of this encounter: 98.4 kg (217 lb).  Medication Reconciliation: complete     Michell Lara [facility]  2/13/2018 9:14 AM        "

## 2018-05-18 ENCOUNTER — OFFICE VISIT (OUTPATIENT)
Dept: CARDIOLOGY | Facility: CLINIC | Age: 63
End: 2018-05-18
Attending: INTERNAL MEDICINE
Payer: COMMERCIAL

## 2018-05-18 VITALS
OXYGEN SATURATION: 94 % | BODY MASS INDEX: 34.21 KG/M2 | SYSTOLIC BLOOD PRESSURE: 110 MMHG | DIASTOLIC BLOOD PRESSURE: 73 MMHG | HEART RATE: 63 BPM | HEIGHT: 67 IN | WEIGHT: 218 LBS

## 2018-05-18 DIAGNOSIS — E11.8 TYPE 2 DIABETES MELLITUS WITH COMPLICATION, WITH LONG-TERM CURRENT USE OF INSULIN (H): ICD-10-CM

## 2018-05-18 DIAGNOSIS — E78.5 HYPERLIPIDEMIA LDL GOAL <70: ICD-10-CM

## 2018-05-18 DIAGNOSIS — F17.200 TOBACCO USE DISORDER: ICD-10-CM

## 2018-05-18 DIAGNOSIS — I25.5 ISCHEMIC CARDIOMYOPATHY: ICD-10-CM

## 2018-05-18 DIAGNOSIS — I10 HYPERTENSION GOAL BP (BLOOD PRESSURE) < 140/90: ICD-10-CM

## 2018-05-18 DIAGNOSIS — Z79.4 TYPE 2 DIABETES MELLITUS WITH COMPLICATION, WITH LONG-TERM CURRENT USE OF INSULIN (H): ICD-10-CM

## 2018-05-18 DIAGNOSIS — I73.9 PAD (PERIPHERAL ARTERY DISEASE) (H): Primary | ICD-10-CM

## 2018-05-18 PROCEDURE — G0463 HOSPITAL OUTPT CLINIC VISIT: HCPCS | Mod: ZF

## 2018-05-18 PROCEDURE — 99214 OFFICE O/P EST MOD 30 MIN: CPT | Mod: ZP | Performed by: INTERNAL MEDICINE

## 2018-05-18 ASSESSMENT — PAIN SCALES - GENERAL: PAINLEVEL: NO PAIN (0)

## 2018-05-18 NOTE — PATIENT INSTRUCTIONS
You were seen today in the Cardiovascular Clinic at the [facility].      Cardiology Providers you saw during your visit:  Dr. Olson    Diagnosis:  Peripheral artery disease.    Results:  None today    Recommendations:   Left leg arterial duplex     Echo     Lipid profile.  Nothing to eat for 6 hours prior.     Have done in next 1-2 weeks.  Can be done all in one day.    Follow-up:  June 1, 2018 at 1:15 pm.      For emergencies call 911.    For any scheduling needs, please call [phone]. Option 1 then option 3    Thank you for your visit today!     Please call if you have any questions or concerns.  Vikas Skinner RN

## 2018-05-18 NOTE — MR AVS SNAPSHOT
After Visit Summary   5/18/2018    Alexandro Pool    MRN: [id number]           Patient Information     Date Of Birth          1955        Visit Information        Provider Department      5/18/2018 1:00 PM Giovani Olson MD [facility]        Today's Diagnoses     PAD S/P Lt SFA stenting 3-9-2012, Lt fem-SENIA bypass w/ ISGSV 9-, S/P Rt AKA 2014, ligation of parasitic branch 9/19/2017    -  1    Tobacco use disorder        Hyperlipidemia LDL goal <70        Hypertension goal BP (blood pressure) < 140/90        Type 2 diabetes mellitus with complication, with long-term current use of insulin (H)        Ischemic cardiomyopathy          Care Instructions    You were seen today in the Cardiovascular Clinic at the [facility].      Cardiology Providers you saw during your visit:  Dr. Olson    Diagnosis:  Peripheral artery disease.    Results:  None today    Recommendations:   Left leg arterial duplex     Echo     Lipid profile.  Nothing to eat for 6 hours prior.     Have done in next 1-2 weeks.  Can be done all in one day.    Follow-up:  June 1, 2018 at 1:15 pm.      For emergencies call 911.    For any scheduling needs, please call [phone]. Option 1 then option 3    Thank you for your visit today!     Please call if you have any questions or concerns.  Vikas Skinner RN              Follow-ups after your visit        Additional Services     Follow-Up with Vascular Cardiologist       Schedule duplex, echo and labs same day in next 1-2 weeks.                  Your next 10 appointments already scheduled     May 23, 2018  8:00 AM CDT   Lab with  LAB   [facility] ([facility])    [address] [place] [address] [phone]            May 23, 2018  8:30 AM CDT   US LOWER EXTREMITY ARTERIAL DUPLEX LEFT with UCUSV2   [facility] US ([facility])    [address] [place] [address]    [facility] [address] [phone]           Please bring a list of your medicines (including vitamins, minerals and over-the-counter drugs). Also, tell your doctor about any allergies you may have. Wear comfortable clothes and leave your valuables at home.  You do not need to do anything special to prepare for your exam.  Please call the Imaging Department at your exam site with any questions.            May 23, 2018  9:00 AM CDT   Ech Complete with [address] Echo ([facility])    [address] [place] [id number] [phone]           1. Please bring or wear a comfortable two-piece outfit. 2. You may eat, drink and take your normal medicines. 3. For any questions that cannot be answered, please contact the ordering physician            Jun 01, 2018  1:15 PM CDT   (Arrive by 1:00 PM)   Return Visit with Giovani Olson MD   [facility] [place] [facility] ([facility])    [address] [place] [id number] [phone]            Aug 16, 2018  8:30 AM CDT   Lab with  LAB   [facility] ([facility])    [address] [place] [address] [phone]            Aug 16, 2018  9:00 AM CDT   US ABDOMEN COMPLETE with [address] [facility] US ([facility])    [address] [place] [address] [phone]           Please bring a list of your medicines (including vitamins, minerals and over-the-counter drugs). Also, tell your doctor about any allergies you may have. Wear comfortable clothes and leave your valuables at home.  Adults: No eating or drinking for 8 hours before the exam. You may take medicine with a small sip of water.  Children: - Children 6+ years: No food or drink for 6 hours before exam. - Children 1-5 years: No food or drink for 4 hours before exam. - Infants,  "breast-fed: may have breast milk up to 2 hours before exam. - Infants, formula: may have bottle until 4 hours before exam.  Please call the Imaging Department at your exam site with any questions.            Aug 16, 2018 10:00 AM CDT   (Arrive by 9:45 AM)   Return General Liver with Nehemias Cevallos MD   [facility] ([facility] [place])    [address] [place] [address]  [address]  [facility] [address] [phone]              Future tests that were ordered for you today     Open Future Orders        Priority Expected Expires Ordered    Follow-Up with Vascular Cardiologist Routine 6/1/2018 6/1/2018 5/18/2018    US Lower Extremity Arterial Duplex Left Routine 5/23/2018 5/18/2019 5/18/2018    Lipid Profile Routine 5/22/2018 6/1/2018 5/18/2018    Hemoglobin A1c Routine 5/22/2018 6/1/2018 5/18/2018    Echocardiogram Complete Routine 5/22/2018 6/1/2018 5/18/2018            Who to contact     If you have questions or need follow up information about today's clinic visit or your schedule please contact [facility] directly at [phone].  Normal or non-critical lab and imaging results will be communicated to you by [employer], letter or phone within 4 business days after the clinic has received the results. If you do not hear from us within 7 days, please contact the clinic through [employer] or phone. If you have a critical or abnormal lab result, we will notify you by phone as soon as possible.  Submit refill requests through [employer] or call your pharmacy and they will forward the refill request to us. Please allow 3 business days for your refill to be completed.          Additional Information About Your Visit        [employer] Information     [employer] lets you send messages to your doctor, view your test results, renew your prescriptions, schedule appointments and more. To sign up, go to [email or web address] . Click on \"Log in\" on the left side of the screen, which will take you to the Welcome " "page. Then click on \"Sign up Now\" on the right side of the page.     You will be asked to enter the access code listed below, as well as some personal information. Please follow the directions to create your username and password.     Your access code is: [email or web address]  Expires: 2018  1:36 PM     Your access code will  in 90 days. If you need help or a new code, please call your [facility] or [phone].        Care EveryWhere ID     This is your Care EveryWhere ID. This could be used by other organizations to access your [facility] medical records  [id number]        Your Vitals Were     Pulse Height Pulse Oximetry BMI (Body Mass Index)          63 1.702 m (5' 7\") 94% 34.14 kg/m2         Blood Pressure from Last 3 Encounters:   18 110/73   18 129/81   10/20/17 120/73    Weight from Last 3 Encounters:   18 98.9 kg (218 lb)   18 98.4 kg (217 lb)   10/20/17 95.7 kg (211 lb)                 Today's Medication Changes          These changes are accurate as of 18  1:38 PM.  If you have any questions, ask your nurse or doctor.               These medicines have changed or have updated prescriptions.        Dose/Directions    insulin glargine 100 UNIT/ML injection   Commonly known as:  LANTUS   This may have changed:    - when to take this  - additional instructions   Used for:  Type II or unspecified type diabetes mellitus with neurological manifestations, not stated as uncontrolled(250.60) (H)        Inject 12 Units Subcutaneous every morning   Quantity:  1 Month   Refills:  0       oxyCODONE IR 10 MG tablet   Commonly known as:  ROXICODONE   This may have changed:  how much to take   Used for:  S/P AKA (above knee amputation) (H)        Dose:  15 mg   Take 1.5 tablets (15 mg) by mouth every 4 hours as needed   Quantity:  60 tablet   Refills:  0                Primary Care Provider Office Phone # Fax #    Roger Belle [phone]       [facility] " [address]  [facility] [address]        Equal Access to Services     MARCUSNADEEM ODALYS : Hadii michelle rowe oumareduard Milaali, wakayleyda luivonnericcoha, qaybta kayanethdebbie arellano, david gillzachwiliam florez. So [facility] [phone].    ATENCIÓN: Si habla español, tiene a wong disposición servicios gratuitos de asistencia lingüística. Llame al [phone].    We comply with applicable federal civil rights laws and [place] laws. We do not discriminate on the basis of race, color, national origin, age, disability, sex, sexual orientation, or gender identity.            Thank you!     Thank you for choosing [facility]  for your care. Our goal is always to provide you with excellent care. Hearing back from our patients is one way we can continue to improve our services. Please take a few minutes to complete the written survey that you may receive in the mail after your visit with us. Thank you!             Your Updated Medication List - Protect others around you: Learn how to safely use, store and throw away your medicines at www.disposemymeds.org.          This list is accurate as of 5/18/18  1:38 PM.  Always use your most recent med list.                   Brand Name Dispense Instructions for use Diagnosis    acetaminophen 325 MG tablet    TYLENOL    250 tablet    Take 1 tablet by mouth every 4 hours as needed.    Peripheral arterial disease (H)       amLODIPine 2.5 MG tablet    NORVASC     Take 2.5 mg by mouth 2 times daily        ASPIRIN EC PO      Take 81 mg by mouth daily        atorvastatin 80 MG tablet    LIPITOR    90 tablet    Take 1 tablet (80 mg) by mouth At Bedtime    Peripheral artery disease (H)       gabapentin 100 MG capsule    NEURONTIN    90 capsule    Take 1 capsule (100 mg) by mouth 3 times daily    Critical lower limb ischemia       humaLOG 100 UNIT/ML injection   Generic drug:  insulin lispro      Inject Subcutaneous 3 times daily (before meals) 18 units before each meal        hydrocortisone  1 % cream    CORTAID     Apply topically 2 times daily PRN        insulin glargine 100 UNIT/ML injection    LANTUS    1 Month    Inject 12 Units Subcutaneous every morning    Type II or unspecified type diabetes mellitus with neurological manifestations, not stated as uncontrolled(250.60) (H)       insulin pen needle 32G X 4 MM    BD KWABENA U/F    120 each    Use 4x  daily or as directed.    Type II or unspecified type diabetes mellitus without mention of complication, not stated as uncontrolled       lisinopril 2.5 MG tablet    PRINIVIL/Zestril    180 tablet    TAKE 1 TABLET (2.5 MG) BY MOUTH 2 TIMES DAILY    Hypertension goal BP (blood pressure) < 140/90       METOPROLOL SUCCINATE ER PO      Take 75 mg by mouth daily        morphine 15 MG 12 hr tablet    MS CONTIN    60 tablet    Take 3 tablets (45 mg) by mouth 2 times daily    Critical lower limb ischemia       order for DME     1 Units    Equipment being ordered: FlexiTouch pneumatic compression device.  2-3 times per day to left lower extremity. Current strength - L4        oxyCODONE IR 10 MG tablet    ROXICODONE    60 tablet    Take 1.5 tablets (15 mg) by mouth every 4 hours as needed    S/P AKA (above knee amputation) (H)       SENNA PO      1 Tab , bedtime        spironolactone 25 MG tablet    ALDACTONE    15 tablet    Take 0.5 tablets (12.5 mg) by mouth daily    Ischemic cardiomyopathy, Hypertension goal BP (blood pressure) < 140/90       vorapaxar sulfate 2.08 MG tablet    ZONTIVITY    30 tablet    Take 1 tablet (2.08 mg) by mouth daily    Lower limb ischemia

## 2018-05-18 NOTE — NURSING NOTE
Cardiac Testing: Patient given instructions regarding  echocardiogram and left leg arterial duplex. Discussed purpose, preparation, procedure and when to expect results reported back to the patient. Patient demonstrated understanding of this information and agreed to call with further questions or concerns.  Labs:  Patient was instructed to return for the next laboratory testing in 2 weeks. Patient demonstrated understanding of this information and agreed to call with further questions or concerns.   Med Reconcile: Reviewed and verified all current medications with the patient. The updated medication list was printed and given to the patient.  Return Appointment: 6/1/18 at 1:15 pm with Dr. Olson.  Patient given instructions regarding scheduling next clinic visit. Patient demonstrated understanding of this information and agreed to call with further questions or concerns.  Smoking Cessation: Patient instructed regarding options for smoking cessation and given written information regarding smoking cessation assistance programs. Patient demonstrated understanding of this information and agreed to call with further questions or concerns.  Patient stated he understood all health information given and agreed to call with further questions or concerns.

## 2018-05-18 NOTE — NURSING NOTE
Chief Complaint   Patient presents with     Follow Up For     reason for visit: 61 y/o male for f/u of PAD, s/p amputation, hyperlipidemia and hypertension.     Vitals were taken and medications were reconciled.   ANTONIA Antony  12:29 PM

## 2018-05-18 NOTE — PROGRESS NOTES
Alexandro Pool is a 62 year old male who is presenting at the current time to discuss his diagnosi(es) of :       PAD (peripheral artery disease) (H)  Tobacco use disorder  Hyperlipidemia LDL goal <70  Hypertension goal BP (blood pressure) < 140/90  Type 2 diabetes mellitus with complication, with long-term current use of insulin (H)  Ischemic cardiomyopathy .      Review Of Systems  Skin: negative  Eyes: negative  Ears/Nose/Throat: negative  Respiratory: No shortness of breath, dyspnea on exertion, cough, or hemoptysis  Cardiovascular: negative  Gastrointestinal: negative  Genitourinary: negative  Musculoskeletal: negative  Neurologic: negative  Psychiatric: negative  Hematologic/Lymphatic/Immunologic: negative  Endocrine: negative    PAST MEDICAL HISTORY:                  Past Medical History:   Diagnosis Date     Acute kidney failure, unspecified      Blood transfusion      CAD (coronary artery disease)      CARDIOVASCULAR SCREENING; LDL GOAL LESS THAN 100      Cirrhosis (H)      Critical lower limb ischemia      Diabetes mellitus (H) 10/2011     Hypertension      Hypertension goal BP (blood pressure) < 140/90      Ischemic cardiomyopathy      Lymphedema of left leg 5/11/2016     Peripheral arterial disease (H)      PVD (peripheral vascular disease) (H)      Right above knee amputation 4/30/2014     Type 2 diabetes, HbA1C goal < 8% (H)        PAST SURGICAL HISTORY:                  Past Surgical History:   Procedure Laterality Date     AMPUTATE LEG ABOVE KNEE  11/22/2011    Procedure:AMPUTATE LEG ABOVE KNEE; Revise Right Below Knee Amputation To Above Knee Amputation; Surgeon:MADISON WELLS; [facility]     AMPUTATE LEG BELOW KNEE  11/10/2011    Procedure:AMPUTATE LEG BELOW KNEE; Right Below Knee Amputation; Surgeon:MADISON WELLS; [facility]     BYPASS GRAFT FEMOROTIBIAL  9/19/2013    Procedure: BYPASS GRAFT FEMOROTIBIAL;  Left Femorotibial Bypass Graft Insitu ;  Surgeon: Marisol Suggs MD;   [facility]     CARDIAC SURGERY      cardiac stent     ESOPHAGOSCOPY, GASTROSCOPY, DUODENOSCOPY (EGD), COMBINED  10/1/2012    Procedure: COMBINED ESOPHAGOSCOPY, GASTROSCOPY, DUODENOSCOPY (EGD);;  Surgeon: Nehemias Cevallos MD;  [facility]     ESOPHAGOSCOPY, GASTROSCOPY, DUODENOSCOPY (EGD), COMBINED N/A 3/24/2016    Procedure: COMBINED ESOPHAGOSCOPY, GASTROSCOPY, DUODENOSCOPY (EGD);  Surgeon: Gildardo Marks MD;  [facility]     IR STENT VASCULAR LT  3/9/2012          IRRIGATION AND DEBRIDEMENT LOWER EXTREMITY, COMBINED  11/15/2011    Procedure:COMBINED IRRIGATION AND DEBRIDEMENT LOWER EXTREMITY; DRAINAGE OF ABSCESS AT BELOW KNEE AMPUTATION AND KNEE DISARTICULATION.; Surgeon:MADISON WELLS; [facility]     NO HISTORY OF SURGERY  7/12/13    derm     VASCULAR REPAIR LOWER EXTREMITY Left 9/19/2017    Procedure: VASCULAR REPAIR LOWER EXTREMITY;  Ligation Parasitic Branch To Left Lower Extremity ;  Surgeon: Marisol Suggs MD;  [facility]       CURRENT MEDICATIONS:                  Current Outpatient Prescriptions   Medication Sig Dispense Refill     acetaminophen (TYLENOL) 325 MG tablet Take 1 tablet by mouth every 4 hours as needed. 250 tablet 0     amLODIPine (NORVASC) 2.5 MG tablet Take 2.5 mg by mouth 2 times daily       atorvastatin (LIPITOR) 80 MG tablet Take 1 tablet (80 mg) by mouth At Bedtime 90 tablet 11     gabapentin (NEURONTIN) 100 MG capsule Take 1 capsule (100 mg) by mouth 3 times daily 90 capsule 0     insulin glargine (LANTUS) SOLN 100 UNIT/ML Inject 12 Units Subcutaneous every morning (Patient taking differently: At Bedtime Inject 50 Units Subcutaneous) 1 Month 0     insulin lispro (HUMALOG VIAL) SOLN 100 UNIT/ML Inject Subcutaneous 3 times daily (before meals) 18 units before each meal       insulin pen needle (BD KWABENA U/F) 32G X 4 MM Use 4x  daily or as directed. 120 each 11     METOPROLOL SUCCINATE ER PO Take 75 mg by mouth daily       morphine (MS CONTIN) 15 MG 12 hr tablet Take 3  tablets (45 mg) by mouth 2 times daily 60 tablet 0     oxyCODONE (ROXICODONE) 10 MG immediate release tablet Take 1.5 tablets (15 mg) by mouth every 4 hours as needed (Patient taking differently: Take 20 mg by mouth every 4 hours as needed ) 60 tablet 0     ASPIRIN EC PO Take 81 mg by mouth daily       hydrocortisone 1 % cream Apply topically 2 times daily PRN       lisinopril (PRINIVIL/ZESTRIL) 2.5 MG tablet TAKE 1 TABLET (2.5 MG) BY MOUTH 2 TIMES DAILY 180 tablet 3     order for DME Equipment being ordered: FlexiTouch pneumatic compression device.  2-3 times per day to left lower extremity.  Current strength - L4 1 Units      SENNA PO 1 Tab , bedtime       spironolactone (ALDACTONE) 25 MG tablet Take 0.5 tablets (12.5 mg) by mouth daily 15 tablet 0     Vorapaxar Sulfate (ZONTIVITY) 2.08 MG tablet Take 1 tablet (2.08 mg) by mouth daily 30 tablet 6       ALLERGIES:                  Allergies   Allergen Reactions     Ciprofloxacin Rash       SOCIAL HISTORY:                  Social History     Social History     Marital status:      Spouse name: N/A     Number of children: 0     Years of education: N/A     Occupational History      Unknown     Social History Main Topics     Smoking status: Current Every Day Smoker     Packs/day: 1.50     Years: 40.00     Types: Cigarettes     Smokeless tobacco: Never Used      Comment: working on quitting     Alcohol use 0.6 oz/week     1 Standard drinks or equivalent per week      Comment: Once a month or less     Drug use: No     Sexual activity: Not Currently     Other Topics Concern     Parent/Sibling W/ Cabg, Mi Or Angioplasty Before 65f 55m? No     Caffeine Concern Yes     Exercise No     Social History Narrative       FAMILY HISTORY:                   Family History   Problem Relation Age of Onset     Alcohol/Drug Mother      cirrhosis     Alcohol/Drug Father      C.A.D. No family hx of      DIABETES No family hx of      CANCER No family hx of           Physical exam  Reveals:    O/P: WNL  HEENT: WNL  NECK: No JVD, thyromegaly, or lymphadenopathy  HEART: RRR, no murmurs, gallops, or rubs  LUNGS: CTA bilaterally without rales, wheezes, or rhonchi  GI: NABS, nondistended, nontender, soft, obese  EXT:without cyanosis, clubbing, or edema; S/P Rt AKA  NEURO: nonfocal  : no flank tenderness     Component      Latest Ref Rng & Units 10/20/2017   Cholesterol      <200 mg/dL 108   Triglycerides      <150 mg/dL 182 (H)   HDL Cholesterol      >39 mg/dL 24 (L)   LDL Cholesterol Calculated      <100 mg/dL 48   Non HDL Cholesterol      <130 mg/dL 84       Component      Latest Ref Rng & Units 9/19/2017   Hemoglobin A1C      4.3 - 6.0 % 7.5 (H)     Vascular ultrasound arterial duplex lower extremity bypass graft  (CFA  to SENIA) dated   10/26/2017 3:26 PM     Ordering provider: BRAYAN DOMINGUEZ     Comparison Study: US 5/10/2017     Clinical History: PAD with chronic leg ischemia. Left SFA to SENIA  bypass graft     Technique: Grayscale, duplex, and spectral Doppler ultrasound of the  left leg arterial bypass graft.     Findings:     Left  Lower Extremity:     EIA: 130 cm/sec.   CFA, proximal to graft: 113 cm/sec.   Graft, proximal anastomosis: 110 cm/s, previously 194 cm/s.   Graft, proximal thigh: 37 cm/s, previously 93 cm/s.   Graft, mid thigh: 52 cm/s, previously 112 cm/s.     Mid thigh branch off the SFA to SENIA bypass graft: No flow.     Graft, low thigh: 101 cm/s.  Graft, knee: 58 cm/s  Graft, below knee: 56 cm/s  Graft, proximal calf: 60 cm/s  Graft, mid calf: 85 cm/s  Graft, low calf: 66 cm/s  Graft, distal anastomosis: 57 cm/s  SENIA, distal to anastomosis: 87 cm/s     Diffuse monophasic waveforms throughout the left leg. Sharp systolic  upstroke present.         Impression:     1. Patent left common femoral artery to anterior tibial artery bypass  graft without hemodynamically significant stenosis.     2. 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     I have personally reviewed the examination and initial interpretation  and I agree with the findings.     JUDITH DAVIS  A/P:    (I73.9) PAD S/P Lt SFA stenting 3-9-2012, Lt fem-SENIA bypass w/ ISGSV 9-, S/P Rt AKA 2014, ligation of parasitic branch 9/19/2017  (primary encounter diagnosis)  Comment: he is complaining of LLE edema. Will recheck a LLE arterial duplex to insure he has not developed any AVF since his last imaging was undertaken  Plan: Lipid Profile           (F17.200) Tobacco use disorder  Comment: he is advised to quit smoking, he is advised he will lose his left leg if he does not quit smoking. Three minutes tobacco cessation counselling   Plan: he refuses    (E78.5) Hyperlipidemia LDL goal <70  Comment: lipids not up to date  Plan: Lipid Profile          (I10) Hypertension goal BP (blood pressure) < 140/90  Comment: at goal  Plan: no med changes    (E11.8,  Z79.4) Type 2 diabetes mellitus with complication, with long-term current use of insulin (H)  Comment: his last a1c was > 7%  Plan: Hemoglobin A1c        RTC two weeks    (I25.5) Ischemic cardiomyopathy  Comment: continue beta blocker ace inhibitor, he is unaware if he is actually taking aldactone or not  Plan: Echocardiogram Complete        RTC two weeks

## 2018-05-18 NOTE — LETTER
5/18/2018      RE: Alexandro Pool  [address]    [place] [address]       Dear Colleague,    Thank you for the opportunity to participate in the care of your patient, Alexandro Pool, at the [facility] at [facility]. Please see a copy of my visit note below.    Alexandro Pool is a 62 year old male who is presenting at the current time to discuss his diagnosi(es) of :       PAD (peripheral artery disease) (H)  Tobacco use disorder  Hyperlipidemia LDL goal <70  Hypertension goal BP (blood pressure) < 140/90  Type 2 diabetes mellitus with complication, with long-term current use of insulin (H)  Ischemic cardiomyopathy .      Review Of Systems  Skin: negative  Eyes: negative  Ears/Nose/Throat: negative  Respiratory: No shortness of breath, dyspnea on exertion, cough, or hemoptysis  Cardiovascular: negative  Gastrointestinal: negative  Genitourinary: negative  Musculoskeletal: negative  Neurologic: negative  Psychiatric: negative  Hematologic/Lymphatic/Immunologic: negative  Endocrine: negative    PAST MEDICAL HISTORY:                  Past Medical History:   Diagnosis Date     Acute kidney failure, unspecified      Blood transfusion      CAD (coronary artery disease)      CARDIOVASCULAR SCREENING; LDL GOAL LESS THAN 100      Cirrhosis (H)      Critical lower limb ischemia      Diabetes mellitus (H) 10/2011     Hypertension      Hypertension goal BP (blood pressure) < 140/90      Ischemic cardiomyopathy      Lymphedema of left leg 5/11/2016     Peripheral arterial disease (H)      PVD (peripheral vascular disease) (H)      Right above knee amputation 4/30/2014     Type 2 diabetes, HbA1C goal < 8% (H)      PAST SURGICAL HISTORY:         Past Surgical History:   Procedure Laterality Date     AMPUTATE LEG ABOVE KNEE  11/22/2011    Procedure:AMPUTATE LEG ABOVE KNEE; Revise Right Below Knee Amputation To Above Knee Amputation; Surgeon:MADISON WELLS;  [facility]     AMPUTATE LEG BELOW KNEE  11/10/2011    Procedure:AMPUTATE LEG BELOW KNEE; Right Below Knee Amputation; Surgeon:MADISON WELLS; [facility]     BYPASS GRAFT FEMOROTIBIAL  9/19/2013    Procedure: BYPASS GRAFT FEMOROTIBIAL;  Left Femorotibial Bypass Graft Insitu ;  Surgeon: Marisol Suggs MD;  [facility]: [facility]     CARDIAC SURGERY      cardiac stent     ESOPHAGOSCOPY, GASTROSCOPY, DUODENOSCOPY (EGD), COMBINED  10/1/2012    Procedure: COMBINED ESOPHAGOSCOPY, GASTROSCOPY, DUODENOSCOPY (EGD);;  Surgeon: Nehemias Cevallos MD;  [facility]     ESOPHAGOSCOPY, GASTROSCOPY, DUODENOSCOPY (EGD), COMBINED N/A 3/24/2016    Procedure: COMBINED ESOPHAGOSCOPY, GASTROSCOPY, DUODENOSCOPY (EGD);  Surgeon: Gildardo Marks MD;  [facility]     IR STENT VASCULAR LT  3/9/2012          IRRIGATION AND DEBRIDEMENT LOWER EXTREMITY, COMBINED  11/15/2011    Procedure:COMBINED IRRIGATION AND DEBRIDEMENT LOWER EXTREMITY; DRAINAGE OF ABSCESS AT BELOW KNEE AMPUTATION AND KNEE DISARTICULATION.; Surgeon:MADISON WELLS; [facility]     NO HISTORY OF SURGERY  7/12/13    derm     VASCULAR REPAIR LOWER EXTREMITY Left 9/19/2017    Procedure: VASCULAR REPAIR LOWER EXTREMITY;  Ligation Parasitic Branch To Left Lower Extremity ;  Surgeon: Marisol Suggs MD;  [facility]: [facility]     CURRENT MEDICATIONS:    Current Outpatient Prescriptions   Medication Sig Dispense Refill     acetaminophen (TYLENOL) 325 MG tablet Take 1 tablet by mouth every 4 hours as needed. 250 tablet 0     amLODIPine (NORVASC) 2.5 MG tablet Take 2.5 mg by mouth 2 times daily       atorvastatin (LIPITOR) 80 MG tablet Take 1 tablet (80 mg) by mouth At Bedtime 90 tablet 11     gabapentin (NEURONTIN) 100 MG capsule Take 1 capsule (100 mg) by mouth 3 times daily 90 capsule 0     insulin glargine (LANTUS) SOLN 100 UNIT/ML Inject 12 Units Subcutaneous every morning (Patient taking differently: At Bedtime Inject 50 Units Subcutaneous) 1 Month 0     insulin lispro (HUMALOG  VIAL) SOLN 100 UNIT/ML Inject Subcutaneous 3 times daily (before meals) 18 units before each meal       insulin pen needle (BD KWABENA U/F) 32G X 4 MM Use 4x  daily or as directed. 120 each 11     METOPROLOL SUCCINATE ER PO Take 75 mg by mouth daily       morphine (MS CONTIN) 15 MG 12 hr tablet Take 3 tablets (45 mg) by mouth 2 times daily 60 tablet 0     oxyCODONE (ROXICODONE) 10 MG immediate release tablet Take 1.5 tablets (15 mg) by mouth every 4 hours as needed (Patient taking differently: Take 20 mg by mouth every 4 hours as needed ) 60 tablet 0     ASPIRIN EC PO Take 81 mg by mouth daily       hydrocortisone 1 % cream Apply topically 2 times daily PRN       lisinopril (PRINIVIL/ZESTRIL) 2.5 MG tablet TAKE 1 TABLET (2.5 MG) BY MOUTH 2 TIMES DAILY 180 tablet 3     order for DME Equipment being ordered: FlexiTouch pneumatic compression device.  2-3 times per day to left lower extremity.  Current strength - L4 1 Units      SENNA PO 1 Tab , bedtime       spironolactone (ALDACTONE) 25 MG tablet Take 0.5 tablets (12.5 mg) by mouth daily 15 tablet 0     Vorapaxar Sulfate (ZONTIVITY) 2.08 MG tablet Take 1 tablet (2.08 mg) by mouth daily 30 tablet 6     ALLERGIES:            Allergies   Allergen Reactions     Ciprofloxacin Rash     SOCIAL HISTORY:          Social History     Social History     Marital status:      Spouse name: N/A     Number of children: 0     Years of education: N/A     Occupational History      Unknown     Social History Main Topics     Smoking status: Current Every Day Smoker     Packs/day: 1.50     Years: 40.00     Types: Cigarettes     Smokeless tobacco: Never Used      Comment: working on quitting     Alcohol use 0.6 oz/week     1 Standard drinks or equivalent per week      Comment: Once a month or less     Drug use: No     Sexual activity: Not Currently     Other Topics Concern     Parent/Sibling W/ Cabg, Mi Or Angioplasty Before 65f 55m? No     Caffeine Concern Yes     Exercise No      Social History Narrative     FAMILY HISTORY:            Family History   Problem Relation Age of Onset     Alcohol/Drug Mother      cirrhosis     Alcohol/Drug Father      C.A.D. No family hx of      DIABETES No family hx of      CANCER No family hx of      Physical exam Reveals:    O/P: WNL  HEENT: WNL  NECK: No JVD, thyromegaly, or lymphadenopathy  HEART: RRR, no murmurs, gallops, or rubs  LUNGS: CTA bilaterally without rales, wheezes, or rhonchi  GI: NABS, nondistended, nontender, soft, obese  EXT:without cyanosis, clubbing, or edema; S/P Rt AKA  NEURO: nonfocal  : no flank tenderness     Component      Latest Ref Rng & Units 10/20/2017   Cholesterol      <200 mg/dL 108   Triglycerides      <150 mg/dL 182 (H)   HDL Cholesterol      >39 mg/dL 24 (L)   LDL Cholesterol Calculated      <100 mg/dL 48   Non HDL Cholesterol      <130 mg/dL 84       Component      Latest Ref Rng & Units 9/19/2017   Hemoglobin A1C      4.3 - 6.0 % 7.5 (H)     Vascular ultrasound arterial duplex lower extremity bypass graft  (CFA  to SENIA) dated   10/26/2017 3:26 PM     Ordering provider: BRAYAN DOMINGUEZ     Comparison Study: US 5/10/2017     Clinical History: PAD with chronic leg ischemia. Left SFA to SENIA  bypass graft     Technique: Grayscale, duplex, and spectral Doppler ultrasound of the  left leg arterial bypass graft.     Findings:     Left  Lower Extremity:     EIA: 130 cm/sec.   CFA, proximal to graft: 113 cm/sec.   Graft, proximal anastomosis: 110 cm/s, previously 194 cm/s.   Graft, proximal thigh: 37 cm/s, previously 93 cm/s.   Graft, mid thigh: 52 cm/s, previously 112 cm/s.     Mid thigh branch off the SFA to SENIA bypass graft: No flow.     Graft, low thigh: 101 cm/s.  Graft, knee: 58 cm/s  Graft, below knee: 56 cm/s  Graft, proximal calf: 60 cm/s  Graft, mid calf: 85 cm/s  Graft, low calf: 66 cm/s  Graft, distal anastomosis: 57 cm/s  SENIA, distal to anastomosis: 87 cm/s     Diffuse monophasic waveforms throughout the left leg.  Sharp systolic  upstroke present.     Impression:   1. Patent left common femoral artery to anterior tibial artery bypass  graft without hemodynamically significant stenosis.     2. Occlusion of the previously noted side branch fistula at the mid  thigh, status post ligation.     I have personally reviewed the examination and initial interpretation  and I agree with the findings.     JUDITH DAVIS  A/P:    (I73.9) PAD S/P Lt SFA stenting 3-9-2012, Lt fem-SENIA bypass w/ ISGSV 9-, S/P Rt AKA 2014, ligation of parasitic branch 9/19/2017  (primary encounter diagnosis)  Comment: he is complaining of LLE edema. Will recheck a LLE arterial duplex to insure he has not developed any AVF since his last imaging was undertaken  Plan: Lipid Profile    (F17.200) Tobacco use disorder  Comment: he is advised to quit smoking, he is advised he will lose his left leg if he does not quit smoking. Three minutes tobacco cessation counselling   Plan: he refuses    (E78.5) Hyperlipidemia LDL goal <70  Comment: lipids not up to date  Plan: Lipid Profile          (I10) Hypertension goal BP (blood pressure) < 140/90  Comment: at goal  Plan: no med changes    (E11.8,  Z79.4) Type 2 diabetes mellitus with complication, with long-term current use of insulin (H)  Comment: his last a1c was > 7%  Plan: Hemoglobin A1c        RTC two weeks    (I25.5) Ischemic cardiomyopathy  Comment: continue beta blocker ace inhibitor, he is unaware if he is actually taking aldactone or not  Plan: Echocardiogram Complete        RTC two weeks      Please do not hesitate to contact me if you have any questions/concerns.     Sincerely,     Giovani Olson MD

## 2018-05-23 ENCOUNTER — RADIANT APPOINTMENT (OUTPATIENT)
Dept: ULTRASOUND IMAGING | Facility: CLINIC | Age: 63
End: 2018-05-23
Attending: INTERNAL MEDICINE
Payer: COMMERCIAL

## 2018-05-23 ENCOUNTER — RADIANT APPOINTMENT (OUTPATIENT)
Dept: CARDIOLOGY | Facility: CLINIC | Age: 63
End: 2018-05-23
Attending: INTERNAL MEDICINE
Payer: COMMERCIAL

## 2018-05-23 DIAGNOSIS — E78.5 HYPERLIPIDEMIA LDL GOAL <70: ICD-10-CM

## 2018-05-23 DIAGNOSIS — Z79.4 TYPE 2 DIABETES MELLITUS WITH COMPLICATION, WITH LONG-TERM CURRENT USE OF INSULIN (H): ICD-10-CM

## 2018-05-23 DIAGNOSIS — E11.8 TYPE 2 DIABETES MELLITUS WITH COMPLICATION, WITH LONG-TERM CURRENT USE OF INSULIN (H): ICD-10-CM

## 2018-05-23 DIAGNOSIS — I25.5 ISCHEMIC CARDIOMYOPATHY: ICD-10-CM

## 2018-05-23 DIAGNOSIS — I73.9 PAD (PERIPHERAL ARTERY DISEASE) (H): ICD-10-CM

## 2018-05-23 LAB
CHOLEST SERPL-MCNC: 115 MG/DL
HBA1C MFR BLD: 7.7 % (ref 0–5.6)
HDLC SERPL-MCNC: 26 MG/DL
LDLC SERPL CALC-MCNC: 51 MG/DL
NONHDLC SERPL-MCNC: 88 MG/DL
TRIGL SERPL-MCNC: 189 MG/DL

## 2018-05-23 RX ADMIN — Medication 6 ML: at 09:15

## 2018-07-09 DIAGNOSIS — I73.9 PERIPHERAL ARTERY DISEASE (H): ICD-10-CM

## 2018-07-09 RX ORDER — ATORVASTATIN CALCIUM 80 MG/1
80 TABLET, FILM COATED ORAL AT BEDTIME
Qty: 90 TABLET | Refills: 3 | Status: SHIPPED | OUTPATIENT
Start: 2018-07-09 | End: 2019-08-02

## 2018-08-15 ENCOUNTER — TELEPHONE (OUTPATIENT)
Dept: GASTROENTEROLOGY | Facility: CLINIC | Age: 63
End: 2018-08-15

## 2018-08-15 DIAGNOSIS — E11.49 TYPE II OR UNSPECIFIED TYPE DIABETES MELLITUS WITH NEUROLOGICAL MANIFESTATIONS, NOT STATED AS UNCONTROLLED(250.60) (H): ICD-10-CM

## 2018-08-15 NOTE — TELEPHONE ENCOUNTER
Patient contacted and reminded of upcoming appointment.  Patient confirmed they will be attending.  Patient instructed to bring updated medications list to appointment.  Didier ShresthaCMA

## 2018-08-16 ENCOUNTER — OFFICE VISIT (OUTPATIENT)
Dept: GASTROENTEROLOGY | Facility: CLINIC | Age: 63
End: 2018-08-16
Attending: INTERNAL MEDICINE
Payer: COMMERCIAL

## 2018-08-16 ENCOUNTER — RADIANT APPOINTMENT (OUTPATIENT)
Dept: ULTRASOUND IMAGING | Facility: CLINIC | Age: 63
End: 2018-08-16
Attending: INTERNAL MEDICINE
Payer: COMMERCIAL

## 2018-08-16 VITALS
RESPIRATION RATE: 8 BRPM | DIASTOLIC BLOOD PRESSURE: 72 MMHG | OXYGEN SATURATION: 95 % | HEART RATE: 60 BPM | BODY MASS INDEX: 34.53 KG/M2 | WEIGHT: 220 LBS | HEIGHT: 67 IN | SYSTOLIC BLOOD PRESSURE: 111 MMHG | TEMPERATURE: 97.7 F

## 2018-08-16 DIAGNOSIS — K74.60 CIRRHOSIS OF LIVER WITHOUT ASCITES, UNSPECIFIED HEPATIC CIRRHOSIS TYPE (H): ICD-10-CM

## 2018-08-16 DIAGNOSIS — K74.60 CIRRHOSIS OF LIVER WITHOUT ASCITES, UNSPECIFIED HEPATIC CIRRHOSIS TYPE (H): Primary | ICD-10-CM

## 2018-08-16 LAB
AFP SERPL-MCNC: 4.2 UG/L (ref 0–8)
ALBUMIN SERPL-MCNC: 3.4 G/DL (ref 3.4–5)
ALP SERPL-CCNC: 72 U/L (ref 40–150)
ALT SERPL W P-5'-P-CCNC: 28 U/L (ref 0–70)
ANION GAP SERPL CALCULATED.3IONS-SCNC: 8 MMOL/L (ref 3–14)
AST SERPL W P-5'-P-CCNC: 32 U/L (ref 0–45)
BILIRUB DIRECT SERPL-MCNC: 0.1 MG/DL (ref 0–0.2)
BILIRUB SERPL-MCNC: 0.4 MG/DL (ref 0.2–1.3)
BUN SERPL-MCNC: 18 MG/DL (ref 7–30)
CALCIUM SERPL-MCNC: 8.9 MG/DL (ref 8.5–10.1)
CHLORIDE SERPL-SCNC: 100 MMOL/L (ref 94–109)
CO2 SERPL-SCNC: 27 MMOL/L (ref 20–32)
CREAT SERPL-MCNC: 0.82 MG/DL (ref 0.66–1.25)
ERYTHROCYTE [DISTWIDTH] IN BLOOD BY AUTOMATED COUNT: 14.3 % (ref 10–15)
GFR SERPL CREATININE-BSD FRML MDRD: >90 ML/MIN/1.7M2
GLUCOSE SERPL-MCNC: 149 MG/DL (ref 70–99)
HCT VFR BLD AUTO: 41.4 % (ref 40–53)
HGB BLD-MCNC: 13.4 G/DL (ref 13.3–17.7)
INR PPP: 1 (ref 0.86–1.14)
MCH RBC QN AUTO: 28.5 PG (ref 26.5–33)
MCHC RBC AUTO-ENTMCNC: 32.4 G/DL (ref 31.5–36.5)
MCV RBC AUTO: 88 FL (ref 78–100)
PLATELET # BLD AUTO: 154 10E9/L (ref 150–450)
POTASSIUM SERPL-SCNC: 4.3 MMOL/L (ref 3.4–5.3)
PROT SERPL-MCNC: 7.9 G/DL (ref 6.8–8.8)
RBC # BLD AUTO: 4.71 10E12/L (ref 4.4–5.9)
SODIUM SERPL-SCNC: 135 MMOL/L (ref 133–144)
WBC # BLD AUTO: 6.2 10E9/L (ref 4–11)

## 2018-08-16 PROCEDURE — 36415 COLL VENOUS BLD VENIPUNCTURE: CPT | Performed by: INTERNAL MEDICINE

## 2018-08-16 PROCEDURE — 85027 COMPLETE CBC AUTOMATED: CPT | Performed by: INTERNAL MEDICINE

## 2018-08-16 PROCEDURE — G0463 HOSPITAL OUTPT CLINIC VISIT: HCPCS | Mod: ZF

## 2018-08-16 PROCEDURE — 85610 PROTHROMBIN TIME: CPT | Performed by: INTERNAL MEDICINE

## 2018-08-16 PROCEDURE — 80048 BASIC METABOLIC PNL TOTAL CA: CPT | Performed by: INTERNAL MEDICINE

## 2018-08-16 PROCEDURE — 82105 ALPHA-FETOPROTEIN SERUM: CPT | Performed by: INTERNAL MEDICINE

## 2018-08-16 PROCEDURE — 80076 HEPATIC FUNCTION PANEL: CPT | Performed by: INTERNAL MEDICINE

## 2018-08-16 RX ORDER — INSULIN GLARGINE 100 [IU]/ML
46 INJECTION, SOLUTION SUBCUTANEOUS AT BEDTIME
Refills: 4 | COMMUNITY
Start: 2018-08-10 | End: 2020-09-02

## 2018-08-16 RX ORDER — AMMONIUM LACTATE 12 G/100G
CREAM TOPICAL 2 TIMES DAILY PRN
Refills: 5 | COMMUNITY
Start: 2018-06-14 | End: 2022-11-21

## 2018-08-16 RX ORDER — CLOPIDOGREL BISULFATE 75 MG/1
75 TABLET ORAL DAILY
Refills: 11 | COMMUNITY
Start: 2018-08-03 | End: 2019-10-04 | Stop reason: ALTCHOICE

## 2018-08-16 ASSESSMENT — PAIN SCALES - GENERAL: PAINLEVEL: NO PAIN (0)

## 2018-08-16 NOTE — NURSING NOTE
"Chief Complaint   Patient presents with     RECHECK     Cirrhosis       /72 (BP Location: Right arm, Patient Position: Sitting, Cuff Size: Adult Large)  Pulse 60  Temp 97.7  F (36.5  C) (Oral)  Resp 8  Ht 1.702 m (5' 7\")  Wt 99.8 kg (220 lb)  SpO2 95%  BMI 34.46 kg/m2    Michell Lara [facility]  8/16/2018 9:47 AM      "

## 2018-08-16 NOTE — NURSING NOTE
Attempted to perform a fibrosis scan but unable to successfully collect readings. Dr. Cevallos updated and he plans to treat patient as if without cirrhosis due to labs, including better platelets and clinical assessment.

## 2018-08-16 NOTE — PATIENT INSTRUCTIONS
Preventive Care:    Diabetic Eye Exam Screening: During our visit today, we discussed that it is recommended you receive diabetic eye exam screening. Please call or make an appointment with your primary care provider to discuss this with them. You may also call the [facility] scheduling line ([phone]) to set up an eye exam at one of the [facility]ics.

## 2018-08-16 NOTE — MR AVS SNAPSHOT
After Visit Summary   8/16/2018    Alexandro Pool    MRN: [id number]           Patient Information     Date Of Birth          1955        Visit Information        Provider Department      8/16/2018 10:00 AM Nehemias Cevallos MD [facility] Hepatology        Today's Diagnoses     Cirrhosis of liver without ascites, unspecified hepatic cirrhosis type (H)    -  1      Care Instructions    Preventive Care:    Diabetic Eye Exam Screening: During our visit today, we discussed that it is recommended you receive diabetic eye exam screening. Please call or make an appointment with your primary care provider to discuss this with them. You may also call the [facility] scheduling line ([phone]) to set up an eye exam at one of the [facility].                Follow-ups after your visit        Follow-up notes from your care team     Return in about 6 months (around 2/16/2019).      Who to contact     If you have questions or need follow up information about today's clinic visit or your schedule please contact [facility] HEPATOLOGY directly at [phone].  Normal or non-critical lab and imaging results will be communicated to you by MyChart, letter or phone within 4 business days after the clinic has received the results. If you do not hear from us within 7 days, please contact the clinic through Register My InfoÂ®hart or phone. If you have a critical or abnormal lab result, we will notify you by phone as soon as possible.  Submit refill requests through [employer] or call your pharmacy and they will forward the refill request to us. Please allow 3 business days for your refill to be completed.          Additional Information About Your Visit        Care EveryWhere ID     This is your Care EveryWhere ID. This could be used by other organizations to access your [place] medical records  [id number]        Your Vitals Were     Pulse Temperature Respirations Height Pulse Oximetry BMI (Body Mass Index)    60 97.7  F (36.5  " C) (Oral) 8 1.702 m (5' 7\") 95% 34.46 kg/m2       Blood Pressure from Last 3 Encounters:   08/16/18 111/72   05/18/18 110/73   02/13/18 129/81    Weight from Last 3 Encounters:   08/16/18 99.8 kg (220 lb)   05/18/18 98.9 kg (218 lb)   02/13/18 98.4 kg (217 lb)              We Performed the Following     Schedule follow up appointments          Today's Medication Changes          These changes are accurate as of 8/16/18 11:59 PM.  If you have any questions, ask your nurse or doctor.               These medicines have changed or have updated prescriptions.        Dose/Directions    oxyCODONE IR 10 MG tablet   Commonly known as:  ROXICODONE   This may have changed:  how much to take   Used for:  S/P AKA (above knee amputation) (H)        Dose:  15 mg   Take 1.5 tablets (15 mg) by mouth every 4 hours as needed   Quantity:  60 tablet   Refills:  0                Primary Care Provider Office Phone # Fax #    Roger Belle [phone]       [facility] [address] [address]        Equal Access to Services     [facility]: Hadii michelle oliveira Sodebra, waaxda luqadaha, qaybta kaalmada adedaliyada, david florez. So [facility] [phone].    ATENCIÓN: Si habla español, tiene a wong disposición servicios gratuitos de asistencia lingüística. [facility] [phone].    We comply with applicable federal civil rights laws and [place] laws. We do not discriminate on the basis of race, color, national origin, age, disability, sex, sexual orientation, or gender identity.            Thank you!     Thank you for choosing [facility] HEPATOLOGY  for your care. Our goal is always to provide you with excellent care. Hearing back from our patients is one way we can continue to improve our services. Please take a few minutes to complete the written survey that you may receive in the mail after your visit with us. Thank you!             Your Updated Medication List - Protect " others around you: Learn how to safely use, store and throw away your medicines at www.disposemymeds.org.          This list is accurate as of 8/16/18 11:59 PM.  Always use your most recent med list.                   Brand Name Dispense Instructions for use Diagnosis    acetaminophen 325 MG tablet    TYLENOL    250 tablet    Take 1 tablet by mouth every 4 hours as needed.    Peripheral arterial disease (H)       amLODIPine 2.5 MG tablet    NORVASC     Take 2.5 mg by mouth 2 times daily        ammonium lactate 12 % cream    AMLACTIN     Apply topically 2 times daily as needed for dry skin apply to both legs twice daily as needed        ASPIRIN EC PO      Take 81 mg by mouth daily        atorvastatin 80 MG tablet    LIPITOR    90 tablet    Take 1 tablet (80 mg) by mouth At Bedtime    Peripheral artery disease (H)       BASAGLAR 100 UNIT/ML injection      Inject 46 Units Subcutaneous At Bedtime        clopidogrel 75 MG tablet    PLAVIX     Take 75 mg by mouth daily        gabapentin 100 MG capsule    NEURONTIN    90 capsule    Take 1 capsule (100 mg) by mouth 3 times daily    Critical lower limb ischemia       humaLOG 100 UNIT/ML injection   Generic drug:  insulin lispro      Inject Subcutaneous 3 times daily (before meals) 18 units before each meal        hydrocortisone 1 % cream    CORTAID     Apply topically 2 times daily PRN        insulin pen needle 32G X 4 MM    BD KWABENA U/F    120 each    Use 4x  daily or as directed.    Type II or unspecified type diabetes mellitus without mention of complication, not stated as uncontrolled       lisinopril 2.5 MG tablet    PRINIVIL/Zestril    180 tablet    TAKE 1 TABLET (2.5 MG) BY MOUTH 2 TIMES DAILY    Hypertension goal BP (blood pressure) < 140/90       METOPROLOL SUCCINATE ER PO      Take 75 mg by mouth daily        morphine 15 MG 12 hr tablet    MS CONTIN    60 tablet    Take 3 tablets (45 mg) by mouth 2 times daily    Critical lower limb ischemia       order for DME      1 Units    Equipment being ordered: FlexiTouch pneumatic compression device.  2-3 times per day to left lower extremity. Current strength - L4        oxyCODONE IR 10 MG tablet    ROXICODONE    60 tablet    Take 1.5 tablets (15 mg) by mouth every 4 hours as needed    S/P AKA (above knee amputation) (H)       SENNA PO      1 Tab , bedtime        spironolactone 25 MG tablet    ALDACTONE    15 tablet    Take 0.5 tablets (12.5 mg) by mouth daily    Ischemic cardiomyopathy, Hypertension goal BP (blood pressure) < 140/90       vorapaxar sulfate 2.08 MG tablet    ZONTIVITY    30 tablet    Take 1 tablet (2.08 mg) by mouth daily    Lower limb ischemia

## 2018-08-16 NOTE — LETTER
8/16/2018      RE: Alexandro Pool  [address]  [address]  [place] [address]       SUBJECTIVE:   Alexandro Pool is a 63 year old year old male here for follow up on Hepatitis C that was treated in 2014 with SVR, The patient was last seen by Dr Cevallos on  2/2018, since then he reports feeling well. He notes he has good appetite and has had good energy levels. He has stable LE swelling or increase in abdominal girth. He Denies recent fevers, chills or night sweats. No diarrhea black stools or blood in stools, he reports he gets 1 bowel movement every 1-2 days and uses senna as needed if he feels like he can't go. He also denied nausea or vomiting and denied having episodes of confusion or alteration in wake sleep cycles.  He has not had recent hospitalizations.  The patient lives in assisted living, unfortunately he continues to smoke significantly. He used to smokes 2 PPD in the past but now he reports he cut down to 1 PPD. He does not drink alcohol significantly, he reports he drinks 1 beer every few weeks. He is compliant with his medications.     Review of systems:  10 point ROS negative except as noted above.    Past Medical History:   Diagnosis Date     Acute kidney failure, unspecified      Blood transfusion      CAD (coronary artery disease)      CARDIOVASCULAR SCREENING; LDL GOAL LESS THAN 100      Cirrhosis (H)      Critical lower limb ischemia      Diabetes mellitus (H) 10/2011     Hypertension      Hypertension goal BP (blood pressure) < 140/90      Ischemic cardiomyopathy      Lymphedema of left leg 5/11/2016     Peripheral arterial disease (H)      PVD (peripheral vascular disease) (H)      Right above knee amputation 4/30/2014     Type 2 diabetes, HbA1C goal < 8% (H)           Current Outpatient Prescriptions on File Prior to Visit:  acetaminophen (TYLENOL) 325 MG tablet Take 1 tablet by mouth every 4 hours as needed.   amLODIPine (NORVASC) 2.5 MG tablet Take 2.5 mg by mouth 2 times daily  "  ASPIRIN EC PO Take 81 mg by mouth daily   atorvastatin (LIPITOR) 80 MG tablet Take 1 tablet (80 mg) by mouth At Bedtime   gabapentin (NEURONTIN) 100 MG capsule Take 1 capsule (100 mg) by mouth 3 times daily   hydrocortisone 1 % cream Apply topically 2 times daily PRN   insulin lispro (HUMALOG VIAL) SOLN 100 UNIT/ML Inject Subcutaneous 3 times daily (before meals) 18 units before each meal   insulin pen needle (BD KWABENA U/F) 32G X 4 MM Use 4x  daily or as directed.   lisinopril (PRINIVIL/ZESTRIL) 2.5 MG tablet TAKE 1 TABLET (2.5 MG) BY MOUTH 2 TIMES DAILY   METOPROLOL SUCCINATE ER PO Take 75 mg by mouth daily   morphine (MS CONTIN) 15 MG 12 hr tablet Take 3 tablets (45 mg) by mouth 2 times daily   order for DME Equipment being ordered: FlexiTouch pneumatic compression device.  2-3 times per day to left lower extremity.Current strength - L4   oxyCODONE (ROXICODONE) 10 MG immediate release tablet Take 1.5 tablets (15 mg) by mouth every 4 hours as needed (Patient taking differently: Take 20 mg by mouth every 4 hours as needed )   SENNA PO 1 Tab , bedtime   spironolactone (ALDACTONE) 25 MG tablet Take 0.5 tablets (12.5 mg) by mouth daily   Vorapaxar Sulfate (ZONTIVITY) 2.08 MG tablet Take 1 tablet (2.08 mg) by mouth daily     No current facility-administered medications on file prior to visit.      OBJECTIVE:  Physical exam:  /72 (BP Location: Right arm, Patient Position: Sitting, Cuff Size: Adult Large)  Pulse 60  Temp 97.7  F (36.5  C) (Oral)  Resp 8  Ht 1.702 m (5' 7\")  Wt 99.8 kg (220 lb)  SpO2 95%  BMI 34.46 kg/m2  Body mass index is 34.46 kg/(m^2).   Constitutional: Awake and alert,.   Eyes: Sclera anicteric .   Ears/nose/mouth/throat: Normal oropharynx without ulcers or exudate, mucus membranes moist, no Fetor hepaticus.   Neck: No JVD.   Cardiovascular: RRR, Normal S1, S2, no added sounds or murmurs. No rubs or gallops.   Respiratory: Good air entry bilaterally, no wheezing or crackles.   Abdomen:  " Nondistended, no tenderness to palpation. Bowel sounds are normactive.  Skin: warm, perfused, no jaundice  Psych: Normal affect  MSK: status post right BKA.     ASSESSMENT and PLAN:     Hepatitis C, status post SVR: The patient was treated in 2014 with SVR.  continues to do well, his LFTs today are all within normal limits, so are his platelets and INR. It seems less likely that he has liver cirrhosis. We attempted a fibrosis scan today but that was difficult in light of his body habitus, however in the setting of his low likelihood of having cirrhosis will hold on further EGDs for variceal screenings.      Related complications:  * Kidney function: creatinine is 0.8.   * varcies\Hypertensive gastropathy: EGD on 3/2016, showed no abnormalities .   * Ascites\fluid retention: He is spironolactone 25 mg daily. His US today showed no ascites. Will continue the current dos  * HCC screening:US today shoed no new masses.    * HE: no history of HE, he is awake and alert, not on lactulose or rifaximin   * Vaccines:  finished vaccines in 2012 for HBV.      Smoking: unfortunately continues to smoke but he is down to 1 PPD, he is no interested in quitting.     Return to clinic in 6 months.     Patient seen and discussed with Dr. cevallos who agrees with this plan.    Brent Francis  Internal medicine, PGY3    The patient was seen and examined.  The above assessment and plan was developed jointly with the resident.        Nehemias Cevallos MD      Professor of Medicine  [employer]      Executive Medical Director, Solid Organ Transplant Program  [facility]

## 2018-08-16 NOTE — PROGRESS NOTES
SUBJECTIVE:   Alexandro Pool is a 63 year old year old male here for follow up on Hepatitis C that was treated in 2014 with SVR, The patient was last seen by Dr Cevallos on  2/2018, since then he reports feeling well. He notes he has good appetite and has had good energy levels. He has stable LE swelling or increase in abdominal girth. He Denies recent fevers, chills or night sweats. No diarrhea black stools or blood in stools, he reports he gets 1 bowel movement every 1-2 days and uses senna as needed if he feels like he can't go. He also denied nausea or vomiting and denied having episodes of confusion or alteration in wake sleep cycles.  He has not had recent hospitalizations.  The patient lives in assisted living, unfortunately he continues to smoke significantly. He used to smokes 2 PPD in the past but now he reports he cut down to 1 PPD. He does not drink alcohol significantly, he reports he drinks 1 beer every few weeks. He is compliant with his medications.     Review of systems:  10 point ROS negative except as noted above.    Past Medical History:   Diagnosis Date     Acute kidney failure, unspecified      Blood transfusion      CAD (coronary artery disease)      CARDIOVASCULAR SCREENING; LDL GOAL LESS THAN 100      Cirrhosis (H)      Critical lower limb ischemia      Diabetes mellitus (H) 10/2011     Hypertension      Hypertension goal BP (blood pressure) < 140/90      Ischemic cardiomyopathy      Lymphedema of left leg 5/11/2016     Peripheral arterial disease (H)      PVD (peripheral vascular disease) (H)      Right above knee amputation 4/30/2014     Type 2 diabetes, HbA1C goal < 8% (H)           Current Outpatient Prescriptions on File Prior to Visit:  acetaminophen (TYLENOL) 325 MG tablet Take 1 tablet by mouth every 4 hours as needed.   amLODIPine (NORVASC) 2.5 MG tablet Take 2.5 mg by mouth 2 times daily   ASPIRIN EC PO Take 81 mg by mouth daily   atorvastatin (LIPITOR) 80 MG tablet Take 1 tablet  "(80 mg) by mouth At Bedtime   gabapentin (NEURONTIN) 100 MG capsule Take 1 capsule (100 mg) by mouth 3 times daily   hydrocortisone 1 % cream Apply topically 2 times daily PRN   insulin lispro (HUMALOG VIAL) SOLN 100 UNIT/ML Inject Subcutaneous 3 times daily (before meals) 18 units before each meal   insulin pen needle (BD KWABENA U/F) 32G X 4 MM Use 4x  daily or as directed.   lisinopril (PRINIVIL/ZESTRIL) 2.5 MG tablet TAKE 1 TABLET (2.5 MG) BY MOUTH 2 TIMES DAILY   METOPROLOL SUCCINATE ER PO Take 75 mg by mouth daily   morphine (MS CONTIN) 15 MG 12 hr tablet Take 3 tablets (45 mg) by mouth 2 times daily   order for DME Equipment being ordered: FlexiTouch pneumatic compression device.  2-3 times per day to left lower extremity.Current strength - L4   oxyCODONE (ROXICODONE) 10 MG immediate release tablet Take 1.5 tablets (15 mg) by mouth every 4 hours as needed (Patient taking differently: Take 20 mg by mouth every 4 hours as needed )   SENNA PO 1 Tab , bedtime   spironolactone (ALDACTONE) 25 MG tablet Take 0.5 tablets (12.5 mg) by mouth daily   Vorapaxar Sulfate (ZONTIVITY) 2.08 MG tablet Take 1 tablet (2.08 mg) by mouth daily     No current facility-administered medications on file prior to visit.      OBJECTIVE:  Physical exam:  /72 (BP Location: Right arm, Patient Position: Sitting, Cuff Size: Adult Large)  Pulse 60  Temp 97.7  F (36.5  C) (Oral)  Resp 8  Ht 1.702 m (5' 7\")  Wt 99.8 kg (220 lb)  SpO2 95%  BMI 34.46 kg/m2  Body mass index is 34.46 kg/(m^2).   Constitutional: Awake and alert,.   Eyes: Sclera anicteric .   Ears/nose/mouth/throat: Normal oropharynx without ulcers or exudate, mucus membranes moist, no Fetor hepaticus.   Neck: No JVD.   Cardiovascular: RRR, Normal S1, S2, no added sounds or murmurs. No rubs or gallops.   Respiratory: Good air entry bilaterally, no wheezing or crackles.   Abdomen:  Nondistended, no tenderness to palpation. Bowel sounds are normactive.  Skin: warm, perfused, " no jaundice  Psych: Normal affect  MSK: status post right BKA.     ASSESSMENT and PLAN:     Hepatitis C, status post SVR: The patient was treated in 2014 with SVR.  continues to do well, his LFTs today are all within normal limits, so are his platelets and INR. It seems less likely that he has liver cirrhosis. We attempted a fibrosis scan today but that was difficult in light of his body habitus, however in the setting of his low likelihood of having cirrhosis will hold on further EGDs for variceal screenings.      Related complications:  * Kidney function: creatinine is 0.8.   * varcies\Hypertensive gastropathy: EGD on 3/2016, showed no abnormalities .   * Ascites\fluid retention: He is spironolactone 25 mg daily. His US today showed no ascites. Will continue the current dos  * HCC screening:US today shoed no new masses.    * HE: no history of HE, he is awake and alert, not on lactulose or rifaximin   * Vaccines:  finished vaccines in 2012 for HBV.      Smoking: unfortunately continues to smoke but he is down to 1 PPD, he is no interested in quitting.     Return to clinic in 6 months.     Patient seen and discussed with Dr. cevallos who agrees with this plan.    Brent Francis  Internal medicine, PGY3    The patient was seen and examined.  The above assessment and plan was developed jointly with the resident.        Nehemias Cevallos MD      Professor of Medicine  [employer]      Executive Medical Director, Solid Organ Transplant Program  [facility]

## 2018-08-16 NOTE — LETTER
8/16/2018       RE: Alexandro Pool  [address]  [address]  [place] [address]     Dear Colleague,    Thank you for referring your patient, Alexandro Pool, to the [facility] HEPATOLOGY at [facility]. Please see a copy of my visit note below.    SUBJECTIVE:   Alexandro Pool is a 63 year old year old male here for follow up on Hepatitis C that was treated in 2014 with SVR, The patient was last seen by Dr Cevallos on  2/2018, since then he reports feeling well. He notes he has good appetite and has had good energy levels. He has stable LE swelling or increase in abdominal girth. He Denies recent fevers, chills or night sweats. No diarrhea black stools or blood in stools, he reports he gets 1 bowel movement every 1-2 days and uses senna as needed if he feels like he can't go. He also denied nausea or vomiting and denied having episodes of confusion or alteration in wake sleep cycles.  He has not had recent hospitalizations.  The patient lives in assisted living, unfortunately he continues to smoke significantly. He used to smokes 2 PPD in the past but now he reports he cut down to 1 PPD. He does not drink alcohol significantly, he reports he drinks 1 beer every few weeks. He is compliant with his medications.     Review of systems:  10 point ROS negative except as noted above.    Past Medical History:   Diagnosis Date     Acute kidney failure, unspecified      Blood transfusion      CAD (coronary artery disease)      CARDIOVASCULAR SCREENING; LDL GOAL LESS THAN 100      Cirrhosis (H)      Critical lower limb ischemia      Diabetes mellitus (H) 10/2011     Hypertension      Hypertension goal BP (blood pressure) < 140/90      Ischemic cardiomyopathy      Lymphedema of left leg 5/11/2016     Peripheral arterial disease (H)      PVD (peripheral vascular disease) (H)      Right above knee amputation 4/30/2014     Type 2 diabetes, HbA1C goal < 8% (H)           Current  "Outpatient Prescriptions on File Prior to Visit:  acetaminophen (TYLENOL) 325 MG tablet Take 1 tablet by mouth every 4 hours as needed.   amLODIPine (NORVASC) 2.5 MG tablet Take 2.5 mg by mouth 2 times daily   ASPIRIN EC PO Take 81 mg by mouth daily   atorvastatin (LIPITOR) 80 MG tablet Take 1 tablet (80 mg) by mouth At Bedtime   gabapentin (NEURONTIN) 100 MG capsule Take 1 capsule (100 mg) by mouth 3 times daily   hydrocortisone 1 % cream Apply topically 2 times daily PRN   insulin lispro (HUMALOG VIAL) SOLN 100 UNIT/ML Inject Subcutaneous 3 times daily (before meals) 18 units before each meal   insulin pen needle (BD KWABENA U/F) 32G X 4 MM Use 4x  daily or as directed.   lisinopril (PRINIVIL/ZESTRIL) 2.5 MG tablet TAKE 1 TABLET (2.5 MG) BY MOUTH 2 TIMES DAILY   METOPROLOL SUCCINATE ER PO Take 75 mg by mouth daily   morphine (MS CONTIN) 15 MG 12 hr tablet Take 3 tablets (45 mg) by mouth 2 times daily   order for DME Equipment being ordered: FlexiTouch pneumatic compression device.  2-3 times per day to left lower extremity.Current strength - L4   oxyCODONE (ROXICODONE) 10 MG immediate release tablet Take 1.5 tablets (15 mg) by mouth every 4 hours as needed (Patient taking differently: Take 20 mg by mouth every 4 hours as needed )   SENNA PO 1 Tab , bedtime   spironolactone (ALDACTONE) 25 MG tablet Take 0.5 tablets (12.5 mg) by mouth daily   Vorapaxar Sulfate (ZONTIVITY) 2.08 MG tablet Take 1 tablet (2.08 mg) by mouth daily     No current facility-administered medications on file prior to visit.      OBJECTIVE:  Physical exam:  /72 (BP Location: Right arm, Patient Position: Sitting, Cuff Size: Adult Large)  Pulse 60  Temp 97.7  F (36.5  C) (Oral)  Resp 8  Ht 1.702 m (5' 7\")  Wt 99.8 kg (220 lb)  SpO2 95%  BMI 34.46 kg/m2  Body mass index is 34.46 kg/(m^2).   Constitutional: Awake and alert,.   Eyes: Sclera anicteric .   Ears/nose/mouth/throat: Normal oropharynx without ulcers or exudate, mucus membranes " moist, no Fetor hepaticus.   Neck: No JVD.   Cardiovascular: RRR, Normal S1, S2, no added sounds or murmurs. No rubs or gallops.   Respiratory: Good air entry bilaterally, no wheezing or crackles.   Abdomen:  Nondistended, no tenderness to palpation. Bowel sounds are normactive.  Skin: warm, perfused, no jaundice  Psych: Normal affect  MSK: status post right BKA.     ASSESSMENT and PLAN:     Hepatitis C, status post SVR: The patient was treated in 2014 with SVR.  continues to do well, his LFTs today are all within normal limits, so are his platelets and INR. It seems less likely that he has liver cirrhosis. We attempted a fibrosis scan today but that was difficult in light of his body habitus, however in the setting of his low likelihood of having cirrhosis will hold on further EGDs for variceal screenings.      Related complications:  * Kidney function: creatinine is 0.8.   * varcies\Hypertensive gastropathy: EGD on 3/2016, showed no abnormalities .   * Ascites\fluid retention: He is spironolactone 25 mg daily. His US today showed no ascites. Will continue the current dos  * HCC screening:US today shoed no new masses.    * HE: no history of HE, he is awake and alert, not on lactulose or rifaximin   * Vaccines:  finished vaccines in 2012 for HBV.      Smoking: unfortunately continues to smoke but he is down to 1 PPD, he is no interested in quitting.     Return to clinic in 6 months.     Patient seen and discussed with Dr. cevallos who agrees with this plan.    [facility]  Internal medicine, PGY3    The patient was seen and examined.  The above assessment and plan was developed jointly with the resident.        Nehemias Cevallos MD      Professor of Medicine  [employer]      Executive Medical Director, Solid Organ Transplant Program  [facility]

## 2018-11-21 DIAGNOSIS — I10 HYPERTENSION GOAL BP (BLOOD PRESSURE) < 140/90: ICD-10-CM

## 2018-11-26 RX ORDER — LISINOPRIL 2.5 MG/1
TABLET ORAL
Qty: 180 TABLET | Refills: 3 | OUTPATIENT
Start: 2018-11-26

## 2019-08-02 DIAGNOSIS — I73.9 PERIPHERAL ARTERY DISEASE (H): ICD-10-CM

## 2019-08-06 RX ORDER — ATORVASTATIN CALCIUM 80 MG/1
80 TABLET, FILM COATED ORAL AT BEDTIME
Qty: 90 TABLET | Refills: 0 | Status: SHIPPED | OUTPATIENT
Start: 2019-08-06 | End: 2019-11-08

## 2019-08-21 DIAGNOSIS — I73.9 PAD (PERIPHERAL ARTERY DISEASE) (H): Primary | ICD-10-CM

## 2019-08-26 ENCOUNTER — ANCILLARY PROCEDURE (OUTPATIENT)
Dept: ULTRASOUND IMAGING | Facility: CLINIC | Age: 64
End: 2019-08-26
Attending: INTERNAL MEDICINE
Payer: COMMERCIAL

## 2019-08-26 DIAGNOSIS — I73.9 PAD (PERIPHERAL ARTERY DISEASE) (H): ICD-10-CM

## 2019-08-29 ENCOUNTER — CARE COORDINATION (OUTPATIENT)
Dept: CARDIOLOGY | Facility: CLINIC | Age: 64
End: 2019-08-29

## 2019-08-29 DIAGNOSIS — I73.9 PAD (PERIPHERAL ARTERY DISEASE) (H): Primary | ICD-10-CM

## 2019-08-29 DIAGNOSIS — E78.5 HYPERLIPIDEMIA LDL GOAL <70: ICD-10-CM

## 2019-09-05 ENCOUNTER — CARE COORDINATION (OUTPATIENT)
Dept: CARDIOLOGY | Facility: CLINIC | Age: 64
End: 2019-09-05

## 2019-09-05 NOTE — PROGRESS NOTES
Patient is currently scheduled for return visit with Dr. Olson on 12/6.  Per Dr. Olson, he would like to see patient sooner on 10/4 at 1:00 with labs prior.  Called patient, he confirmed he is able to come on 10/4.  Message sent to schedulers to reschedule appts.

## 2019-10-04 ENCOUNTER — OFFICE VISIT (OUTPATIENT)
Dept: CARDIOLOGY | Facility: CLINIC | Age: 64
End: 2019-10-04
Attending: INTERNAL MEDICINE
Payer: MEDICARE

## 2019-10-04 VITALS
WEIGHT: 242 LBS | DIASTOLIC BLOOD PRESSURE: 68 MMHG | SYSTOLIC BLOOD PRESSURE: 111 MMHG | HEIGHT: 67 IN | OXYGEN SATURATION: 96 % | HEART RATE: 64 BPM | BODY MASS INDEX: 37.98 KG/M2

## 2019-10-04 DIAGNOSIS — E11.8 TYPE 2 DIABETES MELLITUS WITH COMPLICATION, WITH LONG-TERM CURRENT USE OF INSULIN (H): ICD-10-CM

## 2019-10-04 DIAGNOSIS — I10 HYPERTENSION GOAL BP (BLOOD PRESSURE) < 140/90: ICD-10-CM

## 2019-10-04 DIAGNOSIS — Z79.4 TYPE 2 DIABETES MELLITUS WITH COMPLICATION, WITH LONG-TERM CURRENT USE OF INSULIN (H): ICD-10-CM

## 2019-10-04 DIAGNOSIS — I73.9 PAD (PERIPHERAL ARTERY DISEASE) (H): ICD-10-CM

## 2019-10-04 DIAGNOSIS — F17.200 TOBACCO USE DISORDER: ICD-10-CM

## 2019-10-04 DIAGNOSIS — I73.9 PAD (PERIPHERAL ARTERY DISEASE) (H): Primary | ICD-10-CM

## 2019-10-04 DIAGNOSIS — E78.5 HYPERLIPIDEMIA LDL GOAL <70: ICD-10-CM

## 2019-10-04 LAB
CHOLEST SERPL-MCNC: 108 MG/DL
HBA1C MFR BLD: 8.1 % (ref 0–5.6)
HDLC SERPL-MCNC: 26 MG/DL
LDLC SERPL CALC-MCNC: 42 MG/DL
NONHDLC SERPL-MCNC: 82 MG/DL
TRIGL SERPL-MCNC: 197 MG/DL

## 2019-10-04 PROCEDURE — 99215 OFFICE O/P EST HI 40 MIN: CPT | Mod: ZP | Performed by: INTERNAL MEDICINE

## 2019-10-04 PROCEDURE — 99406 BEHAV CHNG SMOKING 3-10 MIN: CPT | Mod: ZP | Performed by: INTERNAL MEDICINE

## 2019-10-04 PROCEDURE — G0463 HOSPITAL OUTPT CLINIC VISIT: HCPCS | Mod: ZF

## 2019-10-04 ASSESSMENT — MIFFLIN-ST. JEOR: SCORE: 1841.33

## 2019-10-04 ASSESSMENT — PAIN SCALES - GENERAL: PAINLEVEL: NO PAIN (0)

## 2019-10-04 NOTE — LETTER
10/4/2019      RE: Alexandro Pool  [address]  [address]  [place] [address]       Dear Colleague,    Thank you for the opportunity to participate in the care of your patient, Alexandro Pool, at the [facility] HEART [facility] at [facility]. Please see a copy of my visit note below.    Alexandro Pool is a 64 year old male who is presenting at the current time to discuss his diagnosi(es) of        PAD (peripheral artery disease) (H)  Tobacco use disorder  Hyperlipidemia LDL goal <70  Hypertension goal BP (blood pressure) < 140/90  Type 2 diabetes mellitus with complication, with long-term current use of insulin (H)  Ischemic cardiomyopathy .      Review Of Systems  Skin: negative  Eyes: negative  Ears/Nose/Throat: negative  Respiratory: No shortness of breath, dyspnea on exertion, cough, or hemoptysis  Cardiovascular: negative  Gastrointestinal: negative  Genitourinary: negative  Musculoskeletal: negative  Neurologic: negative  Psychiatric: negative  Hematologic/Lymphatic/Immunologic: negative  Endocrine: negative     PAST MEDICAL HISTORY:                  Past Medical History:   Diagnosis Date     Acute kidney failure, unspecified      Blood transfusion      CAD (coronary artery disease)      CARDIOVASCULAR SCREENING; LDL GOAL LESS THAN 100      Cirrhosis (H)      Critical lower limb ischemia      Diabetes mellitus (H) 10/2011     Hypertension      Hypertension goal BP (blood pressure) < 140/90      Ischemic cardiomyopathy      Lymphedema of left leg 5/11/2016     Peripheral arterial disease (H)      PVD (peripheral vascular disease) (H)      Right above knee amputation 4/30/2014     Type 2 diabetes, HbA1C goal < 8% (H)        PAST SURGICAL HISTORY:                  Past Surgical History:   Procedure Laterality Date     AMPUTATE LEG ABOVE KNEE  11/22/2011    Procedure:AMPUTATE LEG ABOVE KNEE; Revise Right Below Knee Amputation To Above Knee Amputation; Surgeon:PRISCILLA  MADISON AGUILAR; Location:[facility] OR     AMPUTATE LEG BELOW KNEE  11/10/2011    Procedure:AMPUTATE LEG BELOW KNEE; Right Below Knee Amputation; Surgeon:MADISON WELLS; Location:[facility] OR     BYPASS GRAFT FEMOROTIBIAL  9/19/2013    Procedure: BYPASS GRAFT FEMOROTIBIAL;  Left Femorotibial Bypass Graft Insitu ;  Surgeon: Marisol Suggs MD;  Location: [facility] OR     CARDIAC SURGERY      cardiac stent     ESOPHAGOSCOPY, GASTROSCOPY, DUODENOSCOPY (EGD), COMBINED  10/1/2012    Procedure: COMBINED ESOPHAGOSCOPY, GASTROSCOPY, DUODENOSCOPY (EGD);;  Surgeon: Nehemias Cevallos MD;  Location: [facility]     ESOPHAGOSCOPY, GASTROSCOPY, DUODENOSCOPY (EGD), COMBINED N/A 3/24/2016    Procedure: COMBINED ESOPHAGOSCOPY, GASTROSCOPY, DUODENOSCOPY (EGD);  Surgeon: Gildardo Marks MD;  [facility]     IR STENT VASCULAR LT  3/9/2012          IRRIGATION AND DEBRIDEMENT LOWER EXTREMITY, COMBINED  11/15/2011    Procedure:COMBINED IRRIGATION AND DEBRIDEMENT LOWER EXTREMITY; DRAINAGE OF ABSCESS AT BELOW KNEE AMPUTATION AND KNEE DISARTICULATION.; Surgeon:MADISON WELLS; Location:[facility] OR     NO HISTORY OF SURGERY  7/12/13    derm     VASCULAR REPAIR LOWER EXTREMITY Left 9/19/2017    Procedure: VASCULAR REPAIR LOWER EXTREMITY;  Ligation Parasitic Branch To Left Lower Extremity ;  Surgeon: Marisol Suggs MD;  Location: [facility] OR       CURRENT MEDICATIONS:                  Current Outpatient Medications   Medication Sig Dispense Refill     acetaminophen (TYLENOL) 325 MG tablet Take 1 tablet by mouth every 4 hours as needed. 250 tablet 0     amLODIPine (NORVASC) 2.5 MG tablet Take 2.5 mg by mouth 2 times daily       ammonium lactate (AMLACTIN) 12 % cream Apply topically 2 times daily as needed for dry skin apply to both legs twice daily as needed  5     ASPIRIN EC PO Take 81 mg by mouth daily       atorvastatin (LIPITOR) 80 MG tablet Take 1 tablet (80 mg) by mouth At Bedtime 90 tablet 0     BASAGLAR 100 UNIT/ML injection Inject 46 Units Subcutaneous At Bedtime  4      clopidogrel (PLAVIX) 75 MG tablet Take 75 mg by mouth daily  11     gabapentin (NEURONTIN) 100 MG capsule Take 1 capsule (100 mg) by mouth 3 times daily 90 capsule 0     hydrocortisone 1 % cream Apply topically 2 times daily PRN       insulin lispro (HUMALOG VIAL) SOLN 100 UNIT/ML Inject Subcutaneous 3 times daily (before meals) 18 units before each meal       insulin pen needle (BD KWABENA U/F) 32G X 4 MM Use 4x  daily or as directed. 120 each 11     lisinopril (PRINIVIL/ZESTRIL) 2.5 MG tablet TAKE 1 TABLET (2.5 MG) BY MOUTH 2 TIMES DAILY 180 tablet 3     METOPROLOL SUCCINATE ER PO Take 75 mg by mouth daily       morphine (MS CONTIN) 15 MG 12 hr tablet Take 3 tablets (45 mg) by mouth 2 times daily 60 tablet 0     order for DME Equipment being ordered: [employer] pneumatic compression device.  2-3 times per day to left lower extremity.  Current strength - L4 1 Units      oxyCODONE (ROXICODONE) 10 MG immediate release tablet Take 1.5 tablets (15 mg) by mouth every 4 hours as needed (Patient taking differently: Take 20 mg by mouth every 4 hours as needed ) 60 tablet 0     SENNA PO 1 Tab , bedtime       spironolactone (ALDACTONE) 25 MG tablet Take 0.5 tablets (12.5 mg) by mouth daily 15 tablet 0     Vorapaxar Sulfate (ZONTIVITY) 2.08 MG tablet Take 1 tablet (2.08 mg) by mouth daily 30 tablet 6       ALLERGIES:                  Allergies   Allergen Reactions     Ciprofloxacin Rash       SOCIAL HISTORY:                  Social History     Socioeconomic History     Marital status:      Spouse name: Not on file     Number of children: 0     Years of education: Not on file     Highest education level: Not on file   Occupational History     Employer: UNKNOWN   Social Needs     Financial resource strain: Not on file     Food insecurity:     Worry: Not on file     Inability: Not on file     Transportation needs:     Medical: Not on file     Non-medical: Not on file   Tobacco Use     Smoking status: Current Every Day  Smoker     Packs/day: 1.00     Years: 40.00     Pack years: 40.00     Types: Cigarettes     Smokeless tobacco: Never Used     Tobacco comment: working on quitting   Substance and Sexual Activity     Alcohol use: Yes     Alcohol/week: 1.0 standard drinks     Types: 1 Standard drinks or equivalent per week     Comment: Once a month or less     Drug use: No     Sexual activity: Not Currently   Lifestyle     Physical activity:     Days per week: Not on file     Minutes per session: Not on file     Stress: Not on file   Relationships     Social connections:     Talks on phone: Not on file     Gets together: Not on file     Attends Rastafarian service: Not on file     Active member of club or organization: Not on file     Attends meetings of clubs or organizations: Not on file     Relationship status: Not on file     Intimate partner violence:     Fear of current or ex partner: Not on file     Emotionally abused: Not on file     Physically abused: Not on file     Forced sexual activity: Not on file   Other Topics Concern     Parent/sibling w/ CABG, MI or angioplasty before 65F 55M? No      Service Not Asked     Blood Transfusions Not Asked     Caffeine Concern Yes     Occupational Exposure Not Asked     Hobby Hazards Not Asked     Sleep Concern Not Asked     Stress Concern Not Asked     Weight Concern Not Asked     Special Diet Not Asked     Back Care Not Asked     Exercise No     Bike Helmet Not Asked     Seat Belt Not Asked     Self-Exams Not Asked   Social History Narrative     Not on file       FAMILY HISTORY:                   Family History   Problem Relation Age of Onset     Alcohol/Drug Mother         cirrhosis     Alcohol/Drug Father      C.A.D. No family hx of      Diabetes No family hx of      Cancer No family hx of          Physical exam Reveals:    O/P: WNL  HEENT: WNL  NECK: No JVD, thyromegaly, or lymphadenopathy  HEART: RRR, no murmurs, gallops, or rubs  LUNGS: CTA bilaterally without rales, wheezes,  or rhonchi  GI: NABS, nondistended, nontender, soft  EXT:without cyanosis, clubbing, or edema  NEURO: nonfocal  : no flank tenderness    Component      Latest Ref Rng & Units 10/4/2019   Cholesterol      <200 mg/dL 108   Triglycerides      <150 mg/dL 197 (H)   HDL Cholesterol      >39 mg/dL 26 (L)   LDL Cholesterol Calculated      <100 mg/dL 42   Non HDL Cholesterol      <130 mg/dL 82   Hemoglobin A1C      0 - 5.6 % 8.1 (H)     LEFT RESTING OZZIE AND VPR's 8/26/2019     CLINICAL HISTORY: Right above-knee amputation. Left femoral anterior  tibial bypass graft. Reevaluate.     COMPARISONS: 10/26/2017     REFERRING PROVIDER: VIKY URIARTE ANDREW     TECHNIQUE: Resting ABIs obtained. Left VPR and 1st digit PPGs  obtained.     FINDINGS:  RIGHT:       Brachial: 132 mmHg     LEFT:       Brachial: 151 mmHg       Ankle (PT): 128 mmHg       Ankle (DP): 132 mmHg          OZZIE: 0.87, previously 1.01         VPR:            High thigh: Normal            Low thigh: Normal            Calf: Normal            Ankle: Abnormal           1st digit PPG: Normal                                                                       IMPRESSION:  1. Abnormal left resting OZZIE, 0.87, previously 1.01.  2. Abnormal VPR at the ankle.     BETTE JAY MD    LEFT LOWER EXTREMITY DUPLEX ARTERIAL ULTRASOUND 8/26/2019     CLINICAL HISTORY: Left superficial femoral to anterior tibial bypass  graft reevaluation.     COMPARISON: 5/23/2018     REFERRING PROVIDER: VIKY URIARTE ANDREW     TECHNIQUE: Grayscale, color Doppler, Doppler waveform ultrasound  evaluation of the left leg arteries and bypass graft.     FINDINGS: LEFT:  EXTERNAL ILIAC ARTERY: 140/20 cm/s, monophasic, brisk up and  downstroke with prolonged flat diastolic flow.     COMMON FEMORAL ARTERY: 149/17 cm/s, monophasic, brisk up and  downstroke with prolonged flat diastolic flow.     BYPASS GRAFT, proximal anastomosis: 147/20 cm/s, monophasic, brisk up  and downstroke with  prolonged flat diastolic flow.  BYPASS GRAFT, proximal thigh: 44/0 cm/s, biphasic  BYPASS GRAFT, mid thigh: 77/10 cm/s, monophasic, brisk up and  downstroke with prolonged flat diastolic flow.  BYPASS GRAFT, distal thigh: 52/9 cm/s, triphasic  BYPASS GRAFT, knee: 48/10 cm/s, monophasic, brisk up and downstroke  with prolonged flat diastolic flow.  BYPASS GRAFT, proximal calf: 55/10 cm/s, monophasic, brisk up and  downstroke with prolonged flat diastolic flow.  BYPASS GRAFT, mid calf: 66/11 cm/s, monophasic, brisk up and  downstroke with prolonged flat diastolic flow.  BYPASS GRAFT, distal anastomosis: 55/10 cm/s, monophasic, brisk up and  downstroke with prolonged flat diastolic flow.     ANTERIOR TIBIAL ARTERY, above anastomosis: 72/5 cm/s, RETROGRADE,  monophasic, brisk up and downstroke with prolonged flat diastolic  flow.     ANTERIOR TIBIAL ARTERY, below anastomosis: 87/16 cm/s, monophasic,  brisk up and downstroke with prolonged flat diastolic flow.     ANTERIOR TIBIAL ARTERY, ankle: 93/13 cm/s, monophasic, brisk up and  downstroke with prolonged flat diastolic flow.     POSTERIOR TIBIAL ARTERY, ankle: 18/7 cm/s, monophasic                                                                      IMPRESSION: No stenosis demonstrated through the left femoral to  anterior tibial bypass graft. Change in waveforms from previously  triphasic to monophasic with brisk up and downstroke and prolonged  flat diastolic flow may represent distal vasodilation or hyperemia.        BETTE JAY MD    A/P:    (I73.9) PAD S/P Lt SFA stenting 3-9-2012, Lt fem-SENIA bypass w/ ISGSV 9-, S/P Rt AKA 2014, ligation of parasitic branch 9/19/2017  (primary encounter diagnosis)  Comment: Nonambulatory, no rest pain, no ulcerations. Graft patent on duplex. Diminution in wave form noted.  Refuses to quit smoking. Stop Plavix, vorapaxar (he had mid LAD DESing 2012, has SFA stent has been clinically stable). Start Xarelto 2.5 mg PO BID.  Check repeat imaging in six months.   Plan: empagliflozin (JARDIANCE) 25 MG TABS tablet,         rivaroxaban ANTICOAGULANT (XARELTO         ANTICOAGULANT) 2.5 MG TABS tablet, LipoFit by         NMR, US OZZIE Doppler No Exercise, US Lower         Extremity Arterial Duplex Left, Lipoprotein         (a), CRP cardiac risk, HC SMOKING CESSATION         COUNSELING, ASYMPTOMATIC, 3-10 MIN            (F17.200) Tobacco use disorder  Comment: refuses to quit. He has been told he will lose his leg.  Plan: HC SMOKING CESSATION COUNSELING, ASYMPTOMATIC,         3-10 MIN            (E78.5) Hyperlipidemia LDL goal <70  Comment: at goal, check labs in three months  Plan: LipoFit by NMR, Lipoprotein (a), CRP cardiac         risk           (I10) Hypertension goal BP (blood pressure) < 140/90  Comment: at goal  Plan:no med changes    (E11.8,  Z79.4) Type 2 diabetes mellitus with complication, with long-term current use of insulin (H)  Comment: not at goal. Renal function in tact. Add the below, Check labs in three months.  Plan: empagliflozin (JARDIANCE) 25 MG TABS tablet,         Hemoglobin A1c              Greater than one half the forty minutes total spent on the pt's visit were spent providing education and counselling to the patient regarding the above matters. Extended time seocndary to patient complexity.      Please do not hesitate to contact me if you have any questions/concerns.     Sincerely,     Giovani Olson MD

## 2019-10-04 NOTE — NURSING NOTE
Vascular Testing: Patient given instructions regarding arterial ultrasound and OZZIE Discussed purpose, preparation, procedure and when to expect results reported back to the patient. Patient demonstrated understanding of this information and agreed to call with further questions or concerns.  Labs: Patient was given results of the laboratory testing obtained today. Patient was instructed to return for the next laboratory testing in three months   . Patient demonstrated understanding of this information and agreed to call with further questions or concerns.   Med Reconcile: Reviewed and verified all current medications with the patient. The updated medication list was printed and given to the patient.  New Medication: Patient was educated regarding newly prescribed medication, including discussion of  the indication, administration, side effects, and when to report to MD or RN. Patient demonstrated understanding of this information and agreed to call with further questions or concerns.  Return Appointment: Patient given instructions regarding scheduling next clinic visit. Patient demonstrated understanding of this information and agreed to call with further questions or concerns.  Smoking Cessation: Patient instructed regarding options for smoking cessation and given written information regarding smoking cessation assistance programs. Patient demonstrated understanding of this information and agreed to call with further questions or concerns.  Medication Change: Patient was educated regarding prescribed medication change, including discussion of the indication, administration, side effects, and when to report to MD or RN. Patient demonstrated understanding of this information and agreed to call with further questions or concerns.  Patient stated he understood all health information given and agreed to call with further questions or concerns.

## 2019-10-04 NOTE — NURSING NOTE
Vitals completed successfully and medication reconciled.     Saranya Hester, SOPHIA  12:37 PM  Chief Complaint   Patient presents with     Follow Up      f/u of PAD s/p amputation, hyperlipidemia, left LE edema and hypertension.

## 2019-10-04 NOTE — PROGRESS NOTES
Alexandro Pool is a 64 year old male who is presenting at the current time to discuss his diagnosi(es) of        PAD (peripheral artery disease) (H)  Tobacco use disorder  Hyperlipidemia LDL goal <70  Hypertension goal BP (blood pressure) < 140/90  Type 2 diabetes mellitus with complication, with long-term current use of insulin (H)  Ischemic cardiomyopathy .      Review Of Systems  Skin: negative  Eyes: negative  Ears/Nose/Throat: negative  Respiratory: No shortness of breath, dyspnea on exertion, cough, or hemoptysis  Cardiovascular: negative  Gastrointestinal: negative  Genitourinary: negative  Musculoskeletal: negative  Neurologic: negative  Psychiatric: negative  Hematologic/Lymphatic/Immunologic: negative  Endocrine: negative     PAST MEDICAL HISTORY:                  Past Medical History:   Diagnosis Date     Acute kidney failure, unspecified      Blood transfusion      CAD (coronary artery disease)      CARDIOVASCULAR SCREENING; LDL GOAL LESS THAN 100      Cirrhosis (H)      Critical lower limb ischemia      Diabetes mellitus (H) 10/2011     Hypertension      Hypertension goal BP (blood pressure) < 140/90      Ischemic cardiomyopathy      Lymphedema of left leg 5/11/2016     Peripheral arterial disease (H)      PVD (peripheral vascular disease) (H)      Right above knee amputation 4/30/2014     Type 2 diabetes, HbA1C goal < 8% (H)        PAST SURGICAL HISTORY:                  Past Surgical History:   Procedure Laterality Date     AMPUTATE LEG ABOVE KNEE  11/22/2011    Procedure:AMPUTATE LEG ABOVE KNEE; Revise Right Below Knee Amputation To Above Knee Amputation; Surgeon:MADISON WELLS; Location:[facility]     AMPUTATE LEG BELOW KNEE  11/10/2011    Procedure:AMPUTATE LEG BELOW KNEE; Right Below Knee Amputation; Surgeon:MADISON WELLS; Location:[facility]     BYPASS GRAFT FEMOROTIBIAL  9/19/2013    Procedure: BYPASS GRAFT FEMOROTIBIAL;  Left Femorotibial Bypass Graft Insitu ;  Surgeon: Marisol Suggs MD;   Location: [facility]     CARDIAC SURGERY      cardiac stent     ESOPHAGOSCOPY, GASTROSCOPY, DUODENOSCOPY (EGD), COMBINED  10/1/2012    Procedure: COMBINED ESOPHAGOSCOPY, GASTROSCOPY, DUODENOSCOPY (EGD);;  Surgeon: Nehemias Cevallos MD;  Location: [facility]     ESOPHAGOSCOPY, GASTROSCOPY, DUODENOSCOPY (EGD), COMBINED N/A 3/24/2016    Procedure: COMBINED ESOPHAGOSCOPY, GASTROSCOPY, DUODENOSCOPY (EGD);  Surgeon: Gildardo Marks MD;  Location: [facility]     IR STENT VASCULAR LT  3/9/2012          IRRIGATION AND DEBRIDEMENT LOWER EXTREMITY, COMBINED  11/15/2011    Procedure:COMBINED IRRIGATION AND DEBRIDEMENT LOWER EXTREMITY; DRAINAGE OF ABSCESS AT BELOW KNEE AMPUTATION AND KNEE DISARTICULATION.; Surgeon:MADISON WELLS; Location:[facility]     NO HISTORY OF SURGERY  7/12/13    derm     VASCULAR REPAIR LOWER EXTREMITY Left 9/19/2017    Procedure: VASCULAR REPAIR LOWER EXTREMITY;  Ligation Parasitic Branch To Left Lower Extremity ;  Surgeon: Marisol Suggs MD;  Location: [facility]       CURRENT MEDICATIONS:                  Current Outpatient Medications   Medication Sig Dispense Refill     acetaminophen (TYLENOL) 325 MG tablet Take 1 tablet by mouth every 4 hours as needed. 250 tablet 0     amLODIPine (NORVASC) 2.5 MG tablet Take 2.5 mg by mouth 2 times daily       ammonium lactate (AMLACTIN) 12 % cream Apply topically 2 times daily as needed for dry skin apply to both legs twice daily as needed  5     ASPIRIN EC PO Take 81 mg by mouth daily       atorvastatin (LIPITOR) 80 MG tablet Take 1 tablet (80 mg) by mouth At Bedtime 90 tablet 0     BASAGLAR 100 UNIT/ML injection Inject 46 Units Subcutaneous At Bedtime  4     clopidogrel (PLAVIX) 75 MG tablet Take 75 mg by mouth daily  11     gabapentin (NEURONTIN) 100 MG capsule Take 1 capsule (100 mg) by mouth 3 times daily 90 capsule 0     hydrocortisone 1 % cream Apply topically 2 times daily PRN       insulin lispro (HUMALOG VIAL) SOLN 100 UNIT/ML Inject Subcutaneous 3 times daily (before meals) 18  units before each meal       insulin pen needle (BD KWABENA U/F) 32G X 4 MM Use 4x  daily or as directed. 120 each 11     lisinopril (PRINIVIL/ZESTRIL) 2.5 MG tablet TAKE 1 TABLET (2.5 MG) BY MOUTH 2 TIMES DAILY 180 tablet 3     METOPROLOL SUCCINATE ER PO Take 75 mg by mouth daily       morphine (MS CONTIN) 15 MG 12 hr tablet Take 3 tablets (45 mg) by mouth 2 times daily 60 tablet 0     order for DME Equipment being ordered: FlexiTouch pneumatic compression device.  2-3 times per day to left lower extremity.  Current strength - L4 1 Units      oxyCODONE (ROXICODONE) 10 MG immediate release tablet Take 1.5 tablets (15 mg) by mouth every 4 hours as needed (Patient taking differently: Take 20 mg by mouth every 4 hours as needed ) 60 tablet 0     SENNA PO 1 Tab , bedtime       spironolactone (ALDACTONE) 25 MG tablet Take 0.5 tablets (12.5 mg) by mouth daily 15 tablet 0     Vorapaxar Sulfate (ZONTIVITY) 2.08 MG tablet Take 1 tablet (2.08 mg) by mouth daily 30 tablet 6       ALLERGIES:                  Allergies   Allergen Reactions     Ciprofloxacin Rash       SOCIAL HISTORY:                  Social History     Socioeconomic History     Marital status:      Spouse name: Not on file     Number of children: 0     Years of education: Not on file     Highest education level: Not on file   Occupational History     Employer: UNKNOWN   Social Needs     Financial resource strain: Not on file     Food insecurity:     Worry: Not on file     Inability: Not on file     Transportation needs:     Medical: Not on file     Non-medical: Not on file   Tobacco Use     Smoking status: Current Every Day Smoker     Packs/day: 1.00     Years: 40.00     Pack years: 40.00     Types: Cigarettes     Smokeless tobacco: Never Used     Tobacco comment: working on quitting   Substance and Sexual Activity     Alcohol use: Yes     Alcohol/week: 1.0 standard drinks     Types: 1 Standard drinks or equivalent per week     Comment: Once a month or less      Drug use: No     Sexual activity: Not Currently   Lifestyle     Physical activity:     Days per week: Not on file     Minutes per session: Not on file     Stress: Not on file   Relationships     Social connections:     Talks on phone: Not on file     Gets together: Not on file     Attends Rastafari service: Not on file     Active member of club or organization: Not on file     Attends meetings of clubs or organizations: Not on file     Relationship status: Not on file     Intimate partner violence:     Fear of current or ex partner: Not on file     Emotionally abused: Not on file     Physically abused: Not on file     Forced sexual activity: Not on file   Other Topics Concern     Parent/sibling w/ CABG, MI or angioplasty before 65F 55M? No      Service Not Asked     Blood Transfusions Not Asked     Caffeine Concern Yes     Occupational Exposure Not Asked     Hobby Hazards Not Asked     Sleep Concern Not Asked     Stress Concern Not Asked     Weight Concern Not Asked     Special Diet Not Asked     Back Care Not Asked     Exercise No     Bike Helmet Not Asked     Seat Belt Not Asked     Self-Exams Not Asked   Social History Narrative     Not on file       FAMILY HISTORY:                   Family History   Problem Relation Age of Onset     Alcohol/Drug Mother         cirrhosis     Alcohol/Drug Father      C.A.D. No family hx of      Diabetes No family hx of      Cancer No family hx of          Physical exam Reveals:    O/P: WNL  HEENT: WNL  NECK: No JVD, thyromegaly, or lymphadenopathy  HEART: RRR, no murmurs, gallops, or rubs  LUNGS: CTA bilaterally without rales, wheezes, or rhonchi  GI: NABS, nondistended, nontender, soft  EXT:without cyanosis, clubbing, or edema  NEURO: nonfocal  : no flank tenderness    Component      Latest Ref Rng & Units 10/4/2019   Cholesterol      <200 mg/dL 108   Triglycerides      <150 mg/dL 197 (H)   HDL Cholesterol      >39 mg/dL 26 (L)   LDL Cholesterol Calculated      <100  mg/dL 42   Non HDL Cholesterol      <130 mg/dL 82   Hemoglobin A1C      0 - 5.6 % 8.1 (H)     LEFT RESTING OZZIE AND VPR's 8/26/2019     CLINICAL HISTORY: Right above-knee amputation. Left femoral anterior  tibial bypass graft. Reevaluate.     COMPARISONS: 10/26/2017     REFERRING PROVIDER: VIKY URIARTE ANDREW     TECHNIQUE: Resting ABIs obtained. Left VPR and 1st digit PPGs  obtained.     FINDINGS:  RIGHT:       Brachial: 132 mmHg     LEFT:       Brachial: 151 mmHg       Ankle (PT): 128 mmHg       Ankle (DP): 132 mmHg          OZZIE: 0.87, previously 1.01         VPR:            High thigh: Normal            Low thigh: Normal            Calf: Normal            Ankle: Abnormal           1st digit PPG: Normal                                                                       IMPRESSION:  1. Abnormal left resting OZZIE, 0.87, previously 1.01.  2. Abnormal VPR at the ankle.     BETTE JAY MD    LEFT LOWER EXTREMITY DUPLEX ARTERIAL ULTRASOUND 8/26/2019     CLINICAL HISTORY: Left superficial femoral to anterior tibial bypass  graft reevaluation.     COMPARISON: 5/23/2018     REFERRING PROVIDER: VIKY URIARTE ANDREW     TECHNIQUE: Grayscale, color Doppler, Doppler waveform ultrasound  evaluation of the left leg arteries and bypass graft.     FINDINGS: LEFT:  EXTERNAL ILIAC ARTERY: 140/20 cm/s, monophasic, brisk up and  downstroke with prolonged flat diastolic flow.     COMMON FEMORAL ARTERY: 149/17 cm/s, monophasic, brisk up and  downstroke with prolonged flat diastolic flow.     BYPASS GRAFT, proximal anastomosis: 147/20 cm/s, monophasic, brisk up  and downstroke with prolonged flat diastolic flow.  BYPASS GRAFT, proximal thigh: 44/0 cm/s, biphasic  BYPASS GRAFT, mid thigh: 77/10 cm/s, monophasic, brisk up and  downstroke with prolonged flat diastolic flow.  BYPASS GRAFT, distal thigh: 52/9 cm/s, triphasic  BYPASS GRAFT, knee: 48/10 cm/s, monophasic, brisk up and downstroke  with prolonged flat diastolic  flow.  BYPASS GRAFT, proximal calf: 55/10 cm/s, monophasic, brisk up and  downstroke with prolonged flat diastolic flow.  BYPASS GRAFT, mid calf: 66/11 cm/s, monophasic, brisk up and  downstroke with prolonged flat diastolic flow.  BYPASS GRAFT, distal anastomosis: 55/10 cm/s, monophasic, brisk up and  downstroke with prolonged flat diastolic flow.     ANTERIOR TIBIAL ARTERY, above anastomosis: 72/5 cm/s, RETROGRADE,  monophasic, brisk up and downstroke with prolonged flat diastolic  flow.     ANTERIOR TIBIAL ARTERY, below anastomosis: 87/16 cm/s, monophasic,  brisk up and downstroke with prolonged flat diastolic flow.     ANTERIOR TIBIAL ARTERY, ankle: 93/13 cm/s, monophasic, brisk up and  downstroke with prolonged flat diastolic flow.     POSTERIOR TIBIAL ARTERY, ankle: 18/7 cm/s, monophasic                                                                      IMPRESSION: No stenosis demonstrated through the left femoral to  anterior tibial bypass graft. Change in waveforms from previously  triphasic to monophasic with brisk up and downstroke and prolonged  flat diastolic flow may represent distal vasodilation or hyperemia.        BETTE JAY MD    A/P:    (I73.9) PAD S/P Lt SFA stenting 3-9-2012, Lt fem-SENIA bypass w/ ISGSV 9-, S/P Rt AKA 2014, ligation of parasitic branch 9/19/2017  (primary encounter diagnosis)  Comment: Nonambulatory, no rest pain, no ulcerations. Graft patent on duplex. Diminution in wave form noted.  Refuses to quit smoking. Stop Plavix, vorapaxar (he had mid LAD DESing 2012, has SFA stent has been clinically stable). Start Xarelto 2.5 mg PO BID. Check repeat imaging in six months.   Plan: empagliflozin (JARDIANCE) 25 MG TABS tablet,         rivaroxaban ANTICOAGULANT (XARELTO         ANTICOAGULANT) 2.5 MG TABS tablet, LipoFit by         NMR, US OZZIE Doppler No Exercise, US Lower         Extremity Arterial Duplex Left, Lipoprotein         (a), CRP cardiac risk, HC SMOKING CESSATION          COUNSELING, ASYMPTOMATIC, 3-10 MIN            (F17.200) Tobacco use disorder  Comment: refuses to quit. He has been told he will lose his leg.  Plan: HC SMOKING CESSATION COUNSELING, ASYMPTOMATIC,         3-10 MIN            (E78.5) Hyperlipidemia LDL goal <70  Comment: at goal, check labs in three months  Plan: LipoFit by NMR, Lipoprotein (a), CRP cardiac         risk           (I10) Hypertension goal BP (blood pressure) < 140/90  Comment: at goal  Plan:no med changes    (E11.8,  Z79.4) Type 2 diabetes mellitus with complication, with long-term current use of insulin (H)  Comment: not at goal. Renal function in tact. Add the below, Check labs in three months.  Plan: empagliflozin (JARDIANCE) 25 MG TABS tablet,         Hemoglobin A1c              Greater than one half the forty minutes total spent on the pt's visit were spent providing education and counselling to the patient regarding the above matters. Extended time seocndary to patient complexity.

## 2019-10-04 NOTE — PATIENT INSTRUCTIONS
Patient Instructions:  It was a pleasure to see you in the cardiology clinic today.      If you have any questions, call  Kassandra Jordan RN, at [phone].  Press Option #1 for the [facility], and then press Option #4  We are encouraging the use of [employer] to communicate with your HealthCare Provider    Note the new medications: start xarelto 2.5 mg by mouth bid  Start empagliflozin (JARDIANCE)  25 mg by mouth daily for diabetes    Stop the following medications: stop Plavix and the Zontivity (Verapaxar)    The results from today include: pending lab is three months with a follow up with Dr. Olson two weeks later  In six months have Left US OZZIE Doppler No Exercise and US Lower Extremity Arterial Duplex Left with a follow up visit with Dr. Olson two weeks later.        If you have an urgent need after hours (8:00 am to 4:30 pm) please call [phone] and ask for the cardiology fellow on call.

## 2019-11-08 DIAGNOSIS — I73.9 PERIPHERAL ARTERY DISEASE (H): ICD-10-CM

## 2019-11-10 RX ORDER — ATORVASTATIN CALCIUM 80 MG/1
80 TABLET, FILM COATED ORAL AT BEDTIME
Qty: 90 TABLET | Refills: 3 | Status: SHIPPED | OUTPATIENT
Start: 2019-11-10 | End: 2020-09-02

## 2020-01-07 DIAGNOSIS — Z79.4 TYPE 2 DIABETES MELLITUS WITH COMPLICATION, WITH LONG-TERM CURRENT USE OF INSULIN (H): ICD-10-CM

## 2020-01-07 DIAGNOSIS — E78.5 HYPERLIPIDEMIA LDL GOAL <70: ICD-10-CM

## 2020-01-07 DIAGNOSIS — I73.9 PAD (PERIPHERAL ARTERY DISEASE) (H): ICD-10-CM

## 2020-01-07 DIAGNOSIS — E11.8 TYPE 2 DIABETES MELLITUS WITH COMPLICATION, WITH LONG-TERM CURRENT USE OF INSULIN (H): ICD-10-CM

## 2020-01-07 LAB
CRP SERPL HS-MCNC: 9.7 MG/L
HBA1C MFR BLD: 7.8 % (ref 0–5.6)

## 2020-01-09 LAB — LPA SERPL-MCNC: 8 MG/DL

## 2020-01-11 LAB
CHOLEST SERPL-MCNC: 126 MG/DL
HDL SERPL QN: 8.4 NM
HDL SERPL-SCNC: 21.7 UMOL/L
HDLC SERPL-MCNC: 26 MG/DL (ref 40–59)
HLD.LARGE SERPL-SCNC: <2.8 UMOL/L
LDL SERPL QN: 20.5 NM
LDL SERPL-SCNC: 1034 NMOL/L
LDL SMALL SERPL-SCNC: 585 NMOL/L
LDLC SERPL CALC-MCNC: 51 MG/DL
PATHOLOGY STUDY: ABNORMAL
TRIGL SERPL-MCNC: 247 MG/DL (ref 30–149)
VLDL LARGE SERPL-SCNC: 12.1 NMOL/L
VLDL SERPL QN: 58.7 NM

## 2020-01-30 ENCOUNTER — OFFICE VISIT (OUTPATIENT)
Dept: OTHER | Facility: CLINIC | Age: 65
End: 2020-01-30
Attending: INTERNAL MEDICINE
Payer: MEDICARE

## 2020-01-30 ENCOUNTER — HOSPITAL ENCOUNTER (OUTPATIENT)
Dept: ULTRASOUND IMAGING | Facility: CLINIC | Age: 65
Discharge: HOME | End: 2020-01-30
Attending: INTERNAL MEDICINE | Admitting: INTERNAL MEDICINE
Payer: MEDICARE

## 2020-01-30 VITALS
HEART RATE: 60 BPM | WEIGHT: 198 LBS | SYSTOLIC BLOOD PRESSURE: 110 MMHG | OXYGEN SATURATION: 95 % | DIASTOLIC BLOOD PRESSURE: 74 MMHG | RESPIRATION RATE: 14 BRPM | HEIGHT: 67 IN | BODY MASS INDEX: 31.08 KG/M2

## 2020-01-30 DIAGNOSIS — Z79.4 TYPE 2 DIABETES MELLITUS WITH COMPLICATION, WITH LONG-TERM CURRENT USE OF INSULIN (H): ICD-10-CM

## 2020-01-30 DIAGNOSIS — I10 HYPERTENSION GOAL BP (BLOOD PRESSURE) < 140/90: ICD-10-CM

## 2020-01-30 DIAGNOSIS — E78.5 HYPERLIPIDEMIA LDL GOAL <70: ICD-10-CM

## 2020-01-30 DIAGNOSIS — I73.9 PAD (PERIPHERAL ARTERY DISEASE) (H): ICD-10-CM

## 2020-01-30 DIAGNOSIS — E11.8 TYPE 2 DIABETES MELLITUS WITH COMPLICATION, WITH LONG-TERM CURRENT USE OF INSULIN (H): ICD-10-CM

## 2020-01-30 DIAGNOSIS — F17.200 TOBACCO USE DISORDER: ICD-10-CM

## 2020-01-30 PROCEDURE — 99214 OFFICE O/P EST MOD 30 MIN: CPT | Performed by: INTERNAL MEDICINE

## 2020-01-30 PROCEDURE — 93926 LOWER EXTREMITY STUDY: CPT | Mod: LT

## 2020-01-30 PROCEDURE — G0463 HOSPITAL OUTPT CLINIC VISIT: HCPCS | Mod: 25

## 2020-01-30 RX ORDER — LIRAGLUTIDE 6 MG/ML
INJECTION SUBCUTANEOUS
Qty: 9 ML | Refills: 11 | Status: SHIPPED | OUTPATIENT
Start: 2020-01-30 | End: 2020-09-02

## 2020-01-30 ASSESSMENT — MIFFLIN-ST. JEOR: SCORE: 1641.75

## 2020-01-30 NOTE — PROGRESS NOTES
"Alexandro Pool is a 64 year old male who presents for:  Chief Complaint   Patient presents with     RECHECK     US LWR EXT ARTERIAL DUPLEX LT (1:30) Dr. Olson (2:10). Follow up, patient will order labs at least one week prior to apt. *vg        Vitals:    Vitals:    01/30/20 1418   BP: 110/74   BP Location: Right arm   Patient Position: Chair   Cuff Size: Adult Regular   Pulse: 60   Resp: 14   SpO2: 95%   Weight: 198 lb (89.8 kg)   Height: 5' 7\" (1.702 m)       BMI:  Estimated body mass index is 31.01 kg/m  as calculated from the following:    Height as of this encounter: 5' 7\" (1.702 m).    Weight as of this encounter: 198 lb (89.8 kg).    Pain Score:  Data Unavailable        Nigel Serrano CMA    "

## 2020-01-30 NOTE — PROGRESS NOTES
Alexandro Pool is a 64 year old male who is presenting at the current time to discuss his diagnosi(es) of        Tobacco use disorder  PAD (peripheral artery disease) (H)  Hyperlipidemia LDL goal <70  Hypertension goal BP (blood pressure) < 140/90  Type 2 diabetes mellitus with complication, with long-term current use of insulin (H)     HPI: The patietn is a non ambulatory 64 year old white male due to a prior Rt AKA. He also has PAD and unfortunately continues to smoke while remaining poorly glycemically controlled from a diabetic perspective while on a mutli drug regimen. His renal function remains intact. He has no claudciation as aforementioned, as he is non ambualtory. He has no CLI sxs and no non healing ulcers. In 2013 he had a Lt Fem-AT bypass undertaken by Dr. Suggs. In 2017 he had a parasitic sie branch ligated. His graft velocities are starting to increase at the PAG, but he is asx form this. Flow distal to the PAG is intact. .      Review Of Systems  Skin: negative  Eyes: negative  Ears/Nose/Throat: negative  Respiratory: No shortness of breath, dyspnea on exertion, cough, or hemoptysis  Cardiovascular: negative  Gastrointestinal: negative  Genitourinary: negative  Musculoskeletal: negative  Neurologic: negative  Psychiatric: negative  Hematologic/Lymphatic/Immunologic: negative  Endocrine: negative     PAST MEDICAL HISTORY:                  Past Medical History:   Diagnosis Date     Acute kidney failure, unspecified (H)      Blood transfusion      CAD (coronary artery disease)      CARDIOVASCULAR SCREENING; LDL GOAL LESS THAN 100      Cirrhosis (H)      Critical lower limb ischemia      Diabetes mellitus (H) 10/2011     Hypertension      Hypertension goal BP (blood pressure) < 140/90      Ischemic cardiomyopathy      Lymphedema of left leg 5/11/2016     Peripheral arterial disease (H)      PVD (peripheral vascular disease) (H)      Right above knee amputation 4/30/2014     Type 2 diabetes, HbA1C  goal < 8% (H)        PAST SURGICAL HISTORY:                  Past Surgical History:   Procedure Laterality Date     AMPUTATE LEG ABOVE KNEE  11/22/2011    Procedure:AMPUTATE LEG ABOVE KNEE; Revise Right Below Knee Amputation To Above Knee Amputation; Surgeon:MADISON WELLS; Location:[facility]     AMPUTATE LEG BELOW KNEE  11/10/2011    Procedure:AMPUTATE LEG BELOW KNEE; Right Below Knee Amputation; Surgeon:MADISON WELLS; Location:[facility]     BYPASS GRAFT FEMOROTIBIAL  9/19/2013    Procedure: BYPASS GRAFT FEMOROTIBIAL;  Left Femorotibial Bypass Graft Insitu ;  Surgeon: Marisol Suggs MD;  Location: [facility] OR     CARDIAC SURGERY      cardiac stent     ESOPHAGOSCOPY, GASTROSCOPY, DUODENOSCOPY (EGD), COMBINED  10/1/2012    Procedure: COMBINED ESOPHAGOSCOPY, GASTROSCOPY, DUODENOSCOPY (EGD);;  Surgeon: Nehemias Cevallos MD;  Location:  GI     ESOPHAGOSCOPY, GASTROSCOPY, DUODENOSCOPY (EGD), COMBINED N/A 3/24/2016    Procedure: COMBINED ESOPHAGOSCOPY, GASTROSCOPY, DUODENOSCOPY (EGD);  Surgeon: Gildardo Marks MD;  Location: [facility] GI     IR STENT VASCULAR LT  3/9/2012          IRRIGATION AND DEBRIDEMENT LOWER EXTREMITY, COMBINED  11/15/2011    Procedure:COMBINED IRRIGATION AND DEBRIDEMENT LOWER EXTREMITY; DRAINAGE OF ABSCESS AT BELOW KNEE AMPUTATION AND KNEE DISARTICULATION.; Surgeon:MADISON WELLS; Location:[facility] OR     NO HISTORY OF SURGERY  7/12/13    derm     VASCULAR REPAIR LOWER EXTREMITY Left 9/19/2017    Procedure: VASCULAR REPAIR LOWER EXTREMITY;  Ligation Parasitic Branch To Left Lower Extremity ;  Surgeon: Marisol Suggs MD;  Location: [facility] OR       CURRENT MEDICATIONS:                  Current Outpatient Medications   Medication Sig Dispense Refill     acetaminophen (TYLENOL) 325 MG tablet Take 1 tablet by mouth every 4 hours as needed. 250 tablet 0     amLODIPine (NORVASC) 2.5 MG tablet Take 2.5 mg by mouth 2 times daily       ammonium lactate (AMLACTIN) 12 % cream Apply topically 2 times daily as needed for dry skin apply to  both legs twice daily as needed  5     ASPIRIN EC PO Take 81 mg by mouth daily       atorvastatin (LIPITOR) 80 MG tablet Take 1 tablet (80 mg) by mouth At Bedtime 90 tablet 3     BASAGLAR 100 UNIT/ML injection Inject 46 Units Subcutaneous At Bedtime  4     empagliflozin (JARDIANCE) 25 MG TABS tablet Take 1 tablet (25 mg) by mouth daily 90 tablet 3     gabapentin (NEURONTIN) 100 MG capsule Take 1 capsule (100 mg) by mouth 3 times daily 90 capsule 0     hydrocortisone 1 % cream Apply topically 2 times daily PRN       insulin lispro (HUMALOG VIAL) SOLN 100 UNIT/ML Inject Subcutaneous 3 times daily (before meals) 18 units before each meal       insulin pen needle (BD KWABENA U/F) 32G X 4 MM Use 4x  daily or as directed. 120 each 11     lisinopril (PRINIVIL/ZESTRIL) 2.5 MG tablet TAKE 1 TABLET (2.5 MG) BY MOUTH 2 TIMES DAILY 180 tablet 3     METOPROLOL SUCCINATE ER PO Take 75 mg by mouth daily       morphine (MS CONTIN) 15 MG 12 hr tablet Take 3 tablets (45 mg) by mouth 2 times daily 60 tablet 0     order for DME Equipment being ordered: FlexiTouch pneumatic compression device.  2-3 times per day to left lower extremity.  Current strength - L4 1 Units      oxyCODONE (ROXICODONE) 10 MG immediate release tablet Take 1.5 tablets (15 mg) by mouth every 4 hours as needed 60 tablet 0     rivaroxaban ANTICOAGULANT (XARELTO ANTICOAGULANT) 2.5 MG TABS tablet Take 1 tablet (2.5 mg) by mouth 2 times daily 180 tablet 3     SENNA PO 1 Tab , bedtime       spironolactone (ALDACTONE) 25 MG tablet Take 0.5 tablets (12.5 mg) by mouth daily 15 tablet 0       ALLERGIES:                  Allergies   Allergen Reactions     Ciprofloxacin Rash       SOCIAL HISTORY:                  Social History     Socioeconomic History     Marital status:      Spouse name: Not on file     Number of children: 0     Years of education: Not on file     Highest education level: Not on file   Occupational History     Employer: UNKNOWN   Social Needs      Financial resource strain: Not on file     Food insecurity:     Worry: Not on file     Inability: Not on file     Transportation needs:     Medical: Not on file     Non-medical: Not on file   Tobacco Use     Smoking status: Current Every Day Smoker     Packs/day: 1.00     Years: 40.00     Pack years: 40.00     Types: Cigarettes     Smokeless tobacco: Never Used     Tobacco comment: working on quitting   Substance and Sexual Activity     Alcohol use: Yes     Alcohol/week: 1.0 standard drinks     Types: 1 Standard drinks or equivalent per week     Comment: Once a month or less     Drug use: No     Sexual activity: Not Currently   Lifestyle     Physical activity:     Days per week: Not on file     Minutes per session: Not on file     Stress: Not on file   Relationships     Social connections:     Talks on phone: Not on file     Gets together: Not on file     Attends Tenriism service: Not on file     Active member of club or organization: Not on file     Attends meetings of clubs or organizations: Not on file     Relationship status: Not on file     Intimate partner violence:     Fear of current or ex partner: Not on file     Emotionally abused: Not on file     Physically abused: Not on file     Forced sexual activity: Not on file   Other Topics Concern     Parent/sibling w/ CABG, MI or angioplasty before 65F 55M? No      Service Not Asked     Blood Transfusions Not Asked     Caffeine Concern Yes     Occupational Exposure Not Asked     Hobby Hazards Not Asked     Sleep Concern Not Asked     Stress Concern Not Asked     Weight Concern Not Asked     Special Diet Not Asked     Back Care Not Asked     Exercise No     Bike Helmet Not Asked     Seat Belt Not Asked     Self-Exams Not Asked   Social History Narrative     Not on file       FAMILY HISTORY:                   Family History   Problem Relation Age of Onset     Alcohol/Drug Mother         cirrhosis     Alcohol/Drug Father      C.A.D. No family hx of       Diabetes No family hx of      Cancer No family hx of          Physical exam Reveals:    O/P: WNL  HEENT: WNL  NECK: No JVD, thyromegaly, or lymphadenopathy  HEART: RRR, no murmurs, gallops, or rubs  LUNGS: CTA bilaterally without rales, wheezes, or rhonchi  GI: NABS, nondistended, nontender, soft  EXT:without cyanosis, clubbing, or edema  NEURO: nonfocal  : no flank tenderness      LEFT LOWER EXTREMITY DUPLEX ARTERIAL ULTRASOUND 8/26/2019     CLINICAL HISTORY: Left superficial femoral to anterior tibial bypass  graft reevaluation.     COMPARISON: 5/23/2018     REFERRING PROVIDER: VIKY URIARTE ANDREW     TECHNIQUE: Grayscale, color Doppler, Doppler waveform ultrasound  evaluation of the left leg arteries and bypass graft.     FINDINGS: LEFT:  EXTERNAL ILIAC ARTERY: 140/20 cm/s, monophasic, brisk up and  downstroke with prolonged flat diastolic flow.     COMMON FEMORAL ARTERY: 149/17 cm/s, monophasic, brisk up and  downstroke with prolonged flat diastolic flow.     BYPASS GRAFT, proximal anastomosis: 147/20 cm/s, monophasic, brisk up  and downstroke with prolonged flat diastolic flow.  BYPASS GRAFT, proximal thigh: 44/0 cm/s, biphasic  BYPASS GRAFT, mid thigh: 77/10 cm/s, monophasic, brisk up and  downstroke with prolonged flat diastolic flow.  BYPASS GRAFT, distal thigh: 52/9 cm/s, triphasic  BYPASS GRAFT, knee: 48/10 cm/s, monophasic, brisk up and downstroke  with prolonged flat diastolic flow.  BYPASS GRAFT, proximal calf: 55/10 cm/s, monophasic, brisk up and  downstroke with prolonged flat diastolic flow.  BYPASS GRAFT, mid calf: 66/11 cm/s, monophasic, brisk up and  downstroke with prolonged flat diastolic flow.  BYPASS GRAFT, distal anastomosis: 55/10 cm/s, monophasic, brisk up and  downstroke with prolonged flat diastolic flow.     ANTERIOR TIBIAL ARTERY, above anastomosis: 72/5 cm/s, RETROGRADE,  monophasic, brisk up and downstroke with prolonged flat diastolic  flow.     ANTERIOR TIBIAL ARTERY, below  anastomosis: 87/16 cm/s, monophasic,  brisk up and downstroke with prolonged flat diastolic flow.     ANTERIOR TIBIAL ARTERY, ankle: 93/13 cm/s, monophasic, brisk up and  downstroke with prolonged flat diastolic flow.     POSTERIOR TIBIAL ARTERY, ankle: 18/7 cm/s, monophasic                                                                      IMPRESSION: No stenosis demonstrated through the left femoral to  anterior tibial bypass graft. Change in waveforms from previously  triphasic to monophasic with brisk up and downstroke and prolonged  flat diastolic flow may represent distal vasodilation or hyperemia.        BETTE JAY MD              Component      Latest Ref Rng & Units 10/4/2019 1/7/2020   Total Cholesterol      <=199 mg/dL  126   Triglycerides      30 - 149 mg/dL 197 (H) 247 (H)   HDL Cholesterol      40 - 59 mg/dL  26 (L)   LDL Cholesterol      <=129 mg/dL  51   HDL Size      >=8.9 nm  8.4 (L)   VLDL Size      <=46.7 nm  58.7 (H)   LDL Particle Size      >=20.7 nm  20.5 (L)   Lge HDL Particle number      >=4.2 umol/L  <2.8 (L)   HDL Particle Number NMR      >=33.0 umol/L  21.7 (L)   Lge VLDL Part number      <=2.7 nmol/L  12.1 (H)   Small LDL Particle number      <=634 nmol/L  585   LDL Particle Number      <=1,135 nmol/L  1,034   EER LipoFit by NMR        SEE NOTE   Cholesterol      <200 mg/dL 108    HDL Cholesterol      >39 mg/dL 26 (L)    LDL Cholesterol Calculated      <100 mg/dL 42    Non HDL Cholesterol      <130 mg/dL 82    Hemoglobin A1C      0 - 5.6 % 8.1 (H) 7.8 (H)   CRP Cardiac Risk      mg/L  9.7   Lipoprotein (a)      <=29 mg/dL  8     A/P:    (F17.200) Tobacco use disorder  Comment: He is informed that he is likely to be successful in becoming a double amputee should he continue to smoke. He declines cessation assistance  Plan: HC SMOKING CESSATION COUNSELING, ASYMPTOMATIC,         3-10 MIN, CBC with platelets differential           (I73.9) PAD S/P Lt SFA stenting 3-9-2012, Lt fem-SENIA  bypass w/ ISGSV 9-, S/P Rt AKA 2014, ligation of parasitic branch 9/19/2017  Comment: No current intervention warranted. Monitor with surveillance duplex in [facility] for non healing ulcer or rest pain  Plan: CBC with platelets differential, Basic         metabolic panel, LipoFit by NMR, US Lower         Extremity Arterial Duplex Left           (E78.5) Hyperlipidemia LDL goal <70  Comment: at goal  Plan: T3 Free, T4 free, TSH, LipoFit by NMR           (I10) Hypertension goal BP (blood pressure) < 140/90  Comment: at goal  Plan: no med changes    (E11.8,  Z79.4) Type 2 diabetes mellitus with complication, with long-term current use of insulin (H)  Comment: not at goal; Add Victoza  Plan: CBC with platelets differential, Hepatic panel,        Hemoglobin A1c, Albumin Random Urine         Quantitative with Creat Ratio, liraglutide         (VICTOZA) 18 MG/3ML solution           Greater than one half the twenty five minutes total spent on the pt's visit were spent providing education and counselling to the patient regarding the above matters.

## 2020-01-31 NOTE — NURSING NOTE
Rx for victoza sent via Fax to [facility] [place] [phone].    Berna Hoover RN BSN  [facility]

## 2020-04-01 ENCOUNTER — TELEPHONE (OUTPATIENT)
Dept: OTHER | Facility: CLINIC | Age: 65
End: 2020-04-01

## 2020-04-01 NOTE — TELEPHONE ENCOUNTER
Called Alexandro to discuss his appointment scheduled for April 16th at 1:40 PM.    Alexandro says he is feeling great and has no concerns at this time.    Alexandro agreed to pushing back his labs, US & Office visit with Dr. Olson until the COVID-19 restrictions have been lifted.    Berna Hoover RN BSN  [facility]  [phone]

## 2020-05-16 NOTE — IP AVS SNAPSHOT
Same Day Surgery [address] [address]    Phone:  [phone]                                       After Visit Summary   9/19/2017    Alexandro Pool    MRN: [id number]           After Visit Summary Signature Page     I have received my discharge instructions, and my questions have been answered. I have discussed any challenges I see with this plan with the nurse or doctor.    ..........................................................................................................................................  Patient/Patient Representative Signature      ..........................................................................................................................................  Patient Representative Print Name and Relationship to Patient    ..................................................               ................................................  Date                                            Time    ..........................................................................................................................................  Reviewed by Signature/Title    ...................................................              ..............................................  Date                                                            Time           Activity as tolerated

## 2020-05-20 ENCOUNTER — APPOINTMENT (OUTPATIENT)
Dept: GENERAL RADIOLOGY | Facility: CLINIC | Age: 65
End: 2020-05-20
Attending: EMERGENCY MEDICINE
Payer: MEDICARE

## 2020-05-20 ENCOUNTER — HOSPITAL ENCOUNTER (EMERGENCY)
Facility: CLINIC | Age: 65
Discharge: HOME | End: 2020-05-20
Attending: EMERGENCY MEDICINE | Admitting: EMERGENCY MEDICINE
Payer: MEDICARE

## 2020-05-20 VITALS
DIASTOLIC BLOOD PRESSURE: 80 MMHG | RESPIRATION RATE: 16 BRPM | OXYGEN SATURATION: 95 % | SYSTOLIC BLOOD PRESSURE: 105 MMHG | TEMPERATURE: 97.8 F | WEIGHT: 194 LBS | HEART RATE: 68 BPM | BODY MASS INDEX: 30.38 KG/M2

## 2020-05-20 DIAGNOSIS — R22.42 MASS OF LOWER LEG, LEFT: ICD-10-CM

## 2020-05-20 DIAGNOSIS — S90.812A: ICD-10-CM

## 2020-05-20 DIAGNOSIS — M79.672 LEFT FOOT PAIN: ICD-10-CM

## 2020-05-20 DIAGNOSIS — R22.42 LOCALIZED SWELLING OF LEFT FOOT: ICD-10-CM

## 2020-05-20 DIAGNOSIS — T14.8XXA ABRASION: ICD-10-CM

## 2020-05-20 PROCEDURE — 73630 X-RAY EXAM OF FOOT: CPT | Mod: LT

## 2020-05-20 PROCEDURE — 99283 EMERGENCY DEPT VISIT LOW MDM: CPT | Mod: Z6 | Performed by: EMERGENCY MEDICINE

## 2020-05-20 PROCEDURE — 99283 EMERGENCY DEPT VISIT LOW MDM: CPT

## 2020-05-20 NOTE — ED AVS SNAPSHOT
[facility], [place], Emergency Department  [address] [place] [address]  Phone:  [phone]                                    Alexandro Pool   MRN: [id number]    Department:  [facility], Emergency Department   Date of Visit:  5/20/2020           After Visit Summary Signature Page    I have received my discharge instructions, and my questions have been answered. I have discussed any challenges I see with this plan with the nurse or doctor.    ..........................................................................................................................................  Patient/Patient Representative Signature      ..........................................................................................................................................  Patient Representative Print Name and Relationship to Patient    ..................................................               ................................................  Date                                   Time    ..........................................................................................................................................  Reviewed by Signature/Title    ...................................................              ..............................................  Date                                               Time          22EPIC Rev 08/18

## 2020-05-21 NOTE — ED NOTES
Bed: ED27  Expected date:   Expected time:   Means of arrival:   Comments:  [facility]    64 M  L foot broken, not on thinners, CMS intact

## 2020-05-21 NOTE — DISCHARGE INSTRUCTIONS
TODAY'S VISIT:  You were seen today for foot swelling  -   - If you had any labs or imaging/radiology tests performed today, you should also discuss these tests with your usual provider.     FOLLOW-UP:  Please make an appointment to follow up with:  - Your Primary Care Provider. If you do not have a PCP, please call the Primary Care Center (phone: [phone] for an appointment    - Have your provider review the results from today's visit with you again to make sure no further follow-up or additional testing is needed based on those results.     OTHER INSTRUCTIONS:  -Apply ice to the affected area several times daily for the next 24 to 48 hours  -Make sure to monitor your abrasions for signs of infection, apply bacitracin twice daily    RETURN TO THE EMERGENCY DEPARTMENT  Return to the Emergency Department at any time for any new or worsening symptoms or any concerns.
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
## 2020-05-21 NOTE — ED PROVIDER NOTES
"  History     Chief Complaint   Patient presents with     Foot Pain     HPI  Alexandro Pool is a 64 year old male with a past medical history of coronary artery disease, cirrhosis, lower limb ischemia (status post right leg amputation), diabetes, hypertension, ischemic cardiomyopathy who presents to the emergency department with a chief complaint of left foot pain.  The patient is brought in by ambulance from assisted living facility.  The patient is in an electric wheelchair.  The patient reports that he and a friend were \"jousting\" with each other and his friend's wheelchair hit his left foot.  He states that the foot support of his friend's wheelchair went over the top of his foot while the foot support for his wheelchair state underneath his foot, pinching his foot between the 2.  This occurred about 2-1/2 hours prior to arrival to the hospital.  His left foot is now painful and swollen.  He has an associated abrasion to the top of his foot as well as to the great toe.  No active bleeding.  It is tender to the touch and he has some bruising surrounded the abraded the area.  No other injuries.  He is able to wiggle his toes. EMS reportedly was unable to feel pulse in foot. Pt reports no pulse in foot is normal.  The patient is concerned he may have broken a bone in his foot.    I have reviewed the Medications, Allergies, Past Medical and Surgical History, and Social History in the [facility].    Past Medical History:   Diagnosis Date     Acute kidney failure, unspecified (H)      Blood transfusion      CAD (coronary artery disease)      CARDIOVASCULAR SCREENING; LDL GOAL LESS THAN 100      Cirrhosis (H)      Critical lower limb ischemia      Diabetes mellitus (H) 10/2011     Hypertension      Hypertension goal BP (blood pressure) < 140/90      Ischemic cardiomyopathy      Lymphedema of left leg 5/11/2016     Peripheral arterial disease (H)      PVD (peripheral vascular disease) (H)      Right above knee " amputation 4/30/2014     Type 2 diabetes, HbA1C goal < 8% (H)      Past Surgical History:   Procedure Laterality Date     AMPUTATE LEG ABOVE KNEE  11/22/2011    Procedure:AMPUTATE LEG ABOVE KNEE; Revise Right Below Knee Amputation To Above Knee Amputation; Surgeon:MADISON WELLS; Location:[facility] OR     AMPUTATE LEG BELOW KNEE  11/10/2011    Procedure:AMPUTATE LEG BELOW KNEE; Right Below Knee Amputation; Surgeon:MADISON WELLS; Location:[facility] OR     BYPASS GRAFT FEMOROTIBIAL  9/19/2013    Procedure: BYPASS GRAFT FEMOROTIBIAL;  Left Femorotibial Bypass Graft Insitu ;  Surgeon: Marisol Suggs MD;  Location: [facility] OR     CARDIAC SURGERY      cardiac stent     ESOPHAGOSCOPY, GASTROSCOPY, DUODENOSCOPY (EGD), COMBINED  10/1/2012    Procedure: COMBINED ESOPHAGOSCOPY, GASTROSCOPY, DUODENOSCOPY (EGD);;  Surgeon: Nehemias Cevallos MD;  Location:  GI     ESOPHAGOSCOPY, GASTROSCOPY, DUODENOSCOPY (EGD), COMBINED N/A 3/24/2016    Procedure: COMBINED ESOPHAGOSCOPY, GASTROSCOPY, DUODENOSCOPY (EGD);  Surgeon: Gildardo Marks MD;  Location:  GI     IR STENT VASCULAR LT  3/9/2012          IRRIGATION AND DEBRIDEMENT LOWER EXTREMITY, COMBINED  11/15/2011    Procedure:COMBINED IRRIGATION AND DEBRIDEMENT LOWER EXTREMITY; DRAINAGE OF ABSCESS AT BELOW KNEE AMPUTATION AND KNEE DISARTICULATION.; Surgeon:MADISON WELLS; Location:[facility] OR     NO HISTORY OF SURGERY  7/12/13    derm     VASCULAR REPAIR LOWER EXTREMITY Left 9/19/2017    Procedure: VASCULAR REPAIR LOWER EXTREMITY;  Ligation Parasitic Branch To Left Lower Extremity ;  Surgeon: Marisol Suggs MD;  Location: [facility] OR     No current facility-administered medications for this encounter.      Current Outpatient Medications   Medication     acetaminophen (TYLENOL) 325 MG tablet     amLODIPine (NORVASC) 2.5 MG tablet     ammonium lactate (AMLACTIN) 12 % cream     ASPIRIN EC PO     atorvastatin (LIPITOR) 80 MG tablet     BASAGLAR 100 UNIT/ML injection     empagliflozin (JARDIANCE) 25 MG TABS tablet      gabapentin (NEURONTIN) 100 MG capsule     hydrocortisone 1 % cream     insulin lispro (HUMALOG VIAL) SOLN 100 UNIT/ML     insulin pen needle (BD KWABENA U/F) 32G X 4 MM     liraglutide (VICTOZA) 18 MG/3ML solution     lisinopril (PRINIVIL/ZESTRIL) 2.5 MG tablet     METOPROLOL SUCCINATE ER PO     morphine (MS CONTIN) 15 MG 12 hr tablet     order for DME     oxyCODONE (ROXICODONE) 10 MG immediate release tablet     rivaroxaban ANTICOAGULANT (XARELTO ANTICOAGULANT) 2.5 MG TABS tablet     SENNA PO     spironolactone (ALDACTONE) 25 MG tablet     Allergies   Allergen Reactions     Ciprofloxacin Rash     Past medical history, past surgical history, medications, and allergies were reviewed with the patient. Additional pertinent items: None    Social History     Socioeconomic History     Marital status:      Spouse name: Not on file     Number of children: 0     Years of education: Not on file     Highest education level: Not on file   Occupational History     Employer: UNKNOWN   Social Needs     Financial resource strain: Not on file     Food insecurity     Worry: Not on file     Inability: Not on file     Transportation needs     Medical: Not on file     Non-medical: Not on file   Tobacco Use     Smoking status: Current Every Day Smoker     Packs/day: 1.00     Years: 40.00     Pack years: 40.00     Types: Cigarettes     Smokeless tobacco: Never Used     Tobacco comment: working on quitting   Substance and Sexual Activity     Alcohol use: Yes     Alcohol/week: 1.0 standard drinks     Types: 1 Standard drinks or equivalent per week     Comment: Once a month or less     Drug use: No     Sexual activity: Not Currently   Lifestyle     Physical activity     Days per week: Not on file     Minutes per session: Not on file     Stress: Not on file   Relationships     Social connections     Talks on phone: Not on file     Gets together: Not on file     Attends Hoahaoism service: Not on file     Active member of club or  organization: Not on file     Attends meetings of clubs or organizations: Not on file     Relationship status: Not on file     Intimate partner violence     Fear of current or ex partner: Not on file     Emotionally abused: Not on file     Physically abused: Not on file     Forced sexual activity: Not on file   Other Topics Concern     Parent/sibling w/ CABG, MI or angioplasty before 65F 55M? No      Service Not Asked     Blood Transfusions Not Asked     Caffeine Concern Yes     Occupational Exposure Not Asked     Hobby Hazards Not Asked     Sleep Concern Not Asked     Stress Concern Not Asked     Weight Concern Not Asked     Special Diet Not Asked     Back Care Not Asked     Exercise No     Bike Helmet Not Asked     Seat Belt Not Asked     Self-Exams Not Asked   Social History Narrative     Not on file     Social history was reviewed with the patient. Additional pertinent items: None    Review of Systems  General: No fevers or chills  Skin: Positive for abrasion to dorsum of left foot, positive for chronic skin color changes to left foot/leg  Eyes: No eye redness or discharge  Ears/Nose/Throat: No rhinorrhea or nasal congestion  Respiratory: No cough or SOB  Cardiovascular: No chest pain or palpitations  Gastrointestinal: No nausea, vomiting, or diarrhea  Genitourinary: No urinary frequency, hematuria, or dysuria  Musculoskeletal: Positive for left foot pain, positive for prior right lower extremity amputation  Neurologic: No numbness or weakness  Hematologic/Lymphatic/Immunologic: No leg swelling, no easy bruising/bleeding  Endocrine: No polyuria/polydypsia    A complete review of systems was performed with pertinent positives and negatives noted in the HPI, and all other systems negative.    Physical Exam   BP: 130/81  Pulse: 71  Temp: 97.8  F (36.6  C)  Resp: 22  Weight: 88 kg (194 lb)  SpO2: 97 %      General: Well nourished, well developed, NAD  HEENT: EOMI, anicteric. NCAT, MMM  Neck: no jugular  venous distension, supple, nl ROM  Cardiac: Regular rate and rhythm.  Unable to palpate pulse in left foot (baseline per patient)  Pulm: Airway patent, nonlabored breathing  Skin: Warm and dry to the touch.  Chronic appearing skin changes to left lower extremity, ecchymosis to dorsum of left foot (new per patient), with central area of abrasion as well as small abrasion to dorsum of left great toe, no active bleeding or drainage from the wound  Extremities: Patient is status post right AKA, swelling to left lower extremity, see skin exam above  Neuro: A&Ox3, diminished sensation to left foot and leg (baseline per patient), patient is able to wiggle the toes of his left foot    ED Course        Procedures                           Labs Ordered and Resulted from Time of ED Arrival Up to the Time of Departure from the ED - No data to display         Results for orders placed or performed during the hospital encounter of 05/20/20 (from the past 24 hour(s))   Foot XR, G/E 3 views, left    Narrative    EXAM: XR FOOT LT G/E 3 VW  LOCATION: [facility]  DATE/TIME: 5/20/2020 9:01 PM    INDICATION: Foot pain, swelling.  COMPARISON: None.    FINDINGS: Plantar calcaneal spurring. Dorsal soft tissue swelling. Osteopenia suspected. Mild hallux valgus. Bony hypertrophic change at the medial aspect of the first metatarsal head.      Impression    IMPRESSION: No acute fracture identified.       Labs, vital signs, and imaging studies were reviewed by me.    Medications - No data to display    Assessments & Plan (with Medical Decision Making)   Alexandro Pool is a 64 year old male who presents with injury to his left foot.  Differential diagnosis includes contusion versus fracture.  Patient also noted to have overlying area of ecchymosis and abrasion.  X-ray ordered.    X-ray shows no acute fracture or dislocation.  There are chronic degenerative changes seen.    I have reviewed the nursing notes.    I have reviewed  the findings, diagnosis, plan and need for follow up with the patient.    Patient to be discharged home. Advised to follow up with PCP within 1 week. To return to ER immediately with any new/worsening symptoms. Plan of care discussed with patient who expresses understanding and agrees with plan of care.    Patient states he has multiple postop shoe/supportive footwear/braces that he can wear at home if needed to protect his injured foot.  He also has oxycodone to use as needed as well as over-the-counter pain medications to use for pain.  Patient was advised to elevate the affected extremity as much as possible and apply ice over the swollen area several times a day for the next 24 to 48 hours.  He was also advised to apply bacitracin to the abraded area and monitor closely for signs of infection and to return immediately should any signs of infection (such as cloudy discharge from the wound, surrounding warmth/induration, fevers/chills) develop, particularly given his history of peripheral vascular disease and diabetes which would hinder his ability to fight infection/heal.  Patient expresses understanding and agreement with this plan.    Discharge Medication List as of 5/20/2020  9:36 PM          Final diagnoses:   Left foot pain   Localized swelling of left foot   Abrasion       5/20/2020   [facility], [place], EMERGENCY DEPARTMENT     Denise Strong MD  05/20/20 [id number]

## 2020-07-17 ENCOUNTER — TELEPHONE (OUTPATIENT)
Dept: OTHER | Facility: CLINIC | Age: 65
End: 2020-07-17

## 2020-07-17 DIAGNOSIS — I73.9 PAD (PERIPHERAL ARTERY DISEASE) (H): Primary | ICD-10-CM

## 2020-07-17 NOTE — TELEPHONE ENCOUNTER
Patient is due for fasting labs & LLE Arterial US.    His followup was originally due in April.    Routing to Dr. Olson to determine if OZZIE without exercise is also needed at this time.  And if visit should be in-clinic or virtual.      Berna Hoover RN BSN  [facility]  [phone]

## 2020-07-20 NOTE — TELEPHONE ENCOUNTER
Alexandro is scheduled as follows:     7/27/2020 Labs    9/2/2020 Tele (video not available) visit with Dr. Olson.    Nicki MATOS

## 2020-07-27 ENCOUNTER — ANCILLARY PROCEDURE (OUTPATIENT)
Dept: ULTRASOUND IMAGING | Facility: CLINIC | Age: 65
End: 2020-07-27
Attending: INTERNAL MEDICINE
Payer: MEDICARE

## 2020-07-27 DIAGNOSIS — I73.9 PAD (PERIPHERAL ARTERY DISEASE) (H): ICD-10-CM

## 2020-07-27 DIAGNOSIS — E11.8 TYPE 2 DIABETES MELLITUS WITH COMPLICATION, WITH LONG-TERM CURRENT USE OF INSULIN (H): ICD-10-CM

## 2020-07-27 DIAGNOSIS — F17.200 TOBACCO USE DISORDER: ICD-10-CM

## 2020-07-27 DIAGNOSIS — E78.5 HYPERLIPIDEMIA LDL GOAL <70: ICD-10-CM

## 2020-07-27 DIAGNOSIS — Z79.4 TYPE 2 DIABETES MELLITUS WITH COMPLICATION, WITH LONG-TERM CURRENT USE OF INSULIN (H): ICD-10-CM

## 2020-07-27 LAB
ALBUMIN SERPL-MCNC: 3.2 G/DL (ref 3.4–5)
ALP SERPL-CCNC: 70 U/L (ref 40–150)
ALT SERPL W P-5'-P-CCNC: 17 U/L (ref 0–70)
ANION GAP SERPL CALCULATED.3IONS-SCNC: 6 MMOL/L (ref 3–14)
AST SERPL W P-5'-P-CCNC: 18 U/L (ref 0–45)
BASOPHILS # BLD AUTO: 0.1 10E9/L (ref 0–0.2)
BASOPHILS NFR BLD AUTO: 0.7 %
BILIRUB DIRECT SERPL-MCNC: 0.1 MG/DL (ref 0–0.2)
BILIRUB SERPL-MCNC: 0.4 MG/DL (ref 0.2–1.3)
BUN SERPL-MCNC: 16 MG/DL (ref 7–30)
CALCIUM SERPL-MCNC: 8.5 MG/DL (ref 8.5–10.1)
CHLORIDE SERPL-SCNC: 98 MMOL/L (ref 94–109)
CHOLEST SERPL-MCNC: NORMAL MG/DL
CO2 SERPL-SCNC: 25 MMOL/L (ref 20–32)
CREAT SERPL-MCNC: 0.84 MG/DL (ref 0.66–1.25)
CREAT UR-MCNC: 22 MG/DL
DIFFERENTIAL METHOD BLD: ABNORMAL
EOSINOPHIL # BLD AUTO: 0.3 10E9/L (ref 0–0.7)
EOSINOPHIL NFR BLD AUTO: 3.6 %
ERYTHROCYTE [DISTWIDTH] IN BLOOD BY AUTOMATED COUNT: 15.1 % (ref 10–15)
GFR SERPL CREATININE-BSD FRML MDRD: >90 ML/MIN/{1.73_M2}
GLUCOSE SERPL-MCNC: 78 MG/DL (ref 70–99)
HBA1C MFR BLD: 6.8 % (ref 0–5.6)
HCT VFR BLD AUTO: 42 % (ref 40–53)
HDL SERPL QN: NORMAL NM
HDL SERPL-SCNC: NORMAL NMOL/L
HDLC SERPL-MCNC: NORMAL MG/DL
HGB BLD-MCNC: 13.3 G/DL (ref 13.3–17.7)
HLD.LARGE SERPL-SCNC: NORMAL UMOL/L
IMM GRANULOCYTES # BLD: 0 10E9/L (ref 0–0.4)
IMM GRANULOCYTES NFR BLD: 0.6 %
LDL SERPL QN: NORMAL
LDL SERPL-SCNC: NORMAL NMOL/L
LDL SMALL SERPL-SCNC: NORMAL NMOL/L
LDLC SERPL CALC-MCNC: NORMAL MG/DL
LYMPHOCYTES # BLD AUTO: 1 10E9/L (ref 0.8–5.3)
LYMPHOCYTES NFR BLD AUTO: 14.7 %
MCH RBC QN AUTO: 27.3 PG (ref 26.5–33)
MCHC RBC AUTO-ENTMCNC: 31.7 G/DL (ref 31.5–36.5)
MCV RBC AUTO: 86 FL (ref 78–100)
MICROALBUMIN UR-MCNC: 10 MG/L
MICROALBUMIN/CREAT UR: 43.65 MG/G CR (ref 0–17)
MONOCYTES # BLD AUTO: 0.8 10E9/L (ref 0–1.3)
MONOCYTES NFR BLD AUTO: 12 %
NEUTROPHILS # BLD AUTO: 4.7 10E9/L (ref 1.6–8.3)
NEUTROPHILS NFR BLD AUTO: 68.4 %
NRBC # BLD AUTO: 0 10*3/UL
NRBC BLD AUTO-RTO: 0 /100
PATHOLOGY STUDY: NORMAL
PLATELET # BLD AUTO: 145 10E9/L (ref 150–450)
POTASSIUM SERPL-SCNC: 4 MMOL/L (ref 3.4–5.3)
PROT SERPL-MCNC: 8.1 G/DL (ref 6.8–8.8)
RBC # BLD AUTO: 4.88 10E12/L (ref 4.4–5.9)
SODIUM SERPL-SCNC: 128 MMOL/L (ref 133–144)
T3FREE SERPL-MCNC: 2.5 PG/ML (ref 2.3–4.2)
T4 FREE SERPL-MCNC: 1.1 NG/DL (ref 0.76–1.46)
TRIGL SERPL-MCNC: NORMAL MG/DL
TSH SERPL DL<=0.005 MIU/L-ACNC: 1.27 MU/L (ref 0.4–4)
VLDL LARGE SERPL-SCNC: NORMAL
VLDL SERPL QN: NORMAL NM
WBC # BLD AUTO: 6.9 10E9/L (ref 4–11)

## 2020-07-27 PROCEDURE — 84481 FREE ASSAY (FT-3): CPT | Performed by: INTERNAL MEDICINE

## 2020-07-28 DIAGNOSIS — E78.5 HYPERLIPIDEMIA LDL GOAL <70: ICD-10-CM

## 2020-07-31 LAB
CHOLEST SERPL-MCNC: 92 MG/DL
HDL SERPL QN: 8.5 NM
HDL SERPL-SCNC: 17 UMOL/L
HDLC SERPL-MCNC: 25 MG/DL (ref 40–59)
HLD.LARGE SERPL-SCNC: <2.8 UMOL/L
LDL SERPL QN: 20.6 NM
LDL SERPL-SCNC: 659 NMOL/L
LDL SMALL SERPL-SCNC: 442 NMOL/L
LDLC SERPL CALC-MCNC: 38 MG/DL
PATHOLOGY STUDY: ABNORMAL
TRIGL SERPL-MCNC: 146 MG/DL (ref 30–149)
VLDL LARGE SERPL-SCNC: 3 NMOL/L
VLDL SERPL QN: 48.1 NM

## 2020-09-02 ENCOUNTER — VIRTUAL VISIT (OUTPATIENT)
Dept: OTHER | Facility: CLINIC | Age: 65
End: 2020-09-02
Attending: INTERNAL MEDICINE
Payer: MEDICARE

## 2020-09-02 ENCOUNTER — TELEPHONE (OUTPATIENT)
Dept: OTHER | Facility: CLINIC | Age: 65
End: 2020-09-02

## 2020-09-02 DIAGNOSIS — I73.9 PAD (PERIPHERAL ARTERY DISEASE) (H): ICD-10-CM

## 2020-09-02 DIAGNOSIS — E11.8 TYPE 2 DIABETES MELLITUS WITH COMPLICATION, WITH LONG-TERM CURRENT USE OF INSULIN (H): ICD-10-CM

## 2020-09-02 DIAGNOSIS — I70.229 CRITICAL LOWER LIMB ISCHEMIA (H): ICD-10-CM

## 2020-09-02 DIAGNOSIS — Z79.4 TYPE 2 DIABETES MELLITUS WITH COMPLICATION, WITH LONG-TERM CURRENT USE OF INSULIN (H): ICD-10-CM

## 2020-09-02 DIAGNOSIS — E87.1 HYPONATREMIA: ICD-10-CM

## 2020-09-02 DIAGNOSIS — E78.5 HYPERLIPIDEMIA LDL GOAL <70: ICD-10-CM

## 2020-09-02 DIAGNOSIS — F17.200 TOBACCO USE DISORDER: Primary | ICD-10-CM

## 2020-09-02 DIAGNOSIS — I10 HYPERTENSION GOAL BP (BLOOD PRESSURE) < 140/90: ICD-10-CM

## 2020-09-02 LAB
ANION GAP SERPL CALCULATED.3IONS-SCNC: 7 MMOL/L (ref 3–14)
BUN SERPL-MCNC: 17 MG/DL (ref 7–30)
CALCIUM SERPL-MCNC: 9 MG/DL (ref 8.5–10.1)
CHLORIDE SERPL-SCNC: 103 MMOL/L (ref 94–109)
CO2 SERPL-SCNC: 28 MMOL/L (ref 20–32)
CREAT SERPL-MCNC: 0.89 MG/DL (ref 0.66–1.25)
GFR SERPL CREATININE-BSD FRML MDRD: 89 ML/MIN/{1.73_M2}
GLUCOSE SERPL-MCNC: 142 MG/DL (ref 70–99)
POTASSIUM SERPL-SCNC: 4 MMOL/L (ref 3.4–5.3)
SODIUM SERPL-SCNC: 137 MMOL/L (ref 133–144)

## 2020-09-02 PROCEDURE — 99443 ZZC PHYSICIAN TELEPHONE EVALUATION 21-30 MIN: CPT | Performed by: INTERNAL MEDICINE

## 2020-09-02 RX ORDER — LIRAGLUTIDE 6 MG/ML
1.8 INJECTION SUBCUTANEOUS DAILY
Qty: 27 ML | Refills: 3 | Status: SHIPPED | OUTPATIENT
Start: 2020-09-02 | End: 2021-11-16

## 2020-09-02 RX ORDER — LISINOPRIL 2.5 MG/1
TABLET ORAL
Qty: 180 TABLET | Refills: 3 | Status: SHIPPED | OUTPATIENT
Start: 2020-09-02 | End: 2021-11-16

## 2020-09-02 RX ORDER — ATORVASTATIN CALCIUM 80 MG/1
80 TABLET, FILM COATED ORAL AT BEDTIME
Qty: 90 TABLET | Refills: 3 | Status: SHIPPED | OUTPATIENT
Start: 2020-09-02 | End: 2021-11-16

## 2020-09-02 RX ORDER — AMLODIPINE BESYLATE 2.5 MG/1
2.5 TABLET ORAL 2 TIMES DAILY
Qty: 180 TABLET | Refills: 3 | Status: SHIPPED | OUTPATIENT
Start: 2020-09-02 | End: 2022-10-11

## 2020-09-02 RX ORDER — PEN NEEDLE, DIABETIC 32GX 5/32"
NEEDLE, DISPOSABLE MISCELLANEOUS
Qty: 120 EACH | Refills: 11 | Status: SHIPPED | OUTPATIENT
Start: 2020-09-02 | End: 2022-07-08

## 2020-09-02 RX ORDER — INSULIN GLARGINE 100 [IU]/ML
46 INJECTION, SOLUTION SUBCUTANEOUS AT BEDTIME
Qty: 45 ML | Refills: 3 | Status: SHIPPED | OUTPATIENT
Start: 2020-09-02 | End: 2021-11-16

## 2020-09-02 NOTE — TELEPHONE ENCOUNTER
Please arrange for:      BMP (non-fasting) 9/2/20 or 9/3/20    Video follow up 8:40 Friday 9/4/20    Thank you.

## 2020-09-02 NOTE — PROGRESS NOTES
"Alexandro Pool is a 65 year old male who is being evaluated via a billable telephone visit.      The patient has been notified of following:     \"This telephone visit will be conducted via a call between you and your physician/provider. We have found that certain health care needs can be provided without the need for a physical exam.  This service lets us provide the care you need with a short phone conversation.  If a prescription is necessary we can send it directly to your pharmacy.  If lab work is needed we can place an order for that and you can then stop by our lab to have the test done at a later time.    Telephone visits are billed at different rates depending on your insurance coverage. During this emergency period, for some insurers they may be billed the same as an in-person visit.  Please reach out to your insurance provider with any questions.    If during the course of the call the physician/provider feels a telephone visit is not appropriate, you will not be charged for this service.\"    Patient has given verbal consent for Telephone visit? Yes     What phone number would you like to be contacted at? Mobile phone number [phone]    How would you like to obtain your AVS? Mailed     Patient has not obtained vitals on 9/2/20   "

## 2020-09-02 NOTE — PROGRESS NOTES
Alexandro Pool is a 65 year old male who is presenting at the current time to discuss his diagnosi(es) of        Tobacco use disorder  PAD (peripheral artery disease) (H)  Hyperlipidemia LDL goal <70  Hypertension goal BP (blood pressure) < 140/90  Type 2 diabetes mellitus with complication, with long-term current use of insulin (H)  Hyponatremia .      HPI: The patietn is a non ambulatory 65 year old white male due to a prior Rt AKA. He also has PAD and unfortunately continues to smoke while now remaining better glycemically controlled from a diabetic perspective while on a mutli drug regimen. His renal function remains intact. He has no claudciation as aforementioned, as he is non ambualtory. He has no CLI sxs and no non healing ulcers. In 2013 he had a Lt Fem-AT bypass undertaken by Dr. Suggs. In 2017 he had a parasitic sie branch ligated. His graft velocities are starting to increase at the PAG, but he is asx form this. Flow distal to the PAG is intact. There is not a significant graft stenosis on recent U/S. His lipids are well contorlled and he has low Na while not on aldactone. He has a wound on his left foot. He states this is healing  With local wound care performed by [facility] in [place].    Review Of Systems  Skin: negative  Eyes: negative  Ears/Nose/Throat: negative  Respiratory: No shortness of breath, dyspnea on exertion, cough, or hemoptysis  Cardiovascular: negative  Gastrointestinal: negative  Genitourinary: negative  Musculoskeletal: negative  Neurologic: negative  Psychiatric: negative  Hematologic/Lymphatic/Immunologic: negative  Endocrine: negative         Physical exam was not performed as this was a billable telephone encounter mandated by COVID.        LEFT LOWER EXTREMITY DUPLEX ARTERIAL ULTRASOUND 8/26/2019     CLINICAL HISTORY: Left superficial femoral to anterior tibial bypass  graft reevaluation.     COMPARISON: 5/23/2018     REFERRING PROVIDER: VIKY URIARTE  HANNAH     TECHNIQUE: Grayscale, color Doppler, Doppler waveform ultrasound  evaluation of the left leg arteries and bypass graft.     FINDINGS: LEFT:  EXTERNAL ILIAC ARTERY: 140/20 cm/s, monophasic, brisk up and  downstroke with prolonged flat diastolic flow.     COMMON FEMORAL ARTERY: 149/17 cm/s, monophasic, brisk up and  downstroke with prolonged flat diastolic flow.     BYPASS GRAFT, proximal anastomosis: 147/20 cm/s, monophasic, brisk up  and downstroke with prolonged flat diastolic flow.  BYPASS GRAFT, proximal thigh: 44/0 cm/s, biphasic  BYPASS GRAFT, mid thigh: 77/10 cm/s, monophasic, brisk up and  downstroke with prolonged flat diastolic flow.  BYPASS GRAFT, distal thigh: 52/9 cm/s, triphasic  BYPASS GRAFT, knee: 48/10 cm/s, monophasic, brisk up and downstroke  with prolonged flat diastolic flow.  BYPASS GRAFT, proximal calf: 55/10 cm/s, monophasic, brisk up and  downstroke with prolonged flat diastolic flow.  BYPASS GRAFT, mid calf: 66/11 cm/s, monophasic, brisk up and  downstroke with prolonged flat diastolic flow.  BYPASS GRAFT, distal anastomosis: 55/10 cm/s, monophasic, brisk up and  downstroke with prolonged flat diastolic flow.     ANTERIOR TIBIAL ARTERY, above anastomosis: 72/5 cm/s, RETROGRADE,  monophasic, brisk up and downstroke with prolonged flat diastolic  flow.     ANTERIOR TIBIAL ARTERY, below anastomosis: 87/16 cm/s, monophasic,  brisk up and downstroke with prolonged flat diastolic flow.     ANTERIOR TIBIAL ARTERY, ankle: 93/13 cm/s, monophasic, brisk up and  downstroke with prolonged flat diastolic flow.     POSTERIOR TIBIAL ARTERY, ankle: 18/7 cm/s, monophasic                                                                      IMPRESSION: No stenosis demonstrated through the left femoral to  anterior tibial bypass graft. Change in waveforms from previously  triphasic to monophasic with brisk up and downstroke and prolonged  flat diastolic flow may represent distal vasodilation or  hyperemia.        BETTE JAY MD    US LOWER EXTREMITY ARTERIAL DUPLEX LEFT  1/30/2020 2:11 PM      HISTORY: Patient is status post a left superficial femoral artery to  anterior tibial artery in situ bypass graft.     COMPARISON: None.     FINDINGS: Color Doppler and spectral waveform analysis performed.     The left superficial femoral artery to anterior tibial artery in situ  bypass graft is patent. There is calcified plaque in the inflow  superficial femoral artery this contributes to mild elevations in peak  systolic velocities of 185 and 203 cm/s. Corresponding luminal  diameter of the inflow artery is 5.7 mm.     Diameters of the bypass graft range between 6.5 to 7.8 mm. No  significant elevations in peak systolic velocity are noted within the  graft.                                                                      IMPRESSION: Patent left superficial femoral artery to anterior tibial  artery bypass graft. No significant stenoses within the graft. Mild  stenosis in the inflow artery.     DELMY FRIEND MD    EXAMINATIONS 7/27/2020:  1. RESTING LEFT OZZIE, TBI, and VPR   2. LEFT LEG DUPLEX ARTERIAL ULTRASOUND     CLINICAL HISTORY: Left femoral anterior tibial bypass graft  evaluation. History of right below-knee amputation.     COMPARISONS: Duplex arterial ultrasound 1/30/2020. OZZIE 8/26/2019.     REFERRING PROVIDER: VIKY URIARTE ANDREW     TECHNIQUE: Resting left OZZIE TBI and VPR obtained. Left leg arteries  and bypass graft evaluated with grayscale, color Doppler, and Doppler  waveform ultrasound.     FINDINGS:  RIGHT:       Brachial: 114 mmHg     LEFT:       Brachial: 126 mmHg       Ankle (PT): 90 mmHg       Ankle (DP): 105 mmHg       1st Digit: 112 mmHg          OZZIE: 0.83, previously 0.87       TBI: 0.89          VPR:            High thigh: Normal            Low thigh: Normal            Calf: Normal            Ankle: Normal          External iliac artery: 139/21 cm/s, biphasic       Common femoral  artery, proximal to anastomosis: 166/21 cm/s,  biphasic          Femoral - anterior tibial bypass graft, proximal anastomosis:  171/16 cm/s, biphasic       Femoral - anterior tibial bypass graft, proximal thigh: 48/7  cm/s, biphasic       Femoral - anterior tibial bypass graft, mid thigh: 79/11 cm/s,  biphasic           Femoral - anterior tibial bypass graft, distal thigh: 100/12  cm/s, biphasic            Femoral - anterior tibial bypass graft, knee: 54/9 cm/s, biphasic             Femoral - anterior tibial bypass graft, proximal calf: 63/11  cm/s, biphasic       Femoral - anterior tibial bypass graft, mid calf: 83/14 cm/s,  biphasic       Femoral - anterior tibial bypass graft, mid calf: 59/9 cm/s,  biphasic       Femoral - anterior tibial bypass graft, distal anastomosis: 68/11  cm/s, biphasic          Anterior tibial artery, distal calf, distal to anastomosis: 94/11  cm/s, biphasic       Anterior tibial artery, ankle: 111/19 cm/s, biphasic                                                                      IMPRESSION:  1. LEFT OZZIE is ABNORMAL, 0.83, previously 0.87.     2. LEFT TBI is normal: 0.89.     3. Patent left femoral to anterior tibial artery bypass graft. No  significant stenosis demonstrated.     Guidelines:     OZZIE Diagnostic Criteria (Based on criteria published in Circulation  2011; 124: 3499-7205):    > 1.4: Non compressible    1.00 - 1.40: Normal    0.91 - 0.99: Borderline    At or below 0.90: Abnormal     OZZIE Diagnostic Criteria (Based on ACC/AHA guideline 2008):    >/=1.3 - non compressible vessels    1.00  -1.29 - Normal    0.91 - 0.99 - Borderline    0.41 - 0.90 - Mild to moderate PAD    0.00 - 0.40 - Severe PAD     BETTE JAY MD    Component      Latest Ref Rng & Units 1/7/2020 7/27/2020 7/28/2020   WBC      4.0 - 11.0 10e9/L  6.9    RBC Count      4.4 - 5.9 10e12/L  4.88    Hemoglobin      13.3 - 17.7 g/dL  13.3    Hematocrit      40.0 - 53.0 %  42.0    MCV      78 - 100 fl  86    MCH       26.5 - 33.0 pg  27.3    MCHC      31.5 - 36.5 g/dL  31.7    RDW      10.0 - 15.0 %  15.1 (H)    Platelet Count      150 - 450 10e9/L  145 (L)    Diff Method        Automated Method    % Neutrophils      %  68.4    % Lymphocytes      %  14.7    % Monocytes      %  12.0    % Eosinophils      %  3.6    % Basophils      %  0.7    % Immature Granulocytes      %  0.6    Nucleated RBCs      0 /100  0    Absolute Neutrophil      1.6 - 8.3 10e9/L  4.7    Absolute Lymphocytes      0.8 - 5.3 10e9/L  1.0    Absolute Monocytes      0.0 - 1.3 10e9/L  0.8    Absolute Eosinophils      0.0 - 0.7 10e9/L  0.3    Absolute Basophils      0.0 - 0.2 10e9/L  0.1    Abs Immature Granulocytes      0 - 0.4 10e9/L  0.0    Absolute Nucleated RBC        0.0    Total Cholesterol      <=199 mg/dL 126 Canceled, Test credited 92   Triglycerides      30 - 149 mg/dL 247 (H) Canceled, Test credited 146   HDL Cholesterol      40 - 59 mg/dL 26 (L) Canceled, Test credited 25 (L)   LDL Cholesterol      <=129 mg/dL 51 Canceled, Test credited 38   HDL Size      >=8.9 nm 8.4 (L) Canceled, Test credited 8.5 (L)   VLDL Size      <=46.7 nm 58.7 (H) Canceled, Test credited 48.1 (H)   LDL Particle Size      >=20.7 nm 20.5 (L) Canceled, Test credited 20.6 (L)   Lge HDL Particle number      >=4.2 umol/L <2.8 (L) Canceled, Test credited <2.8 (L)   HDL Particle Number NMR      >=33.0 umol/L 21.7 (L) Canceled, Test credited 17.0 (L)   Lge VLDL Part number      <=2.7 nmol/L 12.1 (H) Canceled, Test credited 3.0 (H)   Small LDL Particle number      <=634 nmol/L 585 Canceled, Test credited 442   LDL Particle Number      <=1,135 nmol/L 1,034 Canceled, Test credited 659   EER LipoFit by NMR       SEE NOTE Canceled, Test credited SEE NOTE   Sodium      133 - 144 mmol/L  128 (L)    Potassium      3.4 - 5.3 mmol/L  4.0    Chloride      94 - 109 mmol/L  98    Carbon Dioxide      20 - 32 mmol/L  25    Anion Gap      3 - 14 mmol/L  6    Glucose      70 - 99 mg/dL  78    Urea  Nitrogen      7 - 30 mg/dL  16    Creatinine      0.66 - 1.25 mg/dL  0.84    GFR Estimate      >60 mL/min/1.73:m2  >90    GFR Estimate If Black      >60 mL/min/1.73:m2  >90    Calcium      8.5 - 10.1 mg/dL  8.5    Bilirubin Direct      0.0 - 0.2 mg/dL  0.1    Bilirubin Total      0.2 - 1.3 mg/dL  0.4    Albumin      3.4 - 5.0 g/dL  3.2 (L)    Protein Total      6.8 - 8.8 g/dL  8.1    Alkaline Phosphatase      40 - 150 U/L  70    ALT      0 - 70 U/L  17    AST      0 - 45 U/L  18    Creatinine Urine      mg/dL  22    Albumin Urine mg/L      mg/L  10    Albumin Urine mg/g Cr      0 - 17 mg/g Cr  43.65 (H)    Hemoglobin A1C      0 - 5.6 % 7.8 (H) 6.8 (H)    CRP Cardiac Risk      mg/L 9.7     Lipoprotein (a)      <=29 mg/dL 8     TSH      0.40 - 4.00 mU/L  1.27    T4 Free      0.76 - 1.46 ng/dL  1.10    Free T3      2.3 - 4.2 pg/mL  2.5           A/P:      (E87.1) Hyponatremia  Comment: not on diuretics. Asx, will recheck as I suspect a false abnormal result  Plan: Basic metabolic panel                   (F17.200) Tobacco use disorder  Comment: He is informed that he is likely to be successful in becoming a double amputee should he continue to smoke. He declines cessation assistance.  Plan: HC SMOKING CESSATION COUNSELING, ASYMPTOMATIC,         3-10 MIN, CBC with platelets differential            (I73.9) PAD S/P Lt SFA stenting 3-9-2012, Lt fem-SENIA bypass w/ ISGSV 9-, S/P Rt AKA 2014, ligation of parasitic branch 9/19/2017  Comment: No current intervention warranted. Monitor with surveillance duplex in one year, RTC for non healing ulcer or rest pain  Plan: CBC with platelets differential, Basic         metabolic panel, LipoFit by NMR, US Lower         Extremity Arterial Duplex Left            (E78.5) Hyperlipidemia LDL goal <70  Comment: at goal  Plan: T3 Free, T4 free, TSH, LipoFit by NMR            (I10) Hypertension goal BP (blood pressure) < 140/90  Comment: at goal  Plan: no med changes     (E11.8,  Z79.4)  Type 2 diabetes mellitus with complication, with long-term current use of insulin (H)  Comment: at goal; Added Victoza  Plan: CBC with platelets differential, Hepatic panel,        Hemoglobin A1c, Albumin Random Urine         Quantitative with Creat Ratio, liraglutide         (VICTOZA) 18 MG/3ML solution               Greater than one half the forty minutes total spent on the pt's visit were spent providing education and counselling to the patient regarding the above matters. Extended time secondary to patient complexity.

## 2020-09-02 NOTE — TELEPHONE ENCOUNTER
Patient is scheduled for his BMP (non-fasting) Lab today (9/2/20) and   Telephone follow up Visit  (Unable to do Video visit) with Dr Olson @ 8:40 on 9/4/20.

## 2020-09-04 ENCOUNTER — VIRTUAL VISIT (OUTPATIENT)
Dept: OTHER | Facility: CLINIC | Age: 65
End: 2020-09-04
Attending: INTERNAL MEDICINE
Payer: MEDICARE

## 2020-09-04 DIAGNOSIS — E87.1 HYPONATREMIA: ICD-10-CM

## 2020-09-04 PROCEDURE — 99442 ZZC PHYSICIAN TELEPHONE EVALUATION 11-20 MIN: CPT | Performed by: INTERNAL MEDICINE

## 2020-09-04 NOTE — PROGRESS NOTES
Alexandro Pool is a 65 year old male who is presenting at the current time to discuss his diagnosi(es) of     Hyponatremia     HPI: The patient  is a non ambulatory 65 year old white male due to a prior Rt AKA. He also has PAD and unfortunately continues to smoke while now remaining better glycemically controlled from a diabetic perspective while on a mutli drug regimen. His renal function remains intact. He has no claudciation as aforementioned, as he is non ambualtory. He has no CLI sxs and no non healing ulcers. In 2013 he had a Lt Fem-AT bypass undertaken by Dr. Suggs. In 2017 he had a parasitic side branch ligated. His graft velocities are starting to increase at the PAG, but he is asx form this. Flow distal to the PAG is intact. There is not a significant graft stenosis on recent U/S. His lipids are well contorlled and he has low Na while not on aldactone. He has a wound on his left foot. He states this is healing  With local wound care performed by [facility] in [place].    When last seen on 9/220, I suspected his hyponatremia was a false positive. No med changes were made, labs were redrawn, and he presents today to go over those results..      Review Of Systems  Skin: negative  Eyes: negative  Ears/Nose/Throat: negative  Respiratory: No shortness of breath, dyspnea on exertion, cough, or hemoptysis  Cardiovascular: negative  Gastrointestinal: negative  Genitourinary: negative  Musculoskeletal: positive for left foot healing wound  Neurologic: negative  Psychiatric: negative  Hematologic/Lymphatic/Immunologic: negative  Endocrine: negative     PAST MEDICAL HISTORY:                  Past Medical History:   Diagnosis Date     Acute kidney failure, unspecified (H)      Blood transfusion      CAD (coronary artery disease)      CARDIOVASCULAR SCREENING; LDL GOAL LESS THAN 100      Cirrhosis (H)      Critical lower limb ischemia      Diabetes mellitus (H) 10/2011     Hypertension      Hypertension goal  BP (blood pressure) < 140/90      Ischemic cardiomyopathy      Lymphedema of left leg 5/11/2016     Peripheral arterial disease (H)      PVD (peripheral vascular disease) (H)      Right above knee amputation 4/30/2014     Type 2 diabetes, HbA1C goal < 8% (H)        PAST SURGICAL HISTORY:                  Past Surgical History:   Procedure Laterality Date     AMPUTATE LEG ABOVE KNEE  11/22/2011    Procedure:AMPUTATE LEG ABOVE KNEE; Revise Right Below Knee Amputation To Above Knee Amputation; Surgeon:MADISON WELLS; Location:[facility] OR     AMPUTATE LEG BELOW KNEE  11/10/2011    Procedure:AMPUTATE LEG BELOW KNEE; Right Below Knee Amputation; Surgeon:MADISON WELLS; Location:[facility] OR     BYPASS GRAFT FEMOROTIBIAL  9/19/2013    Procedure: BYPASS GRAFT FEMOROTIBIAL;  Left Femorotibial Bypass Graft Insitu ;  Surgeon: Marisol Suggs MD;  Location:  OR     CARDIAC SURGERY      cardiac stent     ESOPHAGOSCOPY, GASTROSCOPY, DUODENOSCOPY (EGD), COMBINED  10/1/2012    Procedure: COMBINED ESOPHAGOSCOPY, GASTROSCOPY, DUODENOSCOPY (EGD);;  Surgeon: Nehemias Cevallos MD;  Location:  GI     ESOPHAGOSCOPY, GASTROSCOPY, DUODENOSCOPY (EGD), COMBINED N/A 3/24/2016    Procedure: COMBINED ESOPHAGOSCOPY, GASTROSCOPY, DUODENOSCOPY (EGD);  Surgeon: Gildardo Marks MD;  Location:  GI     IR STENT VASCULAR LT  3/9/2012          IRRIGATION AND DEBRIDEMENT LOWER EXTREMITY, COMBINED  11/15/2011    Procedure:COMBINED IRRIGATION AND DEBRIDEMENT LOWER EXTREMITY; DRAINAGE OF ABSCESS AT BELOW KNEE AMPUTATION AND KNEE DISARTICULATION.; Surgeon:MADISON WELLS; Location: OR     NO HISTORY OF SURGERY  7/12/13    derm     VASCULAR REPAIR LOWER EXTREMITY Left 9/19/2017    Procedure: VASCULAR REPAIR LOWER EXTREMITY;  Ligation Parasitic Branch To Left Lower Extremity ;  Surgeon: Marisol Suggs MD;  Location:  OR       CURRENT MEDICATIONS:                  Current Outpatient Medications   Medication Sig Dispense Refill     acetaminophen (TYLENOL) 325 MG tablet  Take 1 tablet by mouth every 4 hours as needed. 250 tablet 0     amLODIPine (NORVASC) 2.5 MG tablet Take 1 tablet (2.5 mg) by mouth 2 times daily 180 tablet 3     ammonium lactate (AMLACTIN) 12 % cream Apply topically 2 times daily as needed for dry skin apply to both legs twice daily as needed  5     ASPIRIN EC PO Take 81 mg by mouth daily       atorvastatin (LIPITOR) 80 MG tablet Take 1 tablet (80 mg) by mouth At Bedtime 90 tablet 3     empagliflozin (JARDIANCE) 25 MG TABS tablet Take 1 tablet (25 mg) by mouth daily 90 tablet 3     gabapentin (NEURONTIN) 100 MG capsule Take 1 capsule (100 mg) by mouth 3 times daily 90 capsule 0     hydrocortisone 1 % cream Apply topically 2 times daily PRN       insulin glargine (BASAGLAR KWIKPEN) 100 UNIT/ML pen Inject 46 Units Subcutaneous At Bedtime 45 mL 3     insulin lispro (HUMALOG VIAL) SOLN 100 UNIT/ML Inject Subcutaneous 3 times daily (before meals) 18 units before each meal       insulin pen needle (BD KWABENA U/F) 32G X 4 MM miscellaneous Use 4x  daily or as directed. 120 each 11     liraglutide (VICTOZA) 18 MG/3ML solution Inject 1.8 mg Subcutaneous daily 27 mL 3     lisinopril (ZESTRIL) 2.5 MG tablet TAKE 1 TABLET (2.5 MG) BY MOUTH 2 TIMES DAILY 180 tablet 3     METOPROLOL SUCCINATE ER PO Take 75 mg by mouth daily       morphine (MS CONTIN) 15 MG 12 hr tablet Take 3 tablets (45 mg) by mouth 2 times daily 60 tablet 0     order for DME Equipment being ordered: FlexiTouch pneumatic compression device.  2-3 times per day to left lower extremity.  Current strength - L4 1 Units      oxyCODONE (ROXICODONE) 10 MG immediate release tablet Take 1.5 tablets (15 mg) by mouth every 4 hours as needed 60 tablet 0     rivaroxaban ANTICOAGULANT (XARELTO ANTICOAGULANT) 2.5 MG TABS tablet Take 1 tablet (2.5 mg) by mouth 2 times daily 180 tablet 3     SENNA PO 1 Tab , bedtime         ALLERGIES:                  Allergies   Allergen Reactions     Ciprofloxacin Rash       SOCIAL HISTORY:                   Social History     Socioeconomic History     Marital status:      Spouse name: Not on file     Number of children: 0     Years of education: Not on file     Highest education level: Not on file   Occupational History     Employer: UNKNOWN   Social Needs     Financial resource strain: Not on file     Food insecurity     Worry: Not on file     Inability: Not on file     Transportation needs     Medical: Not on file     Non-medical: Not on file   Tobacco Use     Smoking status: Current Every Day Smoker     Packs/day: 1.00     Years: 40.00     Pack years: 40.00     Types: Cigarettes     Smokeless tobacco: Never Used     Tobacco comment: working on quitting   Substance and Sexual Activity     Alcohol use: Yes     Alcohol/week: 1.0 standard drinks     Types: 1 Standard drinks or equivalent per week     Comment: Once a month or less     Drug use: No     Sexual activity: Not Currently   Lifestyle     Physical activity     Days per week: Not on file     Minutes per session: Not on file     Stress: Not on file   Relationships     Social connections     Talks on phone: Not on file     Gets together: Not on file     Attends Sikh service: Not on file     Active member of club or organization: Not on file     Attends meetings of clubs or organizations: Not on file     Relationship status: Not on file     Intimate partner violence     Fear of current or ex partner: Not on file     Emotionally abused: Not on file     Physically abused: Not on file     Forced sexual activity: Not on file   Other Topics Concern     Parent/sibling w/ CABG, MI or angioplasty before 65F 55M? No      Service Not Asked     Blood Transfusions Not Asked     Caffeine Concern Yes     Occupational Exposure Not Asked     Hobby Hazards Not Asked     Sleep Concern Not Asked     Stress Concern Not Asked     Weight Concern Not Asked     Special Diet Not Asked     Back Care Not Asked     Exercise No     Bike Helmet Not Asked     Seat  Belt Not Asked     Self-Exams Not Asked   Social History Narrative     Not on file       FAMILY HISTORY:                   Family History   Problem Relation Age of Onset     Alcohol/Drug Mother         cirrhosis     Alcohol/Drug Father      C.A.D. No family hx of      Diabetes No family hx of      Cancer No family hx of            Physical exam was not performed as it was a COVID mandated billable telephone encounter.    Component      Latest Ref Rng & Units 7/27/2020 9/2/2020   Sodium      133 - 144 mmol/L 128 (L) 137   Potassium      3.4 - 5.3 mmol/L 4.0 4.0   Chloride      94 - 109 mmol/L 98 103   Carbon Dioxide      20 - 32 mmol/L 25 28   Anion Gap      3 - 14 mmol/L 6 7   Glucose      70 - 99 mg/dL 78 142 (H)   Urea Nitrogen      7 - 30 mg/dL 16 17   Creatinine      0.66 - 1.25 mg/dL 0.84 0.89   GFR Estimate      >60 mL/min/1.73:m2 >90 89   GFR Estimate If Black      >60 mL/min/1.73:m2 >90 >90   Calcium      8.5 - 10.1 mg/dL 8.5 9.0       A/P:    (E87.1) Hyponatremia  Comment: resolved  Plan: no further f/u required of this, no med changes required. Other f/u per 9/2/2020 virtual visit instructions.     Greater than one half the fifteen minutes total spent on the pt's visit were spent providing education and counselling to the patient regarding the above matters.

## 2020-09-04 NOTE — PROGRESS NOTES
"Alexandro Pool is a 65 year old male who is being evaluated via a billable telephone visit.      The patient has been notified of following:     \"This telephone visit will be conducted via a call between you and your physician/provider. We have found that certain health care needs can be provided without the need for a physical exam.  This service lets us provide the care you need with a short phone conversation.  If a prescription is necessary we can send it directly to your pharmacy.  If lab work is needed we can place an order for that and you can then stop by our lab to have the test done at a later time.    Telephone visits are billed at different rates depending on your insurance coverage. During this emergency period, for some insurers they may be billed the same as an in-person visit.  Please reach out to your insurance provider with any questions.    If during the course of the call the physician/provider feels a telephone visit is not appropriate, you will not be charged for this service.\"    Patient has given verbal consent for Telephone visit?  Yes     What phone number would you like to be contacted at? Home phone number [phone]    How would you like to obtain your AVS? Mailed     Patient has not obtained vitals on 9/4/20   "

## 2020-12-03 ENCOUNTER — TRANSCRIBE ORDERS (OUTPATIENT)
Dept: OTHER | Age: 65
End: 2020-12-03

## 2020-12-03 DIAGNOSIS — Z12.11 SCREENING FOR MALIGNANT NEOPLASM OF COLON: Primary | ICD-10-CM

## 2020-12-08 ENCOUNTER — TELEPHONE (OUTPATIENT)
Dept: GASTROENTEROLOGY | Facility: CLINIC | Age: 65
End: 2020-12-08

## 2020-12-08 NOTE — TELEPHONE ENCOUNTER
Patient is scheduled for colonoscopy with Dr. Marshall    Spoke with: Alexandro Pool    Date of Procedure: 1/12/2021    Location: [facility] [place] [facility]    Sedation Type C/S    Pre-op for Unit J MAC not needed    (if yes advise patient they will need a pre-op prior to procedure)      Is patient on blood thinners? -yes (If yes- inform patient to follow up with PCP or provider for follow up instructions)     Informed patient they will need an adult  yes    Informed Patient of COVID Test Requirement yes and scheduled    Preferred Pharmacy for Pre Prescription on chart    Confirmed Nurse will call to complete assessment yes    Additional comments: He is on Morphine x2 a day. I sent a message to the provider that placed the order to verify the sedation level

## 2020-12-13 DIAGNOSIS — Z11.59 ENCOUNTER FOR SCREENING FOR OTHER VIRAL DISEASES: Primary | ICD-10-CM

## 2020-12-22 ENCOUNTER — TELEPHONE (OUTPATIENT)
Dept: GASTROENTEROLOGY | Facility: OUTPATIENT CENTER | Age: 65
End: 2020-12-22

## 2020-12-22 DIAGNOSIS — Z11.59 ENCOUNTER FOR SCREENING FOR OTHER VIRAL DISEASES: Primary | ICD-10-CM

## 2020-12-22 NOTE — TELEPHONE ENCOUNTER
Caller: [facility]    Reason for cancel? Cx 1/12 Visckocil due to provider schedule change    Rescheduled? Y- 1/13/2021 w/ [facility] @ [facility]    Will patient call back to reschedule?N

## 2021-01-06 ENCOUNTER — TELEPHONE (OUTPATIENT)
Dept: GASTROENTEROLOGY | Facility: OUTPATIENT CENTER | Age: 66
End: 2021-01-06

## 2021-01-06 NOTE — TELEPHONE ENCOUNTER
LVM for pt stating Per Linda, patient is non ambulatory and uses electric wheelchair. Unless someone can transfer with minimal or no assistance patient must be rescheduled to different location then what is currently scheduled at [facility].

## 2021-01-06 NOTE — TELEPHONE ENCOUNTER
Patient states he needs minimal assistance. He would like to remain as is scheduled at [facility].

## 2021-01-07 ENCOUNTER — TELEPHONE (OUTPATIENT)
Dept: GASTROENTEROLOGY | Facility: OUTPATIENT CENTER | Age: 66
End: 2021-01-07

## 2021-01-08 ENCOUNTER — TELEPHONE (OUTPATIENT)
Dept: GASTROENTEROLOGY | Facility: OUTPATIENT CENTER | Age: 66
End: 2021-01-08

## 2021-01-08 DIAGNOSIS — Z12.11 ENCOUNTER FOR SCREENING COLONOSCOPY: Primary | ICD-10-CM

## 2021-01-08 RX ORDER — BISACODYL 5 MG
5 TABLET, DELAYED RELEASE (ENTERIC COATED) ORAL SEE ADMIN INSTRUCTIONS
Qty: 4 TABLET | Refills: 0 | Status: ON HOLD | OUTPATIENT
Start: 2021-01-08 | End: 2022-10-05

## 2021-01-08 NOTE — TELEPHONE ENCOUNTER
Patient taking any blood thinners ? Tushar JOE at patient's assisted living said they had orders from patient's provider about holding Xeralto. about when      Heart disease ? No      Lung disease ? No      Sleep apnea ? No      Diabetic ? Type 2     Kidney disease ? No      Electronic implanted medical devices ? No      PTSD ? N/a      Prep instructions reviewed with patient ? Pre-assessment completed with Tushar JOE at patient's assisted living     Medical transportation    Ph[facility] : [facility]     Indication for procedure : Screening colonoscopy     Referring provider : Juan Luis Abraham MD      Arrival Time : 7:30 AM

## 2021-01-09 DIAGNOSIS — Z11.59 ENCOUNTER FOR SCREENING FOR OTHER VIRAL DISEASES: ICD-10-CM

## 2021-01-09 LAB
SARS-COV-2 RNA RESP QL NAA+PROBE: NORMAL
SPECIMEN SOURCE: NORMAL

## 2021-01-09 PROCEDURE — U0005 INFEC AGEN DETEC AMPLI PROBE: HCPCS | Performed by: PATHOLOGY

## 2021-01-09 PROCEDURE — U0003 INFECTIOUS AGENT DETECTION BY NUCLEIC ACID (DNA OR RNA); SEVERE ACUTE RESPIRATORY SYNDROME CORONAVIRUS 2 (SARS-COV-2) (CORONAVIRUS DISEASE [COVID-19]), AMPLIFIED PROBE TECHNIQUE, MAKING USE OF HIGH THROUGHPUT TECHNOLOGIES AS DESCRIBED BY CMS-2020-01-R: HCPCS | Performed by: PATHOLOGY

## 2021-01-10 LAB
LABORATORY COMMENT REPORT: NORMAL
SARS-COV-2 RNA RESP QL NAA+PROBE: NEGATIVE
SPECIMEN SOURCE: NORMAL

## 2021-01-13 ENCOUNTER — TRANSFERRED RECORDS (OUTPATIENT)
Dept: HEALTH INFORMATION MANAGEMENT | Facility: CLINIC | Age: 66
End: 2021-01-13

## 2021-01-13 ENCOUNTER — DOCUMENTATION ONLY (OUTPATIENT)
Dept: GASTROENTEROLOGY | Facility: OUTPATIENT CENTER | Age: 66
End: 2021-01-13
Payer: MEDICARE

## 2021-10-07 DIAGNOSIS — E11.8 TYPE 2 DIABETES MELLITUS WITH COMPLICATION, WITH LONG-TERM CURRENT USE OF INSULIN (H): ICD-10-CM

## 2021-10-07 DIAGNOSIS — Z79.4 TYPE 2 DIABETES MELLITUS WITH COMPLICATION, WITH LONG-TERM CURRENT USE OF INSULIN (H): ICD-10-CM

## 2021-10-07 DIAGNOSIS — I73.9 PAD (PERIPHERAL ARTERY DISEASE) (H): ICD-10-CM

## 2021-10-07 RX ORDER — EMPAGLIFLOZIN 25 MG/1
TABLET, FILM COATED ORAL
Qty: 30 TABLET | Refills: 0 | Status: SHIPPED | OUTPATIENT
Start: 2021-10-07 | End: 2021-10-27

## 2021-10-07 NOTE — TELEPHONE ENCOUNTER
Unable to refill per [facility] protocol.  Med loaded for 30 days- needs appointment for future refills.    Adrianna GALDAMEZN, RN    [facility]  [facility]  Office: [phone]  Fax: [phone]

## 2021-10-22 DIAGNOSIS — I73.9 PAD (PERIPHERAL ARTERY DISEASE) (H): ICD-10-CM

## 2021-10-22 DIAGNOSIS — E11.8 TYPE 2 DIABETES MELLITUS WITH COMPLICATION, WITH LONG-TERM CURRENT USE OF INSULIN (H): ICD-10-CM

## 2021-10-22 DIAGNOSIS — Z79.4 TYPE 2 DIABETES MELLITUS WITH COMPLICATION, WITH LONG-TERM CURRENT USE OF INSULIN (H): ICD-10-CM

## 2021-10-22 RX ORDER — EMPAGLIFLOZIN 25 MG/1
TABLET, FILM COATED ORAL
Qty: 30 TABLET | Refills: 0 | OUTPATIENT
Start: 2021-10-22

## 2021-10-22 NOTE — TELEPHONE ENCOUNTER
Patient: Dina Garcia Date: 2017   : 1956 Attending: Graciela Henry MD   61 year old female      Subjective: No fevers. No complaints , feels short winded she says at times.     Review of Systems:  Pertinent items are noted in history of present illness     Reviewed Pertinent: Allergies, Medications and Medical History    Vital Last Value 24 Hour Range   Temperature 97.8 °F (36.6 °C) (17 06) Temp  Min: 97.8 °F (36.6 °C)  Max: 98.4 °F (36.9 °C)   Pulse 84 (17 0929) Pulse  Min: 78  Max: 86   Respiratory 20 (17) Resp  Min: 18  Max: 20   Non-Invasive  Blood Pressure 134/82 (17 0929) BP  Min: 120/70  Max: 177/86   Pulse Oximetry 98 % (17 09) SpO2  Min: 98 %  Max: 99 %   Arterial   Blood Pressure   No Data Recorded     Vital Today Admit   Weight 132.5 kg (17 06) Weight: (!) 140.5 kg (17 1509)   Height N/A Height: 5' 6\" (167.6 cm) (17 1509)   BMI N/A BMI (Calculated): 50.1 (17 1509)     Weight over the past 48 Hours:  Patient Vitals for the past 48 hrs:   Weight   1718 133.9 kg   17 132.5 kg        Intake/Output:  Last Stool Occurrence: 0 (17)    I/O this shift:  In: 240 [P.O.:240]  Out: -     I/O last 3 completed shifts:  In: 1320 [P.O.:1320]  Out: -     Physical Exam:  Visit Vitals   • /82 (BP Location: [facility], Patient Position: Sitting)   • Pulse 84   • Temp 97.8 °F (36.6 °C) (Oral)   • Resp 20   • Ht 5' 6\" (1.676 m)   • Wt 132.5 kg   • LMP 1995   • SpO2 98%   • BMI 47.16 kg/m2       General Appearance:    Alert, cooperative, no distress, appears stated age, unchanged.    Head:    Normocephalic, without obvious abnormality, atraumatic   Eyes:    PERRL, conjunctiva/corneas clear   Ears:    Normal external ear canals, both ears   Nose:   Nares normal, septum midline, mucosa normal, no drainage    or sinus tenderness   Throat:   Lips, mucosa, and tongue normal; teeth and gums normal   Neck:   Supple  JARDIANCE 25 MG TABS tablet  Last Written Prescription Date:  10/7/21  Last Fill Quantity: 90,  # refills: 0     Last office visit: 9/4/20  Rx denied - patient is due for:    Fasting labs, urinalysis, OZZIE w/o exercise, LLE Arterial US, Follow up visit with Dr. Olson.  Orders updated in Epic.      Berna Hoover RN BSN  [facility]  [phone]         Back:     Symmetric   Lungs:     Diminished but clear to auscultation   Chest Wall:    No tenderness or deformity, PPM to upper chest wall     Heart:    Regular, S1 and S2 normal, no murmur, rub   or gallop       Abdomen:     Soft, non-tender, bowel sounds active all four quadrants,     no masses, Obese            Extremities:   Extremities normal, atraumatic, no cyanosis or edema   Pulses:      Skin:   Skin color, texture, turgor normal.2 wound to right lower leg- clean.  No cellulitis.  PICC site RUE site c/d/i.               Laboratory Results:    Lab Results   Component Value Date    SODIUM 141 05/19/2017    POTASSIUM 3.9 05/19/2017    BUN 16 05/19/2017    CREATININE 1.21 (H) 05/19/2017    GLUCOSE 91 05/19/2017    HCT 33.0 (L) 05/14/2017    HGB 10.2 (L) 05/14/2017     (H) 05/14/2017    INR 1.5 05/19/2017    PTT 29 03/11/2012    BILIRUBIN 0.8 05/13/2017    AST 27 05/13/2017    GPT 39 05/13/2017    ALKPT 68 05/13/2017    ALBUMIN 2.1 (L) 05/13/2017    RESR 68 (H) 05/15/2017    CRP 6.4 (H) 05/15/2017     WBC (K/mcL)   Date Value   05/14/2017 8.8       Urinalysis:    Lab Results   Component Value Date    USPG 1.016 05/04/2017    UWBC SMALL (A) 05/04/2017    URBC LARGE (A) 05/04/2017    UBILI NEGATIVE 05/04/2017    UPH 5.5 05/04/2017    UBACTR MODERATE (A) 05/04/2017       Cultures: Reviewed  5/4 Urine culture 100k MSSA  5/4 BC x 2 MSSA  5/4 MRSA PCR negative  5/5 Cdiff negative  5/5 BC picc/peripheral negative     Imaging: Reviewed  5/6 Abd us- unremarkable  5/6 CXR- no focal consolidation   5/8 ALESSIO - no endocarditis   5/9 CXR- No acute cardiopulmonary disease     Assessment:   1. MSSA bacteremia/uti, follow-up blood cultures on 5/5 negative so far. ALESSIO without endocarditis   2. Leukocytosis-resolved   3. LE edema/ulcers    Plan:    1. Continue Nafcillin IV- anticipate 2 weeks from 5/5 (day 12/14 as of today), not planning longer therapy for now ic course , per Dr Rehman.   2. Would need test of cure  blood cultures and ID followup to observe for recurrence. To be done.   2. Observe ulcers RLE    Long discussion with patient. If fevers or any symptoms of systemic infection happen, may have to do again blood cultures and look at Pacer. She understands well my reasoning.       Discussed with: Patient

## 2021-10-27 NOTE — TELEPHONE ENCOUNTER
30 days of Jardiance sent to pharmacy to get patient through until follow up with Dr. Olson. Angely called and discussed.     Adrianna GALDAMEZN, RN    [facility]  Office: [phone]  Fax: [phone]

## 2021-10-27 NOTE — TELEPHONE ENCOUNTER
Scheduled follow up appointments with Angely at [facility].  She is asking if patient will get a refill since he scheduled appointments so he does not run out.  I will route to the nurse for review and to respond to Angely at [phone].  Patient is scheduled for labs and imaging on 11/02/21 and Dr. Olson appt on 11/16/21.

## 2021-11-02 ENCOUNTER — LAB (OUTPATIENT)
Dept: LAB | Facility: CLINIC | Age: 66
End: 2021-11-02
Attending: INTERNAL MEDICINE
Payer: MEDICARE

## 2021-11-02 ENCOUNTER — HOSPITAL ENCOUNTER (OUTPATIENT)
Dept: ULTRASOUND IMAGING | Facility: CLINIC | Age: 66
End: 2021-11-02
Attending: INTERNAL MEDICINE
Payer: MEDICARE

## 2021-11-02 DIAGNOSIS — I73.9 PAD (PERIPHERAL ARTERY DISEASE) (H): ICD-10-CM

## 2021-11-02 DIAGNOSIS — Z79.4 TYPE 2 DIABETES MELLITUS WITH COMPLICATION, WITH LONG-TERM CURRENT USE OF INSULIN (H): ICD-10-CM

## 2021-11-02 DIAGNOSIS — I10 HYPERTENSION GOAL BP (BLOOD PRESSURE) < 140/90: ICD-10-CM

## 2021-11-02 DIAGNOSIS — E78.5 HYPERLIPIDEMIA LDL GOAL <70: ICD-10-CM

## 2021-11-02 DIAGNOSIS — E11.8 TYPE 2 DIABETES MELLITUS WITH COMPLICATION, WITH LONG-TERM CURRENT USE OF INSULIN (H): ICD-10-CM

## 2021-11-02 LAB
ALBUMIN SERPL-MCNC: 3.6 G/DL (ref 3.4–5)
ALP SERPL-CCNC: 62 U/L (ref 40–150)
ALT SERPL W P-5'-P-CCNC: 20 U/L (ref 0–70)
ANION GAP SERPL CALCULATED.3IONS-SCNC: 5 MMOL/L (ref 3–14)
AST SERPL W P-5'-P-CCNC: 18 U/L (ref 0–45)
BILIRUB SERPL-MCNC: 0.4 MG/DL (ref 0.2–1.3)
BUN SERPL-MCNC: 29 MG/DL (ref 7–30)
CALCIUM SERPL-MCNC: 9.1 MG/DL (ref 8.5–10.1)
CHLORIDE BLD-SCNC: 106 MMOL/L (ref 94–109)
CO2 SERPL-SCNC: 21 MMOL/L (ref 20–32)
CREAT SERPL-MCNC: 1.2 MG/DL (ref 0.66–1.25)
CREAT UR-MCNC: 51 MG/DL
CRP SERPL HS-MCNC: 3.7 MG/L
GFR SERPL CREATININE-BSD FRML MDRD: 62 ML/MIN/1.73M2
GLUCOSE BLD-MCNC: 160 MG/DL (ref 70–99)
HBA1C MFR BLD: 7.9 % (ref 0–5.6)
MICROALBUMIN UR-MCNC: 30 MG/L
MICROALBUMIN/CREAT UR: 58.82 MG/G CR (ref 0–17)
POTASSIUM BLD-SCNC: 4.3 MMOL/L (ref 3.4–5.3)
PROT SERPL-MCNC: 8.3 G/DL (ref 6.8–8.8)
SODIUM SERPL-SCNC: 132 MMOL/L (ref 133–144)

## 2021-11-02 PROCEDURE — 83695 ASSAY OF LIPOPROTEIN(A): CPT

## 2021-11-02 PROCEDURE — 36415 COLL VENOUS BLD VENIPUNCTURE: CPT

## 2021-11-02 PROCEDURE — 93922 UPR/L XTREMITY ART 2 LEVELS: CPT

## 2021-11-02 PROCEDURE — 82043 UR ALBUMIN QUANTITATIVE: CPT

## 2021-11-02 PROCEDURE — 83036 HEMOGLOBIN GLYCOSYLATED A1C: CPT

## 2021-11-02 PROCEDURE — 82040 ASSAY OF SERUM ALBUMIN: CPT

## 2021-11-02 PROCEDURE — 80061 LIPID PANEL: CPT

## 2021-11-02 PROCEDURE — 86141 C-REACTIVE PROTEIN HS: CPT

## 2021-11-02 PROCEDURE — 93926 LOWER EXTREMITY STUDY: CPT | Mod: LT

## 2021-11-03 LAB — LPA SERPL-MCNC: 7 MG/DL

## 2021-11-16 ENCOUNTER — TELEPHONE (OUTPATIENT)
Dept: OTHER | Facility: CLINIC | Age: 66
End: 2021-11-16

## 2021-11-16 ENCOUNTER — OFFICE VISIT (OUTPATIENT)
Dept: OTHER | Facility: CLINIC | Age: 66
End: 2021-11-16
Attending: INTERNAL MEDICINE
Payer: MEDICARE

## 2021-11-16 VITALS
SYSTOLIC BLOOD PRESSURE: 108 MMHG | WEIGHT: 217 LBS | OXYGEN SATURATION: 95 % | BODY MASS INDEX: 32.89 KG/M2 | RESPIRATION RATE: 16 BRPM | HEART RATE: 70 BPM | DIASTOLIC BLOOD PRESSURE: 70 MMHG | HEIGHT: 68 IN

## 2021-11-16 DIAGNOSIS — Z79.4 TYPE 2 DIABETES MELLITUS WITH COMPLICATION, WITH LONG-TERM CURRENT USE OF INSULIN (H): ICD-10-CM

## 2021-11-16 DIAGNOSIS — I70.422 ATHEROSCLEROSIS OF AUTOLOGOUS VEIN BYPASS GRAFT(S) OF THE EXTREMITIES WITH REST PAIN, LEFT LEG (H): ICD-10-CM

## 2021-11-16 DIAGNOSIS — F17.200 TOBACCO USE DISORDER: ICD-10-CM

## 2021-11-16 DIAGNOSIS — E78.5 HYPERLIPIDEMIA LDL GOAL <70: ICD-10-CM

## 2021-11-16 DIAGNOSIS — I73.9 PAD (PERIPHERAL ARTERY DISEASE) (H): ICD-10-CM

## 2021-11-16 DIAGNOSIS — I10 HYPERTENSION GOAL BP (BLOOD PRESSURE) < 140/90: ICD-10-CM

## 2021-11-16 DIAGNOSIS — E87.1 HYPONATREMIA: ICD-10-CM

## 2021-11-16 DIAGNOSIS — E11.8 TYPE 2 DIABETES MELLITUS WITH COMPLICATION, WITH LONG-TERM CURRENT USE OF INSULIN (H): ICD-10-CM

## 2021-11-16 PROCEDURE — G0463 HOSPITAL OUTPT CLINIC VISIT: HCPCS

## 2021-11-16 PROCEDURE — 99215 OFFICE O/P EST HI 40 MIN: CPT | Performed by: INTERNAL MEDICINE

## 2021-11-16 RX ORDER — LISINOPRIL 2.5 MG/1
TABLET ORAL
Qty: 180 TABLET | Refills: 3 | Status: SHIPPED | OUTPATIENT
Start: 2021-11-16 | End: 2022-10-11

## 2021-11-16 RX ORDER — LIRAGLUTIDE 6 MG/ML
1.8 INJECTION SUBCUTANEOUS DAILY
Qty: 27 ML | Refills: 3 | Status: SHIPPED | OUTPATIENT
Start: 2021-11-16 | End: 2022-10-11 | Stop reason: ALTCHOICE

## 2021-11-16 RX ORDER — METOPROLOL SUCCINATE 50 MG/1
75 TABLET, EXTENDED RELEASE ORAL DAILY
Qty: 135 TABLET | Refills: 3 | Status: ON HOLD | OUTPATIENT
Start: 2021-11-16 | End: 2021-12-03

## 2021-11-16 RX ORDER — ATORVASTATIN CALCIUM 80 MG/1
80 TABLET, FILM COATED ORAL AT BEDTIME
Qty: 90 TABLET | Refills: 3 | Status: SHIPPED | OUTPATIENT
Start: 2021-11-16 | End: 2022-09-23

## 2021-11-16 RX ORDER — INSULIN GLARGINE 100 [IU]/ML
50 INJECTION, SOLUTION SUBCUTANEOUS AT BEDTIME
Qty: 50 ML | Refills: 3 | Status: SHIPPED | OUTPATIENT
Start: 2021-11-16 | End: 2022-02-16 | Stop reason: ALTCHOICE

## 2021-11-16 ASSESSMENT — MIFFLIN-ST. JEOR: SCORE: 1733.81

## 2021-11-16 NOTE — PROGRESS NOTES
Alexandro Pool is a 66 year old male who is presenting at the current time to discuss his diagnosi(es) of          Tobacco use disorder  PAD (peripheral artery disease) (H)  Hyperlipidemia LDL goal <70  Hypertension goal BP (blood pressure) < 140/90  Type 2 diabetes mellitus with complication, with long-term current use of insulin (H)  Hyponatremia        HPI: The patient  is a non ambulatory 65 year old white male due to a prior Rt AKA. He also has PAD and unfortunately continues to smoke while now remaining better glycemically controlled from a diabetic perspective while on a mutli drug regimen. His renal function remains intact. He has no claudciation as aforementioned, as he is non ambualtory. He has no CLI sxs and no non healing ulcers. In 2013 he had a Lt Fem-AT bypass undertaken by Dr. Suggs. In 2017 he had a parasitic side branch ligated. His graft velocities are starting to increase at the PAG, but he is asx form this. Flow distal to the PAG is intact. There is not a significant graft stenosis on recent U/S. His lipids are well contorlled and he has low Na while not on aldactone. He has a wound on his left foot. He states this is healing  With local wound care performed by [facility] in [place].     When last seen on 9/220, I suspected his hyponatremia was a false positive. No med changes were made, labs were redrawn, and he presents today to go over those results..         Review Of Systems  Skin: negative  Eyes: negative  Ears/Nose/Throat: negative  Respiratory: No shortness of breath, dyspnea on exertion, cough, or hemoptysis  Cardiovascular: negative  Gastrointestinal: negative  Genitourinary: negative  Musculoskeletal: negative  Neurologic: negative  Psychiatric: negative  Hematologic/Lymphatic/Immunologic: negative  Endocrine: negative      PAST MEDICAL HISTORY:                  Past Medical History:   Diagnosis Date     Acute kidney failure, unspecified (H)      Blood transfusion      CAD  (coronary artery disease)      CARDIOVASCULAR SCREENING; LDL GOAL LESS THAN 100      Cirrhosis (H)      Critical lower limb ischemia (H)      Diabetes mellitus (H) 10/2011     Hypertension      Hypertension goal BP (blood pressure) < 140/90      Ischemic cardiomyopathy      Lymphedema of left leg 5/11/2016     Peripheral arterial disease (H)      PVD (peripheral vascular disease) (H)      Right above knee amputation 4/30/2014     Type 2 diabetes, HbA1C goal < 8% (H)        PAST SURGICAL HISTORY:                  Past Surgical History:   Procedure Laterality Date     AMPUTATE LEG ABOVE KNEE  11/22/2011    Procedure:AMPUTATE LEG ABOVE KNEE; Revise Right Below Knee Amputation To Above Knee Amputation; Surgeon:MADISON WELLS; Location:[facility] OR     AMPUTATE LEG BELOW KNEE  11/10/2011    Procedure:AMPUTATE LEG BELOW KNEE; Right Below Knee Amputation; Surgeon:MADISON WELLS; Location:[facility]     BYPASS GRAFT FEMOROTIBIAL  9/19/2013    Procedure: BYPASS GRAFT FEMOROTIBIAL;  Left Femorotibial Bypass Graft Insitu ;  Surgeon: Marisol Suggs MD;  Location:  OR     CARDIAC SURGERY      cardiac stent     ESOPHAGOSCOPY, GASTROSCOPY, DUODENOSCOPY (EGD), COMBINED  10/1/2012    Procedure: COMBINED ESOPHAGOSCOPY, GASTROSCOPY, DUODENOSCOPY (EGD);;  Surgeon: Nehemias Cevallos MD;  Location:  GI     ESOPHAGOSCOPY, GASTROSCOPY, DUODENOSCOPY (EGD), COMBINED N/A 3/24/2016    Procedure: COMBINED ESOPHAGOSCOPY, GASTROSCOPY, DUODENOSCOPY (EGD);  Surgeon: Gildardo Marks MD;  Location:  GI     IR STENT VASCULAR LT  3/9/2012          IRRIGATION AND DEBRIDEMENT LOWER EXTREMITY, COMBINED  11/15/2011    Procedure:COMBINED IRRIGATION AND DEBRIDEMENT LOWER EXTREMITY; DRAINAGE OF ABSCESS AT BELOW KNEE AMPUTATION AND KNEE DISARTICULATION.; Surgeon:MADISON WELLS; Location: OR     NO HISTORY OF SURGERY  7/12/13    derm     VASCULAR REPAIR LOWER EXTREMITY Left 9/19/2017    Procedure: VASCULAR REPAIR LOWER EXTREMITY;  Ligation Parasitic Branch To Left  "Lower Extremity ;  Surgeon: Marisol Suggs MD;  Location: [facility] OR       CURRENT MEDICATIONS:                  Current Outpatient Medications   Medication Sig Dispense Refill     acetaminophen (TYLENOL) 325 MG tablet Take 1 tablet by mouth every 4 hours as needed. 250 tablet 0     amLODIPine (NORVASC) 2.5 MG tablet Take 1 tablet (2.5 mg) by mouth 2 times daily 180 tablet 3     ammonium lactate (AMLACTIN) 12 % cream Apply topically 2 times daily as needed for dry skin apply to both legs twice daily as needed  5     ASPIRIN EC PO Take 81 mg by mouth daily       atorvastatin (LIPITOR) 80 MG tablet Take 1 tablet (80 mg) by mouth At Bedtime 90 tablet 3     bisacodyl (DULCOLAX) 5 MG EC tablet Take 1 tablet (5 mg) by mouth See Admin Instructions Refer to \"Getting Ready for a Colonoscopy\" instruction handout 4 tablet 0     empagliflozin (JARDIANCE) 25 MG TABS tablet TAKE 1 TABLET (25 MG) BY MOUTH DAILY 30 tablet 0     gabapentin (NEURONTIN) 100 MG capsule Take 1 capsule (100 mg) by mouth 3 times daily 90 capsule 0     hydrocortisone 1 % cream Apply topically 2 times daily PRN       insulin glargine (BASAGLAR KWIKPEN) 100 UNIT/ML pen Inject 46 Units Subcutaneous At Bedtime 45 mL 3     insulin lispro (HUMALOG VIAL) SOLN 100 UNIT/ML Inject Subcutaneous 3 times daily (before meals) 18 units before each meal       insulin pen needle (BD KWABENA U/F) 32G X 4 MM miscellaneous Use 4x  daily or as directed. 120 each 11     liraglutide (VICTOZA) 18 MG/3ML solution Inject 1.8 mg Subcutaneous daily 27 mL 3     lisinopril (ZESTRIL) 2.5 MG tablet TAKE 1 TABLET (2.5 MG) BY MOUTH 2 TIMES DAILY 180 tablet 3     METOPROLOL SUCCINATE ER PO Take 75 mg by mouth daily       morphine (MS CONTIN) 15 MG 12 hr tablet Take 3 tablets (45 mg) by mouth 2 times daily 60 tablet 0     order for DME Equipment being ordered: FlexiTouch pneumatic compression device.  2-3 times per day to left lower extremity.  Current strength - L4 1 Units      oxyCODONE " "(ROXICODONE) 10 MG immediate release tablet Take 1.5 tablets (15 mg) by mouth every 4 hours as needed 60 tablet 0     polyethylene glycol (GOLYTELY) 236 g suspension Take 4,000 mLs by mouth See Admin Instructions Refer to \"Getting Ready for a Colonoscopy\" instruction handout 4000 mL 0     rivaroxaban ANTICOAGULANT (XARELTO ANTICOAGULANT) 2.5 MG TABS tablet Take 1 tablet (2.5 mg) by mouth 2 times daily 180 tablet 3     SENNA PO 1 Tab , bedtime         ALLERGIES:                  Allergies   Allergen Reactions     Ciprofloxacin Rash       SOCIAL HISTORY:                  Social History     Socioeconomic History     Marital status:      Spouse name: Not on file     Number of children: 0     Years of education: Not on file     Highest education level: Not on file   Occupational History     Employer: UNKNOWN   Tobacco Use     Smoking status: Current Every Day Smoker     Packs/day: 1.00     Years: 40.00     Pack years: 40.00     Types: Cigarettes     Smokeless tobacco: Never Used     Tobacco comment: working on quitting   Substance and Sexual Activity     Alcohol use: Yes     Alcohol/week: 1.0 standard drink     Types: 1 Standard drinks or equivalent per week     Comment: Once a month or less     Drug use: No     Sexual activity: Not Currently   Other Topics Concern     Parent/sibling w/ CABG, MI or angioplasty before 65F 55M? No      Service Not Asked     Blood Transfusions Not Asked     Caffeine Concern Yes     Occupational Exposure Not Asked     Hobby Hazards Not Asked     Sleep Concern Not Asked     Stress Concern Not Asked     Weight Concern Not Asked     Special Diet Not Asked     Back Care Not Asked     Exercise No     Bike Helmet Not Asked     Seat Belt Not Asked     Self-Exams Not Asked   Social History Narrative     Not on file     Social Determinants of Health     Financial Resource Strain: Not on file   Food Insecurity: Not on file   Transportation Needs: Not on file   Physical Activity: Not on " file   Stress: Not on file   Social Connections: Not on file   Intimate Partner Violence: Not on file   Housing Stability: Not on file       FAMILY HISTORY:                   Family History   Problem Relation Age of Onset     Alcohol/Drug Mother         cirrhosis     Alcohol/Drug Father      C.A.D. No family hx of      Diabetes No family hx of      Cancer No family hx of              Physical exam Reveals:    O/P: WNL  HEENT: WNL  NECK: No JVD, thyromegaly, or lymphadenopathy  HEART: RRR, no murmurs, gallops, or rubs  LUNGS: CTA bilaterally without rales, wheezes, or rhonchi  GI: NABS, nondistended, nontender, soft  EXT:without cyanosis, clubbing, or edema; S/P Rt AKA, shallow nonhealing ulcer over dorsum of Lt foot.  NEURO: nonfocal  : no flank tenderness      Component      Latest Ref Rng & Units 1/7/2020 7/27/2020   Sodium      133 - 144 mmol/L  128 (L)   Potassium      3.4 - 5.3 mmol/L  4.0   Chloride      94 - 109 mmol/L  98   Carbon Dioxide      20 - 32 mmol/L  25   Anion Gap      3 - 14 mmol/L  6   Urea Nitrogen      7 - 30 mg/dL  16   Creatinine      0.66 - 1.25 mg/dL  0.84   Calcium      8.5 - 10.1 mg/dL  8.5   Glucose      70 - 99 mg/dL  78   Alkaline Phosphatase      40 - 150 U/L     AST      0 - 45 U/L     ALT      0 - 70 U/L     Protein Total      6.8 - 8.8 g/dL     Albumin      3.4 - 5.0 g/dL     Bilirubin Total      0.2 - 1.3 mg/dL     GFR Estimate      >60 mL/min/1.73m2  >90   Total Cholesterol      <=199 mg/dL 126 Canceled, Test credited   Triglycerides      30 - 149 mg/dL 247 (H) Canceled, Test credited   HDL Cholesterol      40 - 59 mg/dL 26 (L) Canceled, Test credited   LDL Cholesterol      <=129 mg/dL 51 Canceled, Test credited   HDL Size      >=8.9 nm 8.4 (L) Canceled, Test credited   VLDL Size      <=46.7 nm 58.7 (H) Canceled, Test credited   LDL Particle Size      >=20.7 nm 20.5 (L) Canceled, Test credited   Lge HDL Particle number      >=4.2 umol/L <2.8 (L) Canceled, Test credited   HDL  Particle Number NMR      >=33.0 umol/L 21.7 (L) Canceled, Test credited   Lge VLDL Part number      <=2.7 nmol/L 12.1 (H) Canceled, Test credited   Small LDL Particle number      <=634 nmol/L 585 Canceled, Test credited   LDL Particle Number      <=1,135 nmol/L 1,034 Canceled, Test credited   EER LipoFit by NMR       SEE NOTE Canceled, Test credited   GFR Estimate If Black      >60 mL/min/1.73:m2  >90   Creatinine Urine      mg/dL  22   Albumin Urine mg/L      mg/L  10   Albumin Urine mg/g Cr      0.00 - 17.00 mg/g Cr  43.65 (H)   Hemoglobin A1C      0.0 - 5.6 % 7.8 (H) 6.8 (H)   CRP Cardiac Risk      mg/L 9.7    Lipoprotein (a)      <=29 mg/dL 8    C-Reactive Protein High Sensitivity      mg/L       Component      Latest Ref Rng & Units 7/28/2020 9/2/2020 11/2/2021   Sodium      133 - 144 mmol/L  137 132 (L)   Potassium      3.4 - 5.3 mmol/L  4.0 4.3   Chloride      94 - 109 mmol/L  103 106   Carbon Dioxide      20 - 32 mmol/L  28 21   Anion Gap      3 - 14 mmol/L  7 5   Urea Nitrogen      7 - 30 mg/dL  17 29   Creatinine      0.66 - 1.25 mg/dL  0.89 1.20   Calcium      8.5 - 10.1 mg/dL  9.0 9.1   Glucose      70 - 99 mg/dL  142 (H) 160 (H)   Alkaline Phosphatase      40 - 150 U/L   62   AST      0 - 45 U/L   18   ALT      0 - 70 U/L   20   Protein Total      6.8 - 8.8 g/dL   8.3   Albumin      3.4 - 5.0 g/dL   3.6   Bilirubin Total      0.2 - 1.3 mg/dL   0.4   GFR Estimate      >60 mL/min/1.73m2  89 62   Total Cholesterol      <=199 mg/dL 92     Triglycerides      30 - 149 mg/dL 146     HDL Cholesterol      40 - 59 mg/dL 25 (L)     LDL Cholesterol      <=129 mg/dL 38  43   HDL Size      >=8.9 nm 8.5 (L)     VLDL Size      <=46.7 nm 48.1 (H)     LDL Particle Size      >=20.7 nm 20.6 (L)     Lge HDL Particle number      >=4.2 umol/L <2.8 (L)     HDL Particle Number NMR      >=33.0 umol/L 17.0 (L)     Lge VLDL Part number      <=2.7 nmol/L 3.0 (H)     Small LDL Particle number      <=634 nmol/L 442     LDL Particle  Number      <=1,135 nmol/L 659  1099   EER LipoFit by NMR       SEE NOTE     GFR Estimate If Black      >60 mL/min/1.73:m2  >90    Creatinine Urine      mg/dL   51   Albumin Urine mg/L      mg/L   30   Albumin Urine mg/g Cr      0.00 - 17.00 mg/g Cr   58.82 (H)   Hemoglobin A1C      0.0 - 5.6 %   7.9 (H)   CRP Cardiac Risk      mg/L      Lipoprotein (a)      <=29 mg/dL   7   C-Reactive Protein High Sensitivity      mg/L   3.7         US OZZIE DOPPLER NO EXERCISE, 1-2 LEVELS, BILATERAL   11/2/2021 10:15 AM        HISTORY: With TBI; PAD (peripheral artery disease) (H).     COMPARISON: None.     FINDINGS:  Right:  Patient has history of right above knee amputation.  Left OZZIE:   PT: 88, with index of 0.81. Previously, 0.71.  DP: 100, with index of 0.93. Previously, 0.83.     Left Digital Brachial index: 72 with index of 0.67, previously 0.89     Waveforms: Distal waveforms are predominantly biphasic in both left  posterior tibial and dorsalis pedis arteries. Left digital waveform  appears with good amplitude and near normal morphology.                                                                      IMPRESSION: Intact left lower extremity OZZIE examination, resting OZZIE  values slightly improved from previous exam.     JILLIAN TELLEZ MD      US LOWER EXTREMITY ARTERIAL DUPLEX LEFT 11/2/2021 10:15 AM     HISTORY: 66-year-old patient with history of peripheral arterial  disease and left lower extremity femoral to anterior tibial surgical  arterial bypass graft. Patient has nonhealing anterior foot wound.     COMPARISON: July 27, 2020.     TECHNIQUE: Color Doppler and spectral waveform analysis obtained  throughout the left lower extremity surgical arterial bypass graft and  adjacent native arteries.     FINDINGS: The native inflow artery is patent with velocity of 175/15  cm/second. Elevated velocity at the proximal anastomosis measuring  302/2 cm/second, previously 171/16 cm/second. Turbulent blood flow is  noted at this  location, though difficult to directly visualize a  stenosis. Remaining velocities are intact ranging from 47-78  cm/second.                                                                      IMPRESSION:  Patent left lower extremity surgical arterial bypass graft. Velocity  elevation at the proximal anastomosis measured up to 302/2 cm/second,  previously 171/16 cm/second. Difficult to visualize definitive  stenosis as there is turbulent blood flow on duplex images, which may  be cause for velocity elevation.     JILLIAN TELLEZ MD          LEFT LOWER EXTREMITY DUPLEX ARTERIAL ULTRASOUND 8/26/2019     CLINICAL HISTORY: Left superficial femoral to anterior tibial bypass  graft reevaluation.     COMPARISON: 5/23/2018     REFERRING PROVIDER: VIKY URIARTE ANDREW     TECHNIQUE: Grayscale, color Doppler, Doppler waveform ultrasound  evaluation of the left leg arteries and bypass graft.     FINDINGS: LEFT:  EXTERNAL ILIAC ARTERY: 140/20 cm/s, monophasic, brisk up and  downstroke with prolonged flat diastolic flow.     COMMON FEMORAL ARTERY: 149/17 cm/s, monophasic, brisk up and  downstroke with prolonged flat diastolic flow.     BYPASS GRAFT, proximal anastomosis: 147/20 cm/s, monophasic, brisk up  and downstroke with prolonged flat diastolic flow.  BYPASS GRAFT, proximal thigh: 44/0 cm/s, biphasic  BYPASS GRAFT, mid thigh: 77/10 cm/s, monophasic, brisk up and  downstroke with prolonged flat diastolic flow.  BYPASS GRAFT, distal thigh: 52/9 cm/s, triphasic  BYPASS GRAFT, knee: 48/10 cm/s, monophasic, brisk up and downstroke  with prolonged flat diastolic flow.  BYPASS GRAFT, proximal calf: 55/10 cm/s, monophasic, brisk up and  downstroke with prolonged flat diastolic flow.  BYPASS GRAFT, mid calf: 66/11 cm/s, monophasic, brisk up and  downstroke with prolonged flat diastolic flow.  BYPASS GRAFT, distal anastomosis: 55/10 cm/s, monophasic, brisk up and  downstroke with prolonged flat diastolic flow.     ANTERIOR  TIBIAL ARTERY, above anastomosis: 72/5 cm/s, RETROGRADE,  monophasic, brisk up and downstroke with prolonged flat diastolic  flow.     ANTERIOR TIBIAL ARTERY, below anastomosis: 87/16 cm/s, monophasic,  brisk up and downstroke with prolonged flat diastolic flow.     ANTERIOR TIBIAL ARTERY, ankle: 93/13 cm/s, monophasic, brisk up and  downstroke with prolonged flat diastolic flow.     POSTERIOR TIBIAL ARTERY, ankle: 18/7 cm/s, monophasic                                                                      IMPRESSION: No stenosis demonstrated through the left femoral to  anterior tibial bypass graft. Change in waveforms from previously  triphasic to monophasic with brisk up and downstroke and prolonged  flat diastolic flow may represent distal vasodilation or hyperemia.        BETTE JAY MD     US LOWER EXTREMITY ARTERIAL DUPLEX LEFT  1/30/2020 2:11 PM      HISTORY: Patient is status post a left superficial femoral artery to  anterior tibial artery in situ bypass graft.     COMPARISON: None.     FINDINGS: Color Doppler and spectral waveform analysis performed.     The left superficial femoral artery to anterior tibial artery in situ  bypass graft is patent. There is calcified plaque in the inflow  superficial femoral artery this contributes to mild elevations in peak  systolic velocities of 185 and 203 cm/s. Corresponding luminal  diameter of the inflow artery is 5.7 mm.     Diameters of the bypass graft range between 6.5 to 7.8 mm. No  significant elevations in peak systolic velocity are noted within the  graft.                                                                      IMPRESSION: Patent left superficial femoral artery to anterior tibial  artery bypass graft. No significant stenoses within the graft. Mild  stenosis in the inflow artery.     DELMY FRIEND MD     EXAMINATIONS 7/27/2020:  1. RESTING LEFT OZZIE, TBI, and VPR   2. LEFT LEG DUPLEX ARTERIAL ULTRASOUND     CLINICAL HISTORY: Left femoral anterior  tibial bypass graft  evaluation. History of right below-knee amputation.     COMPARISONS: Duplex arterial ultrasound 1/30/2020. OZZIE 8/26/2019.     REFERRING PROVIDER: VIKY URIARTE ANDREW     TECHNIQUE: Resting left OZZIE TBI and VPR obtained. Left leg arteries  and bypass graft evaluated with grayscale, color Doppler, and Doppler  waveform ultrasound.     FINDINGS:  RIGHT:       Brachial: 114 mmHg     LEFT:       Brachial: 126 mmHg       Ankle (PT): 90 mmHg       Ankle (DP): 105 mmHg       1st Digit: 112 mmHg          OZZIE: 0.83, previously 0.87       TBI: 0.89          VPR:            High thigh: Normal            Low thigh: Normal            Calf: Normal            Ankle: Normal          External iliac artery: 139/21 cm/s, biphasic       Common femoral artery, proximal to anastomosis: 166/21 cm/s,  biphasic          Femoral - anterior tibial bypass graft, proximal anastomosis:  171/16 cm/s, biphasic       Femoral - anterior tibial bypass graft, proximal thigh: 48/7  cm/s, biphasic       Femoral - anterior tibial bypass graft, mid thigh: 79/11 cm/s,  biphasic           Femoral - anterior tibial bypass graft, distal thigh: 100/12  cm/s, biphasic            Femoral - anterior tibial bypass graft, knee: 54/9 cm/s, biphasic             Femoral - anterior tibial bypass graft, proximal calf: 63/11  cm/s, biphasic       Femoral - anterior tibial bypass graft, mid calf: 83/14 cm/s,  biphasic       Femoral - anterior tibial bypass graft, mid calf: 59/9 cm/s,  biphasic       Femoral - anterior tibial bypass graft, distal anastomosis: 68/11  cm/s, biphasic          Anterior tibial artery, distal calf, distal to anastomosis: 94/11  cm/s, biphasic       Anterior tibial artery, ankle: 111/19 cm/s, biphasic                                                                      IMPRESSION:  1. LEFT OZZIE is ABNORMAL, 0.83, previously 0.87.     2. LEFT TBI is normal: 0.89.     3. Patent left femoral to anterior tibial artery bypass  graft. No  significant stenosis demonstrated.     Guidelines:     OZZIE Diagnostic Criteria (Based on criteria published in Circulation  2011; 124: 9148-2767):    > 1.4: Non compressible    1.00 - 1.40: Normal    0.91 - 0.99: Borderline    At or below 0.90: Abnormal     OZZIE Diagnostic Criteria (Based on ACC/AHA guideline 2008):    >/=1.3 - non compressible vessels    1.00  -1.29 - Normal    0.91 - 0.99 - Borderline    0.41 - 0.90 - Mild to moderate PAD    0.00 - 0.40 - Severe PAD     BETTE JAY MD           (E87.1) Hyponatremia  Comment: not on diuretics. Asx, will recheck as I suspect a false abnormal result  Plan: Basic metabolic panel                                  (I73.9) PAD S/P Lt SFA stenting 3-9-2012, Lt fem-SENIA bypass w/ ISGSV 9-, S/P Rt AKA 2014, ligation of parasitic branch 9/19/2017  Comment: Velocities increasing within the graft, which has no visible stenosisNo current intervention warranted. Monitor with surveillance duplex in one year, RTC for non healing ulcer or rest pain  Plan: CBC with platelets differential, Basic         metabolic panel, LipoFit by NMR, US Lower         Extremity Arterial Duplex Left            (E78.5) Hyperlipidemia LDL goal <70  Comment: at goal LDL-C, but not LDL-P, and still smoking w/ suboptimally controlled DM2, and tobacco use.  Plan: T3 Free, T4 free, TSH, LipoFit by NMR            (I10) Hypertension goal BP (blood pressure) < 140/90  Comment: at goal  Plan: no med changes     (E11.8,  Z79.4) Type 2 diabetes mellitus with complication, with long-term current use of insulin (H)  Comment: at goal; Added Victoza  Plan: CBC with platelets differential, Hepatic panel,        Hemoglobin A1c, Albumin Random Urine         Quantitative with Creat Ratio, liraglutide         (VICTOZA) 18 MG/3ML solution                A/P:    (F17.200) Tobacco use disorder  Comment: He is informed that he is likely to be successful in becoming a double amputee should he continue to smoke.  He declines cessation assistance.  Plan: HC SMOKING CESSATION COUNSELING, ASYMPTOMATIC,         3-10 MIN, CBC with platelets differential    (I73.9) PAD S/P Lt SFA stenting 3-9-2012, Lt fem-SENIA bypass w/ ISGSV 9-, S/P Rt AKA 2014, ligation of parasitic branch 9/19/2017  Comment: Having increased velocities develop in LLE fem-SENIA IS GSV graft which was placed in 2013. He has ischemic rest pain and a wound on the top of his left foot. He is to get an angiogram to ascertain if this has intervenable lesions. He is medically stable to proceed with the angio; Stay on ASA prior to angio, Hold vascular dosed Xarelto for three days prior to angio   Plan: rivaroxaban ANTICOAGULANT (XARELTO         ANTICOAGULANT) 2.5 MG TABS tablet,         empagliflozin (JARDIANCE) 25 MG TABS tablet,         atorvastatin (LIPITOR) 80 MG tablet, metoprolol        succinate ER (TOPROL-XL) 50 MG 24 hr tablet, IR        Lower Extremity Angiogram Left            (E78.5) Hyperlipidemia LDL goal <70  Comment: at LDL-C gola but not LDL-P goal, no med additions warranted - he should however stop smoking, which he will not do.   Plan: atorvastatin (LIPITOR) 80 MG tablet           (I10) Hypertension goal BP (blood pressure) < 140/90  Comment: at goal  Plan: lisinopril (ZESTRIL) 2.5 MG tablet        No med changes    (E11.8,  Z79.4) Type 2 diabetes mellitus with complication, with long-term current use of insulin (H)  Comment: not at goal despite multiple meds below; increase LA insulin to 50 untis daily  Plan: liraglutide (VICTOZA) 18 MG/3ML solution,         insulin glargine (BASAGLAR KWIKPEN) 100 UNIT/ML        pen, empagliflozin (JARDIANCE) 25 MG TABS         tablet, insulin lispro (HUMALOG) 100 UNIT/ML         vial        repeat labs in three months, RTC two weeks later    (E87.1) Hyponatremia  Plan: f/u with PCP    (I70.422) 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

## 2021-11-16 NOTE — PROGRESS NOTES
"[facility]        Patient is here for a follow up  to discuss about lab results      /70 (BP Location: Right arm, Patient Position: Chair, Cuff Size: Adult Large)   Pulse 70   Resp 16   Ht 5' 8\" (1.727 m)   Wt 217 lb (98.4 kg)   SpO2 95%   BMI 32.99 kg/m      The provider has been notified that the patient has no concerns.     Questions patient would like addressed today are: N/A.    Refills are needed: No    Has homecare services and agency name:  Nafisa Serrano [facility]      "

## 2021-11-16 NOTE — TELEPHONE ENCOUNTER
Patient Education       Procedure: IR Lower Extremity Angiogram Left  Diagnosis:  I73.9 PAD S/P Lt SFA stenting 3-9-2012, Lt fem-SENIA bypass w/ ISGSV 9-, S/P Rt AKA 2014, ligation of parasitic branch 9/19/2017; I70.422 Atherosclerosis of autologous vein bypass graft(s) of the extremities with rest pain, left leg (H)     Anticoagulation Instruction:   On ASA and Xarelto (vascular dosing) - stay on ASA for angio, hold Xarelto for three days prior to angiogram.    Pre-Operative Physical Exam: Patient's appointment with Dr. Olson on 11/16/21 may serve as pre-op  Allergies:  Updated in [place]  Bowel Prep: n/a    NPO after midnight prior.  Hold diabetic medications the morning of the procedure.  Blood pressure medications may be taken with a sip of water.  No alcohol, tobacco, ibuprofen or NSAID medications 24 hours prior.  Drink extra water the day prior to the procedure and the day following the procedure to protect kidney function.    Post Procedure Education: [facility] post-procedure fact sheet reviewed with patient.    COVID-19 instructions for isolation and pre testing reviewed with pt.    Learner(s):patient  Method: Listening  Barriers to Learning:called patient's nurse (Angely) to discuss as well  Outcome: Patient did verbalize understanding of above education.    Berna Hoover RN BSN  [facility]  [phone]

## 2021-11-17 NOTE — TELEPHONE ENCOUNTER
Follow up to 11/16/21.    Patient will need IR Lower Extremity Angiogram Left.    Routing to scheduling to arrange.  Please call TATYANA Au ([phone]) to schedule.  Please send education packet for angiogram.    Berna Hoover RN BSN  [facility]  [phone]

## 2021-11-19 DIAGNOSIS — Z11.59 ENCOUNTER FOR SCREENING FOR OTHER VIRAL DISEASES: ICD-10-CM

## 2021-11-19 NOTE — TELEPHONE ENCOUNTER
Type of procedure: LEFT LOWER EXTREMITY ANGIOGRAM WITH POSSIBLE INTERVENTION  Location of procedure: [facility] IR  Date and time of procedure: 12/2/21 @ 12:30 PM  Requesting Surgeon: DR. URIARTE  Performing Surgeon/IR: DR. FRIEND  Pre-Op Appt Date: PT TO SCHEDULE  Post-Op Appt Date: PT TO SCHEDULE   Packet sent out: Yes  Pre-cert/Authorization completed:  Yes  Date: 11/19/21

## 2021-11-23 ENCOUNTER — TELEPHONE (OUTPATIENT)
Dept: NURSING | Facility: CLINIC | Age: 66
End: 2021-11-23
Payer: MEDICARE

## 2021-11-24 NOTE — TELEPHONE ENCOUNTER
Triage Call:    Patient is returning a phone call, reviewed chart and was for scheduling pre-procedure COVID test.  He indicated he already had one scheduled at [facility], but advised that recent changes, any procedures happening at an [facility] facility, the test needs to be done with us.      TX call to COVID scheduling line.    Lizbeth Miles RN on 11/23/2021 at 6:11 PM

## 2021-11-29 ENCOUNTER — LAB (OUTPATIENT)
Dept: LAB | Facility: CLINIC | Age: 66
End: 2021-11-29
Attending: INTERNAL MEDICINE
Payer: MEDICARE

## 2021-11-29 DIAGNOSIS — Z11.59 ENCOUNTER FOR SCREENING FOR OTHER VIRAL DISEASES: ICD-10-CM

## 2021-11-29 PROCEDURE — U0003 INFECTIOUS AGENT DETECTION BY NUCLEIC ACID (DNA OR RNA); SEVERE ACUTE RESPIRATORY SYNDROME CORONAVIRUS 2 (SARS-COV-2) (CORONAVIRUS DISEASE [COVID-19]), AMPLIFIED PROBE TECHNIQUE, MAKING USE OF HIGH THROUGHPUT TECHNOLOGIES AS DESCRIBED BY CMS-2020-01-R: HCPCS

## 2021-11-29 PROCEDURE — U0005 INFEC AGEN DETEC AMPLI PROBE: HCPCS

## 2021-11-30 LAB — SARS-COV-2 RNA RESP QL NAA+PROBE: NEGATIVE

## 2021-12-02 ENCOUNTER — HOSPITAL ENCOUNTER (OUTPATIENT)
Facility: CLINIC | Age: 66
Setting detail: OBSERVATION
Discharge: NURSING, REHAB OR LONG-TERM CARE | End: 2021-12-03
Attending: INTERNAL MEDICINE | Admitting: RADIOLOGY
Payer: MEDICARE

## 2021-12-02 ENCOUNTER — APPOINTMENT (OUTPATIENT)
Dept: INTERVENTIONAL RADIOLOGY/VASCULAR | Facility: CLINIC | Age: 66
End: 2021-12-02
Attending: INTERNAL MEDICINE
Payer: MEDICARE

## 2021-12-02 ENCOUNTER — APPOINTMENT (OUTPATIENT)
Dept: ULTRASOUND IMAGING | Facility: CLINIC | Age: 66
End: 2021-12-02
Attending: RADIOLOGY
Payer: MEDICARE

## 2021-12-02 DIAGNOSIS — I10 HYPERTENSION GOAL BP (BLOOD PRESSURE) < 140/90: ICD-10-CM

## 2021-12-02 DIAGNOSIS — D62 ANEMIA DUE TO BLOOD LOSS, ACUTE: ICD-10-CM

## 2021-12-02 DIAGNOSIS — I70.229 CRITICAL LOWER LIMB ISCHEMIA (H): Primary | ICD-10-CM

## 2021-12-02 DIAGNOSIS — I73.9 PAD (PERIPHERAL ARTERY DISEASE) (H): ICD-10-CM

## 2021-12-02 DIAGNOSIS — I70.422 ATHEROSCLEROSIS OF AUTOLOGOUS VEIN BYPASS GRAFT(S) OF THE EXTREMITIES WITH REST PAIN, LEFT LEG (H): ICD-10-CM

## 2021-12-02 LAB
ABO/RH(D): NORMAL
ANION GAP SERPL CALCULATED.3IONS-SCNC: 6 MMOL/L (ref 3–14)
ANTIBODY SCREEN: NEGATIVE
APTT PPP: 30 SECONDS (ref 22–38)
BUN SERPL-MCNC: 19 MG/DL (ref 7–30)
CALCIUM SERPL-MCNC: 8.3 MG/DL (ref 8.5–10.1)
CHLORIDE BLD-SCNC: 109 MMOL/L (ref 94–109)
CHOLEST SERPL-MCNC: 94 MG/DL
CO2 SERPL-SCNC: 22 MMOL/L (ref 20–32)
CREAT SERPL-MCNC: 1 MG/DL (ref 0.66–1.25)
ERYTHROCYTE [DISTWIDTH] IN BLOOD BY AUTOMATED COUNT: 14.9 % (ref 10–15)
FASTING STATUS PATIENT QL REPORTED: YES
GFR SERPL CREATININE-BSD FRML MDRD: 78 ML/MIN/1.73M2
GLUCOSE BLD-MCNC: 193 MG/DL (ref 70–99)
GLUCOSE BLDC GLUCOMTR-MCNC: 177 MG/DL (ref 70–99)
GLUCOSE BLDC GLUCOMTR-MCNC: 197 MG/DL (ref 70–99)
HCT VFR BLD AUTO: 46 % (ref 40–53)
HDLC SERPL-MCNC: 23 MG/DL
HGB BLD-MCNC: 12.6 G/DL (ref 13.3–17.7)
HGB BLD-MCNC: 13.1 G/DL (ref 13.3–17.7)
HGB BLD-MCNC: 14.9 G/DL (ref 13.3–17.7)
INR PPP: 1.05 (ref 0.85–1.15)
LACTATE SERPL-SCNC: 1.7 MMOL/L (ref 0.7–2)
LDLC SERPL CALC-MCNC: 13 MG/DL
MCH RBC QN AUTO: 28 PG (ref 26.5–33)
MCHC RBC AUTO-ENTMCNC: 32.4 G/DL (ref 31.5–36.5)
MCV RBC AUTO: 87 FL (ref 78–100)
NONHDLC SERPL-MCNC: 71 MG/DL
PLATELET # BLD AUTO: 148 10E3/UL (ref 150–450)
POTASSIUM BLD-SCNC: 4.3 MMOL/L (ref 3.4–5.3)
RBC # BLD AUTO: 5.32 10E6/UL (ref 4.4–5.9)
SODIUM SERPL-SCNC: 137 MMOL/L (ref 133–144)
SPECIMEN EXPIRATION DATE: NORMAL
TRIGL SERPL-MCNC: 292 MG/DL
WBC # BLD AUTO: 7.1 10E3/UL (ref 4–11)

## 2021-12-02 PROCEDURE — 272N000571 HC SHEATH CR8

## 2021-12-02 PROCEDURE — 80061 LIPID PANEL: CPT | Performed by: RADIOLOGY

## 2021-12-02 PROCEDURE — C1769 GUIDE WIRE: HCPCS

## 2021-12-02 PROCEDURE — 37186 SEC ART THROMBECTOMY ADD-ON: CPT

## 2021-12-02 PROCEDURE — 36415 COLL VENOUS BLD VENIPUNCTURE: CPT | Performed by: NURSE PRACTITIONER

## 2021-12-02 PROCEDURE — G0378 HOSPITAL OBSERVATION PER HR: HCPCS

## 2021-12-02 PROCEDURE — 258N000003 HC RX IP 258 OP 636: Performed by: RADIOLOGY

## 2021-12-02 PROCEDURE — 93926 LOWER EXTREMITY STUDY: CPT | Mod: LT,XS

## 2021-12-02 PROCEDURE — 272N000302 HC DEVICE INFLATION CR5

## 2021-12-02 PROCEDURE — 999N000012 HC STATISTIC ANGIOGRAM, STENT, VERTEBRO PLASTY

## 2021-12-02 PROCEDURE — C1725 CATH, TRANSLUMIN NON-LASER: HCPCS

## 2021-12-02 PROCEDURE — 86850 RBC ANTIBODY SCREEN: CPT | Performed by: NURSE PRACTITIONER

## 2021-12-02 PROCEDURE — 272N000116 HC CATH CR1

## 2021-12-02 PROCEDURE — 85027 COMPLETE CBC AUTOMATED: CPT | Performed by: RADIOLOGY

## 2021-12-02 PROCEDURE — 250N000013 HC RX MED GY IP 250 OP 250 PS 637: Performed by: PHYSICIAN ASSISTANT

## 2021-12-02 PROCEDURE — 82310 ASSAY OF CALCIUM: CPT | Performed by: RADIOLOGY

## 2021-12-02 PROCEDURE — 86901 BLOOD TYPING SEROLOGIC RH(D): CPT | Performed by: NURSE PRACTITIONER

## 2021-12-02 PROCEDURE — C1773 RET DEV, INSERTABLE: HCPCS

## 2021-12-02 PROCEDURE — 258N000003 HC RX IP 258 OP 636: Performed by: PHYSICIAN ASSISTANT

## 2021-12-02 PROCEDURE — 99152 MOD SED SAME PHYS/QHP 5/>YRS: CPT

## 2021-12-02 PROCEDURE — 85610 PROTHROMBIN TIME: CPT | Performed by: RADIOLOGY

## 2021-12-02 PROCEDURE — 82962 GLUCOSE BLOOD TEST: CPT | Mod: 91

## 2021-12-02 PROCEDURE — 85018 HEMOGLOBIN: CPT | Performed by: NURSE PRACTITIONER

## 2021-12-02 PROCEDURE — 99220 PR INITIAL OBSERVATION CARE,LEVEL III: CPT | Performed by: PHYSICIAN ASSISTANT

## 2021-12-02 PROCEDURE — 93926 LOWER EXTREMITY STUDY: CPT

## 2021-12-02 PROCEDURE — 272N000570 HC SHEATH CR7

## 2021-12-02 PROCEDURE — 250N000009 HC RX 250: Performed by: PHYSICIAN ASSISTANT

## 2021-12-02 PROCEDURE — 250N000011 HC RX IP 250 OP 636: Performed by: PHYSICIAN ASSISTANT

## 2021-12-02 PROCEDURE — 83605 ASSAY OF LACTIC ACID: CPT | Performed by: PHYSICIAN ASSISTANT

## 2021-12-02 PROCEDURE — 272N000566 HC SHEATH CR3

## 2021-12-02 PROCEDURE — 36415 COLL VENOUS BLD VENIPUNCTURE: CPT | Performed by: PHYSICIAN ASSISTANT

## 2021-12-02 PROCEDURE — 99220 PR INITIAL OBSERVATION CARE,LEVEL III: CPT | Performed by: INTERNAL MEDICINE

## 2021-12-02 PROCEDURE — 250N000011 HC RX IP 250 OP 636: Performed by: RADIOLOGY

## 2021-12-02 PROCEDURE — 272N000196 HC ACCESSORY CR5

## 2021-12-02 PROCEDURE — 36415 COLL VENOUS BLD VENIPUNCTURE: CPT | Performed by: RADIOLOGY

## 2021-12-02 PROCEDURE — 250N000012 HC RX MED GY IP 250 OP 636 PS 637: Performed by: PHYSICIAN ASSISTANT

## 2021-12-02 PROCEDURE — 255N000002 HC RX 255 OP 636: Performed by: RADIOLOGY

## 2021-12-02 PROCEDURE — 272N000190 HC ACCESSORY CR14

## 2021-12-02 PROCEDURE — 85018 HEMOGLOBIN: CPT | Mod: 91 | Performed by: PHYSICIAN ASSISTANT

## 2021-12-02 PROCEDURE — 76937 US GUIDE VASCULAR ACCESS: CPT

## 2021-12-02 PROCEDURE — 96372 THER/PROPH/DIAG INJ SC/IM: CPT | Performed by: PHYSICIAN ASSISTANT

## 2021-12-02 PROCEDURE — 85730 THROMBOPLASTIN TIME PARTIAL: CPT | Performed by: RADIOLOGY

## 2021-12-02 RX ORDER — AMOXICILLIN 250 MG
2 CAPSULE ORAL 2 TIMES DAILY PRN
Status: DISCONTINUED | OUTPATIENT
Start: 2021-12-02 | End: 2021-12-03 | Stop reason: HOSPADM

## 2021-12-02 RX ORDER — HEPARIN SODIUM 1000 [USP'U]/ML
2000 INJECTION, SOLUTION INTRAVENOUS; SUBCUTANEOUS ONCE
Status: DISCONTINUED | OUTPATIENT
Start: 2021-12-02 | End: 2021-12-02

## 2021-12-02 RX ORDER — FENTANYL CITRATE 50 UG/ML
25-50 INJECTION, SOLUTION INTRAMUSCULAR; INTRAVENOUS EVERY 5 MIN PRN
Status: DISCONTINUED | OUTPATIENT
Start: 2021-12-02 | End: 2021-12-02 | Stop reason: HOSPADM

## 2021-12-02 RX ORDER — ATORVASTATIN CALCIUM 40 MG/1
80 TABLET, FILM COATED ORAL AT BEDTIME
Status: DISCONTINUED | OUTPATIENT
Start: 2021-12-02 | End: 2021-12-03 | Stop reason: HOSPADM

## 2021-12-02 RX ORDER — GABAPENTIN 100 MG/1
100 CAPSULE ORAL 3 TIMES DAILY
Status: DISCONTINUED | OUTPATIENT
Start: 2021-12-02 | End: 2021-12-03 | Stop reason: HOSPADM

## 2021-12-02 RX ORDER — NALOXONE HYDROCHLORIDE 0.4 MG/ML
0.2 INJECTION, SOLUTION INTRAMUSCULAR; INTRAVENOUS; SUBCUTANEOUS
Status: DISCONTINUED | OUTPATIENT
Start: 2021-12-02 | End: 2021-12-02 | Stop reason: HOSPADM

## 2021-12-02 RX ORDER — ACETAMINOPHEN 650 MG/1
650 SUPPOSITORY RECTAL EVERY 6 HOURS PRN
Status: DISCONTINUED | OUTPATIENT
Start: 2021-12-02 | End: 2021-12-03 | Stop reason: HOSPADM

## 2021-12-02 RX ORDER — HEPARIN SODIUM 200 [USP'U]/100ML
1 INJECTION, SOLUTION INTRAVENOUS CONTINUOUS PRN
Status: DISCONTINUED | OUTPATIENT
Start: 2021-12-02 | End: 2021-12-02 | Stop reason: HOSPADM

## 2021-12-02 RX ORDER — LIDOCAINE 40 MG/G
CREAM TOPICAL
Status: DISCONTINUED | OUTPATIENT
Start: 2021-12-02 | End: 2021-12-02 | Stop reason: HOSPADM

## 2021-12-02 RX ORDER — NALOXONE HYDROCHLORIDE 0.4 MG/ML
0.4 INJECTION, SOLUTION INTRAMUSCULAR; INTRAVENOUS; SUBCUTANEOUS
Status: DISCONTINUED | OUTPATIENT
Start: 2021-12-02 | End: 2021-12-02 | Stop reason: HOSPADM

## 2021-12-02 RX ORDER — SODIUM CHLORIDE 9 MG/ML
INJECTION, SOLUTION INTRAVENOUS CONTINUOUS
Status: DISCONTINUED | OUTPATIENT
Start: 2021-12-02 | End: 2021-12-03

## 2021-12-02 RX ORDER — HEPARIN SODIUM 1000 [USP'U]/ML
3000 INJECTION, SOLUTION INTRAVENOUS; SUBCUTANEOUS ONCE
Status: COMPLETED | OUTPATIENT
Start: 2021-12-02 | End: 2021-12-02

## 2021-12-02 RX ORDER — LIDOCAINE 40 MG/G
CREAM TOPICAL
Status: DISCONTINUED | OUTPATIENT
Start: 2021-12-02 | End: 2021-12-03

## 2021-12-02 RX ORDER — NITROGLYCERIN 0.4 MG/1
0.4 TABLET SUBLINGUAL EVERY 5 MIN PRN
Status: DISCONTINUED | OUTPATIENT
Start: 2021-12-02 | End: 2021-12-03

## 2021-12-02 RX ORDER — HEPARIN SODIUM 1000 [USP'U]/ML
2000 INJECTION, SOLUTION INTRAVENOUS; SUBCUTANEOUS ONCE
Status: COMPLETED | OUTPATIENT
Start: 2021-12-02 | End: 2021-12-02

## 2021-12-02 RX ORDER — NICOTINE POLACRILEX 4 MG
15-30 LOZENGE BUCCAL
Status: DISCONTINUED | OUTPATIENT
Start: 2021-12-02 | End: 2021-12-03 | Stop reason: HOSPADM

## 2021-12-02 RX ORDER — ONDANSETRON 2 MG/ML
4 INJECTION INTRAMUSCULAR; INTRAVENOUS EVERY 6 HOURS PRN
Status: DISCONTINUED | OUTPATIENT
Start: 2021-12-02 | End: 2021-12-03 | Stop reason: HOSPADM

## 2021-12-02 RX ORDER — AMOXICILLIN 250 MG
1 CAPSULE ORAL 2 TIMES DAILY PRN
Status: DISCONTINUED | OUTPATIENT
Start: 2021-12-02 | End: 2021-12-03 | Stop reason: HOSPADM

## 2021-12-02 RX ORDER — FLUMAZENIL 0.1 MG/ML
0.2 INJECTION, SOLUTION INTRAVENOUS
Status: DISCONTINUED | OUTPATIENT
Start: 2021-12-02 | End: 2021-12-02 | Stop reason: HOSPADM

## 2021-12-02 RX ORDER — IODIXANOL 320 MG/ML
150 INJECTION, SOLUTION INTRAVASCULAR ONCE
Status: COMPLETED | OUTPATIENT
Start: 2021-12-02 | End: 2021-12-02

## 2021-12-02 RX ORDER — ACETAMINOPHEN 325 MG/1
650 TABLET ORAL EVERY 6 HOURS PRN
Status: DISCONTINUED | OUTPATIENT
Start: 2021-12-02 | End: 2021-12-03 | Stop reason: HOSPADM

## 2021-12-02 RX ORDER — DEXTROSE MONOHYDRATE 25 G/50ML
25-50 INJECTION, SOLUTION INTRAVENOUS
Status: DISCONTINUED | OUTPATIENT
Start: 2021-12-02 | End: 2021-12-03 | Stop reason: HOSPADM

## 2021-12-02 RX ORDER — ONDANSETRON 4 MG/1
4 TABLET, ORALLY DISINTEGRATING ORAL EVERY 6 HOURS PRN
Status: DISCONTINUED | OUTPATIENT
Start: 2021-12-02 | End: 2021-12-03 | Stop reason: HOSPADM

## 2021-12-02 RX ORDER — SODIUM CHLORIDE 9 MG/ML
INJECTION, SOLUTION INTRAVENOUS CONTINUOUS
Status: DISCONTINUED | OUTPATIENT
Start: 2021-12-02 | End: 2021-12-02 | Stop reason: HOSPADM

## 2021-12-02 RX ORDER — INSULIN ASPART 100 [IU]/ML
32 INJECTION, SOLUTION INTRAVENOUS; SUBCUTANEOUS
COMMUNITY

## 2021-12-02 RX ORDER — POLYETHYLENE GLYCOL 3350 17 G/17G
17 POWDER, FOR SOLUTION ORAL DAILY PRN
Status: DISCONTINUED | OUTPATIENT
Start: 2021-12-02 | End: 2021-12-03 | Stop reason: HOSPADM

## 2021-12-02 RX ADMIN — MIDAZOLAM HYDROCHLORIDE 0.5 MG: 1 INJECTION, SOLUTION INTRAMUSCULAR; INTRAVENOUS at 15:15

## 2021-12-02 RX ADMIN — FENTANYL CITRATE 25 MCG: 50 INJECTION INTRAMUSCULAR; INTRAVENOUS at 15:18

## 2021-12-02 RX ADMIN — HEPARIN SODIUM 4 BAG: 200 INJECTION, SOLUTION INTRAVENOUS at 14:26

## 2021-12-02 RX ADMIN — INSULIN GLARGINE 30 UNITS: 100 INJECTION, SOLUTION SUBCUTANEOUS at 23:34

## 2021-12-02 RX ADMIN — HEPARIN SODIUM 3000 UNITS: 1000 INJECTION INTRAVENOUS; SUBCUTANEOUS at 13:56

## 2021-12-02 RX ADMIN — IODIXANOL 76 ML: 320 INJECTION, SOLUTION INTRAVASCULAR at 15:30

## 2021-12-02 RX ADMIN — FENTANYL CITRATE 25 MCG: 50 INJECTION INTRAMUSCULAR; INTRAVENOUS at 15:15

## 2021-12-02 RX ADMIN — MIDAZOLAM HYDROCHLORIDE 0.5 MG: 1 INJECTION, SOLUTION INTRAMUSCULAR; INTRAVENOUS at 15:19

## 2021-12-02 RX ADMIN — HEPARIN SODIUM 2000 UNITS: 1000 INJECTION INTRAVENOUS; SUBCUTANEOUS at 13:59

## 2021-12-02 RX ADMIN — SODIUM CHLORIDE 250 ML: 9 INJECTION, SOLUTION INTRAVENOUS at 15:02

## 2021-12-02 RX ADMIN — GABAPENTIN 100 MG: 100 CAPSULE ORAL at 21:14

## 2021-12-02 RX ADMIN — SODIUM CHLORIDE: 9 INJECTION, SOLUTION INTRAVENOUS at 17:50

## 2021-12-02 RX ADMIN — FENTANYL CITRATE 50 MCG: 50 INJECTION INTRAMUSCULAR; INTRAVENOUS at 14:29

## 2021-12-02 RX ADMIN — FENTANYL CITRATE 50 MCG: 50 INJECTION INTRAMUSCULAR; INTRAVENOUS at 14:01

## 2021-12-02 RX ADMIN — SODIUM CHLORIDE: 9 INJECTION, SOLUTION INTRAVENOUS at 11:24

## 2021-12-02 RX ADMIN — MIDAZOLAM HYDROCHLORIDE 1 MG: 1 INJECTION, SOLUTION INTRAMUSCULAR; INTRAVENOUS at 13:35

## 2021-12-02 RX ADMIN — ATORVASTATIN CALCIUM 80 MG: 40 TABLET, FILM COATED ORAL at 21:14

## 2021-12-02 RX ADMIN — FENTANYL CITRATE 50 MCG: 50 INJECTION INTRAMUSCULAR; INTRAVENOUS at 13:35

## 2021-12-02 RX ADMIN — MIDAZOLAM HYDROCHLORIDE 1 MG: 1 INJECTION, SOLUTION INTRAMUSCULAR; INTRAVENOUS at 14:29

## 2021-12-02 RX ADMIN — MIDAZOLAM HYDROCHLORIDE 1 MG: 1 INJECTION, SOLUTION INTRAMUSCULAR; INTRAVENOUS at 14:01

## 2021-12-02 RX ADMIN — LIDOCAINE HYDROCHLORIDE 6 ML: 10 INJECTION, SOLUTION INFILTRATION; PERINEURAL at 13:40

## 2021-12-02 RX ADMIN — SODIUM CHLORIDE 250 ML: 9 INJECTION, SOLUTION INTRAVENOUS at 16:35

## 2021-12-02 ASSESSMENT — MIFFLIN-ST. JEOR: SCORE: 1581.86

## 2021-12-02 NOTE — PROGRESS NOTES
VASCULAR MEDICINE CHART CHECK    Patient presented today for a lower extremity angiogram. He was just recently seen in the vascular clinic by Dr. Olson on 11/16/21. Please refer to that note for further details. No formal Vascular Medicine consultation is needed today. He is due to see Dr. Olson in follow-up in 3 months. Please call [phone] if we can be of any further assistance this admission or in the future.     Sadaf Walden PA-C

## 2021-12-02 NOTE — PROGRESS NOTES
1620: Jenny BAXTER at bedside assessing pt. 250 ml/1 hour IVF bolus given for hypotension. R) groin site stable, ecchymotic. Ultrasound complete. Hgb ordered q6 hours, next check at 1800 with orders to transfuse <7. Blood consent signed and in paper chart. Pt to remain clear liquids only for now per provider, she will place a new diet order when he is appropriate to advance. Pt called his nursing facility to update that he will be spending the night in the hospital.

## 2021-12-02 NOTE — PROGRESS NOTES
Dr. Escobar at bedside, assessed pt and groin site. Ecchymotic but stable. Continue to monitor. Left his number: [phone] for any issues.

## 2021-12-02 NOTE — PROGRESS NOTES
PATIENT/VISITOR WELLNESS SCREENING    Step 1 Patient Screening    1. In the last month, have you been in contact with someone who was confirmed or suspected to have Coronavirus/COVID-19? No    2. Do you have the following symptoms?  Fever/Chills? No   Cough? No   Shortness of breath? No   New loss of taste or smell? No  Sore throat? No  Muscle or body aches? No  Headaches? No  Fatigue? No  Vomiting or diarrhea? No    Step 2 Visitor Screening    1. Name of Visitor (1 visitor per patient): n/a

## 2021-12-02 NOTE — BRIEF OP NOTE
Interventional Radiology Brief Post Procedure Note    Procedure: IR LOWER EXTREMITY ANGIOGRAM LEFT, L CFA , proximal SFA -AT bypass graft angioplasty, Suction thromboembolectomy of LLE     Proceduralist: Rio    Assistant: Luz    Time Out: Prior to the start of the procedure and with procedural staff participation, I verbally confirmed the patient s identity using two indicators, relevant allergies, that the procedure was appropriate and matched the consent or emergent situation, and that the correct equipment/implants were available. Immediately prior to starting the procedure I conducted the Time Out with the procedural staff and re-confirmed the patient s name, procedure, and site/side. (The Joint Commission universal protocol was followed.)  Yes    Medications   Medication Event Details Admin User Admin Time       Sedation: IR Nurse Monitored Care   Post Procedure Summary:  Prior to the start of the procedure and with procedural staff participation, I verbally confirmed the patient s identity using two indicators, relevant allergies, that the procedure was appropriate and matched the consent or emergent situation, and that the correct equipment/implants were available. Immediately prior to starting the procedure I conducted the Time Out with the procedural staff and re-confirmed the patient s name, procedure, and site/side. (The Joint NAVX universal protocol was followed.)  Yes       Sedatives: Fentanyl and Midazolam (Versed)    Vital signs, airway and pulse oximetry were monitored and remained stable throughout the procedure and sedation was maintained until the procedure was complete.  The patient was monitored by staff until sedation discharge criteria were met.    Patient tolerance: Patient tolerated the procedure well with no immediate complications.    Time of sedation in minutes: 60 Minutes minutes from beginning to end of physician one to one monitoring.          Findings: No significant L  iliac disease, R iliac diffusely disease, Proximal CFA stenosis (at site of prior bypass graft anastomosis), balloon angioplasty . LLE angio showed filling defect in bypass graft below knee, Suction embolectomy attempted unsuccessfully, snare used to remove athero-embolus completely. Palpable pulse in bypass graft and monophasic AT signal at end of case.     Estimated Blood Loss: 1L    Fluoroscopy Time: 27.1 minute(s)    SPECIMENS: None    Complications: Atheroembolization after angioplasty, removed via snare     Condition: Stable    Plan:     - Bed rest 4 hrs  - STAT US duplex BLE    Comments: See dictated procedure note for full details.    Jose Escobar MD

## 2021-12-02 NOTE — PRE-PROCEDURE
GENERAL PRE-PROCEDURE:   Procedure:  Left leg angiogram with possible angioplasty and/or stent placement with intravenous moderate sedation   Date/Time:  12/2/2021 11:30 AM    Written consent obtained?: Yes    Risks and benefits: Risks, benefits and alternatives were discussed    Consent given by:  Patient  Patient states understanding of procedure being performed: Yes    Patient's understanding of procedure matches consent: Yes    Procedure consent matches procedure scheduled: Yes    Expected level of sedation:  Moderate  Appropriately NPO:  Yes  ASA Class:  3  Mallampati  :  Grade 2- soft palate, base of uvula, tonsillar pillars, and portion of posterior pharyngeal wall visible  Lungs:  Lungs clear with good breath sounds bilaterally and other (comment)  Lung exam comment:  Expiratory wheezes with exertion  Heart:  Normal heart sounds and rate  History & Physical reviewed:  History and physical reviewed and no updates needed  Statement of review:  I have reviewed the lab findings, diagnostic data, medications, and the plan for sedation

## 2021-12-02 NOTE — IR NOTE
Interventional Radiology Intra-procedural Nursing Note    Patient Name: Alexandro Pool  Medical Record Number: [id number]  Today's Date: December 2, 2021    Start Time: 1337  End of procedure time: 1520  Procedure: Left leg angiogram with possible angioplasty and/or stent placement with intravenous moderate sedation   Report given to: Iram JOE  Time pt departs:  1540    Other Notes: Pt into IR suite 2 via cart. Pt awake and alert. To table in supine position. Monitoring equipment applied. VSS. Tele SR. Dr. Pate in room. Time out and procedure started. Pt tolerated procedure well. Debrief with Dr. Pate. Hematoma formed on right groin and pressure held until hemostasis achieved. Dressing CDI. Pt was hypotensive during case.  250 ml NS bolus given. Type and Screen with a hemoglobin sent to the lab.  Pt transferred back to Care Suites.    Medications:    Versed 4 mg  Fentanyl 200 mcg  Lidocaine 1% 6 ml  Heparin 7,000 units     Kirk Thomas RN

## 2021-12-02 NOTE — PROGRESS NOTES
Care Suites Admission Nursing Note    Patient Information  Name: Alexandro Pool  Age: 66 year old  Reason for admission: LE angiogram  Care Suites arrival time: 1047    Visitor Information  Name: n/a    Patient Admission/Assessment   Pre-procedure assessment complete: Yes  If abnormal assessment/labs, provider notified: N/A  NPO: Yes  Medications held per instructions/orders: Yes  Consent: obtained  Patient oriented to room: Yes  Education/questions answered: Yes  Plan/other: Proceed as planned    Discharge Planning  Discharge name/phone number: Pt will call his ride service that transports to and from his nursing home  Overnight post sedation caregiver: nursing home staff  Discharge location: [place]    Iram Antonio RN

## 2021-12-02 NOTE — PROGRESS NOTES
Interventional Radiology Progress Note:  Inpatient at [facility]  Date: December 2, 2021     History: Alexandro Pool is a 66 year old male with a history of smoking, PAD s/p R AKA in 2012 and L fem to tib bypass graft, hyperlipidemia, hypertension, type 2 diabetes, CAD with stent. He has a slowly healing left foot dorsal wound which is managed twice a week by [facility] with slow improvement in healing. He had recent vascular imaging with decreased OZZIE and US flow noted in the left graft. Alexandro had a left leg angiogram today in IR with Dr Pate    During the angiogram a blockage was angioplastied in the common femoral artery and a  piece of calcified plaque broke off and embolized into the distal graft, Suction aspiration was performed with a blood loss of 1 L, Hemoglobin started at 14.9 and with re check it is 12.6.Dr Pate was able to snare the clot and pulled it out with the sheath. Patient became hypotensive and was given a NS fluid bolus. Manual pressure was applied for 30 minutes post sheath removal     Because of the blood loss and hypotension it was felt the patient should spend a night in observation at the hospital. He has a 6 hour bedrest.     Physical Exam:   Vitals: Temp:  [98.1  F (36.7  C)] 98.1  F (36.7  C)  Pulse:  [64-81] 75  Resp:  [13-30] 14  BP: ()/(38-84) 91/65  SpO2:  [91 %-100 %] 96 %    General/Neuro: Alert, oriented, pleasant male in no acute distress prior to procedure. Some pain post with pressure to the right groin post angiogram. Moves all extremities equally.  Resp:  Normal respirations on room air. Non labored breathing. Equal air entry B/L. Lungs clear to auscultation.   Abdomen:  Soft, non-distended, non-tender.  Vascular: right groin procedure site with dressing CDI. Ecchymotic post procedure. Site soft without tenderness pre, but wore after.     Labs:  ROUTINE ICU LABS (Last four results)  CMPRecent Labs   Lab 12/02/21  1124       POTASSIUM 4.3   CHLORIDE 109   CO2 22   ANIONGAP 6   *   BUN 19   CR 1.00   GFRESTIMATED 78   BOGDAN 8.3*     CBC  Recent Labs   Lab 12/02/21  1512 12/02/21  1124   WBC  --  7.1   RBC  --  5.32   HGB 12.6* 14.9   HCT  --  46.0   MCV  --  87   MCH  --  28.0   MCHC  --  32.4   RDW  --  14.9   PLT  --  148*     INR  Recent Labs   Lab 12/02/21  1124   INR 1.05     Arterial Blood GasNo lab results found in last 7 days.    No results for input(s): PROTTOTAL, ALBUMIN, BILITOTAL, ALKPHOS, AST, ALT, BILIDIRECT in the last 168 hours.    Cultures:  No results for input(s): CULT in the last 168 hours.    Assessment: Alexandro Pool is a 66 year old male with a history significant of PAD with increased arterial insufficiency in the left graft with embolization of a calcified plaque during angioplasty with subsequent blood loss of ~ 1 L during suction thrombectomy. He is being admitted overnight for observation and BP control.     US is also ordered to check the left graft for any other areas of possible calcified clot tonight.     Plan: Admit to the hospital overnight. Dr Pate gave report to Dr River, attending Hospitalist.   -Bedrest for 6 hours with HOB up 30% at most. Follow post angiogram orders for groin and CMS checks of left leg.   -Will order labs for am.   -Hospitalist to assist in diabetic and medical care.   -IR to see in am for discharge as long as patient remains stable.     Appreciate Hospitalist assistance in the care of this patient.     Total time spent on the date of the encounter is 20 minutes, including time spent counseling the patient, performing a medically appropriate evaluation, reviewing prior medical history, ordering medications and tests, documenting clinical information in the medical record, and communication of results.    Thanks Marisol [facility] Interventional Radiology CNP ([phone]) (phone [phone])

## 2021-12-02 NOTE — H&P
[facility]    History and Physical - Hospitalist Service       Date of Admission:  12/2/2021    Assessment & Plan   Alexandro Pool is a 66 year old male with PMHx of PAD, right AKA, tobacco use disorder, hyperlipidemia, hypertension, T2DM, CAD s/p stent to LAD (2012), and non-healing left doral foot wound who was recently found to have decreased flow to left graft on OZZIE. Pt underwent angiogram by IR and Vascular Surgery on 12/2/21 with angioplasty of the common femoral artery. A piece of calcified plaque broke off during the procedure and embolized in the distal graft s/p unsuccessful suction aspiration, but ultimately with successful snaring. EBL 1000 ccs. Post-procedure, pt with hypotension. Hospitalist service was contacted for observation admission.     Non-healing left dorsal foot wound s/p angioplasty of left common femoral artery  Plaque break off with unsuccessful suction aspiration, but successful snaring  PAD S/P Lt SFA stenting 3-9-2012, Lt fem-SENIA bypass w/ ISGSV 9-, S/P Rt AKA 2014, ligation of parasitic branch 9/19/2017  S/P right AKA: Last seen by Dr. Olson 11/2021. Procedure today with IR and Vascular Surgery. See note for details. EBL 1000 ccs/ Pt received heparin total 5,000 U IV per MAR. Last dose of ASA 81 mg on 12/2 and Xarelto 11/29.   * Post-procedure, SBP in the 70s-80s. Pt asymptomatic. Notes some back pain related to current bed, but denies abdominal pain, dizziness, lightheadedness, chest pain, SOB, dizziness. No lower extremity pain. R groin access site with hematoma, but with palpable pulse. Received 250 ml bolus + ~750 ml maintenance fluids intra-op.    * Vascular fellow assessed during my interview. Prelim review of US post vascular access negative for acute pathology. Agrees with plan of care below.   - [place] to observation under [facility] status   - Additional 250 ml bolus ordered in Care Suites + maintenance thereafter with 0.9% NS at 75 ccs/hr.  Monitor for evidence of volume overload as pt with hx of CHF with EF 35% per echo 2018   - Repeat Hgb stable, lactic WNL. Serial Hgb q 6 hrs  - Conditional orders to transfuse for Hgb <7. Consent signed and in pt's chart   - Monitor sx, monitor CMS   - IR to follow-up with patient in the AM   - Hold ASA + Xarelto until reassessed in the AM  - Hold PTA antihypertensives, assess for resumption in the AM   - 2g sodium + mod CHO diet   - Resume outpatient wound cares through [facility] in [place] (has home health care services)     Acute blood loss anemia: EBL 1L from procedure above. Pt hypotensive post-op. Responding to fluid resuscitation.   - Monitor, plan as above     Recent Labs   Lab 12/02/21  1716 12/02/21  1512 12/02/21  1124   HGB 13.1* 12.6* 14.9     CAD s/p stent to LAD (2012)  HFrEFdue to ischemic cardiomyopathy (EF 36%, 2018), not in acute exacerbation  Hypertension  Hyperlipidemia:   * Echo 5/2018 with EF 36%, large LAD territory akinesis.   - Hold Norvasc 2.5 mg BID, Lisinopril 2.5 mg po BID, Toprol XL 75 mg po qd  - Continue Atorvastatin 80 mg at bedtime  - Monitor volume status very closely   - I&O, daily weights   - Recommend outpatient Cardiology follow-up, no indication for repeat echo during this hospitalization unless decompensates from respiratory status.     Tobacco use disorder: 50 pack year smoking hx.   - Cessation encouraged   - Declines replacement at this time     T2DM with peripheral neuropathy: A1C 7.9 on 11/2/21  [Basaglar 50 U at bedtime, Humalog 18 U TID with meals, Victoza 1.8 mg subcutaneous every day, Jardiance 25 mg po every day, Gabapentin 100 mg TID   - Basaglar 30 U at bedtime this evening until diet fully advanced   - Medium sliding scale insulin + 1 U per 15 g CHO, resume mealtime Humalog in the AM if diet fully advanced   - Monitor for hypoglycemia   - BS per protocol     Recent Labs   Lab 12/02/21  1124   *     Chronic pain: Continue MS Contin 45 mg BID and  oxycodone 15 mg po q4 hrs PRN once verified by pharmacy     Diet: Regular Diet Adult    DVT Prophylaxis: Pneumatic Compression Devices  Olson Catheter: Not present  Central Lines: None  Code Status:  Full Code, confirmed with pt at bedside. Friend, Harsh, is HCA if unable to make decisions for self.     Disposition Plan   Expected discharge: Pending clinical course.     The patient's care was discussed with the Attending Physician, Dr. iRver, Bedside Nurse and Patient.    SAHIL Tiwari [facility]  Securely message with the Vocera Web Console (learn more here)  Text page via [facility] Paging/Directory  ______________________________________________________________________    Chief Complaint   Non-healing left dorsal foot wound    History is obtained from the patient and extensive chart review.     History of Present Illness   Alexandro Pool is a 66 year old male with PMHx of PAD, right AKA, tobacco use disorder, hyperlipidemia, hypertension, T2DM, CAD s/p stent to LAD (2012), and non-healing left doral foot wound who was recently found to have decreased flow to left graft on OZZIE. Pt underwent angiogram by IR and Vascular Surgery on 12/2/21 with angioplasty of the common femoral artery. A piece of calcified plaque broke off during the procedure and embolized in the distal graft s/p unsuccessful suction aspiration, but ultimately with successful snaring. EBL 1000 ccs. Post-procedure, pt with hypotension. Hospitalist service was contacted for observation admission.     Procedure today with IR and Vascular Surgery. See note for details. EBL 1000 ccs/ Pt received heparin total 5,000 U IV per MAR. Last dose of ASA 81 mg on 12/2 and Xarelto 11/29.     Post-procedure, SBP in the 70s-80s. Pt asymptomatic. Notes some back pain related to current bed, but denies abdominal pain, dizziness, lightheadedness, chest pain, SOB, dizziness. No lower extremity pain. R groin access site  with hematoma, but with palpable pulse. Received 250 ml bolus + ~750 ml maintenance fluids intra-op.      Vascular fellow assessed during my interview. Prelim review of US post vascular access negative for acute pathology. Agrees with plan of care below.     Review of Systems    The 10 point Review of Systems is negative other than noted in the HPI.    Past Medical History    I have reviewed this patient's medical history and updated it with pertinent information if needed.   Past Medical History:   Diagnosis Date     Acute kidney failure, unspecified (H)      Blood transfusion      CAD (coronary artery disease)      CARDIOVASCULAR SCREENING; LDL GOAL LESS THAN 100      Cirrhosis (H)      Critical lower limb ischemia (H)      Diabetes mellitus (H) 10/2011     Hypertension      Hypertension goal BP (blood pressure) < 140/90      Ischemic cardiomyopathy      Lymphedema of left leg 5/11/2016     Peripheral arterial disease (H)      PVD (peripheral vascular disease) (H)      Right above knee amputation 4/30/2014     Type 2 diabetes, HbA1C goal < 8% (H)        Past Surgical History   I have reviewed this patient's surgical history and updated it with pertinent information if needed.  Past Surgical History:   Procedure Laterality Date     AMPUTATE LEG ABOVE KNEE  11/22/2011    Procedure:AMPUTATE LEG ABOVE KNEE; Revise Right Below Knee Amputation To Above Knee Amputation; Surgeon:MADISON WELLS; Location:[facility] OR     AMPUTATE LEG BELOW KNEE  11/10/2011    Procedure:AMPUTATE LEG BELOW KNEE; Right Below Knee Amputation; Surgeon:MADISON WELLS; Location:[facility] OR     BYPASS GRAFT FEMOROTIBIAL  9/19/2013    Procedure: BYPASS GRAFT FEMOROTIBIAL;  Left Femorotibial Bypass Graft Insitu ;  Surgeon: Marisol Suggs MD;  Location: [facility] OR     CARDIAC SURGERY      cardiac stent     ESOPHAGOSCOPY, GASTROSCOPY, DUODENOSCOPY (EGD), COMBINED  10/1/2012    Procedure: COMBINED ESOPHAGOSCOPY, GASTROSCOPY, DUODENOSCOPY (EGD);;  Surgeon: Nehemias Cevallos  "MD ALESSANDRA;  Location:  GI     ESOPHAGOSCOPY, GASTROSCOPY, DUODENOSCOPY (EGD), COMBINED N/A 3/24/2016    Procedure: COMBINED ESOPHAGOSCOPY, GASTROSCOPY, DUODENOSCOPY (EGD);  Surgeon: Gildardo Marks MD;  Location:  GI     IR STENT VASCULAR LT  3/9/2012          IRRIGATION AND DEBRIDEMENT LOWER EXTREMITY, COMBINED  11/15/2011    Procedure:COMBINED IRRIGATION AND DEBRIDEMENT LOWER EXTREMITY; DRAINAGE OF ABSCESS AT BELOW KNEE AMPUTATION AND KNEE DISARTICULATION.; Surgeon:MADISON WELLS; Location: OR     NO HISTORY OF SURGERY  13    derm     VASCULAR REPAIR LOWER EXTREMITY Left 2017    Procedure: VASCULAR REPAIR LOWER EXTREMITY;  Ligation Parasitic Branch To Left Lower Extremity ;  Surgeon: Marisol Suggs MD;  Location:  OR       Social History   I have reviewed this patient's social history and updated it with pertinent information if needed.  Social History     Tobacco Use     Smoking status: Current Every Day Smoker     Packs/day: 1.00     Years: 40.00     Pack years: 40.00     Types: Cigarettes     Smokeless tobacco: Never Used     Tobacco comment: working on quitting   Substance Use Topics     Alcohol use: Yes     Alcohol/week: 1.0 standard drink     Types: 1 Standard drinks or equivalent per week     Comment: \"Once a year maybe\"     Drug use: No       Family History   I have reviewed this patient's family history and updated it with pertinent information if needed.  Family History   Problem Relation Age of Onset     Alcohol/Drug Mother         cirrhosis     Alcohol/Drug Father      C.A.D. No family hx of      Diabetes No family hx of      Cancer No family hx of        Prior to Admission Medications   Prior to Admission Medications   Prescriptions Last Dose Informant Patient Reported? Taking?   ASPIRIN EC PO 2021 at Unknown time Self Yes Yes   Sig: Take 81 mg by mouth daily   SENNA PO 2021 at Unknown time Self Yes Yes   Si Tab , bedtime   acetaminophen (TYLENOL) 325 MG tablet Past Month " "at Unknown time Self No Yes   Sig: Take 1 tablet by mouth every 4 hours as needed.   amLODIPine (NORVASC) 2.5 MG tablet 12/2/2021 at Unknown time  No Yes   Sig: Take 1 tablet (2.5 mg) by mouth 2 times daily   ammonium lactate (AMLACTIN) 12 % cream Past Week at Unknown time  Yes Yes   Sig: Apply topically 2 times daily as needed for dry skin apply to both legs twice daily as needed   atorvastatin (LIPITOR) 80 MG tablet 12/1/2021 at Unknown time  No Yes   Sig: Take 1 tablet (80 mg) by mouth At Bedtime   bisacodyl (DULCOLAX) 5 MG EC tablet More than a month at Unknown time  No Yes   Sig: Take 1 tablet (5 mg) by mouth See Admin Instructions Refer to \"Getting Ready for a Colonoscopy\" instruction handout   empagliflozin (JARDIANCE) 25 MG TABS tablet 12/2/2021 at Unknown time  No Yes   Sig: TAKE 1 TABLET (25 MG) BY MOUTH DAILY   gabapentin (NEURONTIN) 100 MG capsule 12/2/2021 at Unknown time Self No Yes   Sig: Take 1 capsule (100 mg) by mouth 3 times daily   hydrocortisone 1 % cream More than a month at Unknown time Self Yes Yes   Sig: Apply topically 2 times daily PRN   insulin aspart (NOVOLOG FLEXPEN) 100 UNIT/ML pen 12/1/2021 at Unknown time  Yes Yes   Sig: Inject 19 Units Subcutaneous 2 times daily (with meals)   insulin glargine (BASAGLAR KWIKPEN) 100 UNIT/ML pen 11/30/2021  No No   Sig: Inject 50 Units Subcutaneous At Bedtime   insulin lispro (HUMALOG) 100 UNIT/ML vial Unknown at Unknown time  No Yes   Sig: Inject 18 Units Subcutaneous 3 times daily (before meals) 18 units before each meal   insulin pen needle (BD KWABENA U/F) 32G X 4 MM miscellaneous Unknown at Unknown time  No Yes   Sig: Use 4x  daily or as directed.   liraglutide (VICTOZA) 18 MG/3ML solution 12/1/2021 at Unknown time  No Yes   Sig: Inject 1.8 mg Subcutaneous daily   lisinopril (ZESTRIL) 2.5 MG tablet 12/2/2021 at Unknown time  No Yes   Sig: TAKE 1 TABLET (2.5 MG) BY MOUTH 2 TIMES DAILY   metoprolol succinate ER (TOPROL-XL) 50 MG 24 hr tablet " "12/2/2021 at Unknown time  No Yes   Sig: Take 1.5 tablets (75 mg) by mouth daily   morphine (MS CONTIN) 15 MG 12 hr tablet 12/2/2021 at Unknown time Self No Yes   Sig: Take 3 tablets (45 mg) by mouth 2 times daily   order for DME   Yes No   Sig: Equipment being ordered: FlexiTouch pneumatic compression device.  2-3 times per day to left lower extremity.  Current strength - L4   oxyCODONE (ROXICODONE) 10 MG immediate release tablet  Self No No   Sig: Take 1.5 tablets (15 mg) by mouth every 4 hours as needed   polyethylene glycol (GOLYTELY) 236 g suspension Unknown at Unknown time  No Yes   Sig: Take 4,000 mLs by mouth See Admin Instructions Refer to \"Getting Ready for a Colonoscopy\" instruction handout   rivaroxaban ANTICOAGULANT (XARELTO ANTICOAGULANT) 2.5 MG TABS tablet 11/29/2021 at 0730  No No   Sig: Take 1 tablet (2.5 mg) by mouth 2 times daily      Facility-Administered Medications: None     Allergies   Allergies   Allergen Reactions     Ciprofloxacin Rash       Physical Exam   Vital Signs: Temp: 98.1  F (36.7  C) Temp src: Oral BP: 109/84 Pulse: 72   Resp: 22 SpO2: 97 % O2 Device: None (Room air)    Weight: 187 lbs 0 oz    CONSTITUTIONAL: Pt laying in bed, dressed in hospital garb. Appears comfortable. Cooperative with interview.  HEENT: Normocephalic, atraumatic. Pupils equal, round, and reactive to light. Negative for conjunctival redness or scleral icterus.  Oral mucosa pink and moist; negative for ulcerations, erythema, or exudates.  Dentition in good repair.   CARDIOVASCULAR: RRR, no murmurs, rubs, or extra heart sounds appreciated. Pulses +2/4 and regular in upper and lower extremities, bilaterally.   RESPIRATORY: No increased work of breathing.   GASTROINTESTINAL:  Abdomen soft, non-distended. BS auscultated in all four quadrants. Negative for tenderness to palpation.  No masses or organomegaly noted.  MUSCULOSKELETAL: R BKA noted.   HEMATOLOGIC/LYMPHATIC/IMMUNOLOGIC: Negative for lower extremity edema, " bilaterally.  NEUROLOGIC: Alert and oriented to person, place, and time.  strength intact.  SKIN: Chronic venous stasis discoloration of LLE. R groin site with palpable hematoma. L dorsal foot wound noted. No surrounding erythema or purulent drainage.     Data   Data reviewed today: I reviewed all medications, new labs and imaging results over the last 24 hours. I personally reviewed all labs and imaging to date.     Recent Labs   Lab 12/02/21  1716 12/02/21  1512 12/02/21  1124   WBC  --   --  7.1   HGB 13.1* 12.6* 14.9   MCV  --   --  87   PLT  --   --  148*   INR  --   --  1.05   NA  --   --  137   POTASSIUM  --   --  4.3   CHLORIDE  --   --  109   CO2  --   --  22   BUN  --   --  19   CR  --   --  1.00   ANIONGAP  --   --  6   BOGDAN  --   --  8.3*   GLC  --   --  193*     Recent Results (from the past 24 hour(s))   Us Post Vascular Access Low Ext Duplex    Narrative    US POST VASCULAR ACCESS LOW EXT DUPLEX   12/2/2021 3:55 PM    INDICATION: Right groin pain. Peripheral arterial disease. S/P angio, r/o pseudo right groin    COMPARISON: The angiogram from 12/2/2021.    FINDINGS:   No identified pseudoaneurysm or arterial venous fistula.    Ultrasound of the right groin shows a brisk monophasic waveform in the right external iliac, common femoral and presumed proximal superficial femoral arteries. The external vein is patent with antegrade flow.      Impression    CONCLUSION:   No identified pseudoaneurysm or arterial venous fistula.

## 2021-12-02 NOTE — PROGRESS NOTES
Care Suites Post Procedure Note    Patient Information  Name: Alexandro Pool  Age: 66 year old    Post Procedure  Time patient returned to Care Suites: 3095  Concerns/abnormal assessment: R) groin site ecchymotic and slightly firm, RN held manual pressure for 5 min, hematoma reduced, site now soft but ecchymotic, provider aware  If abnormal assessment, provider notified: Yes - provider aware, ultrasound at bedside now  Plan/Other: Plan for admission overnight, monitor pt closely    Iram Antonio RN

## 2021-12-03 VITALS
TEMPERATURE: 98.2 F | HEART RATE: 71 BPM | HEIGHT: 67 IN | DIASTOLIC BLOOD PRESSURE: 76 MMHG | SYSTOLIC BLOOD PRESSURE: 103 MMHG | BODY MASS INDEX: 29.35 KG/M2 | WEIGHT: 187 LBS | RESPIRATION RATE: 11 BRPM | OXYGEN SATURATION: 99 %

## 2021-12-03 LAB
ANION GAP SERPL CALCULATED.3IONS-SCNC: 5 MMOL/L (ref 3–14)
BUN SERPL-MCNC: 15 MG/DL (ref 7–30)
CALCIUM SERPL-MCNC: 7.6 MG/DL (ref 8.5–10.1)
CHLORIDE BLD-SCNC: 111 MMOL/L (ref 94–109)
CO2 SERPL-SCNC: 21 MMOL/L (ref 20–32)
CREAT SERPL-MCNC: 0.88 MG/DL (ref 0.66–1.25)
ERYTHROCYTE [DISTWIDTH] IN BLOOD BY AUTOMATED COUNT: 15.3 % (ref 10–15)
GFR SERPL CREATININE-BSD FRML MDRD: 89 ML/MIN/1.73M2
GLUCOSE BLD-MCNC: 137 MG/DL (ref 70–99)
GLUCOSE BLDC GLUCOMTR-MCNC: 118 MG/DL (ref 70–99)
GLUCOSE BLDC GLUCOMTR-MCNC: 173 MG/DL (ref 70–99)
HCT VFR BLD AUTO: 34.8 % (ref 40–53)
HGB BLD-MCNC: 11.1 G/DL (ref 13.3–17.7)
HGB BLD-MCNC: 11.8 G/DL (ref 13.3–17.7)
HGB BLD-MCNC: 12.1 G/DL (ref 13.3–17.7)
MCH RBC QN AUTO: 27.8 PG (ref 26.5–33)
MCHC RBC AUTO-ENTMCNC: 31.9 G/DL (ref 31.5–36.5)
MCV RBC AUTO: 87 FL (ref 78–100)
PLATELET # BLD AUTO: 133 10E3/UL (ref 150–450)
POTASSIUM BLD-SCNC: 4.2 MMOL/L (ref 3.4–5.3)
RBC # BLD AUTO: 3.99 10E6/UL (ref 4.4–5.9)
SODIUM SERPL-SCNC: 137 MMOL/L (ref 133–144)
WBC # BLD AUTO: 8.8 10E3/UL (ref 4–11)

## 2021-12-03 PROCEDURE — 99217 PR OBSERVATION CARE DISCHARGE: CPT | Performed by: HOSPITALIST

## 2021-12-03 PROCEDURE — 85018 HEMOGLOBIN: CPT | Mod: 91 | Performed by: PHYSICIAN ASSISTANT

## 2021-12-03 PROCEDURE — 250N000013 HC RX MED GY IP 250 OP 250 PS 637: Performed by: PHYSICIAN ASSISTANT

## 2021-12-03 PROCEDURE — 99220 PR INITIAL OBSERVATION CARE,LEVEL III: CPT | Performed by: INTERNAL MEDICINE

## 2021-12-03 PROCEDURE — 85027 COMPLETE CBC AUTOMATED: CPT | Performed by: PHYSICIAN ASSISTANT

## 2021-12-03 PROCEDURE — G0378 HOSPITAL OBSERVATION PER HR: HCPCS

## 2021-12-03 PROCEDURE — 36415 COLL VENOUS BLD VENIPUNCTURE: CPT | Performed by: PHYSICIAN ASSISTANT

## 2021-12-03 PROCEDURE — 82310 ASSAY OF CALCIUM: CPT | Performed by: PHYSICIAN ASSISTANT

## 2021-12-03 PROCEDURE — 96372 THER/PROPH/DIAG INJ SC/IM: CPT | Performed by: PHYSICIAN ASSISTANT

## 2021-12-03 PROCEDURE — 82962 GLUCOSE BLOOD TEST: CPT

## 2021-12-03 PROCEDURE — 258N000003 HC RX IP 258 OP 636: Performed by: PHYSICIAN ASSISTANT

## 2021-12-03 PROCEDURE — 250N000012 HC RX MED GY IP 250 OP 636 PS 637: Performed by: PHYSICIAN ASSISTANT

## 2021-12-03 RX ORDER — METOPROLOL SUCCINATE 50 MG/1
75 TABLET, EXTENDED RELEASE ORAL DAILY
Qty: 135 TABLET | Refills: 3 | Status: SHIPPED | OUTPATIENT
Start: 2021-12-05 | End: 2022-10-11

## 2021-12-03 RX ADMIN — INSULIN ASPART 3 UNITS: 100 INJECTION, SOLUTION INTRAVENOUS; SUBCUTANEOUS at 09:58

## 2021-12-03 RX ADMIN — GABAPENTIN 100 MG: 100 CAPSULE ORAL at 09:57

## 2021-12-03 RX ADMIN — INSULIN ASPART 1 UNITS: 100 INJECTION, SOLUTION INTRAVENOUS; SUBCUTANEOUS at 10:00

## 2021-12-03 RX ADMIN — SODIUM CHLORIDE: 9 INJECTION, SOLUTION INTRAVENOUS at 06:00

## 2021-12-03 NOTE — PROGRESS NOTES
Interventional Radiology Progress Note:  Inpatient at [facility]  Date: December 3, 2021     History: Alexandro Pool is a 66 year old male with a history of smoking, PAD s/p R AKA in 2012 and L fem to tib bypass graft, hyperlipidemia, hypertension, type 2 diabetes, CAD with stent. He has a slowly healing left foot dorsal wound which is managed twice a week by [facility] with slow improvement in healing. He had recent vascular imaging with decreased OZZIE and US flow noted in the left graft. Alexandro had a left leg angiogram today in IR with Dr Pate     During the angiogram a blockage was angioplastied in the common femoral artery and a  piece of calcified plaque broke off and embolized into the distal graft, Suction aspiration was performed with a blood loss of 1 L, Hemoglobin started at 14.9 and with re check it is 12.6.Dr Pate was able to snare the clot and pulled it out with the sheath. Patient became hypotensive and was given a NS fluid bolus. Manual pressure was applied for 30 minutes post sheath removal      Because of the blood loss and hypotension it was felt the patient should spend a night in observation at the hospital. He has a 6 hour bedrest.     Alexandro had an uneventful night. VSS. Hgb in the 12 range without much change. He is eager to leave this morning.     Interval History: I'm ready to go. I need a cigarette and a can of pop. Feeling fine.      Physical Exam:   Vitals: Temp:  [97.6  F (36.4  C)-98.2  F (36.8  C)] 98.2  F (36.8  C)  Pulse:  [62-81] 71  Resp:  [11-30] 11  BP: ()/(38-87) 103/76  SpO2:  [93 %-100 %] 99 %    General/Neuro: Alert, oriented, pleasant male in no acute distress prior to procedure. Some pain post with pressure to the right groin post angiogram. Moves all extremities equally.  Resp:  Normal respirations on room air. Non labored breathing. Equal air entry B/L. Lungs clear to auscultation.   Cardiac: S1S2, murmur, irregular  HR  Abdomen:  Soft, non-distended, non-tender.  Vascular: right groin procedure site with old blood on dressing. Echymotic. Site soft with tenderness.   Left foot warm to touch with dressing over wound.     Labs:  Recent Labs   Lab 12/03/21  0525 12/02/21  2358 12/02/21  1716 12/02/21  1512 12/02/21  1124   HGB 11.1* 12.1* 13.1*   < > 14.9   WBC 8.8  --   --   --  7.1    < > = values in this interval not displayed.     Recent Labs   Lab 12/03/21  0525 12/02/21  1124   CR 0.88 1.00      Imaging:     EXAM: US LOWER EXTREMITY ARTERIAL DUPLEX LEFT  LOCATION: [facility]  DATE/TIME: 12/2/2021 3:55 PM     INDICATION: Peripheral arterial disease. S/P left leg angio and snaring of calcified clot on left pulled out in/with sheath, please check for further clot in the left and right. Left SFA to anterior tibial arterial bypass.    COMPARISON: 11/2/2021.  TECHNIQUE: Duplex utilizing 2D gray-scale imaging, Doppler interrogation with color-flow and spectral waveform analysis.     FINDINGS:     LEFT LOWER EXTREMITY ARTERIAL ASSESSMENT:  SEGMENT VELOCITIES (cm/s)  Inflow: 109  Prox Anastomosis Graft: 96  Proximal Graft: 39  Mid Graft: 50  Distal Graft: 25  Distal Anastomosis: 40  Native: 70                                                                IMPRESSION:  1.  The left femoral to anterior tibial arterial bypass is patent with brisk multiphase vascular waveforms throughout and no evidence of a hemodynamically significant lesion.  2.  The peroneal and posterior tibial arteries below the knee appear to be occluded.    US POST VASCULAR ACCESS LOW EXT DUPLEX 12/2/2021 3:55 PM     INDICATION: Right groin pain. Peripheral arterial disease. S/P angio, r/o pseudo right groin     COMPARISON: The angiogram from 12/2/2021.     FINDINGS:   No identified pseudoaneurysm or arterial venous fistula.     Ultrasound of the right groin shows a brisk monophasic waveform in the right external iliac, common femoral and  presumed proximal superficial femoral arteries. The external vein is patent with antegrade flow.                                                                  CONCLUSION:   No identified pseudoaneurysm or arterial venous fistula.    Assessment: Stable overnight s/p angiogram with 1 L blood loss. Left foot warm with normal CMS which is numb at baseline. Ok for discharge from IR standpoint.     Plan: Patient will follow up with IR Dr Pate in one month with imaging and clinic visit.     Total time spent on the date of the encounter is 15 minutes, including time spent counseling the patient, performing a medically appropriate evaluation, reviewing prior medical history, ordering medications and tests, documenting clinical information in the medical record, and communication of results.    Thanks [facility] Interventional Radiology CNP ([phone]) (phone [phone])

## 2021-12-03 NOTE — PROVIDER NOTIFICATION
"Paged hospitalist REED Chery at [phone], \"Pt requesting to have fluids and IMC d/c'd and d/c ASAP. BP stable overnight. Thanks! -TATYANA Casiano *[id number]\". Awaiting further orders.  "

## 2021-12-03 NOTE — PROGRESS NOTES
Pt. A&Ox4, lung sounds clear, VVS on room air. HR SR, rare PVCs. Urine output adequate, pain at groin site, pt declined prn pain meds. R groin site ecchymotic, soft to palpation, no hemotoma. PTA wound top of L foot, dressing replaced, numbness/tingling present at baseline, doppler LLE pulses. R AKA. NS @ 75ml/hr. Patient potentially discharging home today. Will continue to follow plan of care.

## 2021-12-03 NOTE — DISCHARGE INSTRUCTIONS
Peripheral Angiogram Discharge Instructions - Femoral     After you go home:      Have an adult stay with you until tomorrow.    Drink extra fluids for 2 days.    You may resume your normal diet.    No smoking       For 24 hours - due to the sedation you received:    Relax and take it easy.    Do NOT make any important or legal decisions.    Do NOT drive or operate machines at home or at work.    Do NOT drink alcohol.    Care of Groin Puncture Site:      For the first 24 hrs - check the puncture site every 1-2 hours while awake.    For 2 days, when you cough, sneeze, laugh or move your bowels, hold your hand over the puncture site and press firmly.    Remove the bandaid after 24 hours. If there is minor oozing, apply another bandaid and remove it after 12 hours.    It is normal to have a small bruise or pea size lump at the site.    You may shower tomorrow.  Do NOT take a bath, or use a hot tub or pool for at least 3 days. Do NOT scrub the site. Do not use lotion or powder near the puncture site.     Activity:            For 2 days:    No stooping or squatting    Do NOT do any heavy activity such as exercise, lifting, or straining.     No housework, yard work or any activity that make you sweat    Do NOT lift more than 10 pounds    Bleeding:      If you start bleeding from the site in your groin, lie down flat and press firmly on/above the site for 10 minutes.     Once bleeding stops, lay flat for 2 hours.     Call the [facility] as soon as you can.       Call 911 right away if you have heavy bleeding or bleeding that does not stop.      Medicines:         Take your medications, including blood thinners, unless your provider tells you not to.      If you have stopped any medicines, check with your provider about when to restart them.        Follow Up Appointments:      Follow up with [facility] as directed.    Call the clinic if:      You have increased pain or a large or growing hard lump  around the site.    The site is red, swollen, hot or tender.    Blood or fluid is draining from the site.    You have chills or a fever greater than 101 F (38 C).    Your leg feels numb, cool or changes color.    You have hives, a rash or unusual itching.    New pain in the back or belly that you cannot control with Tylenol.    Any questions or concerns.    Other Instructions:      If you received a stent - carry your stent card with you at all times.      If you have questions or your original symptoms do not improve, call:         [facility] @ [phone]

## 2021-12-03 NOTE — DISCHARGE SUMMARY
[facility]    Discharge Summary  Hospitalist    Date of Admission:  12/2/2021  Date of Discharge:  12/3/2021  Discharging Provider: Sophie Chery DO  Date of Service (when I saw the patient): 12/03/21    Discharge Diagnoses   Non-healing left dorsal foot wound s/p angioplasty of left common femoral artery  Plaque break off with unsuccessful suction aspiration, but successful snaring  PAD S/P Lt SFA stenting 3-9-2012, Lt fem-SENIA bypass w/ ISGSV 9-, S/P Rt AKA 2014, ligation of parasitic branch 9/19/2017  S/P right AKA  Acute blood loss anemia  CAD s/p stent to LAD (2012)  HFrEFdue to ischemic cardiomyopathy (EF 36%, 2018), not in acute exacerbation  Hypertension  Hyperlipidemia  Tobacco use disorder  T2DM with peripheral neuropathy  Chronic pain    History of Present Illness   Alexandro Pool is an 66 year old male who presented with non-healing left doral foot wound who was recently found to have decreased flow to left graft on OZZIE. Pt underwent angiogram by IR and Vascular Surgery on 12/2/21 with angioplasty of the common femoral artery. A piece of calcified plaque broke off during the procedure and embolized in the distal graft s/p unsuccessful suction aspiration, but ultimately with successful snaring. EBL 1000 ccs. Post-procedure, pt with hypotension. Hospitalist service was contacted for observation admission.     Hospital Course   Alexandro Pool was admitted on 12/2/2021.  The following problems were addressed during his hospitalization:    Non-healing left dorsal foot wound s/p angioplasty of left common femoral artery  Plaque break off with unsuccessful suction aspiration, but successful snaring  PAD S/P Lt SFA stenting 3-9-2012, Lt fem-SENIA bypass w/ ISGSV 9-, S/P Rt AKA 2014, ligation of parasitic branch 9/19/2017  S/P right AKA: Last seen by Dr. Olson 11/2021. Procedure today with IR and Vascular Surgery. See note for details. EBL 1000 ccs/ Pt received  heparin total 5,000 U IV per MAR. Last dose of ASA 81 mg on 12/2 and Xarelto 11/29.   * Post-procedure, SBP in the 70s-80s. Pt asymptomatic. Notes some back pain related to current bed, but denies abdominal pain, dizziness, lightheadedness, chest pain, SOB, dizziness. No lower extremity pain. R groin access site with hematoma, but with palpable pulse. Received 250 ml bolus + ~750 ml maintenance fluids intra-op.    * Vascular fellow assessed during my interview. Prelim review of US post vascular access negative for acute pathology. Agrees with plan of care below.   - Hgb down to 11.1 post procedure.  - resume amlodipine and lisinopril (low doses) starting 12/3 evening, hold off on metoprolol until 12/5  - Resume ASA on 12/4, wait to restart low dose xarelto until 12/6 AM  - follow up with vascular medicine in 3 months, they will schedule  - Resume outpatient wound cares through [facility] (has home health care services)      Acute blood loss anemia  EBL 1L from procedure above. Pt hypotensive post-op. Responded to fluid resuscitation and maintenance fluids overnight  - Hgb preprocedure was 14.9, drop to 11.1. Likely some hemodilution involved as well.  - Feels well without dizziness or lightheadedness on 12/3  - Recheck CBC in one week with PCP     CAD s/p stent to LAD (2012)  HFrEFdue to ischemic cardiomyopathy (EF 36%, 2018), not in acute exacerbation  Hypertension  Hyperlipidemia:   * Echo 5/2018 with EF 36%, large LAD territory akinesis.   - PTA Norvasc 2.5 mg BID, Lisinopril 2.5 mg po BID, Toprol XL 75 mg po qd  - Continue Atorvastatin 80 mg at bedtime  - resume amlodipine and lisinopril (low doses) starting 12/3 evening, hold off on metoprolol until 12/5  - recommend to hold BP meds if any signs of lightheadedness, dizziness.     Tobacco use disorder: 50 pack year smoking hx.   - Cessation encouraged      T2DM with peripheral neuropathy: A1C 7.9 on 11/2/21  [Basaglar 50 U at bedtime, Humalog  18 U TID with meals, Victoza 1.8 mg subcutaneous every day, Jardiance 25 mg po every day, Gabapentin 100 mg TID   - resume PTA regimen on discharge     Chronic pain  Continue PTA MS Contin 45 mg BID and oxycodone 15 mg po q4 hrs PRN    Sophie Chery, DO    Significant Results and Procedures    12/2: embolization of calcified plaque during angioplasty with 1L blood loss during suction thrombectomy    Pending Results   NA    Code Status   Full Code       Primary Care Physician   Roger Belle    Physical Exam   Temp: 98.2  F (36.8  C) Temp src: Oral BP: 103/76 Pulse: 71   Resp: 11 SpO2: 99 % O2 Device: None (Room air)    Vitals:    12/02/21 1047   Weight: 84.8 kg (187 lb)     Vital Signs with Ranges  Temp:  [97.6  F (36.4  C)-98.2  F (36.8  C)] 98.2  F (36.8  C)  Pulse:  [62-81] 71  Resp:  [11-30] 11  BP: ()/(38-87) 103/76  SpO2:  [91 %-100 %] 99 %  I/O last 3 completed shifts:  In: 1452.5 [P.O.:540; I.V.:912.5]  Out: 1050 [Urine:1050]    Patient seen and examined on day of discharge. He is ready for discharge, eager to leave. No chest pain, shortness of breath, lightheadedness or dizziness. Tolerating sitting up in chair and up to bathroom. Discussed care plan with IR, vascular medicine, RN and CC. Stable for discharge to home.    Constitutional: Awake, alert, cooperative, no apparent distress, up in wheelchair  Eyes: Conjunctiva and pupils examined and normal.  HEENT: Moist mucous membranes, normal dentition.  Respiratory: Clear to auscultation bilaterally, no crackles or wheezing.  Cardiovascular: Regular rate and rhythm, normal S1 and S2, and no murmur noted.  GI: Soft, non-distended, non-tender, normal bowel sounds.  Lymph/Hematologic: No anterior cervical or supraclavicular adenopathy.  Skin: No rashes, no cyanosis, no edema. Right groin site with small ecchymosis, not very sore. Soft.  Musculoskeletal: No joint swelling, erythema or tenderness. LLE with chronic discoloration from venous stasis changes.  ALFONSO FRANCISCO noted.  Neurologic: Cranial nerves 2-12 intact, normal strength and sensation.  Psychiatric: Alert, oriented to person, place and time, no obvious anxiety or depression.    Discharge Disposition   Discharged to home  Condition at discharge: Stable    Consultations This Hospital Stay   None    Time Spent on this Encounter   ISophie DO, personally saw the patient today and spent greater than 30 minutes discharging this patient.    Discharge Orders      CBC with platelets     Reason for your hospital stay    Critical limb ischemia s/p interventional radiology procedure with blood loss and low blood pressure     Follow-up and recommended labs and tests     Follow up with primary care provider, Roger Belle, within 7 days for hospital follow- up.  The following labs/tests are recommended: CBC in one week.    Follow up with Dr Olson as recommended by vascular medicine (they will arrange)     Activity    Your activity upon discharge: activity as tolerated     Discharge Instructions    1. Due to low blood pressures seen in hospital, you will not restart your metoprolol until 12/5, to allow your body to recover from blood loss. If you feel lightheaded or dizzy after resuming your lisinopril, amlodipine, then hold both of these medications and wait a few days before restarting your metoprolol.    2. Due to the blood loss, your xarelto will not be restarted until 12/6. You will continue to take your daily aspirin 81mg starting on 12/4/21    3. Recheck your blood count in one week to ensure that your counts are going up.     Diet    Follow this diet upon discharge: Moderate Consistent Carb (60 g CHO per Meal) Diet; 2 gm NA Diet     Discharge Medications   Current Discharge Medication List      CONTINUE these medications which have CHANGED    Details   metoprolol succinate ER (TOPROL-XL) 50 MG 24 hr tablet Take 1.5 tablets (75 mg) by mouth daily  Qty: 135 tablet, Refills: 3    Associated Diagnoses: PAD  "(peripheral artery disease) (H)      rivaroxaban ANTICOAGULANT (XARELTO ANTICOAGULANT) 2.5 MG TABS tablet Take 1 tablet (2.5 mg) by mouth 2 times daily  Qty: 180 tablet, Refills: 3    Associated Diagnoses: PAD (peripheral artery disease) (H)         CONTINUE these medications which have NOT CHANGED    Details   acetaminophen (TYLENOL) 325 MG tablet Take 1 tablet by mouth every 4 hours as needed.  Qty: 250 tablet, Refills: 0    Associated Diagnoses: Peripheral arterial disease (H)      amLODIPine (NORVASC) 2.5 MG tablet Take 1 tablet (2.5 mg) by mouth 2 times daily  Qty: 180 tablet, Refills: 3    Comments: Keep on file until pt calls for refills.  Associated Diagnoses: Hypertension goal BP (blood pressure) < 140/90      ammonium lactate (AMLACTIN) 12 % cream Apply topically 2 times daily as needed for dry skin apply to both legs twice daily as needed  Refills: 5      ASPIRIN EC PO Take 81 mg by mouth daily      atorvastatin (LIPITOR) 80 MG tablet Take 1 tablet (80 mg) by mouth At Bedtime  Qty: 90 tablet, Refills: 3    Comments: Keep on file until pt calls for refills.  Associated Diagnoses: PAD (peripheral artery disease) (H); Hyperlipidemia LDL goal <70      bisacodyl (DULCOLAX) 5 MG EC tablet Take 1 tablet (5 mg) by mouth See Admin Instructions Refer to \"Getting Ready for a Colonoscopy\" instruction handout  Qty: 4 tablet, Refills: 0    Associated Diagnoses: Encounter for screening colonoscopy      empagliflozin (JARDIANCE) 25 MG TABS tablet TAKE 1 TABLET (25 MG) BY MOUTH DAILY  Qty: 30 tablet, Refills: 0    Comments: Keep on file until pt calls for refills.  Associated Diagnoses: PAD (peripheral artery disease) (H); Type 2 diabetes mellitus with complication, with long-term current use of insulin (H)      gabapentin (NEURONTIN) 100 MG capsule Take 1 capsule (100 mg) by mouth 3 times daily  Qty: 90 capsule, Refills: 0    Associated Diagnoses: Critical lower limb ischemia (H)      hydrocortisone 1 % cream Apply " "topically 2 times daily PRN      insulin aspart (NOVOLOG FLEXPEN) 100 UNIT/ML pen Inject 19 Units Subcutaneous 2 times daily (with meals)      insulin pen needle (BD KWABENA U/F) 32G X 4 MM miscellaneous Use 4x  daily or as directed.  Qty: 120 each, Refills: 11    Comments: Keep on file until pt calls for refills.  Associated Diagnoses: Type 2 diabetes mellitus with complication, with long-term current use of insulin (H)      liraglutide (VICTOZA) 18 MG/3ML solution Inject 1.8 mg Subcutaneous daily  Qty: 27 mL, Refills: 3    Comments: Keep on file until pt calls for refills.  Associated Diagnoses: Type 2 diabetes mellitus with complication, with long-term current use of insulin (H)      lisinopril (ZESTRIL) 2.5 MG tablet TAKE 1 TABLET (2.5 MG) BY MOUTH 2 TIMES DAILY  Qty: 180 tablet, Refills: 3    Comments: Keep on file until pt calls for refills.  Associated Diagnoses: Hypertension goal BP (blood pressure) < 140/90      morphine (MS CONTIN) 15 MG 12 hr tablet Take 3 tablets (45 mg) by mouth 2 times daily  Qty: 60 tablet, Refills: 0    Associated Diagnoses: Critical lower limb ischemia (H)      polyethylene glycol (GOLYTELY) 236 g suspension Take 4,000 mLs by mouth See Admin Instructions Refer to \"Getting Ready for a Colonoscopy\" instruction handout  Qty: 4000 mL, Refills: 0    Comments: May fill with equivalent  Associated Diagnoses: Encounter for screening colonoscopy      SENNA PO 1 Tab , bedtime      insulin glargine (BASAGLAR KWIKPEN) 100 UNIT/ML pen Inject 50 Units Subcutaneous At Bedtime  Qty: 50 mL, Refills: 3    Comments: If Basaglar is not covered by insurance, may substitute Lantus at same dose and frequency.  Keep on file until pt calls for refills.  Associated Diagnoses: Type 2 diabetes mellitus with complication, with long-term current use of insulin (H)      order for DME Equipment being ordered: FlexiTouch pneumatic compression device.  2-3 times per day to left lower extremity.  Current strength - " L4  Qty: 1 Units         STOP taking these medications       insulin lispro (HUMALOG) 100 UNIT/ML vial Comments:   Reason for Stopping:         oxyCODONE (ROXICODONE) 10 MG immediate release tablet Comments:   Reason for Stopping:             Allergies   Allergies   Allergen Reactions     Ciprofloxacin Rash     Data   Most Recent 3 CBC's:  Recent Labs   Lab Test 12/03/21  0525 12/02/21  2358 12/02/21  1716 12/02/21  1512 12/02/21  1124 07/27/20  1243   WBC 8.8  --   --   --  7.1 6.9   HGB 11.1* 12.1* 13.1*   < > 14.9 13.3   MCV 87  --   --   --  87 86   *  --   --   --  148* 145*    < > = values in this interval not displayed.      Most Recent 3 BMP's:  Recent Labs   Lab Test 12/03/21  0846 12/03/21  0525 12/03/21  0219 12/02/21  1852 12/02/21  1124 11/02/21  0900   NA  --  137  --   --  137 132*   POTASSIUM  --  4.2  --   --  4.3 4.3   CHLORIDE  --  111*  --   --  109 106   CO2  --  21  --   --  22 21   BUN  --  15  --   --  19 29   CR  --  0.88  --   --  1.00 1.20   ANIONGAP  --  5  --   --  6 5   BOGDAN  --  7.6*  --   --  8.3* 9.1   * 137* 118*   < > 193* 160*    < > = values in this interval not displayed.     Most Recent 2 LFT's:  Recent Labs   Lab Test 11/02/21  0900 07/27/20  1243   AST 18 18   ALT 20 17   ALKPHOS 62 70   BILITOTAL 0.4 0.4     Most Recent INR's and Anticoagulation Dosing History:  Anticoagulation Dose History     Recent Dosing and Labs Latest Ref Rng & Units 8/9/2016 2/16/2017 7/20/2017 8/15/2017 2/13/2018 8/16/2018 12/2/2021    INR 0.85 - 1.15 1.04 1.03 1.11 1.13 1.02 1.00 1.05        Most Recent 3 Troponin's:  Recent Labs   Lab Test 10/14/13  1800 10/14/13  1018   TROPI 0.026 0.015     Most Recent Cholesterol Panel:  Recent Labs   Lab Test 12/02/21  1124   CHOL 94   LDL 13   HDL 23*   TRIG 292*     Most Recent 6 Bacteria Isolates From Any Culture (See EPIC Reports for Culture Details):  Recent Labs   Lab Test 10/14/13  1850 10/14/13  1205 10/14/13  1136   CULT No growth No  growth No growth     Most Recent TSH, T4 and A1c Labs:  Recent Labs   Lab Test 11/02/21  0900 07/27/20  1243   TSH  --  1.27   T4  --  1.10   A1C 7.9* 6.8*     Results for orders placed or performed during the hospital encounter of 12/02/21   IR Lower Extremity Angiogram Left    Narrative    INTERVENTIONAL RADIOLOGY LOWER EXTREMITY ANGIOGRAM LEFT  12/2/2021  3:29 PM     HISTORY:  Patient has a left superficial femoral artery to anterior  tibial artery in situ bypass graft. Patient has a slow to heal wound  in the left foot. Ultrasound shows an elevated velocity at the inflow  artery of the bypass graft suggesting a significant stenosis.    COMPARISON: Ultrasound dated 11/2/2021.    DESCRIPTION OF PROCEDURE: After obtaining informed consent, the  patient was placed in a supine position on the fluoroscopy table. The  right groin was prepped and draped in the usual sterile manner. 1%  lidocaine was injected for local anesthesia. Ultrasound was used to  evaluate and document patency of the right common femoral artery.  Under sterile ultrasound guidance, access into the right common  femoral artery was obtained. An image was saved for documentation. A 6  [place] vascular sheath was placed. An Omni Flush catheter was  positioned in the lower abdominal aorta for pelvic angiogram. The Omni  Flush catheter was maneuvered into the right external iliac artery for  a right lower extremity angiogram.    FINDINGS:   Pelvic angiogram: There was a significant stenosis due to eccentric  plaque in the left common femoral artery at the level of the proximal  anastomosis of the left femoral anterior tibial artery bypass graft.  There were significant stenoses in the right common iliac and external  iliac arteries.    Left lower extremity angiogram:  Left femoral to anterior tibial artery bypass graft: No significant  stenosis.  Profunda femoris artery: No significant stenosis.  Tibial arteries: The anterior tibial artery is patent  without  significant stenosis.    Intervention: A 6 Guinean crossover sheath was placed. The stenosis in  the left common femoral artery was dilated with a 6 mm followed by a 7  mm balloon. Follow-up angiogram showed significant improvement in  luminal diameter and improved flow.    Follow-up left lower extremity angiogram was performed. This showed a  filling defect in the distal aspects of the femoral anterior tibial  artery bypass graft. This was felt to represent embolized calcified  plaque at the site of angioplasty.    Existing 6 Guinean crossover sheath was replaced with an 8 Guinean 65 cm  Destination sheath. Through that sheath, an attempt was made to  aspirate the embolus with a CAT 8 catheter. This was unsuccessful.  Subsequently, a 7 mm Spider embolic protection device was passed  distal to the embolus. Repeat attempt was made to aspirate the embolus  however, this was unsuccessful and led to loss of 1000 cc blood. At  this time, 10 mm EN Snare was passed and used to grasp the embolus. It  could not be pulled into the 8 Guinean sheath and therefore the 8  Guinean sheath and snare were had to be removed to pull out the  embolus. Pressure was then held at the puncture site for 30 minutes  with good hemostasis.    I determined this patient to be an appropriate candidate for the  planned sedation and procedure and reassessed the patient immediately  prior to sedation and procedure. Moderate intravenous conscious  sedation was supervised by me. The patient was independently monitored  by a registered nurse assigned to the Department of radiology using  automated blood pressure, EKG and pulse oximetry. The patient did  become mildly hypotensive with systolic blood pressures in the 80s  following attempt at aspiration of the embolus. He was given a 250 cc  saline bolus. He was typed and screened and a repeat hemoglobin was  checked. Radiation dose for this scan was reduced using automated  exposure control,  adjustment of the mA and/or kV according to patient  size, or iterative reconstruction technique.     MEDICATIONS: 4 mg Versed, 200 mcg fentanyl, 7000 units heparin    Fluoroscopy time: 27.1 minutes    Total fluoroscopy dose: 452.71 mGy Air Kerma    Contrast: 76 cc Visipaque      Impression    IMPRESSION: Balloon angioplasty of the left common femoral artery at  the level of the anastomosis of the left femoral to mid anterior  tibial artery bypass graft performed as above. Procedure complicated  by embolization of calcified plaque at the site of angioplasty into  the distal aspects of the bypass graft. This was able to be snared  out. However, removal required removal of a 8 [place] sheath and  therefore, final angiogram could not be performed. The patient had  satisfactory dopplerable left anterior tibial artery. Patient will  undergo ultrasound of the left lower extremity to make certain that  there is no evidence for residual embolus.    US Lower Extremity Arterial Duplex Left    Narrative    EXAM: US LOWER EXTREMITY ARTERIAL DUPLEX LEFT  LOCATION: [facility]  DATE/TIME: 12/2/2021 3:55 PM    INDICATION: Peripheral arterial disease. S/P left leg angio and snaring of calcified clot on left pulled out in/with sheath, please check for further clot in the left and right. Left SFA to anterior tibial arterial bypass.  COMPARISON: 11/2/2021.  TECHNIQUE: Duplex utilizing 2D gray-scale imaging, Doppler interrogation with color-flow and spectral waveform analysis.    FINDINGS:    LEFT LOWER EXTREMITY ARTERIAL ASSESSMENT:  SEGMENT VELOCITIES (cm/s)  Inflow: 109  Prox Anastomosis Graft: 96  Proximal Graft: 39  Mid Graft: 50  Distal Graft: 25  Distal Anastomosis: 40  Native: 70      Impression    IMPRESSION:  1.  The left femoral to anterior tibial arterial bypass is patent with brisk multiphase vascular waveforms throughout and no evidence of a hemodynamically significant lesion.  2.  The peroneal and  posterior tibial arteries below the knee appear to be occluded.   Us Post Vascular Access Low Ext Duplex    Narrative    US POST VASCULAR ACCESS LOW EXT DUPLEX   12/2/2021 3:55 PM    INDICATION: Right groin pain. Peripheral arterial disease. S/P angio, r/o pseudo right groin    COMPARISON: The angiogram from 12/2/2021.    FINDINGS:   No identified pseudoaneurysm or arterial venous fistula.    Ultrasound of the right groin shows a brisk monophasic waveform in the right external iliac, common femoral and presumed proximal superficial femoral arteries. The external vein is patent with antegrade flow.      Impression    CONCLUSION:   No identified pseudoaneurysm or arterial venous fistula.

## 2021-12-03 NOTE — PROGRESS NOTES
Care Transitions Note:  Writer visited patient at beside briefly this morning presented a MOON to him and obtained signature on form.  Patient wanting to be discharged home ([facility]) he also needs to schedule a ride so needs to know when he can be discharged.  Writer paged Dr. Chery who stated IR needs to come see patient.  Writer GRANT for Marisol BUTLER in IR requesting he be seen soon this morning.  Await call back with plan from IR.

## 2021-12-03 NOTE — PROGRESS NOTES
1755: detailed report given to Cheli JOE.    Pt transferred to [facility]- via cart. R) groin site unchanged, assessed at bedside with Cheli JOE. No complaints from pt. VSS on RA.

## 2021-12-03 NOTE — CONSULTS
February 21, 2021      Jerson Magallon  [address]  [facility] [address]         Dear Jerson    We have been unable to reach you by telephone regarding your recent tests.  Please call our office at the number below at your earliest convenience to discuss your test results over the phone if you have any questions. Results have also been released to the [employer] mark.    Sincerely,      Shelia Akhtar MD  [phone]   [facility]    Vascular Medicine Consultation     Date of Admission:  12/2/2021  Date of Consult (When I saw the patient): 12/03/21         Physician Supervisory Attestation:   I have reviewed and discussed with the physician assistant their history, physical and plan and independently interviewed and examined Alexandro Pool and agree with the plan as stated in the physician assistant note.    We were asked by Dr. Pate to evaluate and help manage vascular risk factors in this 66 year old male smoker with a history of diabetes, hyperlipidemia, hypertension, PAD, CAD and prior right AKA who was admitted to the hospital for observation after complication of left lower extremity angiogram in which he embolized downstream.     Key elements of HX, Exam and plan    Reviewed angiogram , lab results in Epic  He lost 1 L of blood yesterday , fortunately HGB dropped 1 gram   Sitting in chair  Groin site looks good this am   hemodynamically stable     He had been on PAD-dosed Xarelto 2.5 mg BID and aspirin. Given his groin hematoma and acute blood loss from his procedure, would recommend holding Xarelto for the next 3 days and resuming it on 12/6/21. He can resume aspirin now. [facility].     He smokes , offered help and he is not ready to quit     Thank you for the consultation !    This note was dictated by utilizing Dragon software    Copy of this note to Dr. Wolf, primary care physician, hospitalist service    Vascular medicine service will sign off please call us with any questions    France Montilla MD , FAGOOD, FS, FNLA  Vascular Medicine  Clinical Lipidologist   Clinical Hypertension specialist    12/3/2021      Assessment & Plan   1. Peripheral arterial disease with critical limb ischemia s/p left proximal CFA stenosis angioplasty complicated by distal embolization into bypass graft below knee    He underwent  angioplasty of his proximal graft anastomosis. Unfortunately, he embolized downstream. An attempt was made at suction thrombectomy but this was unsuccessful. Ultimately a snare was used to remove the thrombus. He is doing well now with some ecchymosis of the access site.     He is to discharge back to his facility. He had been on PAD-dosed Xarelto 2.5 mg BID and aspirin. Given his groin hematoma and acute blood loss from his procedure, would recommend holding Xarelto for the next 3 days and resuming it on 12/6/21. He can resume aspirin now. He will follow-up with Dr. Olson in the [facility] in 3 months.     2. Ongoing tobacco use    The importance of complete smoking cessation was stressed. He has no desire in quitting smoking and therefore wants no help in quitting.     3. Hyperlipidemia    His LDL is noted to be 13 on Atorvastatin 80 mg daily. He should continue the same.     4. Type 2 diabetes    His diabetes has been improving some with an A1C of 7.9%  recently, but his A1C is still not yet at goal of less than 7.0%. As an outpatient, he is on Jardiance, Victoza, Humalog, and Basaglar. His Basaglar dose was just increased 2 weeks ago. He should continue the same and follow-up with Dr. Olson in 3 months with repeat A1C to be drawn just prior to appointment.     5. Hypertension    He was hypotensive post angiogram, but is now normotensive. Continue prior to admission amlodipine, lisinopril, and metoprolol.       Reason for Consult   Reason for consult: Asked by Dr. Pate to evaluate and help manage vascular risk factors in this 66 year old male smoker with a history of diabetes, hyperlipidemia, hypertension, PAD, CAD and prior right AKA who was admitted to the hospital for observation after complication of left lower extremity angiogram in which he embolized downstream.     Primary Care Physician   Roger Belle      History of Present Illness   Alexandro Pool is a non ambulatory 66 year old  white male due to a prior right AKA. He also has PAD and unfortunately continues to smoke while now remaining better glycemically controlled from a diabetic perspective while on a mutli drug regimen. His renal function remains intact. He has no claudciation as aforementioned, as he is non ambualtory. In 2013 he had a left Fem-AT bypass undertaken by Dr. Suggs. In 2017 he had a parasitic side branch ligated. His graft velocities are starting to increase at the PAG. Flow distal to the PAG is intact. There is not a significant graft stenosis on recent U/S. His lipids are well controlled. He has a wound on his left foot that is healing with local wound care performed by [facility] in [place], but he is also now complaining of rest pain in his left foot. Given his CLI symptoms, an angiogram was requested for further evaluation.     He presented on 12/2/21 for an angiogram. The right iliac artery had diffuse disease and he had proximal bypass graft anastomosis stenosis for which angioplasty was performed. Unfortunately, he embolized downstream in bypass graft below knee. Suction thrombectomy was attempted but unsuccessful. Ultimately a snare was used to remove the atheroembolus in its entirety. He had an EBL of 1 liter. He was admitted to observation overnight for monitoring.     He is doing well this morning. His hemoglobin only dropped one gram. He is up in his wheelchair and very eager to discharge.        Past Medical History   Past Medical History:   Diagnosis Date     Acute kidney failure, unspecified (H)      Blood transfusion      CAD (coronary artery disease)      CARDIOVASCULAR SCREENING; LDL GOAL LESS THAN 100      Cirrhosis (H)      Critical lower limb ischemia (H)      Diabetes mellitus (H) 10/2011     Hypertension      Hypertension goal BP (blood pressure) < 140/90      Ischemic cardiomyopathy      Lymphedema of left leg 5/11/2016     Peripheral arterial disease (H)      PVD (peripheral vascular  disease) (H)      Right above knee amputation 2014     Type 2 diabetes, HbA1C goal < 8% (H)        Past Surgical History   Past Surgical History:   Procedure Laterality Date     AMPUTATE LEG ABOVE KNEE  2011    Procedure:AMPUTATE LEG ABOVE KNEE; Revise Right Below Knee Amputation To Above Knee Amputation; Surgeon:MADISON WELLS; Location:[facility] OR     AMPUTATE LEG BELOW KNEE  11/10/2011    Procedure:AMPUTATE LEG BELOW KNEE; Right Below Knee Amputation; Surgeon:MADISON WELLS; [facility]     BYPASS GRAFT FEMOROTIBIAL  2013    Procedure: BYPASS GRAFT FEMOROTIBIAL;  Left Femorotibial Bypass Graft Insitu ;  Surgeon: Marisol Suggs MD;  Location: [facility] OR     CARDIAC SURGERY      cardiac stent     ESOPHAGOSCOPY, GASTROSCOPY, DUODENOSCOPY (EGD), COMBINED  10/1/2012    Procedure: COMBINED ESOPHAGOSCOPY, GASTROSCOPY, DUODENOSCOPY (EGD);;  Surgeon: Nehemias Cevallos MD;  Location: [facility] GI     ESOPHAGOSCOPY, GASTROSCOPY, DUODENOSCOPY (EGD), COMBINED N/A 3/24/2016    Procedure: COMBINED ESOPHAGOSCOPY, GASTROSCOPY, DUODENOSCOPY (EGD);  Surgeon: Gildardo Marks MD;  Location: [facility] GI     IR STENT VASCULAR LT  3/9/2012          IRRIGATION AND DEBRIDEMENT LOWER EXTREMITY, COMBINED  11/15/2011    Procedure:COMBINED IRRIGATION AND DEBRIDEMENT LOWER EXTREMITY; DRAINAGE OF ABSCESS AT BELOW KNEE AMPUTATION AND KNEE DISARTICULATION.; Surgeon:MADISON WELLS; Location:[facility] OR     NO HISTORY OF SURGERY  13    derm     VASCULAR REPAIR LOWER EXTREMITY Left 2017    Procedure: VASCULAR REPAIR LOWER EXTREMITY;  Ligation Parasitic Branch To Left Lower Extremity ;  Surgeon: Marisol Suggs MD;  Location: [facility] OR       Prior to Admission Medications   Prior to Admission Medications   Prescriptions Last Dose Informant Patient Reported? Taking?   ASPIRIN EC PO 2021 at Unknown time Self Yes Yes   Sig: Take 81 mg by mouth daily   SENNA PO 2021 at Unknown time Self Yes Yes   Si Tab , bedtime   acetaminophen (TYLENOL) 325 MG  "tablet Past Month at Unknown time Self No Yes   Sig: Take 1 tablet by mouth every 4 hours as needed.   amLODIPine (NORVASC) 2.5 MG tablet 12/2/2021 at Unknown time  No Yes   Sig: Take 1 tablet (2.5 mg) by mouth 2 times daily   ammonium lactate (AMLACTIN) 12 % cream Past Week at Unknown time  Yes Yes   Sig: Apply topically 2 times daily as needed for dry skin apply to both legs twice daily as needed   atorvastatin (LIPITOR) 80 MG tablet 12/1/2021 at Unknown time  No Yes   Sig: Take 1 tablet (80 mg) by mouth At Bedtime   bisacodyl (DULCOLAX) 5 MG EC tablet More than a month at Unknown time  No Yes   Sig: Take 1 tablet (5 mg) by mouth See Admin Instructions Refer to \"Getting Ready for a Colonoscopy\" instruction handout   empagliflozin (JARDIANCE) 25 MG TABS tablet 12/2/2021 at Unknown time  No Yes   Sig: TAKE 1 TABLET (25 MG) BY MOUTH DAILY   gabapentin (NEURONTIN) 100 MG capsule 12/2/2021 at Unknown time Self No Yes   Sig: Take 1 capsule (100 mg) by mouth 3 times daily   hydrocortisone 1 % cream More than a month at Unknown time Self Yes Yes   Sig: Apply topically 2 times daily PRN   insulin aspart (NOVOLOG FLEXPEN) 100 UNIT/ML pen 12/1/2021 at Unknown time  Yes Yes   Sig: Inject 19 Units Subcutaneous 2 times daily (with meals)   insulin glargine (BASAGLAR KWIKPEN) 100 UNIT/ML pen 11/30/2021  No No   Sig: Inject 50 Units Subcutaneous At Bedtime   insulin lispro (HUMALOG) 100 UNIT/ML vial Unknown at Unknown time  No No   Sig: Inject 18 Units Subcutaneous 3 times daily (before meals) 18 units before each meal   insulin pen needle (BD KWABENA U/F) 32G X 4 MM miscellaneous Unknown at Unknown time  No Yes   Sig: Use 4x  daily or as directed.   liraglutide (VICTOZA) 18 MG/3ML solution 12/1/2021 at Unknown time  No Yes   Sig: Inject 1.8 mg Subcutaneous daily   lisinopril (ZESTRIL) 2.5 MG tablet 12/2/2021 at Unknown time  No Yes   Sig: TAKE 1 TABLET (2.5 MG) BY MOUTH 2 TIMES DAILY   metoprolol succinate ER (TOPROL-XL) 50 MG 24 " "hr tablet 12/2/2021 at Unknown time  No No   Sig: Take 1.5 tablets (75 mg) by mouth daily   morphine (MS CONTIN) 15 MG 12 hr tablet 12/2/2021 at Unknown time Self No Yes   Sig: Take 3 tablets (45 mg) by mouth 2 times daily   order for DME   Yes No   Sig: Equipment being ordered: FlexiTouch pneumatic compression device.  2-3 times per day to left lower extremity.  Current strength - L4   oxyCODONE (ROXICODONE) 10 MG immediate release tablet  Self No No   Sig: Take 1.5 tablets (15 mg) by mouth every 4 hours as needed   polyethylene glycol (GOLYTELY) 236 g suspension Unknown at Unknown time  No Yes   Sig: Take 4,000 mLs by mouth See Admin Instructions Refer to \"Getting Ready for a Colonoscopy\" instruction handout   rivaroxaban ANTICOAGULANT (XARELTO ANTICOAGULANT) 2.5 MG TABS tablet 11/29/2021 at 0730  No No   Sig: Take 1 tablet (2.5 mg) by mouth 2 times daily      Facility-Administered Medications: None     Allergies   Allergies   Allergen Reactions     Ciprofloxacin Rash       Social History   Alexandro Mcghee Geovannijackie  reports that he has been smoking cigarettes. He has a 40.00 pack-year smoking history. He has never used smokeless tobacco. He reports current alcohol use of about 1.0 standard drink of alcohol per week. He reports that he does not use drugs.    Family History   Family History   Problem Relation Age of Onset     Alcohol/Drug Mother         cirrhosis     Alcohol/Drug Father      C.A.D. No family hx of      Diabetes No family hx of      Cancer No family hx of        Review of Systems   The 10 point Review of Systems is negative other than noted in the HPI or here.     Physical Exam   Temp: 98.2  F (36.8  C) Temp src: Oral BP: 103/76 Pulse: 71   Resp: 11 SpO2: 99 % O2 Device: None (Room air)    Vital Signs with Ranges  Temp:  [97.6  F (36.4  C)-98.2  F (36.8  C)] 98.2  F (36.8  C)  Pulse:  [62-81] 71  Resp:  [11-30] 11  BP: ()/(38-87) 103/76  SpO2:  [91 %-100 %] 99 %  187 lbs 0 oz    Constitutional: " awake, alert, cooperative, no apparent distress, and appears stated age  Eyes: Lids and lashes normal, pupils equal, round and reactive to light, extra ocular muscles intact, sclera clear, conjunctiva normal  ENT: normocepalic, without obvious abnormality, oropharynx pink and moist  Hematologic / Lymphatic: no lymphadenopathy  Respiratory: No increased work of breathing, good air exchange, clear to auscultation bilaterally, no crackles or wheezing  Cardiovascular: regular rate and rhythm, normal S1 and S2 and no murmur noted  GI: Normal bowel sounds, soft, non-distended, non-tender  Skin: no redness, warmth, or swelling, no rashes. Ecchymosis right groin.   Musculoskeletal: There is no redness, warmth, or swelling of the joints.  Full range of motion noted.  Motor strength is 5 out of 5 all extremities bilaterally.  Right AKA.  Neurologic: Awake, alert, oriented to name, place and time.  Cranial nerves II-XII are grossly intact.  Motor is 5 out of 5 bilaterally.    Neuropsychiatric:  Normal affect, memory, insight.      Data   Most Recent 3 CBC's:  Recent Labs   Lab Test 12/03/21  0525 12/02/21  2358 12/02/21  1716 12/02/21  1512 12/02/21  1124 07/27/20  1243   WBC 8.8  --   --   --  7.1 6.9   HGB 11.1* 12.1* 13.1*   < > 14.9 13.3   MCV 87  --   --   --  87 86   *  --   --   --  148* 145*    < > = values in this interval not displayed.     Most Recent 3 BMP's:  Recent Labs   Lab Test 12/03/21  0846 12/03/21  0525 12/03/21  0219 12/02/21  1852 12/02/21  1124 11/02/21  0900   NA  --  137  --   --  137 132*   POTASSIUM  --  4.2  --   --  4.3 4.3   CHLORIDE  --  111*  --   --  109 106   CO2  --  21  --   --  22 21   BUN  --  15  --   --  19 29   CR  --  0.88  --   --  1.00 1.20   ANIONGAP  --  5  --   --  6 5   BOGDAN  --  7.6*  --   --  8.3* 9.1   * 137* 118*   < > 193* 160*    < > = values in this interval not displayed.     Most Recent 3 INR's:  Recent Labs   Lab Test 12/02/21  1124 08/16/18  0822  02/13/18  0754   INR 1.05 1.00 1.02     Most Recent Cholesterol Panel:  Recent Labs   Lab Test 12/02/21  1124   CHOL 94   LDL 13   HDL 23*   TRIG 292*     Most Recent Hemoglobin A1c:  Recent Labs   Lab Test 11/02/21  0900   A1C 7.9*

## 2021-12-03 NOTE — PLAN OF CARE
Pt arrived from care suites at 1830. A&Ox4, pleasant. Soft BPs, other VSS on room air. LS clear. Continent, urinal at bedside. PIV infusing NS @ 75 ml/hr, other PIV SL. Incisions. Reports some tenderness in groin site, otherwise denies, declines any interventions at this time. R groin site MD eusebio aware. PTA wound to top of L foot, dressing intact. Doppler LLE pulses, tami, baseline numbness/tingling. R AKA, lift, WC bound at baseline. Clear liquid diet for now. Plan to watch overnight since hypotensive after procedure and discharge tomorrow if stable.

## 2021-12-03 NOTE — PLAN OF CARE
Date: December 3, 2021  Shift: 0700-discharge  Name: Alexandro Pool  Age: 66 year old  YOB: 1955    Reason for Admission: Atherosclerosis of autologous vein bypass graft(s) of the extremities with rest pain, left leg (H) [I70.422]     Cognitive/Mentation: A&Ox4  Neuros/CMS: Intact ex LLE numbness (baseline)    VS: Stable on RA  Cardiac: SR  GI: BS+, +flatus, Med BM today  : Voiding in urinal  Pulmonary: LS clear  Pain: denies     Drains: none  Skin: Wound to LLE foot, R bebo site  Activity: Ind, uses motorized wheelchair    Diet: Mod Cho     Stop Light: Green  Therapies recs: None  Discharge: Today to AL     Shift summary: Changed dressings to Foot and groin. Went over discharge instructions, all questions answered. Pt left with all belongings.

## 2021-12-06 DIAGNOSIS — I73.9 PAD (PERIPHERAL ARTERY DISEASE) (H): Primary | ICD-10-CM

## 2021-12-18 DIAGNOSIS — E11.8 TYPE 2 DIABETES MELLITUS WITH COMPLICATION, WITH LONG-TERM CURRENT USE OF INSULIN (H): ICD-10-CM

## 2021-12-18 DIAGNOSIS — Z79.4 TYPE 2 DIABETES MELLITUS WITH COMPLICATION, WITH LONG-TERM CURRENT USE OF INSULIN (H): ICD-10-CM

## 2021-12-18 DIAGNOSIS — I73.9 PAD (PERIPHERAL ARTERY DISEASE) (H): ICD-10-CM

## 2021-12-20 RX ORDER — EMPAGLIFLOZIN 25 MG/1
TABLET, FILM COATED ORAL
Qty: 90 TABLET | Refills: 0 | Status: SHIPPED | OUTPATIENT
Start: 2021-12-20 | End: 2022-03-22

## 2021-12-20 NOTE — TELEPHONE ENCOUNTER
"empagliflozin (JARDIANCE) 25 MG TABS tablet  Last Written Prescription Date:  11/16/21  Last Fill Quantity: 30,  # refills: 0     Last office visit: 11/16/2021   Follow up due:  February 2022    Unable to fill per [facility] protocol.  Medication and pharmacy loaded.  Requested Prescriptions   Pending Prescriptions Disp Refills     JARDIANCE 25 MG TABS tablet [Pharmacy Med Name: JARDIANCE 25 MG TABLET 25 Tablet] 90 tablet 0     Sig: TAKE 1 TABLET (25 MG) BY MOUTH DAILY       Sodium Glucose Co-Transport Inhibitor Agents Passed - 12/20/2021  9:17 AM        Passed - Patient has documented A1c within the specified period of time.     If HgbA1C is 8 or greater, it needs to be on file within the past 3 months.  If less than 8, must be on file within the past 6 months.     Recent Labs   Lab Test 11/02/21  0900   A1C 7.9*             Passed - No creatinine >1.4 or GFR <45 within the past 12 mos     Recent Labs   Lab Test 12/03/21  0525 11/02/21  0900 09/02/20  1509   GFRESTIMATED 89   < > 89   GFRESTBLACK  --   --  >90    < > = values in this interval not displayed.       Recent Labs   Lab Test 12/03/21  0525   CR 0.88             Passed - Medication is active on med list        Passed - Patient is age 18 or older        Passed - Patient has documented normal Potassium within the last 12 mos.     Recent Labs   Lab Test 12/03/21  0525   POTASSIUM 4.2             Passed - Recent (6 mo) or future (30 days) visit within the authorizing provider's specialty     Patient had office visit in the last 6 months or has a visit in the next 30 days with authorizing provider or within the authorizing provider's specialty.  See \"Patient Info\" tab in inbasket, or \"Choose Columns\" in Meds & Orders section of the refill encounter.               Prescription approved per [facility] Refill Protocol.        "

## 2022-01-17 DIAGNOSIS — K73.9 CHRONIC HEPATITIS, UNSPECIFIED (H): ICD-10-CM

## 2022-01-17 DIAGNOSIS — R79.89 ELEVATED LFTS: Primary | ICD-10-CM

## 2022-01-17 DIAGNOSIS — R94.5 ABNORMAL RESULTS OF LIVER FUNCTION STUDIES: ICD-10-CM

## 2022-01-18 ENCOUNTER — TELEPHONE (OUTPATIENT)
Dept: OTHER | Facility: CLINIC | Age: 67
End: 2022-01-18
Payer: MEDICARE

## 2022-01-18 NOTE — TELEPHONE ENCOUNTER
Scheduled for 2/16/22.       Grisel Mccoy    [facility]   [phone]

## 2022-01-18 NOTE — TELEPHONE ENCOUNTER
Pt called because he received a letter in the mail for follow up. He is scheduled with Dr. Pate and then being worked up with Dr. Gutierrez for liver cancer. He is wanting to hold off on scheduling for now with Dr. Olson and will call back when he can. His blood draw for Dr. Olson is being added to his blood draw for Dr. Gutierrez on 1/25/22.

## 2022-01-21 NOTE — TELEPHONE ENCOUNTER
RECORDS RECEIVED FROM: [facility] Everywhere   Appt Date: 01.25.2022    NOTES STATUS DETAILS   OFFICE NOTE from referring provider N/A    OFFICE NOTES from other specialists [facility]        Internal 01.12.2022 Juan Luis Abraham MD      01.05.2022 Josie Mendoza MD         08.16.2018 Nehemias Cevallos MD   DISCHARGE SUMMARY from hospital N/A    MEDICATION LIST Internal / CE    LIVER BIOSPY (IF APPLICABLE)      PATHOLOGY REPORTS  N/A    IMAGING     ENDOSCOPY (IF AVAILABLE) N/A    COLONOSCOPY (IF AVAILABLE) Received 01.13.2021   ULTRASOUND LIVER N/A    CT OF ABDOMEN N/A    MRI OF LIVER [facility] - in process 12.30.2021 Tricia Ramirez MD     FIBROSCAN, US ELASTOGRAPHY, FIBROSIS SCAN, MR ELASTOGRAPHY N/A    LABS     HEPATIC PANEL (LIVER PANEL) Internal 07.27.2020   BASIC METABOLIC PANEL [facility] 11.17.2021   COMPLETE METABOLIC PANEL [facility] 01.04.2022   COMPLETE BLOOD COUNT (CBC) Care Everywhere 01.04.2022   INTERNATIONAL NORMALIZED RATIO (INR) Care Everywhere 01.04.2022   HEPATITIS C ANTIBODY N/A    HEPATITIS C VIRAL LOAD/PCR N/A    HEPATITIS C GENOTYPE N/A    HEPATITIS B SURFACE ANTIGEN N/A    HEPATITIS B SURFACE ANTIBODY N/A    HEPATITIS B DNA QUANT LEVEL N/A    HEPATITIS B CORE ANTIBODY N/A      Action 01.21.2022  11:06a   Action Taken Called [phone] to image pushed, image received and uploaded to chart

## 2022-01-24 ENCOUNTER — OFFICE VISIT (OUTPATIENT)
Dept: OTHER | Facility: CLINIC | Age: 67
End: 2022-01-24
Attending: RADIOLOGY
Payer: MEDICARE

## 2022-01-24 ENCOUNTER — HOSPITAL ENCOUNTER (OUTPATIENT)
Dept: ULTRASOUND IMAGING | Facility: CLINIC | Age: 67
End: 2022-01-24
Attending: RADIOLOGY
Payer: MEDICARE

## 2022-01-24 DIAGNOSIS — I73.9 PAD (PERIPHERAL ARTERY DISEASE) (H): ICD-10-CM

## 2022-01-24 DIAGNOSIS — I73.9 PAD (PERIPHERAL ARTERY DISEASE) (H): Primary | ICD-10-CM

## 2022-01-24 PROCEDURE — 93926 LOWER EXTREMITY STUDY: CPT | Mod: LT

## 2022-01-24 PROCEDURE — G0463 HOSPITAL OUTPT CLINIC VISIT: HCPCS | Mod: 25

## 2022-01-24 ASSESSMENT — PAIN SCALES - GENERAL: PAINLEVEL: MILD PAIN (3)

## 2022-01-24 NOTE — PROGRESS NOTES
"Alexandro Pool is a 66 year old male who presents for:  Chief Complaint   Patient presents with     RECHECK     follow up to angiogram & US        Vitals:    There were no vitals filed for this visit.    BMI:  Estimated body mass index is 29.29 kg/m  as calculated from the following:    Height as of 12/2/21: 5' 7\" (1.702 m).    Weight as of 12/2/21: 187 lb (84.8 kg).    Pain Score:  Mild Pain (3)        Berna Hoover RN      "

## 2022-01-25 ENCOUNTER — OFFICE VISIT (OUTPATIENT)
Dept: GASTROENTEROLOGY | Facility: CLINIC | Age: 67
End: 2022-01-25
Attending: INTERNAL MEDICINE
Payer: MEDICARE

## 2022-01-25 ENCOUNTER — PRE VISIT (OUTPATIENT)
Dept: GASTROENTEROLOGY | Facility: CLINIC | Age: 67
End: 2022-01-25

## 2022-01-25 ENCOUNTER — LAB (OUTPATIENT)
Dept: LAB | Facility: CLINIC | Age: 67
End: 2022-01-25
Payer: MEDICARE

## 2022-01-25 VITALS
BODY MASS INDEX: 29.29 KG/M2 | OXYGEN SATURATION: 97 % | DIASTOLIC BLOOD PRESSURE: 78 MMHG | SYSTOLIC BLOOD PRESSURE: 124 MMHG | WEIGHT: 187 LBS | HEART RATE: 77 BPM

## 2022-01-25 DIAGNOSIS — D62 ANEMIA DUE TO BLOOD LOSS, ACUTE: ICD-10-CM

## 2022-01-25 DIAGNOSIS — K76.9 LESION OF LIVER GREATER THAN 1 CM IN DIAMETER WITH LIVER DISEASE CONFERRING RISK OF HEPATOCELLULAR CARCINOMA: Primary | ICD-10-CM

## 2022-01-25 DIAGNOSIS — Z91.89 LESION OF LIVER GREATER THAN 1 CM IN DIAMETER WITH LIVER DISEASE CONFERRING RISK OF HEPATOCELLULAR CARCINOMA: Primary | ICD-10-CM

## 2022-01-25 DIAGNOSIS — K74.60 CIRRHOSIS OF LIVER WITHOUT ASCITES, UNSPECIFIED HEPATIC CIRRHOSIS TYPE (H): ICD-10-CM

## 2022-01-25 DIAGNOSIS — R79.89 ELEVATED LFTS: ICD-10-CM

## 2022-01-25 DIAGNOSIS — K73.9 CHRONIC HEPATITIS, UNSPECIFIED (H): ICD-10-CM

## 2022-01-25 DIAGNOSIS — R94.5 ABNORMAL RESULTS OF LIVER FUNCTION STUDIES: ICD-10-CM

## 2022-01-25 LAB
AFP SERPL-MCNC: 2.1 UG/L (ref 0–8)
ALBUMIN SERPL-MCNC: 3.5 G/DL (ref 3.4–5)
ALP SERPL-CCNC: 65 U/L (ref 40–150)
ALT SERPL W P-5'-P-CCNC: 23 U/L (ref 0–70)
ANION GAP SERPL CALCULATED.3IONS-SCNC: 9 MMOL/L (ref 3–14)
AST SERPL W P-5'-P-CCNC: 21 U/L (ref 0–45)
BILIRUB DIRECT SERPL-MCNC: <0.1 MG/DL (ref 0–0.2)
BILIRUB SERPL-MCNC: 0.3 MG/DL (ref 0.2–1.3)
BUN SERPL-MCNC: 19 MG/DL (ref 7–30)
CALCIUM SERPL-MCNC: 8.4 MG/DL (ref 8.5–10.1)
CHLORIDE BLD-SCNC: 109 MMOL/L (ref 94–109)
CO2 SERPL-SCNC: 22 MMOL/L (ref 20–32)
CREAT SERPL-MCNC: 0.85 MG/DL (ref 0.66–1.25)
ERYTHROCYTE [DISTWIDTH] IN BLOOD BY AUTOMATED COUNT: 15 % (ref 10–15)
GFR SERPL CREATININE-BSD FRML MDRD: >90 ML/MIN/1.73M2
GLUCOSE BLD-MCNC: 151 MG/DL (ref 70–99)
HCT VFR BLD AUTO: 43.6 % (ref 40–53)
HGB BLD-MCNC: 13.6 G/DL (ref 13.3–17.7)
INR PPP: 1.18 (ref 0.85–1.15)
MCH RBC QN AUTO: 27 PG (ref 26.5–33)
MCHC RBC AUTO-ENTMCNC: 31.2 G/DL (ref 31.5–36.5)
MCV RBC AUTO: 87 FL (ref 78–100)
PLATELET # BLD AUTO: 144 10E3/UL (ref 150–450)
POTASSIUM BLD-SCNC: 4.5 MMOL/L (ref 3.4–5.3)
PROT SERPL-MCNC: 7.6 G/DL (ref 6.8–8.8)
RBC # BLD AUTO: 5.04 10E6/UL (ref 4.4–5.9)
SODIUM SERPL-SCNC: 140 MMOL/L (ref 133–144)
WBC # BLD AUTO: 6.1 10E3/UL (ref 4–11)

## 2022-01-25 PROCEDURE — 80053 COMPREHEN METABOLIC PANEL: CPT | Performed by: PATHOLOGY

## 2022-01-25 PROCEDURE — G0463 HOSPITAL OUTPT CLINIC VISIT: HCPCS

## 2022-01-25 PROCEDURE — 36415 COLL VENOUS BLD VENIPUNCTURE: CPT | Performed by: PATHOLOGY

## 2022-01-25 PROCEDURE — 85027 COMPLETE CBC AUTOMATED: CPT | Performed by: PATHOLOGY

## 2022-01-25 PROCEDURE — 82248 BILIRUBIN DIRECT: CPT | Performed by: PATHOLOGY

## 2022-01-25 PROCEDURE — 99205 OFFICE O/P NEW HI 60 MIN: CPT | Performed by: INTERNAL MEDICINE

## 2022-01-25 PROCEDURE — 85610 PROTHROMBIN TIME: CPT | Performed by: PATHOLOGY

## 2022-01-25 PROCEDURE — 82105 ALPHA-FETOPROTEIN SERUM: CPT | Performed by: INTERNAL MEDICINE

## 2022-01-25 NOTE — PROGRESS NOTES
HISTORY OF PRESENT ILLNESS:  I had the pleasure of seeing Alexandro Pool for consultation in the Liver Clinic at the [facility] on 01/25/2022.  Mr. Pool was previously seen by me, but has not been seen in 3-1/2 years.    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  He is a sustained virologic responder to hepatitis C treatment.  He recently was seen at [facility] and was diagnosed with a 2 cm, LI-RADS 4 lesion and hence the reason why he is seeing me today.    He is otherwise asymptomatic.  He denies any abdominal pain, itching, skin rash or fatigue.  He denies any increased abdominal girth.  He has only one leg and does have some mild edema.  He also has had a foot ulcer that has been there for about a year.  He does report to me, though, it is much improved.  He denies any gastrointestinal bleeding or any overt signs of hepatic encephalopathy.    He denies any fevers or chills, cough or shortness of breath.  He is fully vaccinated against COVID-19.  He denies any nausea or vomiting.  He denies any diarrhea or constipation.  His appetite has been good, and he thinks his weight has roughly remained the same.    PAST MEDICAL HISTORY:  Significant for right leg amputation secondary to peripheral vascular disease, and he also has a left leg ulcer.  He also has a history of hypertension, type 2 diabetes and hyperlipidemia.  He does report his diabetes has been under fairly good control.  He does see the eye specialist and has excellent diabetic care through [facility].    SOCIAL HISTORY:  He does not use any alcohol.  He still continues to smoke cigarettes.  He is in a motorized wheelchair and is in a long-term care facility.    FAMILY HISTORY:  Negative for liver disease or liver cancer.    REVIEW OF SYSTEMS:  A complete review of systems is negative other than that noted above.    Current Outpatient Medications   Medication     acetaminophen (TYLENOL) 325 MG  tablet     amLODIPine (NORVASC) 2.5 MG tablet     ammonium lactate (AMLACTIN) 12 % cream     ASPIRIN EC PO     atorvastatin (LIPITOR) 80 MG tablet     bisacodyl (DULCOLAX) 5 MG EC tablet     gabapentin (NEURONTIN) 100 MG capsule     hydrocortisone 1 % cream     insulin aspart (NOVOLOG FLEXPEN) 100 UNIT/ML pen     insulin glargine (BASAGLAR KWIKPEN) 100 UNIT/ML pen     insulin pen needle (BD KWABENA U/F) 32G X 4 MM miscellaneous     JARDIANCE 25 MG TABS tablet     liraglutide (VICTOZA) 18 MG/3ML solution     lisinopril (ZESTRIL) 2.5 MG tablet     metoprolol succinate ER (TOPROL-XL) 50 MG 24 hr tablet     morphine (MS CONTIN) 15 MG 12 hr tablet     order for DME     polyethylene glycol (GOLYTELY) 236 g suspension     rivaroxaban ANTICOAGULANT (XARELTO ANTICOAGULANT) 2.5 MG TABS tablet     SENNA PO     No current facility-administered medications for this visit.     /78   Pulse 77   Wt 84.8 kg (187 lb)   SpO2 97%   BMI 29.29 kg/m      PHYSICAL EXAMINATION:    GENERAL:  He looks quite well.  HEENT:  Shows no scleral icterus or temporal muscle wasting.  CHEST:  Clear.  ABDOMEN:  No obvious ascites.  No mass or tenderness to palpation are present.  His liver is 10 cm in span without left lobe enlargement.  No spleen tip is palpable.    EXTREMITIES:  His left leg is in a boot.  I did not unwrap it.  He showed me a picture from yesterday of what his diabetic foot ulcer looks like.    CARDIAC:  His cardiac exam reveals no S3, S4, or murmur.  SKIN:  No stigmata of chronic liver disease.  NEUROLOGIC:  Shows no asterixis.      Recent Results (from the past 168 hour(s))   CBC with platelets    Collection Time: 01/25/22  7:03 AM   Result Value Ref Range    WBC Count 6.1 4.0 - 11.0 10e3/uL    RBC Count 5.04 4.40 - 5.90 10e6/uL    Hemoglobin 13.6 13.3 - 17.7 g/dL    Hematocrit 43.6 40.0 - 53.0 %    MCV 87 78 - 100 fL    MCH 27.0 26.5 - 33.0 pg    MCHC 31.2 (L) 31.5 - 36.5 g/dL    RDW 15.0 10.0 - 15.0 %    Platelet Count 144  (L) 150 - 450 10e3/uL   Basic metabolic panel    Collection Time: 01/25/22  7:03 AM   Result Value Ref Range    Sodium 140 133 - 144 mmol/L    Potassium 4.5 3.4 - 5.3 mmol/L    Chloride 109 94 - 109 mmol/L    Carbon Dioxide (CO2) 22 20 - 32 mmol/L    Anion Gap 9 3 - 14 mmol/L    Urea Nitrogen 19 7 - 30 mg/dL    Creatinine 0.85 0.66 - 1.25 mg/dL    Calcium 8.4 (L) 8.5 - 10.1 mg/dL    Glucose 151 (H) 70 - 99 mg/dL    GFR Estimate >90 >60 mL/min/1.73m2   Hepatic function panel    Collection Time: 01/25/22  7:03 AM   Result Value Ref Range    Bilirubin Total 0.3 0.2 - 1.3 mg/dL    Bilirubin Direct <0.1 0.0 - 0.2 mg/dL    Protein Total 7.6 6.8 - 8.8 g/dL    Albumin 3.5 3.4 - 5.0 g/dL    Alkaline Phosphatase 65 40 - 150 U/L    AST 21 0 - 45 U/L    ALT 23 0 - 70 U/L   INR    Collection Time: 01/25/22  7:03 AM   Result Value Ref Range    INR 1.18 (H) 0.85 - 1.15      EXAM: MR ABD W/WO IV CONT   LOCATION: [facility]   DATE/TIME: 12/30/2021 11:16 AM     INDICATION: New liver lesion on CT chest.   COMPARISON: CT chest 12/27/2021.   TECHNIQUE: Routine MRI abdomen protocol including T1 in/out phase, diffusion, multiplane T2, and dynamic T1 with IV contrast.   CONTRAST: Gadobutrol 1 mmol/mL IV solution 8.5 mL.     FINDINGS:     HEPATOBILIARY: Cirrhotic liver. Normal liver iron and fat concentrations. There is an irregular mass in segment VI of the liver which measures 2.0 x 1.5 cm (series 46, image 38). There is restricted diffusion within the mass. There is diffuse peripheral arterial and venous enhancement of the mass without washout. There are several small simple liver cysts. The largest cyst in segment V measures 2 cm. The hepatic and portal veins are patent. Mild gallbladder distention. Gallbladder is otherwise unremarkable. Diffuse biliary dilatation to the ampulla without evidence for obstructing stone or mass. The common bile duct measures 13 mm.     PANCREAS: Mild diffuse pancreatic ductal dilatation measures 4 to  5 mm. The pancreas is otherwise unremarkable.     SPLEEN: Normal.     ADRENAL GLANDS: Normal.     KIDNEYS: Small simple bilateral renal cysts do not require follow-up.     OTHER: No free fluid or lymphadenopathy. Mild atheromatous plaque in the aorta. No abdominal aortic aneurysm. The stomach and visualized bowel are normal.     IMPRESSION:   1.  Cirrhotic liver.     2.  There is a 2 cm mass in segment VI of the liver. Differential diagnosis includes metastases and hepatoma. LR-4.     3.  Mild gallbladder distention and mild diffuse biliary and pancreatic dilatation to the ampulla without evidence for obstructing stone or mass.     IMPRESSION:  Mr. Pool has a new lesion in his liver on a background of cirrhosis caused by chronic hepatitis C.  I will go ahead and present him at our tumor conference on Friday.  It is being read as a LI-RADS 4 lesion at [facility], and if that is the case, we probably will proceed with a liver biopsy and ablation.  If our radiologist believes it is a LI-RADS 5, we will just proceed with ablation, which should yield cure results as good as surgery.  I have requested that he continue to see me on an every 6-month basis.  We seem to have lost contact, which may have been related to COVID.    Thank you very much for allowing me to participate in the care of this patient.  If you have any questions regarding my recommendations, please do not hesitate to contact me.         Nehemias Cevallos MD      Professor of Medicine  [employer]      Executive Medical Director, Solid Organ Transplant Program  [facility]

## 2022-01-25 NOTE — NURSING NOTE
Chief Complaint   Patient presents with     RECHECK     follow up liver issues       Blood pressure 124/78, pulse 77, weight 84.8 kg (187 lb), SpO2 97 %.      Elif Huynh, CMA

## 2022-01-25 NOTE — LETTER
1/25/2022     RE: Alexandro Pool  [address]  [place] [address]    Dear Colleague,    Thank you for referring your patient, Alexandro Pool, to the [facility] [place]. Please see a copy of my visit note below.    HISTORY OF PRESENT ILLNESS:  I had the pleasure of seeing Alexandro Pool for consultation in the Liver Clinic at the [facility] on 01/25/2022.  Mr. Pool was previously seen by me, but has not been seen in 3-1/2 years.    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  He is a sustained virologic responder to hepatitis C treatment.  He recently was seen at [facility] and was diagnosed with a 2 cm, LI-RADS 4 lesion and hence the reason why he is seeing me today.    He is otherwise asymptomatic.  He denies any abdominal pain, itching, skin rash or fatigue.  He denies any increased abdominal girth.  He has only one leg and does have some mild edema.  He also has had a foot ulcer that has been there for about a year.  He does report to me, though, it is much improved.  He denies any gastrointestinal bleeding or any overt signs of hepatic encephalopathy.    He denies any fevers or chills, cough or shortness of breath.  He is fully vaccinated against COVID-19.  He denies any nausea or vomiting.  He denies any diarrhea or constipation.  His appetite has been good, and he thinks his weight has roughly remained the same.    PAST MEDICAL HISTORY:  Significant for right leg amputation secondary to peripheral vascular disease, and he also has a left leg ulcer.  He also has a history of hypertension, type 2 diabetes and hyperlipidemia.  He does report his diabetes has been under fairly good control.  He does see the eye specialist and has excellent diabetic care through [facility].    SOCIAL HISTORY:  He does not use any alcohol.  He still continues to smoke cigarettes.  He is in a motorized wheelchair and is  in a long-term care facility.    FAMILY HISTORY:  Negative for liver disease or liver cancer.    REVIEW OF SYSTEMS:  A complete review of systems is negative other than that noted above.    Current Outpatient Medications   Medication     acetaminophen (TYLENOL) 325 MG tablet     amLODIPine (NORVASC) 2.5 MG tablet     ammonium lactate (AMLACTIN) 12 % cream     ASPIRIN EC PO     atorvastatin (LIPITOR) 80 MG tablet     bisacodyl (DULCOLAX) 5 MG EC tablet     gabapentin (NEURONTIN) 100 MG capsule     hydrocortisone 1 % cream     insulin aspart (NOVOLOG FLEXPEN) 100 UNIT/ML pen     insulin glargine (BASAGLAR KWIKPEN) 100 UNIT/ML pen     insulin pen needle (BD KWABENA U/F) 32G X 4 MM miscellaneous     JARDIANCE 25 MG TABS tablet     liraglutide (VICTOZA) 18 MG/3ML solution     lisinopril (ZESTRIL) 2.5 MG tablet     metoprolol succinate ER (TOPROL-XL) 50 MG 24 hr tablet     morphine (MS CONTIN) 15 MG 12 hr tablet     order for DME     polyethylene glycol (GOLYTELY) 236 g suspension     rivaroxaban ANTICOAGULANT (XARELTO ANTICOAGULANT) 2.5 MG TABS tablet     SENNA PO     No current facility-administered medications for this visit.     /78   Pulse 77   Wt 84.8 kg (187 lb)   SpO2 97%   BMI 29.29 kg/m      PHYSICAL EXAMINATION:    GENERAL:  He looks quite well.  HEENT:  Shows no scleral icterus or temporal muscle wasting.  CHEST:  Clear.  ABDOMEN:  No obvious ascites.  No mass or tenderness to palpation are present.  His liver is 10 cm in span without left lobe enlargement.  No spleen tip is palpable.    EXTREMITIES:  His left leg is in a boot.  I did not unwrap it.  He showed me a picture from yesterday of what his diabetic foot ulcer looks like.    CARDIAC:  His cardiac exam reveals no S3, S4, or murmur.  SKIN:  No stigmata of chronic liver disease.  NEUROLOGIC:  Shows no asterixis.      Recent Results (from the past 168 hour(s))   CBC with platelets    Collection Time: 01/25/22  7:03 AM   Result Value Ref Range     WBC Count 6.1 4.0 - 11.0 10e3/uL    RBC Count 5.04 4.40 - 5.90 10e6/uL    Hemoglobin 13.6 13.3 - 17.7 g/dL    Hematocrit 43.6 40.0 - 53.0 %    MCV 87 78 - 100 fL    MCH 27.0 26.5 - 33.0 pg    MCHC 31.2 (L) 31.5 - 36.5 g/dL    RDW 15.0 10.0 - 15.0 %    Platelet Count 144 (L) 150 - 450 10e3/uL   Basic metabolic panel    Collection Time: 01/25/22  7:03 AM   Result Value Ref Range    Sodium 140 133 - 144 mmol/L    Potassium 4.5 3.4 - 5.3 mmol/L    Chloride 109 94 - 109 mmol/L    Carbon Dioxide (CO2) 22 20 - 32 mmol/L    Anion Gap 9 3 - 14 mmol/L    Urea Nitrogen 19 7 - 30 mg/dL    Creatinine 0.85 0.66 - 1.25 mg/dL    Calcium 8.4 (L) 8.5 - 10.1 mg/dL    Glucose 151 (H) 70 - 99 mg/dL    GFR Estimate >90 >60 mL/min/1.73m2   Hepatic function panel    Collection Time: 01/25/22  7:03 AM   Result Value Ref Range    Bilirubin Total 0.3 0.2 - 1.3 mg/dL    Bilirubin Direct <0.1 0.0 - 0.2 mg/dL    Protein Total 7.6 6.8 - 8.8 g/dL    Albumin 3.5 3.4 - 5.0 g/dL    Alkaline Phosphatase 65 40 - 150 U/L    AST 21 0 - 45 U/L    ALT 23 0 - 70 U/L   INR    Collection Time: 01/25/22  7:03 AM   Result Value Ref Range    INR 1.18 (H) 0.85 - 1.15      EXAM: MR ABD W/WO IV CONT   LOCATION: [facility]   DATE/TIME: 12/30/2021 11:16 AM     INDICATION: New liver lesion on CT chest.   COMPARISON: CT chest 12/27/2021.   TECHNIQUE: Routine MRI abdomen protocol including T1 in/out phase, diffusion, multiplane T2, and dynamic T1 with IV contrast.   CONTRAST: Gadobutrol 1 mmol/mL IV solution 8.5 mL.     FINDINGS:     HEPATOBILIARY: Cirrhotic liver. Normal liver iron and fat concentrations. There is an irregular mass in segment VI of the liver which measures 2.0 x 1.5 cm (series 46, image 38). There is restricted diffusion within the mass. There is diffuse peripheral arterial and venous enhancement of the mass without washout. There are several small simple liver cysts. The largest cyst in segment V measures 2 cm. The hepatic and portal veins are  patent. Mild gallbladder distention. Gallbladder is otherwise unremarkable. Diffuse biliary dilatation to the ampulla without evidence for obstructing stone or mass. The common bile duct measures 13 mm.     PANCREAS: Mild diffuse pancreatic ductal dilatation measures 4 to 5 mm. The pancreas is otherwise unremarkable.     SPLEEN: Normal.     ADRENAL GLANDS: Normal.     KIDNEYS: Small simple bilateral renal cysts do not require follow-up.     OTHER: No free fluid or lymphadenopathy. Mild atheromatous plaque in the aorta. No abdominal aortic aneurysm. The stomach and visualized bowel are normal.     IMPRESSION:   1.  Cirrhotic liver.     2.  There is a 2 cm mass in segment VI of the liver. Differential diagnosis includes metastases and hepatoma. LR-4.     3.  Mild gallbladder distention and mild diffuse biliary and pancreatic dilatation to the ampulla without evidence for obstructing stone or mass.     IMPRESSION:  Mr. Pool has a new lesion in his liver on a background of cirrhosis caused by chronic hepatitis C.  I will go ahead and present him at our tumor conference on Friday.  It is being read as a LI-RADS 4 lesion at [facility], and if that is the case, we probably will proceed with a liver biopsy and ablation.  If our radiologist believes it is a LI-RADS 5, we will just proceed with ablation, which should yield cure results as good as surgery.  I have requested that he continue to see me on an every 6-month basis.  We seem to have lost contact, which may have been related to COVID.    Thank you very much for allowing me to participate in the care of this patient.  If you have any questions regarding my recommendations, please do not hesitate to contact me.         Nehemias Cevallos MD      Professor of Medicine  [employer]      Executive Medical Director, Solid Organ Transplant Program  [facility]

## 2022-01-25 NOTE — PROGRESS NOTES
VASCULAR OUTPATIENT CONSULT OR VISIT  PHYSICIAN:  Dr. Chente Pate      LOCATION: [facility]    Alexandro Pool   Medical Record #:  [id number]  YOB: 1955  Age:  66 year old     Date of Service: 1/24/2022    PRIMARY CARE PROVIDER: Arthur Abraham    HPI:  Alexandro Pool is a 66 year old male with a past medical history of smoking, peripheral arterial disease, status post right above-the-knee amputation in 2012 and left femoral to tibial bypass graft, hyperlipidemia, hypertension, type 2 diabetes, coronary artery disease with stent.  The patient has had a slow healing left foot dorsal wound which is being managed twice a week by [facility] in [place] with slow improvement in healing.  He is a patient of Dr. Giovani Olson.  He had recent vascular imaging with decreased ABIs and ultrasound flow noted in the left graft.  He underwent a left lower extremity angiogram on 12/2/2021 at [facility].  There was a significant stenosis due to eccentric plaque in the left common femoral artery at the level of the proximal anastomosis of the left femoral anterior tibial artery bypass graft.  There were significant stenoses in the right common internal and external iliac arteries.  An angioplasty was performed to the left common femoral artery.  Follow-up angiogram showed significant improvement in the luminal diameter and improved flow.  Post angiography showed a filling defect in the distal aspect of the femoral anterior tibial artery bypass graft.  This was due to embolized calcified plaque at the site of the angioplasty.  Successful suction thrombectomy as well as snare retrieval was done to remove the embolus.  Due to surgical blood loss the patient was kept overnight for further monitoring.  He was then discharged the next day.  Today, the patient states his wound is improving.  There is a dressing present that was just placed this morning.  The  patient did have pictures on his phone that did show improvement.  He is not having any fever chills or any new symptoms in the left foot.  He states that there is no signs of infection.  He is on Xarelto with no reports of unusual bleeding.    PHH:    Past Medical History:   Diagnosis Date     Acute kidney failure, unspecified (H)      Blood transfusion      CAD (coronary artery disease)      CARDIOVASCULAR SCREENING; LDL GOAL LESS THAN 100      Cirrhosis (H)      Critical lower limb ischemia (H)      Diabetes mellitus (H) 10/2011     Hypertension      Hypertension goal BP (blood pressure) < 140/90      Ischemic cardiomyopathy      Lymphedema of left leg 5/11/2016     Peripheral arterial disease (H)      PVD (peripheral vascular disease) (H)      Right above knee amputation 4/30/2014     Type 2 diabetes, HbA1C goal < 8% (H)         Past Surgical History:   Procedure Laterality Date     AMPUTATE LEG ABOVE KNEE  11/22/2011    Procedure:AMPUTATE LEG ABOVE KNEE; Revise Right Below Knee Amputation To Above Knee Amputation; Surgeon:MADISON WELLS; Location:[facility]     AMPUTATE LEG BELOW KNEE  11/10/2011    Procedure:AMPUTATE LEG BELOW KNEE; Right Below Knee Amputation; Surgeon:MADISON WELLS; [facility]     BYPASS GRAFT FEMOROTIBIAL  9/19/2013    Procedure: BYPASS GRAFT FEMOROTIBIAL;  Left Femorotibial Bypass Graft Insitu ;  Surgeon: Marisol Suggs MD;  Location:  OR     CARDIAC SURGERY      cardiac stent     ESOPHAGOSCOPY, GASTROSCOPY, DUODENOSCOPY (EGD), COMBINED  10/1/2012    Procedure: COMBINED ESOPHAGOSCOPY, GASTROSCOPY, DUODENOSCOPY (EGD);;  Surgeon: Nehemias Cevallos MD;  Location:  GI     ESOPHAGOSCOPY, GASTROSCOPY, DUODENOSCOPY (EGD), COMBINED N/A 3/24/2016    Procedure: COMBINED ESOPHAGOSCOPY, GASTROSCOPY, DUODENOSCOPY (EGD);  Surgeon: Gildardo Marks MD;  Location:  GI     IR LOWER EXTREMITY ANGIOGRAM LEFT  12/2/2021     IR STENT VASCULAR LT  3/9/2012          IRRIGATION AND DEBRIDEMENT LOWER EXTREMITY,  "COMBINED  11/15/2011    Procedure:COMBINED IRRIGATION AND DEBRIDEMENT LOWER EXTREMITY; DRAINAGE OF ABSCESS AT BELOW KNEE AMPUTATION AND KNEE DISARTICULATION.; Surgeon:MADISON WELLS; Location:[facility] OR     NO HISTORY OF SURGERY  7/12/13    derm     VASCULAR REPAIR LOWER EXTREMITY Left 9/19/2017    Procedure: VASCULAR REPAIR LOWER EXTREMITY;  Ligation Parasitic Branch To Left Lower Extremity ;  Surgeon: Marisol Suggs MD;  [facility]: [facility] OR       ALLERGIES:  Ciprofloxacin    MEDS:    Current Outpatient Medications:      acetaminophen (TYLENOL) 325 MG tablet, Take 1 tablet by mouth every 4 hours as needed., Disp: 250 tablet, Rfl: 0     amLODIPine (NORVASC) 2.5 MG tablet, Take 1 tablet (2.5 mg) by mouth 2 times daily, Disp: 180 tablet, Rfl: 3     ammonium lactate (AMLACTIN) 12 % cream, Apply topically 2 times daily as needed for dry skin apply to both legs twice daily as needed, Disp: , Rfl: 5     ASPIRIN EC PO, Take 81 mg by mouth daily, Disp: , Rfl:      atorvastatin (LIPITOR) 80 MG tablet, Take 1 tablet (80 mg) by mouth At Bedtime, Disp: 90 tablet, Rfl: 3     bisacodyl (DULCOLAX) 5 MG EC tablet, Take 1 tablet (5 mg) by mouth See Admin Instructions Refer to \"Getting Ready for a Colonoscopy\" instruction handout, Disp: 4 tablet, Rfl: 0     gabapentin (NEURONTIN) 100 MG capsule, Take 1 capsule (100 mg) by mouth 3 times daily, Disp: 90 capsule, Rfl: 0     hydrocortisone 1 % cream, Apply topically 2 times daily PRN, Disp: , Rfl:      insulin aspart (NOVOLOG FLEXPEN) 100 UNIT/ML pen, Inject 19 Units Subcutaneous 2 times daily (with meals), Disp: , Rfl:      insulin glargine (BASAGLAR KWIKPEN) 100 UNIT/ML pen, Inject 50 Units Subcutaneous At Bedtime, Disp: 50 mL, Rfl: 3     insulin pen needle (BD KWABENA U/F) 32G X 4 MM miscellaneous, Use 4x  daily or as directed., Disp: 120 each, Rfl: 11     JARDIANCE 25 MG TABS tablet, TAKE 1 TABLET (25 MG) BY MOUTH DAILY, Disp: 90 tablet, Rfl: 0     liraglutide (VICTOZA) 18 MG/3ML solution, " "Inject 1.8 mg Subcutaneous daily, Disp: 27 mL, Rfl: 3     lisinopril (ZESTRIL) 2.5 MG tablet, TAKE 1 TABLET (2.5 MG) BY MOUTH 2 TIMES DAILY, Disp: 180 tablet, Rfl: 3     metoprolol succinate ER (TOPROL-XL) 50 MG 24 hr tablet, Take 1.5 tablets (75 mg) by mouth daily, Disp: 135 tablet, Rfl: 3     morphine (MS CONTIN) 15 MG 12 hr tablet, Take 3 tablets (45 mg) by mouth 2 times daily, Disp: 60 tablet, Rfl: 0     order for DME, Equipment being ordered: FlexiTouch pneumatic compression device.  2-3 times per day to left lower extremity. Current strength - L4, Disp: 1 Units, Rfl:      polyethylene glycol (GOLYTELY) 236 g suspension, Take 4,000 mLs by mouth See Admin Instructions Refer to \"Getting Ready for a Colonoscopy\" instruction handout, Disp: 4000 mL, Rfl: 0     rivaroxaban ANTICOAGULANT (XARELTO ANTICOAGULANT) 2.5 MG TABS tablet, Take 1 tablet (2.5 mg) by mouth 2 times daily, Disp: 180 tablet, Rfl: 3     SENNA PO, 1 Tab , bedtime, Disp: , Rfl:     SOCIAL HABITS:    History   Smoking Status     Current Every Day Smoker     Packs/day: 2.00     Years: 40.00     Types: Cigarettes   Smokeless Tobacco     Never Used     Comment: Is not working on quitting     Social History    Substance and Sexual Activity      Alcohol use: Yes        Alcohol/week: 1.0 standard drink        Types: 1 Standard drinks or equivalent per week        Comment: \"Once a year maybe\"      History   Drug Use No       FAMILY HISTORY:    Family History   Problem Relation Age of Onset     Alcohol/Drug Mother         cirrhosis     Alcohol/Drug Father      C.A.D. No family hx of      Diabetes No family hx of      Cancer No family hx of        REVIEW OF SYSTEMS:    A 12 point ROS was reviewed and except for what is listed in the HPI above, all others are negative    PE:  There were no vitals taken for this visit.  Wt Readings from Last 1 Encounters:   01/25/22 187 lb (84.8 kg)     There is no height or weight on file to calculate BMI.    EXAM:  GENERAL: " This is a well-developed 66 year old male who appears his stated age  EYES: Grossly normal.  MOUTH: Buccal mucosa normal   MUSCULOSKELETAL: Grossly normal and both lower extremities are intact.  HEME/LYMPH: No lymphedema  NEUROLOGIC: Focally intact, Alert and oriented x 3.   PSYCH: appropriate affect  INTEGUMENT: Wound left dorsal foot.        DIAGNOSTIC STUDIES:     Images:  US Lower Extremity Arterial Duplex Left    Result Date: 1/24/2022  US LOWER EXTREMITY ARTERIAL DUPLEX LEFT   1/24/2022 1:10 PM HISTORY: Status post left femoral to anterior bypass graft, angioplasty of the distal anastomosis per Dr. Pate on 12/3/2021, one month follow up. PAD (peripheral artery disease) (H). COMPARISON: 12/2/2021. FINDINGS: Color Doppler and spectral waveform analysis was performed throughout the left superficial femoral to anterior tibial artery bypass. The bypass is patent with diameters ranging between 6.1 and 9.7 mm. Inflow and outflow arteries are patent. Waveforms are multiphasic throughout.     IMPRESSION: Patent left superficial femoral to anterior tibial artery bypass. No evidence of stenosis or occlusion. MAIRA PIERRE DO   SYSTEM ID:  [id number]    LABS:      Sodium   Date Value Ref Range Status   01/25/2022 140 133 - 144 mmol/L Final   12/03/2021 137 133 - 144 mmol/L Final   12/02/2021 137 133 - 144 mmol/L Final   09/02/2020 137 133 - 144 mmol/L Final   07/27/2020 128 (L) 133 - 144 mmol/L Final   08/16/2018 135 133 - 144 mmol/L Final     Urea Nitrogen   Date Value Ref Range Status   01/25/2022 19 7 - 30 mg/dL Final   12/03/2021 15 7 - 30 mg/dL Final   12/02/2021 19 7 - 30 mg/dL Final   09/02/2020 17 7 - 30 mg/dL Final   07/27/2020 16 7 - 30 mg/dL Final   08/16/2018 18 7 - 30 mg/dL Final     Hemoglobin   Date Value Ref Range Status   01/25/2022 13.6 13.3 - 17.7 g/dL Final   12/03/2021 11.8 (L) 13.3 - 17.7 g/dL Final   12/03/2021 11.1 (L) 13.3 - 17.7 g/dL Final   07/27/2020 13.3 13.3 - 17.7 g/dL Final    08/16/2018 13.4 13.3 - 17.7 g/dL Final   02/13/2018 13.9 13.3 - 17.7 g/dL Final     Platelet Count   Date Value Ref Range Status   01/25/2022 144 (L) 150 - 450 10e3/uL Final   12/03/2021 133 (L) 150 - 450 10e3/uL Final   12/02/2021 148 (L) 150 - 450 10e3/uL Final   07/27/2020 145 (L) 150 - 450 10e9/L Final   08/16/2018 154 150 - 450 10e9/L Final   02/13/2018 122 (L) 150 - 450 10e9/L Final     INR   Date Value Ref Range Status   01/25/2022 1.18 (H) 0.85 - 1.15 Final   12/02/2021 1.05 0.85 - 1.15 Final   08/16/2018 1.00 0.86 - 1.14 Final   02/13/2018 1.02 0.86 - 1.14 Final   08/15/2017 1.13 0.86 - 1.14 Final     Assessment/plan:  This is a pleasant 59-year-old unfortunate gentleman who underwent balloon angioplasty of the left common femoral artery at the level anastomosis of the left femoral to mid anterior tibial artery bypass graft.  The procedure was complicated by embolization of the calcified plaque at the site of the angioplasty into the distal aspects of the bypass graft.  This was able to be snared out.  The patient states that his wound is continuing to improve.  And he has no signs of infection.  We discussed the results of his ultrasound that was performed today.  The left superficial femoral to anterior tibial artery bypass is patent with no evidence of stenosis or occlusion.  Plan: We will repeat ultrasound in 6 months.  The patient has been instructed to contact us sooner if the wound fails to improve.  Or any other concerns.      This was a in person visit in which 30 minutes of  total time was spent (either in face-to-face or non-face-to-face time).    Dr. Chente Pate   Interventional Radiology  Pager# [phone]  [facility]

## 2022-02-02 DIAGNOSIS — I73.9 PAD (PERIPHERAL ARTERY DISEASE) (H): Primary | ICD-10-CM

## 2022-02-16 ENCOUNTER — OFFICE VISIT (OUTPATIENT)
Dept: OTHER | Facility: CLINIC | Age: 67
End: 2022-02-16
Attending: INTERNAL MEDICINE
Payer: MEDICARE

## 2022-02-16 VITALS
OXYGEN SATURATION: 98 % | HEART RATE: 82 BPM | DIASTOLIC BLOOD PRESSURE: 77 MMHG | BODY MASS INDEX: 33.99 KG/M2 | WEIGHT: 217 LBS | TEMPERATURE: 97 F | SYSTOLIC BLOOD PRESSURE: 125 MMHG

## 2022-02-16 DIAGNOSIS — Z79.4 TYPE 2 DIABETES MELLITUS WITH COMPLICATION, WITH LONG-TERM CURRENT USE OF INSULIN (H): ICD-10-CM

## 2022-02-16 DIAGNOSIS — F17.200 TOBACCO USE DISORDER: ICD-10-CM

## 2022-02-16 DIAGNOSIS — I10 HYPERTENSION GOAL BP (BLOOD PRESSURE) < 140/90: ICD-10-CM

## 2022-02-16 DIAGNOSIS — I73.9 PAD (PERIPHERAL ARTERY DISEASE) (H): ICD-10-CM

## 2022-02-16 DIAGNOSIS — E11.8 TYPE 2 DIABETES MELLITUS WITH COMPLICATION, WITH LONG-TERM CURRENT USE OF INSULIN (H): ICD-10-CM

## 2022-02-16 DIAGNOSIS — E78.5 HYPERLIPIDEMIA LDL GOAL <70: ICD-10-CM

## 2022-02-16 PROCEDURE — 99214 OFFICE O/P EST MOD 30 MIN: CPT | Performed by: INTERNAL MEDICINE

## 2022-02-16 PROCEDURE — G0463 HOSPITAL OUTPT CLINIC VISIT: HCPCS

## 2022-02-16 NOTE — PROGRESS NOTES
Alexandro Pool is a 66 year old male who is presenting at the current time to discuss his diagnosi(es) of        Tobacco use disorder  PAD (peripheral artery disease) (H)  Hyperlipidemia LDL goal <70  Hypertension goal BP (blood pressure) < 140/90  Type 2 diabetes mellitus with complication, with long-term current use of insulin (H)  .      HPI:  Alexandro Pool is a 66 year old male with a past medical history of current  smoking which he refuses to stop, peripheral arterial disease, status post right above-the-knee amputation in 2012 and left femoral to tibial bypass graft, hyperlipidemia, hypertension, type 2 diabetes, coronary artery disease with stent.  The patient has had a slow healing left foot dorsal wound which is being managed twice a week by [facility] in [place] with slow improvement in healing. He had recent vascular imaging with decreased ABIs and ultrasound flow noted in the left graft.  He underwent a left lower extremity angiogram on 12/2/2021 at [facility].  There was a significant stenosis due to eccentric plaque in the left common femoral artery at the level of the proximal anastomosis of the left femoral anterior tibial artery bypass graft.  There were significant stenoses in the right common internal and external iliac arteries.  An angioplasty was performed to the left common femoral artery.  Follow-up angiogram showed significant improvement in the luminal diameter and improved flow.  Post angiography showed a filling defect in the distal aspect of the femoral anterior tibial artery bypass graft.  This was due to embolized calcified plaque at the site of the angioplasty.  Successful suction thrombectomy as well as snare retrieval was done to remove the embolus.  Due to surgical blood loss the patient was kept overnight for further monitoring.  He was then discharged the next day. Today, the patient states his wound is improving.  There is a dressing present  that was just placed yesterday morning.  The patient did have pictures on his phone that did show improvement.  He is not having any fever chills or any new symptoms in the left foot.  He states that there is no signs of infection.  He is on vascular dosed Xarelto with no reports of unusual bleeding. We discussed the results of his ultrasound that was performed in January.  The left superficial femoral to anterior tibial artery bypass is patent with no evidence of stenosis or occlusion. Dr. Pate plans to repeat the ultrasound in July. He is on a statin and has an LDL-C < 70. He is on max dose Jardiance, Victoza, and is also on a LA/SA insulin combination. His A1C is 7%..         Review Of Systems  Skin: negative  Eyes: negative  Ears/Nose/Throat: negative  Respiratory: No shortness of breath, dyspnea on exertion, cough, or hemoptysis  Cardiovascular: negative  Gastrointestinal: negative  Genitourinary: negative  Musculoskeletal: positive for slowly healing left foot dorsal wound  Neurologic: negative  Psychiatric: negative  Hematologic/Lymphatic/Immunologic: negative  Endocrine: negative    PAST MEDICAL HISTORY:                  Past Medical History:   Diagnosis Date     Acute kidney failure, unspecified (H)      Blood transfusion      CAD (coronary artery disease)      CARDIOVASCULAR SCREENING; LDL GOAL LESS THAN 100      Cirrhosis (H)      Critical lower limb ischemia (H)      Diabetes mellitus (H) 10/2011     Hypertension      Hypertension goal BP (blood pressure) < 140/90      Ischemic cardiomyopathy      Lymphedema of left leg 5/11/2016     Peripheral arterial disease (H)      PVD (peripheral vascular disease) (H)      Right above knee amputation 4/30/2014     Type 2 diabetes, HbA1C goal < 8% (H)        PAST SURGICAL HISTORY:                  Past Surgical History:   Procedure Laterality Date     AMPUTATE LEG ABOVE KNEE  11/22/2011    Procedure:AMPUTATE LEG ABOVE KNEE; Revise Right Below Knee Amputation To  Above Knee Amputation; Surgeon:MADISON WELLS; Location:[facility]     AMPUTATE LEG BELOW KNEE  11/10/2011    Procedure:AMPUTATE LEG BELOW KNEE; Right Below Knee Amputation; Surgeon:MADISON WELLS; Location[facility] OR     BYPASS GRAFT FEMOROTIBIAL  9/19/2013    Procedure: BYPASS GRAFT FEMOROTIBIAL;  Left Femorotibial Bypass Graft Insitu ;  Surgeon: Marisol Suggs MD;  Location: [facility] OR     CARDIAC SURGERY      cardiac stent     ESOPHAGOSCOPY, GASTROSCOPY, DUODENOSCOPY (EGD), COMBINED  10/1/2012    Procedure: COMBINED ESOPHAGOSCOPY, GASTROSCOPY, DUODENOSCOPY (EGD);;  Surgeon: Nehemias Cevallos MD;  Location: [facility] GI     ESOPHAGOSCOPY, GASTROSCOPY, DUODENOSCOPY (EGD), COMBINED N/A 3/24/2016    Procedure: COMBINED ESOPHAGOSCOPY, GASTROSCOPY, DUODENOSCOPY (EGD);  Surgeon: Gildardo Marks MD;  Location: [facility] GI     IR LOWER EXTREMITY ANGIOGRAM LEFT  12/2/2021     IR STENT VASCULAR LT  3/9/2012          IRRIGATION AND DEBRIDEMENT LOWER EXTREMITY, COMBINED  11/15/2011    Procedure:COMBINED IRRIGATION AND DEBRIDEMENT LOWER EXTREMITY; DRAINAGE OF ABSCESS AT BELOW KNEE AMPUTATION AND KNEE DISARTICULATION.; Surgeon:MADISON WELLS; Location:[facility] OR     NO HISTORY OF SURGERY  7/12/13    derm     VASCULAR REPAIR LOWER EXTREMITY Left 9/19/2017    Procedure: VASCULAR REPAIR LOWER EXTREMITY;  Ligation Parasitic Branch To Left Lower Extremity ;  Surgeon: Marisol Suggs MD;  Location: [facility] OR       CURRENT MEDICATIONS:                  Current Outpatient Medications   Medication Sig Dispense Refill     acetaminophen (TYLENOL) 325 MG tablet Take 1 tablet by mouth every 4 hours as needed. 250 tablet 0     amLODIPine (NORVASC) 2.5 MG tablet Take 1 tablet (2.5 mg) by mouth 2 times daily 180 tablet 3     ammonium lactate (AMLACTIN) 12 % cream Apply topically 2 times daily as needed for dry skin apply to both legs twice daily as needed  5     ASPIRIN EC PO Take 81 mg by mouth daily       atorvastatin (LIPITOR) 80 MG tablet Take 1 tablet (80 mg) by mouth At  "Bedtime 90 tablet 3     bisacodyl (DULCOLAX) 5 MG EC tablet Take 1 tablet (5 mg) by mouth See Admin Instructions Refer to \"Getting Ready for a Colonoscopy\" instruction handout 4 tablet 0     gabapentin (NEURONTIN) 100 MG capsule Take 1 capsule (100 mg) by mouth 3 times daily 90 capsule 0     hydrocortisone 1 % cream Apply topically 2 times daily PRN       insulin aspart (NOVOLOG FLEXPEN) 100 UNIT/ML pen Inject 19 Units Subcutaneous 2 times daily (with meals)       insulin glargine (BASAGLAR KWIKPEN) 100 UNIT/ML pen Inject 50 Units Subcutaneous At Bedtime 50 mL 3     insulin pen needle (BD KWABENA U/F) 32G X 4 MM miscellaneous Use 4x  daily or as directed. 120 each 11     JARDIANCE 25 MG TABS tablet TAKE 1 TABLET (25 MG) BY MOUTH DAILY 90 tablet 0     liraglutide (VICTOZA) 18 MG/3ML solution Inject 1.8 mg Subcutaneous daily 27 mL 3     lisinopril (ZESTRIL) 2.5 MG tablet TAKE 1 TABLET (2.5 MG) BY MOUTH 2 TIMES DAILY 180 tablet 3     metoprolol succinate ER (TOPROL-XL) 50 MG 24 hr tablet Take 1.5 tablets (75 mg) by mouth daily 135 tablet 3     morphine (MS CONTIN) 15 MG 12 hr tablet Take 3 tablets (45 mg) by mouth 2 times daily 60 tablet 0     order for DME Equipment being ordered: FlexiTouch pneumatic compression device.  2-3 times per day to left lower extremity.  Current strength - L4 1 Units      polyethylene glycol (GOLYTELY) 236 g suspension Take 4,000 mLs by mouth See Admin Instructions Refer to \"Getting Ready for a Colonoscopy\" instruction handout 4000 mL 0     rivaroxaban ANTICOAGULANT (XARELTO ANTICOAGULANT) 2.5 MG TABS tablet Take 1 tablet (2.5 mg) by mouth 2 times daily 180 tablet 3     SENNA PO 1 Tab , bedtime         ALLERGIES:                  Allergies   Allergen Reactions     Ciprofloxacin Rash       SOCIAL HISTORY:                  Social History     Socioeconomic History     Marital status:      Spouse name: Not on file     Number of children: 0     Years of education: Not on file     Highest " "education level: Not on file   Occupational History     Employer: UNKNOWN   Tobacco Use     Smoking status: Current Every Day Smoker     Packs/day: 2.00     Years: 40.00     Pack years: 80.00     Types: Cigarettes     Smokeless tobacco: Never Used     Tobacco comment: Is not working on quitting   Substance and Sexual Activity     Alcohol use: Yes     Alcohol/week: 1.0 standard drink     Types: 1 Standard drinks or equivalent per week     Comment: \"Once a year maybe\"     Drug use: No     Sexual activity: Not Currently   Other Topics Concern     Parent/sibling w/ CABG, MI or angioplasty before 65F 55M? No      Service Not Asked     Blood Transfusions Not Asked     Caffeine Concern Yes     Occupational Exposure Not Asked     Hobby Hazards Not Asked     Sleep Concern Not Asked     Stress Concern Not Asked     Weight Concern Not Asked     Special Diet Not Asked     Back Care Not Asked     Exercise No     Bike Helmet Not Asked     Seat Belt Not Asked     Self-Exams Not Asked   Social History Narrative     Not on file     Social Determinants of Health     Financial Resource Strain: Not on file   Food Insecurity: Not on file   Transportation Needs: Not on file   Physical Activity: Not on file   Stress: Not on file   Social Connections: Not on file   Intimate Partner Violence: Not on file   Housing Stability: Not on file       FAMILY HISTORY:                   Family History   Problem Relation Age of Onset     Alcohol/Drug Mother         cirrhosis     Alcohol/Drug Father      C.A.D. No family hx of      Diabetes No family hx of      Cancer No family hx of            Physical exam Reveals:    O/P: WNL  HEENT: WNL  NECK: No JVD, thyromegaly, or lymphadenopathy  HEART: RRR, no murmurs, gallops, or rubs  LUNGS: CTA bilaterally without rales, wheezes, or rhonchi  GI: NABS, nondistended, nontender, soft  EXT:without cyanosis, clubbing, or edema  NEURO: nonfocal  : no flank tenderness  MS: S/P rt AKA, left foot dorsal " wound      A/P:    (F17.200) Tobacco use disorder  Comment: I again advised him to quit smoking.  Plan: He again refuses to quit smoking.     (I73.9) PAD S/P Lt SFA stenting 3-9-2012, Lt fem-SENIA bypass w/ ISGSV 9-, S/P Rt AKA 2014, ligation of parasitic branch 9/19/2017  Comment: The bypass is patent.   Plan: LipoFit by NMR, Lipoprotein (a), C-Reactive         Protein, High Sensitivity        Duplex in July through Dr. Pate    (E78.5) Hyperlipidemia LDL goal <70  Comment: at goal  Plan: LipoFit by NMR, Lipoprotein (a), C-Reactive         Protein, High Sensitivity           (I10) Hypertension goal BP (blood pressure) < 140/90  Comment: at goal  Plan: no med changes    (E11.8,  Z79.4) Type 2 diabetes mellitus with complication, with long-term current use of insulin (H)  Comment: not at a1c goal of < 7%;Increasse Lantus from 50 to 56 units daily but split dosing to 28 units BID  Plan: insulin glargine (LANTUS PEN) 100 UNIT/ML pen,         Hemoglobin A1c, Comprehensive metabolic panel           30 minutes medical care on today's date.

## 2022-02-16 NOTE — PROGRESS NOTES
[facility]        Patient is here for a  follow up.        Pt is currently taking Aspirin, Statin and Xarelto.    /77 (BP Location: Right arm, Patient Position: Chair, Cuff Size: Adult Regular)   Pulse 82   Temp 97  F (36.1  C) (Temporal)   Wt 217 lb (98.4 kg)   SpO2 98%   BMI 33.99 kg/m      The provider has been notified that the patient has no concerns.     Questions patient would like addressed today are: N/A.    Refills are needed: N/A    Has homecare services and agency name:  Nafisa Thorne MA

## 2022-03-21 DIAGNOSIS — E11.8 TYPE 2 DIABETES MELLITUS WITH COMPLICATION, WITH LONG-TERM CURRENT USE OF INSULIN (H): ICD-10-CM

## 2022-03-21 DIAGNOSIS — Z79.4 TYPE 2 DIABETES MELLITUS WITH COMPLICATION, WITH LONG-TERM CURRENT USE OF INSULIN (H): ICD-10-CM

## 2022-03-21 DIAGNOSIS — I73.9 PAD (PERIPHERAL ARTERY DISEASE) (H): ICD-10-CM

## 2022-03-22 RX ORDER — INSULIN LISPRO 100 [IU]/ML
18 INJECTION, SOLUTION INTRAVENOUS; SUBCUTANEOUS
Qty: 50 ML | Refills: 3 | Status: SHIPPED | OUTPATIENT
Start: 2022-03-22 | End: 2022-03-22

## 2022-03-22 RX ORDER — INSULIN LISPRO 100 [IU]/ML
18 INJECTION, SOLUTION INTRAVENOUS; SUBCUTANEOUS
Qty: 50 ML | Refills: 3 | Status: SHIPPED | OUTPATIENT
Start: 2022-03-22 | End: 2024-04-26 | Stop reason: ALTCHOICE

## 2022-03-22 RX ORDER — EMPAGLIFLOZIN 25 MG/1
TABLET, FILM COATED ORAL
Qty: 90 TABLET | Refills: 3 | Status: SHIPPED | OUTPATIENT
Start: 2022-03-22 | End: 2023-03-06

## 2022-03-22 NOTE — TELEPHONE ENCOUNTER
[facility]     Who is the name of the provider?  Whitney    What is the location you see this provider at?        Reason for call:  Received a phone call from [facility] pharmacy where RX were sent and the insurance is providing other alternatives to the generic humalog quick pen and so the pharmacy is wondering if Dr. Olson would switch the RX to one of these- Novalog U100 or Fiasp Flex U100 for the insulin    : Monica @ [facility]     Phone number to call: [phone]    Additional Notes:

## 2022-03-22 NOTE — TELEPHONE ENCOUNTER
Per LOV, patient was to increase Glargine to 28 units BID.  I cancelled this by mistake, and reordered it.    Patient currently using short term insulin.  Rx in chart is aspart 19 units BID with meals.  Requested Rx is for humalog 18 units TID.    Refill of Jardiance also requested.    Routing to Dr. Olson for review.  Berna Hoover RN BSN  [facility]  [phone]

## 2022-04-27 ENCOUNTER — TELEPHONE (OUTPATIENT)
Dept: OTHER | Facility: CLINIC | Age: 67
End: 2022-04-27
Payer: MEDICARE

## 2022-04-27 NOTE — TELEPHONE ENCOUNTER
Future Appointments   Date Time Provider Department Center   5/2/2022  8:30 AM RD LAB RDLABR [facility]   5/16/2022  2:10 PM Giovani Olson MD [facility]   7/26/2022  9:30 AM UC LAB UCLABR [facility]   7/26/2022 10:30 AM Nehemias Cevallos MD [facility]     Follow up to 2/16/22  Fasting labs  In clinic visit two weeks later.    Cale Gipson I   [address] [place] [id number] [phone]

## 2022-05-02 ENCOUNTER — LAB (OUTPATIENT)
Dept: LAB | Facility: CLINIC | Age: 67
End: 2022-05-02
Payer: MEDICARE

## 2022-05-02 DIAGNOSIS — E11.8 TYPE 2 DIABETES MELLITUS WITH COMPLICATION, WITH LONG-TERM CURRENT USE OF INSULIN (H): ICD-10-CM

## 2022-05-02 DIAGNOSIS — Z79.4 TYPE 2 DIABETES MELLITUS WITH COMPLICATION, WITH LONG-TERM CURRENT USE OF INSULIN (H): ICD-10-CM

## 2022-05-02 DIAGNOSIS — E78.5 HYPERLIPIDEMIA LDL GOAL <70: ICD-10-CM

## 2022-05-02 DIAGNOSIS — I73.9 PAD (PERIPHERAL ARTERY DISEASE) (H): ICD-10-CM

## 2022-05-02 LAB
ALBUMIN SERPL-MCNC: 3.5 G/DL (ref 3.4–5)
ALP SERPL-CCNC: 66 U/L (ref 40–150)
ALT SERPL W P-5'-P-CCNC: 23 U/L (ref 0–70)
ANION GAP SERPL CALCULATED.3IONS-SCNC: 6 MMOL/L (ref 3–14)
AST SERPL W P-5'-P-CCNC: 25 U/L (ref 0–45)
BILIRUB SERPL-MCNC: 0.4 MG/DL (ref 0.2–1.3)
BUN SERPL-MCNC: 15 MG/DL (ref 7–30)
CALCIUM SERPL-MCNC: 9 MG/DL (ref 8.5–10.1)
CHLORIDE BLD-SCNC: 104 MMOL/L (ref 94–109)
CO2 SERPL-SCNC: 25 MMOL/L (ref 20–32)
CREAT SERPL-MCNC: 0.96 MG/DL (ref 0.66–1.25)
CRP SERPL HS-MCNC: 5.5 MG/L
GFR SERPL CREATININE-BSD FRML MDRD: 87 ML/MIN/1.73M2
GLUCOSE BLD-MCNC: 134 MG/DL (ref 70–99)
HBA1C MFR BLD: 7.4 % (ref 0–5.6)
POTASSIUM BLD-SCNC: 4.9 MMOL/L (ref 3.4–5.3)
PROT SERPL-MCNC: 7.6 G/DL (ref 6.8–8.8)
SODIUM SERPL-SCNC: 135 MMOL/L (ref 133–144)

## 2022-05-02 PROCEDURE — 83704 LIPOPROTEIN BLD QUAN PART: CPT | Mod: 90

## 2022-05-02 PROCEDURE — 83036 HEMOGLOBIN GLYCOSYLATED A1C: CPT

## 2022-05-02 PROCEDURE — 80061 LIPID PANEL: CPT | Mod: 90

## 2022-05-02 PROCEDURE — 86141 C-REACTIVE PROTEIN HS: CPT

## 2022-05-02 PROCEDURE — 80053 COMPREHEN METABOLIC PANEL: CPT

## 2022-05-02 PROCEDURE — 99000 SPECIMEN HANDLING OFFICE-LAB: CPT

## 2022-05-02 PROCEDURE — 83695 ASSAY OF LIPOPROTEIN(A): CPT | Mod: 90

## 2022-05-02 PROCEDURE — 36415 COLL VENOUS BLD VENIPUNCTURE: CPT

## 2022-05-03 LAB — LPA SERPL-MCNC: 6 MG/DL

## 2022-05-05 LAB
CHOLEST SERPL-MCNC: 121 MG/DL
HDL SERPL QN: 8.6 NM
HDL SERPL-SCNC: 20.4 UMOL/L
HDLC SERPL-MCNC: 26 MG/DL
HLD.LARGE SERPL-SCNC: <2.8 UMOL/L
LDL SERPL QN: 20.4 NM
LDL SERPL-SCNC: 854 NMOL/L
LDL SMALL SERPL-SCNC: 315 NMOL/L
LDLC SERPL CALC-MCNC: 34 MG/DL
PATHOLOGY STUDY: ABNORMAL
TRIGL SERPL-MCNC: 305 MG/DL
VLDL LARGE SERPL-SCNC: 12.3 NMOL/L
VLDL SERPL QN: 55.7 NM

## 2022-05-16 ENCOUNTER — OFFICE VISIT (OUTPATIENT)
Dept: OTHER | Facility: CLINIC | Age: 67
End: 2022-05-16
Attending: INTERNAL MEDICINE
Payer: MEDICARE

## 2022-05-16 VITALS
SYSTOLIC BLOOD PRESSURE: 128 MMHG | BODY MASS INDEX: 30.85 KG/M2 | WEIGHT: 197 LBS | HEART RATE: 93 BPM | DIASTOLIC BLOOD PRESSURE: 78 MMHG | OXYGEN SATURATION: 98 %

## 2022-05-16 DIAGNOSIS — Z79.4 TYPE 2 DIABETES MELLITUS WITH COMPLICATION, WITH LONG-TERM CURRENT USE OF INSULIN (H): ICD-10-CM

## 2022-05-16 DIAGNOSIS — E78.5 HYPERLIPIDEMIA LDL GOAL <70: ICD-10-CM

## 2022-05-16 DIAGNOSIS — I10 HYPERTENSION GOAL BP (BLOOD PRESSURE) < 140/90: ICD-10-CM

## 2022-05-16 DIAGNOSIS — E11.8 TYPE 2 DIABETES MELLITUS WITH COMPLICATION, WITH LONG-TERM CURRENT USE OF INSULIN (H): ICD-10-CM

## 2022-05-16 DIAGNOSIS — I73.9 PAD (PERIPHERAL ARTERY DISEASE) (H): ICD-10-CM

## 2022-05-16 DIAGNOSIS — F17.200 TOBACCO USE DISORDER: ICD-10-CM

## 2022-05-16 PROCEDURE — 99214 OFFICE O/P EST MOD 30 MIN: CPT | Performed by: INTERNAL MEDICINE

## 2022-05-16 PROCEDURE — G0463 HOSPITAL OUTPT CLINIC VISIT: HCPCS

## 2022-05-16 NOTE — PROGRESS NOTES
[facility]        Patient is here for a  follow up.      Pt is currently taking Aspirin, Statin and Xarelto.    /82 (BP Location: Right arm, Patient Position: Chair, Cuff Size: Adult Regular)   Pulse 93   Wt 197 lb (89.4 kg)   SpO2 98%   BMI 30.85 kg/m      The provider has been notified that the patient has no concerns.     Questions patient would like addressed today are: N/A.    Refills are needed: N/A    Has homecare services and agency name:  Nafisa Thorne MA

## 2022-05-16 NOTE — PROGRESS NOTES
Alexandro Pool is a 66 year old male who is presenting at the current time to discuss his diagnosi(es) of        Tobacco use disorder  PAD (peripheral artery disease) (H)  Hyperlipidemia LDL goal <70  Hypertension goal BP (blood pressure) < 140/90  Type 2 diabetes mellitus with complication, with long-term current use of insulin (H)  .        HPI:  Alexanrdo Pool is a 66 year old male with a past medical history of current  smoking which he refuses to stop, peripheral arterial disease, status post right above-the-knee amputation in 2012 and left femoral to tibial bypass graft, hyperlipidemia, hypertension, type 2 diabetes, coronary artery disease S/P GABRIEL.  The patient has had a slow healing left foot dorsal wound which is being managed twice a week by [facility] in [place] with slow improvement in healing. He had previous vascular imaging with decreased ABIs and ultrasound flow noted in the left graft.  He underwent a left lower extremity angiogram on 12/2/2021 at [facility].  There was a significant stenosis due to eccentric plaque in the left common femoral artery at the level of the proximal anastomosis of the left femoral anterior tibial artery bypass graft.  There were significant stenoses in the right common internal and external iliac arteries.  An angioplasty was performed to the left common femoral artery.  Follow-up angiogram showed significant improvement in the luminal diameter and improved flow.  Post angiography showed a filling defect in the distal aspect of the femoral anterior tibial artery bypass graft.  This was due to embolized calcified plaque from the site of the angioplasty.  Successful suction thrombectomy as well as snare retrieval was done to remove the embolus.  Due to periprocedural blood loss the patient was kept overnight for further monitoring.  He was then discharged the next day. Today, the patient states his wound is improving.  There is a dressing  present that was just placed three days ago The patient did have pictures on his phone that did show improvement.  He is not having any fever chills or any new symptoms in the left foot.  He states that there is no signs of infection.  He is on vascular dosed Xarelto with no reports of unusual bleeding. We discussed the results of his ultrasound that was performed in January.  The left superficial femoral to anterior tibial artery bypass is patent with no evidence of stenosis or occlusion. Dr. Pate plans to repeat the ultrasound in July. He is on a statin and has an LDL-C < 70. He is on max dose Jardiance, Victoza, and is also on a LA/SA insulin combination. His A1C is 7.4%.    He admits to buying a five pound bag of maira's eggs which he has eaten.         Review Of Systems  Skin: negative  Eyes: negative  Ears/Nose/Throat: negative  Respiratory: No shortness of breath, dyspnea on exertion, cough, or hemoptysis  Cardiovascular: negative  Gastrointestinal: negative  Genitourinary: negative  Musculoskeletal: negative  Neurologic: negative  Psychiatric: negative  Hematologic/Lymphatic/Immunologic: negative  Endocrine: negative        PAST MEDICAL HISTORY:                  Past Medical History:   Diagnosis Date     Acute kidney failure, unspecified (H)      Blood transfusion      CAD (coronary artery disease)      CARDIOVASCULAR SCREENING; LDL GOAL LESS THAN 100      Cirrhosis (H)      Critical lower limb ischemia (H)      Diabetes mellitus (H) 10/2011     Hypertension      Hypertension goal BP (blood pressure) < 140/90      Ischemic cardiomyopathy      Lymphedema of left leg 5/11/2016     Peripheral arterial disease (H)      PVD (peripheral vascular disease) (H)      Right above knee amputation 4/30/2014     Type 2 diabetes, HbA1C goal < 8% (H)        PAST SURGICAL HISTORY:                  Past Surgical History:   Procedure Laterality Date     AMPUTATE LEG ABOVE KNEE  11/22/2011    Procedure:AMPUTATE LEG ABOVE  KNEE; Revise Right Below Knee Amputation To Above Knee Amputation; Surgeon:MADISON WELLS; Location:[facility] OR     AMPUTATE LEG BELOW KNEE  11/10/2011    Procedure:AMPUTATE LEG BELOW KNEE; Right Below Knee Amputation; Surgeon:MADISON WELLS; Location[facility] OR     BYPASS GRAFT FEMOROTIBIAL  9/19/2013    Procedure: BYPASS GRAFT FEMOROTIBIAL;  Left Femorotibial Bypass Graft Insitu ;  Surgeon: Marisol Suggs MD;  Location: [facility] OR     CARDIAC SURGERY      cardiac stent     ESOPHAGOSCOPY, GASTROSCOPY, DUODENOSCOPY (EGD), COMBINED  10/1/2012    Procedure: COMBINED ESOPHAGOSCOPY, GASTROSCOPY, DUODENOSCOPY (EGD);;  Surgeon: Nehemias Cevallos MD;  Location: [facility] GI     ESOPHAGOSCOPY, GASTROSCOPY, DUODENOSCOPY (EGD), COMBINED N/A 3/24/2016    Procedure: COMBINED ESOPHAGOSCOPY, GASTROSCOPY, DUODENOSCOPY (EGD);  Surgeon: Gildardo Marks MD;  Location: [facility] GI     IR LOWER EXTREMITY ANGIOGRAM LEFT  12/2/2021     IR STENT VASCULAR LT  3/9/2012          IRRIGATION AND DEBRIDEMENT LOWER EXTREMITY, COMBINED  11/15/2011    Procedure:COMBINED IRRIGATION AND DEBRIDEMENT LOWER EXTREMITY; DRAINAGE OF ABSCESS AT BELOW KNEE AMPUTATION AND KNEE DISARTICULATION.; Surgeon:MADISON WELLS; Location:[facility] OR     NO HISTORY OF SURGERY  7/12/13    derm     VASCULAR REPAIR LOWER EXTREMITY Left 9/19/2017    Procedure: VASCULAR REPAIR LOWER EXTREMITY;  Ligation Parasitic Branch To Left Lower Extremity ;  Surgeon: Marisol Suggs MD;  Location: [facility] OR       CURRENT MEDICATIONS:                  Current Outpatient Medications   Medication Sig Dispense Refill     acetaminophen (TYLENOL) 325 MG tablet Take 1 tablet by mouth every 4 hours as needed. 250 tablet 0     amLODIPine (NORVASC) 2.5 MG tablet Take 1 tablet (2.5 mg) by mouth 2 times daily 180 tablet 3     ammonium lactate (AMLACTIN) 12 % cream Apply topically 2 times daily as needed for dry skin apply to both legs twice daily as needed  5     ASPIRIN EC PO Take 81 mg by mouth daily       atorvastatin (LIPITOR) 80 MG  "tablet Take 1 tablet (80 mg) by mouth At Bedtime 90 tablet 3     bisacodyl (DULCOLAX) 5 MG EC tablet Take 1 tablet (5 mg) by mouth See Admin Instructions Refer to \"Getting Ready for a Colonoscopy\" instruction handout 4 tablet 0     gabapentin (NEURONTIN) 100 MG capsule Take 1 capsule (100 mg) by mouth 3 times daily 90 capsule 0     hydrocortisone 1 % cream Apply topically 2 times daily PRN       insulin aspart (NOVOLOG FLEXPEN) 100 UNIT/ML pen Inject 19 Units Subcutaneous 2 times daily (with meals)       insulin glargine (LANTUS PEN) 100 UNIT/ML pen Inject 28 Units Subcutaneous 2 times daily 60 mL 3     insulin lispro (HUMALOG KWIKPEN) 100 UNIT/ML (1 unit dial) KWIKPEN Inject 18 Units Subcutaneous 3 times daily (before meals) 50 mL 3     insulin pen needle (BD KWABENA U/F) 32G X 4 MM miscellaneous Use 4x  daily or as directed. 120 each 11     JARDIANCE 25 MG TABS tablet TAKE 1 TABLET (25 MG) BY MOUTH DAILY 90 tablet 3     liraglutide (VICTOZA) 18 MG/3ML solution Inject 1.8 mg Subcutaneous daily 27 mL 3     lisinopril (ZESTRIL) 2.5 MG tablet TAKE 1 TABLET (2.5 MG) BY MOUTH 2 TIMES DAILY 180 tablet 3     metoprolol succinate ER (TOPROL-XL) 50 MG 24 hr tablet Take 1.5 tablets (75 mg) by mouth daily 135 tablet 3     morphine (MS CONTIN) 15 MG 12 hr tablet Take 3 tablets (45 mg) by mouth 2 times daily 60 tablet 0     order for DME Equipment being ordered: FlexiTouch pneumatic compression device.  2-3 times per day to left lower extremity.  Current strength - L4 1 Units      polyethylene glycol (GOLYTELY) 236 g suspension Take 4,000 mLs by mouth See Admin Instructions Refer to \"Getting Ready for a Colonoscopy\" instruction handout 4000 mL 0     rivaroxaban ANTICOAGULANT (XARELTO ANTICOAGULANT) 2.5 MG TABS tablet Take 1 tablet (2.5 mg) by mouth 2 times daily 180 tablet 3     SENNA PO 1 Tab , bedtime         ALLERGIES:                  Allergies   Allergen Reactions     Ciprofloxacin Rash       SOCIAL HISTORY:                " "  Social History     Socioeconomic History     Marital status:      Spouse name: Not on file     Number of children: 0     Years of education: Not on file     Highest education level: Not on file   Occupational History     Employer: UNKNOWN   Tobacco Use     Smoking status: Current Every Day Smoker     Packs/day: 2.00     Years: 40.00     Pack years: 80.00     Types: Cigarettes     Smokeless tobacco: Never Used     Tobacco comment: Is not working on quitting   Substance and Sexual Activity     Alcohol use: Yes     Alcohol/week: 1.0 standard drink     Types: 1 Standard drinks or equivalent per week     Comment: \"Once a year maybe\"     Drug use: No     Sexual activity: Not Currently   Other Topics Concern     Parent/sibling w/ CABG, MI or angioplasty before 65F 55M? No      Service Not Asked     Blood Transfusions Not Asked     Caffeine Concern Yes     Occupational Exposure Not Asked     Hobby Hazards Not Asked     Sleep Concern Not Asked     Stress Concern Not Asked     Weight Concern Not Asked     Special Diet Not Asked     Back Care Not Asked     Exercise No     Bike Helmet Not Asked     Seat Belt Not Asked     Self-Exams Not Asked   Social History Narrative     Not on file     Social Determinants of Health     Financial Resource Strain: Not on file   Food Insecurity: Not on file   Transportation Needs: Not on file   Physical Activity: Not on file   Stress: Not on file   Social Connections: Not on file   Intimate Partner Violence: Not on file   Housing Stability: Not on file       FAMILY HISTORY:                   Family History   Problem Relation Age of Onset     Alcohol/Drug Mother         cirrhosis     Alcohol/Drug Father      C.A.D. No family hx of      Diabetes No family hx of      Cancer No family hx of              Physical exam Reveals:    O/P: WNL  HEENT: WNL  NECK: No JVD, thyromegaly, or lymphadenopathy  HEART: RRR, no murmurs, gallops, or rubs  LUNGS: CTA bilaterally without rales, " wheezes, or rhonchi  GI: NABS, nondistended, nontender, soft  EXT:without cyanosis, clubbing, or edema  NEURO: nonfocal  : no flank tenderness    Component      Latest Ref Rng & Units 12/2/2021 5/2/2022   Sodium      133 - 144 mmol/L  135   Potassium      3.4 - 5.3 mmol/L  4.9   Chloride      94 - 109 mmol/L  104   Carbon Dioxide      20 - 32 mmol/L  25   Anion Gap      3 - 14 mmol/L  6   Urea Nitrogen      7 - 30 mg/dL  15   Creatinine      0.66 - 1.25 mg/dL  0.96   Calcium      8.5 - 10.1 mg/dL  9.0   Glucose      70 - 99 mg/dL  134 (H)   Alkaline Phosphatase      40 - 150 U/L  66   AST      0 - 45 U/L  25   ALT      0 - 70 U/L  23   Protein Total      6.8 - 8.8 g/dL  7.6   Albumin      3.4 - 5.0 g/dL  3.5   Bilirubin Total      0.2 - 1.3 mg/dL  0.4   GFR Estimate      >60 mL/min/1.73m2  87   Total Cholesterol      <=199 mg/dL  121   Triglycerides      30 - 149 mg/dL 292 (H) 305 (H)   HDL Cholesterol      40 - 59 mg/dL  26 (L)   LDL Cholesterol      <=129 mg/dL  34   HDL Size      >=8.9 nm  8.6 (L)   VLDL Size      <=46.7 nm  55.7 (H)   LDL Particle Size      >=20.7 nm  20.4 (L)   Lge HDL Particle number      >=4.2 umol/L  <2.8 (L)   HDL Particle Number NMR      >=33.0 umol/L  20.4 (L)   Lge VLDL Part number      <=2.7 nmol/L  12.3 (H)   Small LDL Particle number      <=634 nmol/L  315   LDL Particle Number      <=1135 nmol/L  854   EER LipoFit by NMR        See Note   Cholesterol      <200 mg/dL 94    HDL Cholesterol      >=40 mg/dL 23 (L)    LDL Cholesterol Calculated      <=100 mg/dL 13    Non HDL Cholesterol      <130 mg/dL 71    Patient Fasting > 8hrs?       Yes    C-Reactive Protein High Sensitivity      mg/L  5.5   Lipoprotein (a)      <=29 mg/dL  6   Hemoglobin A1C      0.0 - 5.6 %  7.4 (H)             (F17.200) Tobacco use disorder  Comment: I again advised him to quit smoking.  Plan: He again refuses to quit smoking.      (I73.9) PAD S/P Lt SFA stenting 3-9-2012, Lt fem-SENIA bypass w/ ISGSV 9-,  S/P Rt AKA 2014, ligation of parasitic branch 9/19/2017  Comment: The bypass is patent.   Plan: LipoFit by NMR, Lipoprotein (a), C-Reactive         Protein, High Sensitivity        Duplex in July through Dr. Pate     (E78.5) Hyperlipidemia LDL goal <70  Comment: at goal  Plan: LipoFit by NMR, Lipoprotein (a), C-Reactive         Protein, High Sensitivity            (I10) Hypertension goal BP (blood pressure) < 140/90  Comment: at goal  Plan: no med changes     (E11.8,  Z79.4) Type 2 diabetes mellitus with complication, with long-term current use of insulin (H)  Comment: not at a1c goal of < 7%;Increasse Lantus from 56 to 64 units daily but split dosing  32units BID  Plan: insulin glargine (LANTUS PEN) 100 UNIT/ML pen,         Hemoglobin A1c, Comprehensive metabolic panel         34 minutes total medical care on today's date.

## 2022-06-15 ENCOUNTER — TELEPHONE (OUTPATIENT)
Dept: OTHER | Facility: CLINIC | Age: 67
End: 2022-06-15

## 2022-06-15 DIAGNOSIS — E11.8 TYPE 2 DIABETES MELLITUS WITH COMPLICATION, WITH LONG-TERM CURRENT USE OF INSULIN (H): Primary | ICD-10-CM

## 2022-06-15 DIAGNOSIS — Z79.4 TYPE 2 DIABETES MELLITUS WITH COMPLICATION, WITH LONG-TERM CURRENT USE OF INSULIN (H): Primary | ICD-10-CM

## 2022-06-15 RX ORDER — PROCHLORPERAZINE 25 MG/1
1 SUPPOSITORY RECTAL
Qty: 3 EACH | Refills: 5 | Status: CANCELLED | OUTPATIENT
Start: 2022-06-15

## 2022-06-15 RX ORDER — PROCHLORPERAZINE 25 MG/1
1 SUPPOSITORY RECTAL
Qty: 1 EACH | Refills: 1 | Status: CANCELLED | OUTPATIENT
Start: 2022-06-15

## 2022-06-15 RX ORDER — PROCHLORPERAZINE 25 MG/1
1 SUPPOSITORY RECTAL ONCE
Qty: 1 EACH | Refills: 0 | Status: CANCELLED | OUTPATIENT
Start: 2022-06-15 | End: 2022-06-15

## 2022-06-15 NOTE — TELEPHONE ENCOUNTER
[facility]     Who is the name of the provider?:  Dr Olson       What is the location you see this provider at?:  [facility]       Reason for call:  Pt is thinking about switching to a Free Style Daniel so he doesn't have to poke his finger 4 times a day for his diabetes - is this something Dr Olson can order for him or will they need to schedule with another doctor for that? Patient is due to be scheduled to see Dr Olson in August 2022.      Contact name: Angely ([facility])       Contact number: [phone]      Additional Information:

## 2022-06-15 NOTE — TELEPHONE ENCOUNTER
Patient is requesting DexCom CGM order.  I explained to the caller (RN) that patient may need to have sliding scale insulin to meet coverage eligibility, but that given patient's uncontrolled diabetes and four times per day testing, it may be worthwhile.  CGM and Diabetes Education order (to help patient set up new CGM) loaded for review/signature.    DEXCOM    The Dexcom G6 Continuous Glucose Monitoring (CGM) System is covered by Medicare for patients who meet the Medicare coverage criteria. Medicare coverage for therapeutic CGM includes certain beneficiaries who have either type 1 or type 2 diabetes and intensively manage their insulin.  Criteria for Medicare coverage includes:      The patient has diabetes;    The patient has been using a home blood glucose monitor (BGM) and performing frequent (four or more times a day) BGM testing;    The patient is insulin-treated with three or more daily injections (MDI) of insulin or a continuous subcutaneous insulin infusion (CSII) pump;    The patient's insulin treatment regimen requires frequent adjustments based on therapeutic CGM testing results;    Within six months prior to ordering the CGM, the patient had an in-person visit with the treating practitioner to evaluate their diabetes control and determine that the above criteria have been met; and Every six months following the initial prescription of the CGM, the patient has an in-person visit with the treating practitioner to assess adherence to their CGM regimen and diabetes treatment plan.        Additionally, for coverage, Medicare requires that you have a  that is compatible with Dexcom G6 and that you use that  with your supplies, even if you also use a compatible smart device.    Patient must have Transmitter, Sensor, and  ordererd (G6)

## 2022-07-07 DIAGNOSIS — Z79.4 TYPE 2 DIABETES MELLITUS WITH COMPLICATION, WITH LONG-TERM CURRENT USE OF INSULIN (H): ICD-10-CM

## 2022-07-07 DIAGNOSIS — E11.8 TYPE 2 DIABETES MELLITUS WITH COMPLICATION, WITH LONG-TERM CURRENT USE OF INSULIN (H): ICD-10-CM

## 2022-07-08 RX ORDER — PEN NEEDLE, DIABETIC 32GX 5/32"
NEEDLE, DISPOSABLE MISCELLANEOUS
Qty: 100 EACH | Refills: 11 | Status: SHIPPED | OUTPATIENT
Start: 2022-07-08 | End: 2024-08-13

## 2022-07-08 NOTE — TELEPHONE ENCOUNTER
"Requested Prescriptions   Pending Prescriptions Disp Refills     GLOBAL EASY GLIDE PEN NEEDLES 32G X 4 MM miscellaneous [Pharmacy Med Name: PEN NEEDLES 32G 4MM 32G X 4 MM Miscellaneous] 100 each 11     Sig: USE 4X DAILY OR AS DIRECTED.       Diabetic Supplies Protocol Passed - 7/7/2022 10:46 AM        Passed - Medication is active on med list        Passed - Patient is 18 years of age or older        Passed - Recent (6 mo) or future (30 days) visit within the authorizing provider's specialty     Patient had office visit in the last 6 months or has a visit in the next 30 days with authorizing provider.  See \"Patient Info\" tab in inbasket, or \"Choose Columns\" in Meds & Orders section of the refill encounter.               Prescription approved per [facility] Refill Protocol.  Kary Abreu, RUDOLPHN, RN-[facility]    "

## 2022-08-02 ENCOUNTER — HOSPITAL ENCOUNTER (OUTPATIENT)
Dept: ULTRASOUND IMAGING | Facility: CLINIC | Age: 67
Discharge: HOME | End: 2022-08-02
Attending: RADIOLOGY
Payer: MEDICARE

## 2022-08-02 DIAGNOSIS — I73.9 PAD (PERIPHERAL ARTERY DISEASE) (H): ICD-10-CM

## 2022-08-02 PROCEDURE — 93926 LOWER EXTREMITY STUDY: CPT | Mod: LT

## 2022-08-02 PROCEDURE — 93922 UPR/L XTREMITY ART 2 LEVELS: CPT

## 2022-08-03 DIAGNOSIS — I73.9 PAD (PERIPHERAL ARTERY DISEASE) (H): Primary | ICD-10-CM

## 2022-08-03 NOTE — RESULT ENCOUNTER NOTE
Called patient with results.  Doing well.  Recommend 1 year follow up.  Mckenna Beaulieu RN  IR nurse clinician  [phone]

## 2022-08-03 NOTE — RESULT ENCOUNTER NOTE
Called patient with results.  Doing well.  Recommend one year follow up.  Mckenna Beaulieu RN  IR nurse clinician  [phone]

## 2022-08-09 ENCOUNTER — OFFICE VISIT (OUTPATIENT)
Dept: GASTROENTEROLOGY | Facility: CLINIC | Age: 67
End: 2022-08-09
Attending: INTERNAL MEDICINE
Payer: MEDICARE

## 2022-08-09 ENCOUNTER — LAB (OUTPATIENT)
Dept: LAB | Facility: CLINIC | Age: 67
End: 2022-08-09
Payer: MEDICARE

## 2022-08-09 VITALS — OXYGEN SATURATION: 94 % | SYSTOLIC BLOOD PRESSURE: 99 MMHG | HEART RATE: 80 BPM | DIASTOLIC BLOOD PRESSURE: 66 MMHG

## 2022-08-09 DIAGNOSIS — K74.60 CIRRHOSIS OF LIVER WITHOUT ASCITES, UNSPECIFIED HEPATIC CIRRHOSIS TYPE (H): Primary | ICD-10-CM

## 2022-08-09 DIAGNOSIS — K74.60 CIRRHOSIS OF LIVER WITHOUT ASCITES, UNSPECIFIED HEPATIC CIRRHOSIS TYPE (H): ICD-10-CM

## 2022-08-09 LAB
AFP SERPL-MCNC: 4.3 NG/ML
ALBUMIN SERPL-MCNC: 3.6 G/DL (ref 3.4–5)
ALP SERPL-CCNC: 71 U/L (ref 40–150)
ALT SERPL W P-5'-P-CCNC: 23 U/L (ref 0–70)
ANION GAP SERPL CALCULATED.3IONS-SCNC: 9 MMOL/L (ref 3–14)
AST SERPL W P-5'-P-CCNC: 22 U/L (ref 0–45)
BILIRUB DIRECT SERPL-MCNC: 0.1 MG/DL (ref 0–0.2)
BILIRUB SERPL-MCNC: 0.5 MG/DL (ref 0.2–1.3)
BUN SERPL-MCNC: 29 MG/DL (ref 7–30)
CALCIUM SERPL-MCNC: 9.2 MG/DL (ref 8.5–10.1)
CHLORIDE BLD-SCNC: 106 MMOL/L (ref 94–109)
CO2 SERPL-SCNC: 22 MMOL/L (ref 20–32)
CREAT SERPL-MCNC: 0.98 MG/DL (ref 0.66–1.25)
ERYTHROCYTE [DISTWIDTH] IN BLOOD BY AUTOMATED COUNT: 14.8 % (ref 10–15)
GFR SERPL CREATININE-BSD FRML MDRD: 84 ML/MIN/1.73M2
GLUCOSE BLD-MCNC: 158 MG/DL (ref 70–99)
HCT VFR BLD AUTO: 47.7 % (ref 40–53)
HGB BLD-MCNC: 15.5 G/DL (ref 13.3–17.7)
INR PPP: 1.31 (ref 0.85–1.15)
MCH RBC QN AUTO: 28.1 PG (ref 26.5–33)
MCHC RBC AUTO-ENTMCNC: 32.5 G/DL (ref 31.5–36.5)
MCV RBC AUTO: 86 FL (ref 78–100)
PLATELET # BLD AUTO: 153 10E3/UL (ref 150–450)
POTASSIUM BLD-SCNC: 4.3 MMOL/L (ref 3.4–5.3)
PROT SERPL-MCNC: 8 G/DL (ref 6.8–8.8)
RBC # BLD AUTO: 5.52 10E6/UL (ref 4.4–5.9)
SODIUM SERPL-SCNC: 137 MMOL/L (ref 133–144)
WBC # BLD AUTO: 5.6 10E3/UL (ref 4–11)

## 2022-08-09 PROCEDURE — 36415 COLL VENOUS BLD VENIPUNCTURE: CPT | Performed by: PATHOLOGY

## 2022-08-09 PROCEDURE — 85610 PROTHROMBIN TIME: CPT | Performed by: PATHOLOGY

## 2022-08-09 PROCEDURE — 82248 BILIRUBIN DIRECT: CPT | Performed by: PATHOLOGY

## 2022-08-09 PROCEDURE — 80053 COMPREHEN METABOLIC PANEL: CPT | Performed by: PATHOLOGY

## 2022-08-09 PROCEDURE — 99214 OFFICE O/P EST MOD 30 MIN: CPT | Performed by: INTERNAL MEDICINE

## 2022-08-09 PROCEDURE — 82105 ALPHA-FETOPROTEIN SERUM: CPT | Performed by: INTERNAL MEDICINE

## 2022-08-09 PROCEDURE — G0463 HOSPITAL OUTPT CLINIC VISIT: HCPCS

## 2022-08-09 PROCEDURE — 85027 COMPLETE CBC AUTOMATED: CPT | Performed by: PATHOLOGY

## 2022-08-09 RX ORDER — SEMAGLUTIDE 1.34 MG/ML
INJECTION, SOLUTION SUBCUTANEOUS
COMMUNITY
Start: 2022-08-04 | End: 2022-11-21

## 2022-08-09 ASSESSMENT — PAIN SCALES - GENERAL: PAINLEVEL: MILD PAIN (3)

## 2022-08-09 NOTE — LETTER
8/9/2022       RE: Alexandro Pool  [address]  [place] [address]     Dear Colleague,    Thank you for referring your patient, Alexandro Pool, to the [facility] [place] at [facility]. Please see a copy of my visit note below.    HISTORY OF PRESENT ILLNESS:  I had the pleasure of seeing Alexandro Pool for followup in the Liver Clinic at the [facility] on 08/09/2022.  Mr. 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    Otherwise, he has been well.  He denies any abdominal pain, itching, skin rash, or fatigue.  He denies any increased abdominal girth.  He has some mild lower extremity edema.  He does have an ulcer on his left leg that has been gradually healing.  He has not had any gastrointestinal bleeding or any overt signs of hepatic encephalopathy.    He denies any fevers or chills, cough or shortness of breath.  He has had 4 doses of the COVID-19 vaccine.  He denies any nausea or vomiting.  His bowel habits are unchanged.  He reports his appetite is good and his weight has been stable.    Last December he did have an MRI at [facility] that showed a 2 cm LI-RADS 4 lesion that does need followup.    Current Outpatient Medications   Medication     acetaminophen (TYLENOL) 325 MG tablet     amLODIPine (NORVASC) 2.5 MG tablet     ammonium lactate (AMLACTIN) 12 % cream     ASPIRIN EC PO     atorvastatin (LIPITOR) 80 MG tablet     bisacodyl (DULCOLAX) 5 MG EC tablet     gabapentin (NEURONTIN) 100 MG capsule     GLOBAL EASY GLIDE PEN NEEDLES 32G X 4 MM miscellaneous     hydrocortisone 1 % cream     insulin aspart (NOVOLOG FLEXPEN) 100 UNIT/ML pen     insulin glargine (LANTUS PEN) 100 UNIT/ML pen     insulin lispro (HUMALOG KWIKPEN) 100 UNIT/ML (1 unit dial) KWIKPEN     JARDIANCE 25 MG TABS tablet     lisinopril (ZESTRIL) 2.5 MG tablet     metoprolol succinate ER  (TOPROL-XL) 50 MG 24 hr tablet     morphine (MS CONTIN) 15 MG 12 hr tablet     order for DME     OZEMPIC, 1 MG/DOSE, 4 MG/3ML SOPN     polyethylene glycol (GOLYTELY) 236 g suspension     rivaroxaban ANTICOAGULANT (XARELTO ANTICOAGULANT) 2.5 MG TABS tablet     SENNA PO     liraglutide (VICTOZA) 18 MG/3ML solution     No current facility-administered medications for this visit.     BP 99/66   Pulse 80   SpO2 94%     PHYSICAL EXAMINATION:    GENERAL:  He looks well.  HEENT EXAMINATION:  Shows no scleral icterus or temporal muscle wasting.  CHEST:  Clear.  ABDOMINAL EXAMINATION:  Shows no obvious ascites.  No masses or tenderness to palpation are present.  His liver is 10 cm in span without left lobe enlargement.  No spleen tip is palpable.  EXTREMITY EXAMINATION:  Shows 1+ edema on the left.  He does have an ulcer that is healing.  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  SKIN EXAMINATION:  Shows no stigmata of chronic liver disease.  NEUROLOGIC EXAMINATION:  Shows no asterixis.    Recent Results (from the past 168 hour(s))   Hepatic Panel [LAB20]    Collection Time: 08/09/22 10:43 AM   Result Value Ref Range    Bilirubin Total 0.5 0.2 - 1.3 mg/dL    Bilirubin Direct 0.1 0.0 - 0.2 mg/dL    Protein Total 8.0 6.8 - 8.8 g/dL    Albumin 3.6 3.4 - 5.0 g/dL    Alkaline Phosphatase 71 40 - 150 U/L    AST 22 0 - 45 U/L    ALT 23 0 - 70 U/L   Basic metabolic panel [LAB15]    Collection Time: 08/09/22 10:43 AM   Result Value Ref Range    Sodium 137 133 - 144 mmol/L    Potassium 4.3 3.4 - 5.3 mmol/L    Chloride 106 94 - 109 mmol/L    Carbon Dioxide (CO2) 22 20 - 32 mmol/L    Anion Gap 9 3 - 14 mmol/L    Urea Nitrogen 29 7 - 30 mg/dL    Creatinine 0.98 0.66 - 1.25 mg/dL    Calcium 9.2 8.5 - 10.1 mg/dL    Glucose 158 (H) 70 - 99 mg/dL    GFR Estimate 84 >60 mL/min/1.73m2   CBC with platelets [[id number]]    Collection Time: 08/09/22 10:43 AM   Result Value Ref Range    WBC Count 5.6 4.0 - 11.0 10e3/uL    RBC Count 5.52 4.40 - 5.90  10e6/uL    Hemoglobin 15.5 13.3 - 17.7 g/dL    Hematocrit 47.7 40.0 - 53.0 %    MCV 86 78 - 100 fL    MCH 28.1 26.5 - 33.0 pg    MCHC 32.5 31.5 - 36.5 g/dL    RDW 14.8 10.0 - 15.0 %    Platelet Count 153 150 - 450 10e3/uL   INR [[id number]]    Collection Time: 08/09/22 10:43 AM   Result Value Ref Range    INR 1.31 (H) 0.85 - 1.15      IMPRESSION:  My impression is that Mr. 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  He is a sustained virologic responder to hepatitis C treatment.  As I mentioned above, he does have a LI-RADS 4 lesion that was seen in December by MRI.  I have ordered a followup MRI.  His liver disease is otherwise well compensated and I will not be making any change to the rest of his medical regimen.  He is up to date with regard to vaccines and cancer screening.    My plan will be to see him back in the clinic in 6 months for repeat imaging and blood work.  The imaging we obtain will be determined based on the MRI that is pending.    Thank you very much for allowing me to participate in the care of this patient.  If you have any questions regarding my recommendations, please do not hesitate to contact me.         Nehemias Cevallos MD      Professor of Medicine  [employer]      Executive Medical Director, Solid Organ Transplant Program  [facility]

## 2022-08-09 NOTE — NURSING NOTE
Chief Complaint   Patient presents with     RECHECK     6 month follow up     Blood pressure 99/66, pulse 80, SpO2 94 %.    Jessica Mchugh

## 2022-08-09 NOTE — LETTER
8/9/2022         RE: Alexandro Pool  [address]  [place] [address]        Dear Colleague,    Thank you for referring your patient, Alexandro Pool, to the [facility] [place]. Please see a copy of my visit note below.    HISTORY OF PRESENT ILLNESS:  I had the pleasure of seeing Alexandro Pool for followup in the Liver Clinic at the [facility] on 08/09/2022.  Mr. 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    Otherwise, he has been well.  He denies any abdominal pain, itching, skin rash, or fatigue.  He denies any increased abdominal girth.  He has some mild lower extremity edema.  He does have an ulcer on his left leg that has been gradually healing.  He has not had any gastrointestinal bleeding or any overt signs of hepatic encephalopathy.    He denies any fevers or chills, cough or shortness of breath.  He has had 4 doses of the COVID-19 vaccine.  He denies any nausea or vomiting.  His bowel habits are unchanged.  He reports his appetite is good and his weight has been stable.    Last December he did have an MRI at [facility] that showed a 2 cm LI-RADS 4 lesion that does need followup.    Current Outpatient Medications   Medication     acetaminophen (TYLENOL) 325 MG tablet     amLODIPine (NORVASC) 2.5 MG tablet     ammonium lactate (AMLACTIN) 12 % cream     ASPIRIN EC PO     atorvastatin (LIPITOR) 80 MG tablet     bisacodyl (DULCOLAX) 5 MG EC tablet     gabapentin (NEURONTIN) 100 MG capsule     GLOBAL EASY GLIDE PEN NEEDLES 32G X 4 MM miscellaneous     hydrocortisone 1 % cream     insulin aspart (NOVOLOG FLEXPEN) 100 UNIT/ML pen     insulin glargine (LANTUS PEN) 100 UNIT/ML pen     insulin lispro (HUMALOG KWIKPEN) 100 UNIT/ML (1 unit dial) KWIKPEN     JARDIANCE 25 MG TABS tablet     lisinopril (ZESTRIL) 2.5 MG tablet     metoprolol succinate ER (TOPROL-XL) 50 MG 24 hr tablet     morphine (MS CONTIN)  15 MG 12 hr tablet     order for DME     OZEMPIC, 1 MG/DOSE, 4 MG/3ML SOPN     polyethylene glycol (GOLYTELY) 236 g suspension     rivaroxaban ANTICOAGULANT (XARELTO ANTICOAGULANT) 2.5 MG TABS tablet     SENNA PO     liraglutide (VICTOZA) 18 MG/3ML solution     No current facility-administered medications for this visit.     BP 99/66   Pulse 80   SpO2 94%     PHYSICAL EXAMINATION:    GENERAL:  He looks well.  HEENT EXAMINATION:  Shows no scleral icterus or temporal muscle wasting.  CHEST:  Clear.  ABDOMINAL EXAMINATION:  Shows no obvious ascites.  No masses or tenderness to palpation are present.  His liver is 10 cm in span without left lobe enlargement.  No spleen tip is palpable.  EXTREMITY EXAMINATION:  Shows 1+ edema on the left.  He does have an ulcer that is healing.  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  SKIN EXAMINATION:  Shows no stigmata of chronic liver disease.  NEUROLOGIC EXAMINATION:  Shows no asterixis.    Recent Results (from the past 168 hour(s))   Hepatic Panel [LAB20]    Collection Time: 08/09/22 10:43 AM   Result Value Ref Range    Bilirubin Total 0.5 0.2 - 1.3 mg/dL    Bilirubin Direct 0.1 0.0 - 0.2 mg/dL    Protein Total 8.0 6.8 - 8.8 g/dL    Albumin 3.6 3.4 - 5.0 g/dL    Alkaline Phosphatase 71 40 - 150 U/L    AST 22 0 - 45 U/L    ALT 23 0 - 70 U/L   Basic metabolic panel [LAB15]    Collection Time: 08/09/22 10:43 AM   Result Value Ref Range    Sodium 137 133 - 144 mmol/L    Potassium 4.3 3.4 - 5.3 mmol/L    Chloride 106 94 - 109 mmol/L    Carbon Dioxide (CO2) 22 20 - 32 mmol/L    Anion Gap 9 3 - 14 mmol/L    Urea Nitrogen 29 7 - 30 mg/dL    Creatinine 0.98 0.66 - 1.25 mg/dL    Calcium 9.2 8.5 - 10.1 mg/dL    Glucose 158 (H) 70 - 99 mg/dL    GFR Estimate 84 >60 mL/min/1.73m2   CBC with platelets [OBQ701]    Collection Time: 08/09/22 10:43 AM   Result Value Ref Range    WBC Count 5.6 4.0 - 11.0 10e3/uL    RBC Count 5.52 4.40 - 5.90 10e6/uL    Hemoglobin 15.5 13.3 - 17.7 g/dL    Hematocrit  47.7 40.0 - 53.0 %    MCV 86 78 - 100 fL    MCH 28.1 26.5 - 33.0 pg    MCHC 32.5 31.5 - 36.5 g/dL    RDW 14.8 10.0 - 15.0 %    Platelet Count 153 150 - 450 10e3/uL   INR [[id number]]    Collection Time: 08/09/22 10:43 AM   Result Value Ref Range    INR 1.31 (H) 0.85 - 1.15      IMPRESSION:  My impression is that Mr. 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  He is a sustained virologic responder to hepatitis C treatment.  As I mentioned above, he does have a LI-RADS 4 lesion that was seen in December by MRI.  I have ordered a followup MRI.  His liver disease is otherwise well compensated and I will not be making any change to the rest of his medical regimen.  He is up to date with regard to vaccines and cancer screening.    My plan will be to see him back in the clinic in 6 months for repeat imaging and blood work.  The imaging we obtain will be determined based on the MRI that is pending.    Thank you very much for allowing me to participate in the care of this patient.  If you have any questions regarding my recommendations, please do not hesitate to contact me.         Nehemias Cevallos MD      Professor of Medicine  [employer]      Executive Medical Director, Solid Organ Transplant Program  [facility]            Again, thank you for allowing me to participate in the care of your patient.        Sincerely,        Nehemias Cevallos MD

## 2022-08-09 NOTE — PROGRESS NOTES
HISTORY OF PRESENT ILLNESS:  I had the pleasure of seeing Alexandro Pool for followup in the Liver Clinic at the [facility] on 08/09/2022.  Mr. 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    Otherwise, he has been well.  He denies any abdominal pain, itching, skin rash, or fatigue.  He denies any increased abdominal girth.  He has some mild lower extremity edema.  He does have an ulcer on his left leg that has been gradually healing.  He has not had any gastrointestinal bleeding or any overt signs of hepatic encephalopathy.    He denies any fevers or chills, cough or shortness of breath.  He has had 4 doses of the COVID-19 vaccine.  He denies any nausea or vomiting.  His bowel habits are unchanged.  He reports his appetite is good and his weight has been stable.    Last December he did have an MRI at [facility] that showed a 2 cm LI-RADS 4 lesion that does need followup.    Current Outpatient Medications   Medication     acetaminophen (TYLENOL) 325 MG tablet     amLODIPine (NORVASC) 2.5 MG tablet     ammonium lactate (AMLACTIN) 12 % cream     ASPIRIN EC PO     atorvastatin (LIPITOR) 80 MG tablet     bisacodyl (DULCOLAX) 5 MG EC tablet     gabapentin (NEURONTIN) 100 MG capsule     GLOBAL EASY GLIDE PEN NEEDLES 32G X 4 MM miscellaneous     hydrocortisone 1 % cream     insulin aspart (NOVOLOG FLEXPEN) 100 UNIT/ML pen     insulin glargine (LANTUS PEN) 100 UNIT/ML pen     insulin lispro (HUMALOG KWIKPEN) 100 UNIT/ML (1 unit dial) KWIKPEN     JARDIANCE 25 MG TABS tablet     lisinopril (ZESTRIL) 2.5 MG tablet     metoprolol succinate ER (TOPROL-XL) 50 MG 24 hr tablet     morphine (MS CONTIN) 15 MG 12 hr tablet     order for DME     OZEMPIC, 1 MG/DOSE, 4 MG/3ML SOPN     polyethylene glycol (GOLYTELY) 236 g suspension     rivaroxaban ANTICOAGULANT (XARELTO ANTICOAGULANT) 2.5 MG TABS tablet     SENNA PO     liraglutide (VICTOZA) 18 MG/3ML solution     No current  facility-administered medications for this visit.     BP 99/66   Pulse 80   SpO2 94%     PHYSICAL EXAMINATION:    GENERAL:  He looks well.  HEENT EXAMINATION:  Shows no scleral icterus or temporal muscle wasting.  CHEST:  Clear.  ABDOMINAL EXAMINATION:  Shows no obvious ascites.  No masses or tenderness to palpation are present.  His liver is 10 cm in span without left lobe enlargement.  No spleen tip is palpable.  EXTREMITY EXAMINATION:  Shows 1+ edema on the left.  He does have an ulcer that is healing.  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  SKIN EXAMINATION:  Shows no stigmata of chronic liver disease.  NEUROLOGIC EXAMINATION:  Shows no asterixis.    Recent Results (from the past 168 hour(s))   Hepatic Panel [LAB20]    Collection Time: 08/09/22 10:43 AM   Result Value Ref Range    Bilirubin Total 0.5 0.2 - 1.3 mg/dL    Bilirubin Direct 0.1 0.0 - 0.2 mg/dL    Protein Total 8.0 6.8 - 8.8 g/dL    Albumin 3.6 3.4 - 5.0 g/dL    Alkaline Phosphatase 71 40 - 150 U/L    AST 22 0 - 45 U/L    ALT 23 0 - 70 U/L   Basic metabolic panel [LAB15]    Collection Time: 08/09/22 10:43 AM   Result Value Ref Range    Sodium 137 133 - 144 mmol/L    Potassium 4.3 3.4 - 5.3 mmol/L    Chloride 106 94 - 109 mmol/L    Carbon Dioxide (CO2) 22 20 - 32 mmol/L    Anion Gap 9 3 - 14 mmol/L    Urea Nitrogen 29 7 - 30 mg/dL    Creatinine 0.98 0.66 - 1.25 mg/dL    Calcium 9.2 8.5 - 10.1 mg/dL    Glucose 158 (H) 70 - 99 mg/dL    GFR Estimate 84 >60 mL/min/1.73m2   CBC with platelets [[id number]]    Collection Time: 08/09/22 10:43 AM   Result Value Ref Range    WBC Count 5.6 4.0 - 11.0 10e3/uL    RBC Count 5.52 4.40 - 5.90 10e6/uL    Hemoglobin 15.5 13.3 - 17.7 g/dL    Hematocrit 47.7 40.0 - 53.0 %    MCV 86 78 - 100 fL    MCH 28.1 26.5 - 33.0 pg    MCHC 32.5 31.5 - 36.5 g/dL    RDW 14.8 10.0 - 15.0 %    Platelet Count 153 150 - 450 10e3/uL   INR [[id number]]    Collection Time: 08/09/22 10:43 AM   Result Value Ref Range    INR 1.31 (H) 0.85 - 1.15       IMPRESSION:  My impression is that Mr. 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  He is a sustained virologic responder to hepatitis C treatment.  As I mentioned above, he does have a LI-RADS 4 lesion that was seen in December by MRI.  I have ordered a followup MRI.  His liver disease is otherwise well compensated and I will not be making any change to the rest of his medical regimen.  He is up to date with regard to vaccines and cancer screening.    My plan will be to see him back in the clinic in 6 months for repeat imaging and blood work.  The imaging we obtain will be determined based on the MRI that is pending.    Thank you very much for allowing me to participate in the care of this patient.  If you have any questions regarding my recommendations, please do not hesitate to contact me.         Nehemias Cevallos MD      Professor of Medicine  [employer]      Executive Medical Director, Solid Organ Transplant Program  [facility]

## 2022-09-03 ENCOUNTER — ANCILLARY PROCEDURE (OUTPATIENT)
Dept: MRI IMAGING | Facility: CLINIC | Age: 67
End: 2022-09-03
Attending: INTERNAL MEDICINE
Payer: MEDICARE

## 2022-09-03 DIAGNOSIS — K74.60 CIRRHOSIS OF LIVER WITHOUT ASCITES, UNSPECIFIED HEPATIC CIRRHOSIS TYPE (H): ICD-10-CM

## 2022-09-03 PROCEDURE — G1010 CDSM STANSON: HCPCS | Performed by: RADIOLOGY

## 2022-09-03 PROCEDURE — 74183 MRI ABD W/O CNTR FLWD CNTR: CPT | Mod: MG | Performed by: RADIOLOGY

## 2022-09-03 PROCEDURE — A9585 GADOBUTROL INJECTION: HCPCS | Performed by: RADIOLOGY

## 2022-09-03 RX ORDER — GADOBUTROL 604.72 MG/ML
10 INJECTION INTRAVENOUS ONCE
Status: COMPLETED | OUTPATIENT
Start: 2022-09-03 | End: 2022-09-03

## 2022-09-03 RX ADMIN — GADOBUTROL 9 ML: 604.72 INJECTION INTRAVENOUS at 08:50

## 2022-09-19 DIAGNOSIS — K76.9 LESION OF LIVER GREATER THAN 1 CM IN DIAMETER WITH LIVER DISEASE CONFERRING RISK OF HEPATOCELLULAR CARCINOMA: ICD-10-CM

## 2022-09-19 DIAGNOSIS — K76.9 LIVER LESION: ICD-10-CM

## 2022-09-19 DIAGNOSIS — K74.60 CIRRHOSIS OF LIVER WITHOUT ASCITES, UNSPECIFIED HEPATIC CIRRHOSIS TYPE (H): Primary | ICD-10-CM

## 2022-09-19 DIAGNOSIS — Z91.89 LESION OF LIVER GREATER THAN 1 CM IN DIAMETER WITH LIVER DISEASE CONFERRING RISK OF HEPATOCELLULAR CARCINOMA: ICD-10-CM

## 2022-09-19 NOTE — CONSULTS
Outpatient IR Biopsy Referral    Patient is a 68 y/o male with a PMH of HTN, AKA, PAD, tobacco use,  Chronic Hep C, liver cirrhosis, DM 2, 12/2021 MRI at [facility] that showed a 2 cm LI-RADS 4 lesion that does need followup. IR has been asked to biopsy a segment 6 right liver lesion for concerns of malignancy.         MRI 9/3/22 IMPRESSION:    1. Cirrhosis without evidence of portal hypertension.  2. 2.3 cm arterially rim enhancing, targetoid lesion in hepatic  segment 6 without washout, pseudocapsule. There is associated  increased T2 signal, restricted diffusion, and subthreshold growth.  LIRADS M, recommend tissue sampling.  3. Additional several arterially enhancing foci without washout,  restricted diffusion, abnormal T2 signal, or pseudocapsule formation  throughout the liver, indeterminate LIRADS 3   4. Based on this exam only, the patient is within [place] criteria.  5. Persistent extrahepatic biliary dilatation with common bile duct  measuring up to 1.2 cm, slightly decreased since prior MRI when it  measured as 1.6 cm. No appreciable obstructing lesion.  6. 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    Case and imaging MRI 9/3/22 was reviewed with Dr. Miller from IR and US guided biopsy of the right segment 6 liver lesion is approved. Series 15 Image 34.    Procedure order, surgical pathology order placed.    ASA should be held for 5 days prior to scheduled procedure. Xarelto will need to be held for two doses prior to schedule procedure. 91 mL/min Creatinine clearance, original Cockcroft-Gault    If requesting team would like samples sent for anything else please enter them or notify IR prior to scheduled procedure.    Primary team Dr. Mart MARK made aware of IR recommendations via epic messaging.     JOHAN Phoenix CNP  Interventional Radiology   IR on-call pager:  [phone]

## 2022-09-22 DIAGNOSIS — E78.5 HYPERLIPIDEMIA LDL GOAL <70: ICD-10-CM

## 2022-09-22 DIAGNOSIS — I73.9 PAD (PERIPHERAL ARTERY DISEASE) (H): ICD-10-CM

## 2022-09-23 RX ORDER — ATORVASTATIN CALCIUM 80 MG/1
80 TABLET, FILM COATED ORAL AT BEDTIME
Qty: 90 TABLET | Refills: 3 | Status: SHIPPED | OUTPATIENT
Start: 2022-09-23 | End: 2022-10-11

## 2022-09-23 NOTE — TELEPHONE ENCOUNTER
"atorvastatin (LIPITOR) 80 MG tablet  Last Written Prescription Date:  11/16/21  Last Fill Quantity: 90,  # refills: 3    Next office visit scheduled 10/11/22    Requested Prescriptions   Pending Prescriptions Disp Refills     atorvastatin (LIPITOR) 80 MG tablet [Pharmacy Med Name: ATORVASTATIN 80 MG TABLET 80 Tablet] 90 tablet 3     Sig: TAKE 1 TABLET (80 MG) BY MOUTH AT BEDTIME       [facility]ins Protocol Passed - 9/22/2022  5:55 PM        Passed - LDL on file in past 12 months     Recent Labs   Lab Test 12/02/21  1124   LDL 13             Passed - No abnormal creatine kinase in past 12 months     No lab results found.             Passed - Recent (12 mo) or future (30 days) visit within the authorizing provider's specialty     Patient has had an office visit with the authorizing provider or a provider within the authorizing providers department within the previous 12 mos or has a future within next 30 days. See \"Patient Info\" tab in inbasket, or \"Choose Columns\" in Meds & Orders section of the refill encounter.              Passed - Medication is active on med list        Passed - Patient is age 18 or older           Prescription approved per [facility] Refill Protocol.               "

## 2022-09-26 ENCOUNTER — TELEPHONE (OUTPATIENT)
Dept: INTERVENTIONAL RADIOLOGY/VASCULAR | Facility: CLINIC | Age: 67
End: 2022-09-26

## 2022-09-26 NOTE — TELEPHONE ENCOUNTER
I have spoken with Alexandro about his planned procedure on 10/5/22.  He confirmed understanding of arrival time for appointment and NPO status.  While reviewing medications he informed me that he didn't manage his own medications and provided me with number to team that does.  I then spoke with Siobhan ([phone]), a nurse at [facility].  She confirmed what Alexandro informed me of and asked that I fax instructions to her.  I faxed patient instructions to [phone].  Siobhan also confirmed that they will be able to administer a home rapid COVID test per the instructions provided in FAX.      Siobhan was given my phone number for further questions and concerns.    Jenny CLARK  Interventional Radiology RN   [phone]

## 2022-09-27 ENCOUNTER — LAB (OUTPATIENT)
Dept: LAB | Facility: CLINIC | Age: 67
End: 2022-09-27
Payer: MEDICARE

## 2022-09-27 DIAGNOSIS — Z79.4 TYPE 2 DIABETES MELLITUS WITH COMPLICATION, WITH LONG-TERM CURRENT USE OF INSULIN (H): ICD-10-CM

## 2022-09-27 DIAGNOSIS — E78.5 HYPERLIPIDEMIA LDL GOAL <70: ICD-10-CM

## 2022-09-27 DIAGNOSIS — K74.60 CIRRHOSIS OF LIVER WITHOUT ASCITES, UNSPECIFIED HEPATIC CIRRHOSIS TYPE (H): ICD-10-CM

## 2022-09-27 DIAGNOSIS — E11.8 TYPE 2 DIABETES MELLITUS WITH COMPLICATION, WITH LONG-TERM CURRENT USE OF INSULIN (H): ICD-10-CM

## 2022-09-27 DIAGNOSIS — I73.9 PAD (PERIPHERAL ARTERY DISEASE) (H): ICD-10-CM

## 2022-09-27 LAB
AFP SERPL-MCNC: 5.1 NG/ML
APO A-I SERPL-MCNC: 10 MG/DL
CRP SERPL HS-MCNC: 6.01 MG/L
ERYTHROCYTE [DISTWIDTH] IN BLOOD BY AUTOMATED COUNT: 15.3 % (ref 10–15)
HBA1C MFR BLD: 7.2 % (ref 0–5.6)
HCT VFR BLD AUTO: 45.2 % (ref 40–53)
HGB BLD-MCNC: 14.7 G/DL (ref 13.3–17.7)
INR PPP: 1.46 (ref 0.85–1.15)
MCH RBC QN AUTO: 28.4 PG (ref 26.5–33)
MCHC RBC AUTO-ENTMCNC: 32.5 G/DL (ref 31.5–36.5)
MCV RBC AUTO: 87 FL (ref 78–100)
PLATELET # BLD AUTO: 143 10E3/UL (ref 150–450)
RBC # BLD AUTO: 5.18 10E6/UL (ref 4.4–5.9)
WBC # BLD AUTO: 5.9 10E3/UL (ref 4–11)

## 2022-09-27 PROCEDURE — 80053 COMPREHEN METABOLIC PANEL: CPT

## 2022-09-27 PROCEDURE — 80061 LIPID PANEL: CPT | Mod: 90

## 2022-09-27 PROCEDURE — 86141 C-REACTIVE PROTEIN HS: CPT

## 2022-09-27 PROCEDURE — 83036 HEMOGLOBIN GLYCOSYLATED A1C: CPT

## 2022-09-27 PROCEDURE — 83704 LIPOPROTEIN BLD QUAN PART: CPT | Mod: 90

## 2022-09-27 PROCEDURE — 85610 PROTHROMBIN TIME: CPT

## 2022-09-27 PROCEDURE — 36415 COLL VENOUS BLD VENIPUNCTURE: CPT

## 2022-09-27 PROCEDURE — 85027 COMPLETE CBC AUTOMATED: CPT

## 2022-09-27 PROCEDURE — 99000 SPECIMEN HANDLING OFFICE-LAB: CPT

## 2022-09-27 PROCEDURE — 82248 BILIRUBIN DIRECT: CPT

## 2022-09-27 PROCEDURE — 83695 ASSAY OF LIPOPROTEIN(A): CPT

## 2022-09-27 PROCEDURE — 82105 ALPHA-FETOPROTEIN SERUM: CPT

## 2022-09-27 NOTE — TELEPHONE ENCOUNTER
PUJA left with Siobhan at [facility].  Requested a call back to confirm that FAX with patient instructions was received by her facility.    IR contact information provided in [facility].    Jenny CLARK  Interventional Radiology RN   [phone]

## 2022-09-28 ENCOUNTER — TELEPHONE (OUTPATIENT)
Dept: INTERVENTIONAL RADIOLOGY/VASCULAR | Facility: CLINIC | Age: 67
End: 2022-09-28

## 2022-09-28 LAB
ALBUMIN SERPL-MCNC: 3.7 G/DL (ref 3.4–5)
ALP SERPL-CCNC: 71 U/L (ref 40–150)
ALT SERPL W P-5'-P-CCNC: 21 U/L (ref 0–70)
ANION GAP SERPL CALCULATED.3IONS-SCNC: 10 MMOL/L (ref 3–14)
AST SERPL W P-5'-P-CCNC: 22 U/L (ref 0–45)
BILIRUB DIRECT SERPL-MCNC: <0.1 MG/DL (ref 0–0.2)
BILIRUB SERPL-MCNC: 0.4 MG/DL (ref 0.2–1.3)
BUN SERPL-MCNC: 18 MG/DL (ref 7–30)
CALCIUM SERPL-MCNC: 9.2 MG/DL (ref 8.5–10.1)
CHLORIDE BLD-SCNC: 104 MMOL/L (ref 94–109)
CO2 SERPL-SCNC: 23 MMOL/L (ref 20–32)
CREAT SERPL-MCNC: 1.04 MG/DL (ref 0.66–1.25)
GFR SERPL CREATININE-BSD FRML MDRD: 78 ML/MIN/1.73M2
GLUCOSE BLD-MCNC: 125 MG/DL (ref 70–99)
POTASSIUM BLD-SCNC: 4.4 MMOL/L (ref 3.4–5.3)
PROT SERPL-MCNC: 7.7 G/DL (ref 6.8–8.8)
SODIUM SERPL-SCNC: 137 MMOL/L (ref 133–144)

## 2022-09-28 NOTE — TELEPHONE ENCOUNTER
I have spoken with Siobhan again at [facility].  She still has not received requested FAX with patient education information on it from Interventional Radiology.  Multiple attempts tot fax have been made.  I confirmed FAX number as [phone].  Again, documents faxed.  I will contact Siobhan before end of work day to inquire if processed thru.    Jenny CLARK  Interventional Radiology RN   [phone]

## 2022-10-03 ENCOUNTER — TELEPHONE (OUTPATIENT)
Dept: INTERVENTIONAL RADIOLOGY/VASCULAR | Facility: CLINIC | Age: 67
End: 2022-10-03

## 2022-10-03 LAB
CHOLEST SERPL-MCNC: 124 MG/DL
HDL SERPL QN: 8.6 NM
HDL SERPL-SCNC: 19.1 UMOL/L
HDLC SERPL-MCNC: 26 MG/DL
HLD.LARGE SERPL-SCNC: <2.8 UMOL/L
LDL SERPL QN: 20.7 NM
LDL SERPL-SCNC: 818 NMOL/L
LDL SMALL SERPL-SCNC: 217 NMOL/L
LDLC SERPL CALC-MCNC: 32 MG/DL
PATHOLOGY STUDY: ABNORMAL
TRIGL SERPL-MCNC: 329 MG/DL
VLDL LARGE SERPL-SCNC: 13.1 NMOL/L
VLDL SERPL QN: 58.5 NM

## 2022-10-03 NOTE — TELEPHONE ENCOUNTER
Per Siobhan -  Documents received per e-mail on 9/28.    Jenny CLARK  Interventional Radiology RN   [phone]

## 2022-10-05 ENCOUNTER — APPOINTMENT (OUTPATIENT)
Dept: MEDSURG UNIT | Facility: CLINIC | Age: 67
End: 2022-10-05
Attending: INTERNAL MEDICINE
Payer: MEDICARE

## 2022-10-05 ENCOUNTER — APPOINTMENT (OUTPATIENT)
Dept: INTERVENTIONAL RADIOLOGY/VASCULAR | Facility: CLINIC | Age: 67
End: 2022-10-05
Attending: INTERNAL MEDICINE
Payer: MEDICARE

## 2022-10-05 ENCOUNTER — HOSPITAL ENCOUNTER (OUTPATIENT)
Facility: CLINIC | Age: 67
Discharge: HOME | End: 2022-10-05
Attending: INTERNAL MEDICINE | Admitting: INTERNAL MEDICINE
Payer: MEDICARE

## 2022-10-05 VITALS
DIASTOLIC BLOOD PRESSURE: 82 MMHG | OXYGEN SATURATION: 96 % | HEIGHT: 67 IN | SYSTOLIC BLOOD PRESSURE: 128 MMHG | TEMPERATURE: 97.8 F | BODY MASS INDEX: 30.69 KG/M2 | RESPIRATION RATE: 18 BRPM | WEIGHT: 195.55 LBS | HEART RATE: 78 BPM

## 2022-10-05 DIAGNOSIS — K76.9 LIVER LESION: ICD-10-CM

## 2022-10-05 DIAGNOSIS — K74.60 CIRRHOSIS OF LIVER WITHOUT ASCITES, UNSPECIFIED HEPATIC CIRRHOSIS TYPE (H): ICD-10-CM

## 2022-10-05 LAB
ERYTHROCYTE [DISTWIDTH] IN BLOOD BY AUTOMATED COUNT: 15.1 % (ref 10–15)
FASTING STATUS PATIENT QL REPORTED: YES
GLUCOSE SERPL-MCNC: 122 MG/DL (ref 70–99)
HCT VFR BLD AUTO: 45.5 % (ref 40–53)
HGB BLD-MCNC: 14.4 G/DL (ref 13.3–17.7)
INR PPP: 1.24 (ref 0.85–1.15)
MCH RBC QN AUTO: 28 PG (ref 26.5–33)
MCHC RBC AUTO-ENTMCNC: 31.6 G/DL (ref 31.5–36.5)
MCV RBC AUTO: 88 FL (ref 78–100)
PLATELET # BLD AUTO: 145 10E3/UL (ref 150–450)
RBC # BLD AUTO: 5.15 10E6/UL (ref 4.4–5.9)
WBC # BLD AUTO: 6.5 10E3/UL (ref 4–11)

## 2022-10-05 PROCEDURE — 85027 COMPLETE CBC AUTOMATED: CPT | Performed by: NURSE PRACTITIONER

## 2022-10-05 PROCEDURE — 88305 TISSUE EXAM BY PATHOLOGIST: CPT | Mod: TC | Performed by: INTERNAL MEDICINE

## 2022-10-05 PROCEDURE — 36415 COLL VENOUS BLD VENIPUNCTURE: CPT | Performed by: INTERNAL MEDICINE

## 2022-10-05 PROCEDURE — 250N000009 HC RX 250: Performed by: PHYSICIAN ASSISTANT

## 2022-10-05 PROCEDURE — 88333 PATH CONSLTJ SURG CYTO XM 1: CPT | Mod: TC | Performed by: INTERNAL MEDICINE

## 2022-10-05 PROCEDURE — 99152 MOD SED SAME PHYS/QHP 5/>YRS: CPT | Mod: GC | Performed by: RADIOLOGY

## 2022-10-05 PROCEDURE — 999N000054 HC STATISTIC EKG NON-CHARGEABLE

## 2022-10-05 PROCEDURE — 85610 PROTHROMBIN TIME: CPT | Performed by: NURSE PRACTITIONER

## 2022-10-05 PROCEDURE — 99152 MOD SED SAME PHYS/QHP 5/>YRS: CPT

## 2022-10-05 PROCEDURE — 93005 ELECTROCARDIOGRAM TRACING: CPT

## 2022-10-05 PROCEDURE — 258N000003 HC RX IP 258 OP 636: Performed by: NURSE PRACTITIONER

## 2022-10-05 PROCEDURE — 76942 ECHO GUIDE FOR BIOPSY: CPT | Mod: 26 | Performed by: RADIOLOGY

## 2022-10-05 PROCEDURE — 82947 ASSAY GLUCOSE BLOOD QUANT: CPT | Performed by: INTERNAL MEDICINE

## 2022-10-05 PROCEDURE — 999N000133 HC STATISTIC PP CARE STAGE 2

## 2022-10-05 PROCEDURE — 999N000142 HC STATISTIC PROCEDURE PREP ONLY

## 2022-10-05 PROCEDURE — 47000 NEEDLE BIOPSY OF LIVER PERQ: CPT | Mod: GC | Performed by: RADIOLOGY

## 2022-10-05 PROCEDURE — 250N000011 HC RX IP 250 OP 636: Performed by: PHYSICIAN ASSISTANT

## 2022-10-05 PROCEDURE — 93010 ELECTROCARDIOGRAM REPORT: CPT | Mod: 59 | Performed by: INTERNAL MEDICINE

## 2022-10-05 RX ORDER — NALOXONE HYDROCHLORIDE 0.4 MG/ML
0.4 INJECTION, SOLUTION INTRAMUSCULAR; INTRAVENOUS; SUBCUTANEOUS
Status: DISCONTINUED | OUTPATIENT
Start: 2022-10-05 | End: 2022-10-05 | Stop reason: HOSPADM

## 2022-10-05 RX ORDER — NICOTINE POLACRILEX 4 MG
15-30 LOZENGE BUCCAL
Status: DISCONTINUED | OUTPATIENT
Start: 2022-10-05 | End: 2022-10-05 | Stop reason: HOSPADM

## 2022-10-05 RX ORDER — LIDOCAINE 40 MG/G
CREAM TOPICAL
Status: DISCONTINUED | OUTPATIENT
Start: 2022-10-05 | End: 2022-10-05 | Stop reason: HOSPADM

## 2022-10-05 RX ORDER — NALOXONE HYDROCHLORIDE 0.4 MG/ML
0.2 INJECTION, SOLUTION INTRAMUSCULAR; INTRAVENOUS; SUBCUTANEOUS
Status: DISCONTINUED | OUTPATIENT
Start: 2022-10-05 | End: 2022-10-05 | Stop reason: HOSPADM

## 2022-10-05 RX ORDER — SODIUM CHLORIDE 9 MG/ML
INJECTION, SOLUTION INTRAVENOUS CONTINUOUS
Status: DISCONTINUED | OUTPATIENT
Start: 2022-10-05 | End: 2022-10-05 | Stop reason: HOSPADM

## 2022-10-05 RX ORDER — FLUMAZENIL 0.1 MG/ML
0.2 INJECTION, SOLUTION INTRAVENOUS
Status: DISCONTINUED | OUTPATIENT
Start: 2022-10-05 | End: 2022-10-05 | Stop reason: HOSPADM

## 2022-10-05 RX ORDER — DEXTROSE MONOHYDRATE 25 G/50ML
25-50 INJECTION, SOLUTION INTRAVENOUS
Status: DISCONTINUED | OUTPATIENT
Start: 2022-10-05 | End: 2022-10-05 | Stop reason: HOSPADM

## 2022-10-05 RX ORDER — FENTANYL CITRATE 50 UG/ML
25-50 INJECTION, SOLUTION INTRAMUSCULAR; INTRAVENOUS EVERY 5 MIN PRN
Status: DISCONTINUED | OUTPATIENT
Start: 2022-10-05 | End: 2022-10-05 | Stop reason: HOSPADM

## 2022-10-05 RX ADMIN — FENTANYL CITRATE 25 MCG: 0.05 INJECTION, SOLUTION INTRAMUSCULAR; INTRAVENOUS at 10:02

## 2022-10-05 RX ADMIN — LIDOCAINE HYDROCHLORIDE 8 ML: 10 INJECTION, SOLUTION EPIDURAL; INFILTRATION; INTRACAUDAL; PERINEURAL at 10:32

## 2022-10-05 RX ADMIN — FENTANYL CITRATE 25 MCG: 0.05 INJECTION, SOLUTION INTRAMUSCULAR; INTRAVENOUS at 10:05

## 2022-10-05 RX ADMIN — MIDAZOLAM HYDROCHLORIDE 0.5 MG: 1 INJECTION, SOLUTION INTRAMUSCULAR; INTRAVENOUS at 10:05

## 2022-10-05 RX ADMIN — MIDAZOLAM HYDROCHLORIDE 1 MG: 1 INJECTION, SOLUTION INTRAMUSCULAR; INTRAVENOUS at 10:22

## 2022-10-05 RX ADMIN — SODIUM CHLORIDE: 9 INJECTION, SOLUTION INTRAVENOUS at 09:03

## 2022-10-05 RX ADMIN — FENTANYL CITRATE 50 MCG: 0.05 INJECTION, SOLUTION INTRAMUSCULAR; INTRAVENOUS at 10:22

## 2022-10-05 RX ADMIN — MIDAZOLAM HYDROCHLORIDE 0.5 MG: 1 INJECTION, SOLUTION INTRAMUSCULAR; INTRAVENOUS at 10:01

## 2022-10-05 ASSESSMENT — ACTIVITIES OF DAILY LIVING (ADL)
ADLS_ACUITY_SCORE: 37

## 2022-10-05 NOTE — PROCEDURES
[facility]    Procedure: IR Procedure Note    Date/Time: 10/5/2022 10:49 AM  Performed by: Myron Warren MD  Authorized by: Rober Jenkins MD       UNIVERSAL PROTOCOL   Site Marked: NA  Prior Images Obtained and Reviewed:  Yes  Required items: Required blood products, implants, devices and special equipment available    Patient identity confirmed:  Verbally with patient, arm band, provided demographic data and hospital-assigned identification number  Patient was reevaluated immediately before administering moderate or deep sedation or anesthesia  Confirmation Checklist:  Patient's identity using two indicators, relevant allergies, procedure was appropriate and matched the consent or emergent situation and correct equipment/implants were available  Time out: Immediately prior to the procedure a time out was called    Universal Protocol: the Joint Commission Universal Protocol was followed    Preparation: Patient was prepped and draped in usual sterile fashion       ANESTHESIA    Anesthesia: Local infiltration  Local Anesthetic:  Lidocaine 1% without epinephrine      SEDATION  Patient Sedated: Yes    Sedation:  Fentanyl and midazolam  Vital signs: Vital signs monitored during sedation    See dictated procedure note for full details.  Findings: Seg 6 lesion biopsy, 6 cores taken.     Specimens: none    Complications: None    Condition: Stable    Plan: Seg 6 lesion biopsy, 6 cores taken.       PROCEDURE  Describe Procedure: Seg 6 lesion biopsy, 6 cores taken.   Patient Tolerance:  Patient tolerated the procedure well with no immediate complications  Length of time physician/provider present for 1:1 monitoring during sedation: 60

## 2022-10-05 NOTE — PROGRESS NOTES
Patient tolerated recovery stage well. VSS, RUQ abd site clean/dry/intact, no hematoma, and denies pain. Patient tolerated PO food and fluids. Teaching was done and discharge instructions were given. Patient got in his WC, voided, and PIV was removed. Patient discharged from the hospital via wheel chair to home.

## 2022-10-05 NOTE — PROGRESS NOTES
Pt prepped for liver biopsy.PIV placed and labs sent and NS started.Consent signed and questions answered.

## 2022-10-05 NOTE — DISCHARGE INSTRUCTIONS
[facility]    Interventional Radiology  Patient Instructions Following Biopsy    AFTER YOU GO HOME  If you were given sedation DO NOT drive or operate machinery at home or at work for at least 24 hours  DO relax and take it easy for 48 hours, no strenuous activity for 24 hours  DO drink plenty of fluids  DO resume your regular diet, unless otherwise instructed by your Primary Physician  Keep the dressing dry and in place for 24 hours.  DO NOT SMOKE FOR AT LEAST 24 HOURS, if you have been given any medications that were to help you relax or sedate you during your procedure  DO NOT drink alcoholic beverages the day of your procedure  DO NOT do any strenuous exercise or lifting (> 10 lbs) for at least 7 days following your procedure  DO NOT take a bath or shower for at least 12 hours following your procedure  Remove dressing after shower the next day. Replace with Band aid for 2 days.  Never leave a wet dressing in place.  DO NOT make any important or legal decisions for 24 hours following your procedure  There should be minimum drainage from the biopsy site    CALL THE PHYSICIAN IF:  You start bleeding from the procedure site.  If you do start to bleed from that site, lie down flat and hold pressure on the site for a minimum of 10 minutes.  Your physician will tell you if you need to return to the hospital  You develop nausea or vomiting  You have excessive swelling, redness, or tenderness at the site  You have drainage that looks like it is infected.  You experience severe pain  You develop hives or a rash or unexplained itching  You develop shortness of breath  You develop a temperature of 101 degrees F or greater  You develop bloody clots or red urine after you are discharged  You develop chest pain or cough up blood, lightheadedness or fainting    ADDITIONAL INSTRUCTIONS  Restart eliquis and ASA    [facility] INTERVENTIONAL RADIOLOGY DEPARTMENT  Procedure Physician:Dr Myron Warren    Date of  procedure: October 5, 2022  Telephone Numbers: [phone]      Monday-Friday 7:30 am to 4:00 pm  [phone] After 4:00 pm Monday-Friday, Weekends & Holidays.   Ask for the Interventional Radiologist on call.  Someone is on call 24 hrs/day  [facility] toll free number: [phone] Monday-Friday 8:00 am to 4:30 pm  [facility] Emergency Dept: [phone]

## 2022-10-05 NOTE — PRE-PROCEDURE
GENERAL PRE-PROCEDURE:   Procedure:  Liver lesion biopsy  Date/Time:  10/5/2022 9:31 AM    Verbal consent obtained?: Yes    Written consent obtained?: Yes    Risks and benefits: Risks, benefits and alternatives were discussed    Consent given by:  Patient  Patient states understanding of procedure being performed: Yes    Patient's understanding of procedure matches consent: Yes    Procedure consent matches procedure scheduled: Yes    Expected level of sedation:  Moderate  Appropriately NPO:  Yes  Mallampati  :  Grade 2- soft palate, base of uvula, tonsillar pillars, and portion of posterior pharyngeal wall visible  Lungs:  Lungs clear with good breath sounds bilaterally  Heart:  Normal heart sounds and rate  History & Physical reviewed:  History and physical reviewed and no updates needed  Statement of review:  I have reviewed the lab findings, diagnostic data, medications, and the plan for sedation

## 2022-10-05 NOTE — PROGRESS NOTES
Pt returned back from IR. S/p Liver bx.  Bx site, RUQ abd drsg cdi. AVSS and pt is pain free.  Total bed rest 2 hours until about 1350.

## 2022-10-05 NOTE — IR NOTE
Patient Name: Alexandro Pool  Medical Record Number: [id number]  Today's Date: 10/5/2022    Procedure: Liver lesion biopsy  Proceduralist: Dr. Briana Valerio  Pathology present: yes    Procedure Start: 1005  Procedure end: 1045  Sedation medications administered: 100 mcg fentanyl, 2 mg versed     Report given to: Vanesa JOE   : COCO    Other Notes: Pt arrived to IR room 6 from . Consent reviewed. Pt denies any questions or concerns regarding procedure. Pt positioned supine and monitored per protocol. Pt tolerated procedure without any noted complications. Pt transferred back to .

## 2022-10-06 LAB
ATRIAL RATE - MUSE: 74 BPM
DIASTOLIC BLOOD PRESSURE - MUSE: NORMAL MMHG
INTERPRETATION ECG - MUSE: NORMAL
P AXIS - MUSE: 30 DEGREES
PATH REPORT.COMMENTS IMP SPEC: ABNORMAL
PATH REPORT.COMMENTS IMP SPEC: YES
PATH REPORT.FINAL DX SPEC: ABNORMAL
PATH REPORT.GROSS SPEC: ABNORMAL
PATH REPORT.MICROSCOPIC SPEC OTHER STN: ABNORMAL
PATH REPORT.RELEVANT HX SPEC: ABNORMAL
PHOTO IMAGE: ABNORMAL
PR INTERVAL - MUSE: 192 MS
QRS DURATION - MUSE: 86 MS
QT - MUSE: 364 MS
QTC - MUSE: 404 MS
R AXIS - MUSE: -31 DEGREES
SYSTOLIC BLOOD PRESSURE - MUSE: NORMAL MMHG
T AXIS - MUSE: 94 DEGREES
VENTRICULAR RATE- MUSE: 74 BPM

## 2022-10-06 PROCEDURE — 88305 TISSUE EXAM BY PATHOLOGIST: CPT | Mod: 26 | Performed by: STUDENT IN AN ORGANIZED HEALTH CARE EDUCATION/TRAINING PROGRAM

## 2022-10-06 PROCEDURE — 88333 PATH CONSLTJ SURG CYTO XM 1: CPT | Mod: 26 | Performed by: PATHOLOGY

## 2022-10-07 ENCOUNTER — TELEPHONE (OUTPATIENT)
Dept: INTERVENTIONAL RADIOLOGY/VASCULAR | Facility: CLINIC | Age: 67
End: 2022-10-07

## 2022-10-07 NOTE — TELEPHONE ENCOUNTER
Call attempt: 1  Message left: Yes  VMM left at patient primary contact number with request to contact IR with any concerns or questions regarding recent IR procedure.    IR nurse triage line number provided: Yes    Post call completed.   October 7, 2022 1:58 PM  Jenny Montoya RN

## 2022-10-11 ENCOUNTER — OFFICE VISIT (OUTPATIENT)
Dept: OTHER | Facility: CLINIC | Age: 67
End: 2022-10-11
Attending: INTERNAL MEDICINE
Payer: MEDICARE

## 2022-10-11 VITALS — DIASTOLIC BLOOD PRESSURE: 76 MMHG | SYSTOLIC BLOOD PRESSURE: 121 MMHG | HEART RATE: 70 BPM | OXYGEN SATURATION: 97 %

## 2022-10-11 DIAGNOSIS — E78.5 HYPERLIPIDEMIA LDL GOAL <70: ICD-10-CM

## 2022-10-11 DIAGNOSIS — E11.8 TYPE 2 DIABETES MELLITUS WITH COMPLICATION, WITH LONG-TERM CURRENT USE OF INSULIN (H): ICD-10-CM

## 2022-10-11 DIAGNOSIS — I10 HYPERTENSION GOAL BP (BLOOD PRESSURE) < 140/90: ICD-10-CM

## 2022-10-11 DIAGNOSIS — F17.200 TOBACCO USE DISORDER: ICD-10-CM

## 2022-10-11 DIAGNOSIS — Z79.4 TYPE 2 DIABETES MELLITUS WITH COMPLICATION, WITH LONG-TERM CURRENT USE OF INSULIN (H): ICD-10-CM

## 2022-10-11 DIAGNOSIS — I73.9 PAD (PERIPHERAL ARTERY DISEASE) (H): ICD-10-CM

## 2022-10-11 DIAGNOSIS — C22.1 CHOLANGIOCARCINOMA (H): Primary | ICD-10-CM

## 2022-10-11 PROCEDURE — G0463 HOSPITAL OUTPT CLINIC VISIT: HCPCS

## 2022-10-11 PROCEDURE — 99214 OFFICE O/P EST MOD 30 MIN: CPT | Performed by: INTERNAL MEDICINE

## 2022-10-11 RX ORDER — LISINOPRIL 2.5 MG/1
TABLET ORAL
Qty: 180 TABLET | Refills: 3 | Status: SHIPPED | OUTPATIENT
Start: 2022-10-11 | End: 2023-10-10

## 2022-10-11 RX ORDER — ATORVASTATIN CALCIUM 80 MG/1
80 TABLET, FILM COATED ORAL AT BEDTIME
Qty: 90 TABLET | Refills: 3 | Status: SHIPPED | OUTPATIENT
Start: 2022-10-11 | End: 2023-11-17

## 2022-10-11 RX ORDER — METOPROLOL SUCCINATE 50 MG/1
75 TABLET, EXTENDED RELEASE ORAL DAILY
Qty: 135 TABLET | Refills: 3 | Status: SHIPPED | OUTPATIENT
Start: 2022-10-11 | End: 2023-04-13

## 2022-10-11 RX ORDER — AMLODIPINE BESYLATE 2.5 MG/1
2.5 TABLET ORAL 2 TIMES DAILY
Qty: 180 TABLET | Refills: 3 | Status: SHIPPED | OUTPATIENT
Start: 2022-10-11 | End: 2023-09-18

## 2022-10-11 NOTE — PROGRESS NOTES
Alexandro Pool is a 67 year old male who is presenting at the current time to discuss his diagnosi(es) of            Tobacco use disorder  PAD (peripheral artery disease) (H)  Hyperlipidemia LDL goal <70  Hypertension goal BP (blood pressure) < 140/90  Type 2 diabetes mellitus with complication, with long-term current use of insulin (H)  Cholangiocarcinoma (H)    .           HPI:  Alexandro Pool is a 67 year old male with a past medical history of current  smoking which he refuses to stop, peripheral arterial disease, status post right above-the-knee amputation in 2012 and left femoral to tibial bypass graft, hyperlipidemia, hypertension, type 2 diabetes, coronary artery disease S/P GABRIEL.  The patient has had a slow healing left foot dorsal wound which is being managed twice a week by [facility] in [place] with slow improvement in healing. He had previous vascular imaging with decreased ABIs and ultrasound flow noted in the left graft.  He underwent a left lower extremity angiogram on 12/2/2021 at [facility].  There was a significant stenosis due to eccentric plaque in the left common femoral artery at the level of the proximal anastomosis of the left femoral anterior tibial artery bypass graft.  There were significant stenoses in the right common internal and external iliac arteries.  An angioplasty was performed to the left common femoral artery.  Follow-up angiogram showed significant improvement in the luminal diameter and improved flow.  Post angiography imaging showed a filling defect in the distal aspect of the femoral anterior tibial artery bypass graft.  This was due to embolized calcified plaque from the site of the angioplasty. Successful suction thrombectomy as well as snare retrieval was done to remove the embolus.  Due to periprocedural blood loss the patient was kept overnight for further monitoring.  He was then discharged the next day. Today, the patient states his  wound is improving.  There is a dressing present that was just placed three days ago.  He is not having any fever chills or any new symptoms in the left foot.  He states that there is no signs of infection.  He is on vascular dosed Xarelto with no reports of unusual bleeding. We discussed the results of his ABIs ultrasound that were performed in August.  The left superficial femoral to anterior tibial artery bypass is patent with no evidence of stenosis or occlusion. His OZZIE was normal.  He is on a statin and has an LDL-C < 70. He is on max dose Jardiance, Victoza, and is also on a LA/SA insulin combination. His A1C is 7.2%.    On 10/05/22 he had a liver biopsy which was ordered by his hepatologist Dr. Cevallos. The pathology was positive for cholangiocarcinoma.                Review Of Systems  Skin: negative  Eyes: negative  Ears/Nose/Throat: negative  Respiratory: No shortness of breath, dyspnea on exertion, cough, or hemoptysis  Cardiovascular: negative  Gastrointestinal: negative  Genitourinary: negative  Musculoskeletal: negative  Neurologic: negative  Psychiatric: negative  Hematologic/Lymphatic/Immunologic: negative  Endocrine: negative           PAST MEDICAL HISTORY:                  Past Medical History        Past Medical History:   Diagnosis Date     Acute kidney failure, unspecified (H)       Blood transfusion       CAD (coronary artery disease)       CARDIOVASCULAR SCREENING; LDL GOAL LESS THAN 100       Cirrhosis (H)       Critical lower limb ischemia (H)       Diabetes mellitus (H) 10/2011     Hypertension       Hypertension goal BP (blood pressure) < 140/90       Ischemic cardiomyopathy       Lymphedema of left leg 5/11/2016     Peripheral arterial disease (H)       PVD (peripheral vascular disease) (H)       Right above knee amputation 4/30/2014     Type 2 diabetes, HbA1C goal < 8% (H)              PAST SURGICAL HISTORY:                  Past Surgical History         Past Surgical History:   Procedure  Laterality Date     AMPUTATE LEG ABOVE KNEE   11/22/2011     Procedure:AMPUTATE LEG ABOVE KNEE; Revise Right Below Knee Amputation To Above Knee Amputation; Surgeon:MADISON WELLS; Location:[facility] OR     AMPUTATE LEG BELOW KNEE   11/10/2011     Procedure:AMPUTATE LEG BELOW KNEE; Right Below Knee Amputation; Surgeon:MADISON WELLS; Location:[facility] OR     BYPASS GRAFT FEMOROTIBIAL   9/19/2013     Procedure: BYPASS GRAFT FEMOROTIBIAL;  Left Femorotibial Bypass Graft Insitu ;  Surgeon: Marisol Suggs MD;  Location: [facility] OR     CARDIAC SURGERY         cardiac stent     ESOPHAGOSCOPY, GASTROSCOPY, DUODENOSCOPY (EGD), COMBINED   10/1/2012     Procedure: COMBINED ESOPHAGOSCOPY, GASTROSCOPY, DUODENOSCOPY (EGD);;  Surgeon: Nehemias Cevallos MD;  Location: [facility] GI     ESOPHAGOSCOPY, GASTROSCOPY, DUODENOSCOPY (EGD), COMBINED N/A 3/24/2016     Procedure: COMBINED ESOPHAGOSCOPY, GASTROSCOPY, DUODENOSCOPY (EGD);  Surgeon: Gildardo Marks MD;  Location: [facility] GI     IR LOWER EXTREMITY ANGIOGRAM LEFT   12/2/2021     IR STENT VASCULAR LT   3/9/2012           IRRIGATION AND DEBRIDEMENT LOWER EXTREMITY, COMBINED   11/15/2011     Procedure:COMBINED IRRIGATION AND DEBRIDEMENT LOWER EXTREMITY; DRAINAGE OF ABSCESS AT BELOW KNEE AMPUTATION AND KNEE DISARTICULATION.; Surgeon:MADISON WELLS; Location:[facility] OR     NO HISTORY OF SURGERY   7/12/13     derm     VASCULAR REPAIR LOWER EXTREMITY Left 9/19/2017     Procedure: VASCULAR REPAIR LOWER EXTREMITY;  Ligation Parasitic Branch To Left Lower Extremity ;  Surgeon: Marisol Suggs MD;  Location: [facility] OR            CURRENT MEDICATIONS:                  Current Outpatient Prescriptions          Current Outpatient Medications   Medication Sig Dispense Refill     acetaminophen (TYLENOL) 325 MG tablet Take 1 tablet by mouth every 4 hours as needed. 250 tablet 0     amLODIPine (NORVASC) 2.5 MG tablet Take 1 tablet (2.5 mg) by mouth 2 times daily 180 tablet 3     ammonium lactate (AMLACTIN) 12 % cream Apply topically 2  "times daily as needed for dry skin apply to both legs twice daily as needed   5     ASPIRIN EC PO Take 81 mg by mouth daily         atorvastatin (LIPITOR) 80 MG tablet Take 1 tablet (80 mg) by mouth At Bedtime 90 tablet 3     bisacodyl (DULCOLAX) 5 MG EC tablet Take 1 tablet (5 mg) by mouth See Admin Instructions Refer to \"Getting Ready for a Colonoscopy\" instruction handout 4 tablet 0     gabapentin (NEURONTIN) 100 MG capsule Take 1 capsule (100 mg) by mouth 3 times daily 90 capsule 0     hydrocortisone 1 % cream Apply topically 2 times daily PRN         insulin aspart (NOVOLOG FLEXPEN) 100 UNIT/ML pen Inject 19 Units Subcutaneous 2 times daily (with meals)         insulin glargine (LANTUS PEN) 100 UNIT/ML pen Inject 28 Units Subcutaneous 2 times daily 60 mL 3     insulin lispro (HUMALOG KWIKPEN) 100 UNIT/ML (1 unit dial) KWIKPEN Inject 18 Units Subcutaneous 3 times daily (before meals) 50 mL 3     insulin pen needle (BD KWABENA U/F) 32G X 4 MM miscellaneous Use 4x  daily or as directed. 120 each 11     JARDIANCE 25 MG TABS tablet TAKE 1 TABLET (25 MG) BY MOUTH DAILY 90 tablet 3     liraglutide (VICTOZA) 18 MG/3ML solution Inject 1.8 mg Subcutaneous daily 27 mL 3     lisinopril (ZESTRIL) 2.5 MG tablet TAKE 1 TABLET (2.5 MG) BY MOUTH 2 TIMES DAILY 180 tablet 3     metoprolol succinate ER (TOPROL-XL) 50 MG 24 hr tablet Take 1.5 tablets (75 mg) by mouth daily 135 tablet 3     morphine (MS CONTIN) 15 MG 12 hr tablet Take 3 tablets (45 mg) by mouth 2 times daily 60 tablet 0     order for DME Equipment being ordered: FlexiTouch pneumatic compression device.  2-3 times per day to left lower extremity.  Current strength - L4 1 Units       polyethylene glycol (GOLYTELY) 236 g suspension Take 4,000 mLs by mouth See Admin Instructions Refer to \"Getting Ready for a Colonoscopy\" instruction handout 4000 mL 0     rivaroxaban ANTICOAGULANT (XARELTO ANTICOAGULANT) 2.5 MG TABS tablet Take 1 tablet (2.5 mg) by mouth 2 times daily 180 " "tablet 3     SENNA PO 1 Tab , bedtime                ALLERGIES:                       Allergies   Allergen Reactions     Ciprofloxacin Rash         SOCIAL HISTORY:                  Social History   Social History            Socioeconomic History     Marital status:        Spouse name: Not on file     Number of children: 0     Years of education: Not on file     Highest education level: Not on file   Occupational History       Employer: UNKNOWN   Tobacco Use     Smoking status: Current Every Day Smoker       Packs/day: 2.00       Years: 40.00       Pack years: 80.00       Types: Cigarettes     Smokeless tobacco: Never Used     Tobacco comment: Is not working on quitting   Substance and Sexual Activity     Alcohol use: Yes       Alcohol/week: 1.0 standard drink       Types: 1 Standard drinks or equivalent per week       Comment: \"Once a year maybe\"     Drug use: No     Sexual activity: Not Currently   Other Topics Concern     Parent/sibling w/ CABG, MI or angioplasty before 65F 55M? No      Service Not Asked     Blood Transfusions Not Asked     Caffeine Concern Yes     Occupational Exposure Not Asked     Hobby Hazards Not Asked     Sleep Concern Not Asked     Stress Concern Not Asked     Weight Concern Not Asked     Special Diet Not Asked     Back Care Not Asked     Exercise No     Bike Helmet Not Asked     Seat Belt Not Asked     Self-Exams Not Asked   Social History Narrative     Not on file      Social Determinants of Health      Financial Resource Strain: Not on file   Food Insecurity: Not on file   Transportation Needs: Not on file   Physical Activity: Not on file   Stress: Not on file   Social Connections: Not on file   Intimate Partner Violence: Not on file   Housing Stability: Not on file            FAMILY HISTORY:                   Family History         Family History   Problem Relation Age of Onset     Alcohol/Drug Mother           cirrhosis     Alcohol/Drug Father       C.A.D. No family hx " of       Diabetes No family hx of       Cancer No family hx of                       Physical exam Reveals:     O/P: WNL  HEENT: WNL  NECK: No JVD, thyromegaly, or lymphadenopathy  HEART: RRR, no murmurs, gallops, or rubs  LUNGS: CTA bilaterally without rales, wheezes, or rhonchi  GI: NABS, nondistended, nontender, soft  EXT:without cyanosis, clubbing, or edema  NEURO: nonfocal  : no flank tenderness     Component      Latest Ref Rng & Units 12/2/2021 5/2/2022   Sodium      133 - 144 mmol/L   135   Potassium      3.4 - 5.3 mmol/L   4.9   Chloride      94 - 109 mmol/L   104   Carbon Dioxide      20 - 32 mmol/L   25   Anion Gap      3 - 14 mmol/L   6   Urea Nitrogen      7 - 30 mg/dL   15   Creatinine      0.66 - 1.25 mg/dL   0.96   Calcium      8.5 - 10.1 mg/dL   9.0   Glucose      70 - 99 mg/dL   134 (H)   Alkaline Phosphatase      40 - 150 U/L   66   AST      0 - 45 U/L   25   ALT      0 - 70 U/L   23   Protein Total      6.8 - 8.8 g/dL   7.6   Albumin      3.4 - 5.0 g/dL   3.5   Bilirubin Total      0.2 - 1.3 mg/dL   0.4   GFR Estimate      >60 mL/min/1.73m2   87   Total Cholesterol      <=199 mg/dL   121   Triglycerides      30 - 149 mg/dL 292 (H) 305 (H)   HDL Cholesterol      40 - 59 mg/dL   26 (L)   LDL Cholesterol      <=129 mg/dL   34   HDL Size      >=8.9 nm   8.6 (L)   VLDL Size      <=46.7 nm   55.7 (H)   LDL Particle Size      >=20.7 nm   20.4 (L)   Lge HDL Particle number      >=4.2 umol/L   <2.8 (L)   HDL Particle Number NMR      >=33.0 umol/L   20.4 (L)   Lge VLDL Part number      <=2.7 nmol/L   12.3 (H)   Small LDL Particle number      <=634 nmol/L   315   LDL Particle Number      <=1135 nmol/L   854   EER LipoFit by NMR         See Note   Cholesterol      <200 mg/dL 94     HDL Cholesterol      >=40 mg/dL 23 (L)     LDL Cholesterol Calculated      <=100 mg/dL 13     Non HDL Cholesterol      <130 mg/dL 71     Patient Fasting > 8hrs?       Yes     C-Reactive Protein High Sensitivity      mg/L   5.5    Lipoprotein (a)      <=29 mg/dL   6   Hemoglobin A1C      0.0 - 5.6 %   7.4 (H)          US OZZIE DOPPLER NO EXERCISE, 1-2 LEVELS, BILATERAL   8/2/2022 11:26 AM      HISTORY: History of left SA to anterior tibial bypass, with  angioplasty done on 12/2. Comparison study done 1/24/2022, 6-month  followup; PAD (peripheral artery disease) (H).     COMPARISON: None.     FINDINGS:  Right OZZIE:   Patient has history of right above knee amputation.     Left OZZIE:   PT: 110, index of 0.82, previously 0.81.  DP: 130, index of 0.97, previously 0.93      Left Digital Brachial index: 125, index of 0.93, previously 0.67     Waveforms: Distal posterior tibial arterial waveform is monophasic and  dorsalis pedis waveform is biphasic/triphasic. Digital waveform  appears intact in morphology and amplitude.                                                                      IMPRESSION:   Normal OZZIE examination left lower extremity as well as toe brachial  index, similar to previous exam.     OZZIE CRITERIA:  >1.4 NC  0.95-1.4 Normal  0.90 - 0.94 Mild  0.5 - 0.89 Moderate  0.2 - 0.49 Severe  <0.2 Critical     JILLIAN TELLEZ MD      ULTRASOUND LEFT LOWER EXTREMITY ARTERIAL DUPLEX  8/2/2022 11:27 AM     HISTORY:  66-year-old patient with history of peripheral arterial  disease and left femoral to anterior tibial surgical arterial bypass  graft performed in 2013. Patient had angiogram on December 2, 2021.  Nonocclusive embolus was noted within the distal graft at that time,  though retrieved successfully.     COMPARISON: January 24, 2022.     TECHNIQUE: Color Doppler and spectral waveform analysis obtained  throughout the left lower extremity surgical arterial bypass graft and  adjacent native arteries.     FINDINGS: Left lower extremity surgical arterial bypass graft is  patent. Proximal segment is somewhat aneurysmal measuring up to 8-10  mm. Velocities otherwise intact throughout the bypass graft. No  obvious visible filling defect.                                                                       IMPRESSION: Patent left lower extremity femoral to anterior tibial  surgical bypass graft.     JILLIAN TELLEZ MD        Component      Latest Ref Rng & Units 9/27/2022   Total Cholesterol      <=199 mg/dL 124   Triglycerides      30 - 149 mg/dL 329 (H)   HDL Cholesterol      40 - 59 mg/dL 26 (L)   LDL Cholesterol      <=129 mg/dL 32   HDL Size      >=8.9 nm 8.6 (L)   VLDL Size      <=46.7 nm 58.5 (H)   LDL Particle Size      >=20.7 nm 20.7   Lge HDL Particle number      >=4.2 umol/L <2.8 (L)   HDL Particle Number NMR      >=33.0 umol/L 19.1 (L)   Lge VLDL Part number      <=2.7 nmol/L 13.1 (H)   Small LDL Particle number      <=634 nmol/L 217   LDL Particle Number      <=1135 nmol/L 818   EER LipoFit by NMR       See Note   Hemoglobin A1C      0.0 - 5.6 % 7.2 (H)   Lipoprotein (a)      <30 mg/dL 10   C-Reactive Protein High Sensitivity       6.01           (F17.200) Tobacco use disorder  Comment: I again advised him to quit smoking.  Plan: He again refuses to quit smoking.      (I73.9) PAD S/P Lt SFA stenting 3-9-2012, Lt fem-SENIA bypass w/ ISGSV 9-, S/P Rt AKA 2014, ligation of parasitic branch 9/19/2017  Comment: The bypass is patent. His left foot has a wound on it which is being managed at [facility] in [place]. He is advised to RTC prn wound nonhealing.   Plan: LipoFit by NMR, Lipoprotein (a), C-Reactive         Protein, High Sensitivity        Repeat imaging in one year.     (E78.5) Hyperlipidemia LDL goal <70  Comment: at goal  Plan: LipoFit by NMR, Lipoprotein (a), C-Reactive         Protein, High Sensitivity in one year            (I10) Hypertension goal BP (blood pressure) < 140/90  Comment: at goal  Plan: no med changes     (E11.8,  Z79.4) Type 2 diabetes mellitus with complication, with long-term current use of insulin (H)  Comment: not at a1c goal of < 7%;Increassed Lantus from 56 to 64 units daily but split dosing to 32units  BID when seen on 5/17/2022. He has an endocrinologist, who changed lariglutide to semaglutide in August of this year..   Plan: Further diabetic med changes through his endocrinologist.  34 minutes total medical care on today's date.     (C22.1) Cholangiocarcinoma (H)  (primary encounter diagnosis)  Comment: He asked what his liver biopsy pathology showed and I noted to him it was positive for cholangiocarcinoma.   Plan: As he does not have an appointment with Dr. Cevallos until 2/7/2023, I advised he contact Dr. Cevallos to expedite his appointment with him to discuss management plans for the cholangiocarcinoma.      35 minutes total medical care on today's date.

## 2022-10-11 NOTE — PROGRESS NOTES
Patient is here to discuss follow up.    /76 (BP Location: Right arm, Patient Position: Chair, Cuff Size: Adult Regular)   Pulse 70   SpO2 97%     Questions patient would like addressed today are: N/A.    Refills are needed: N/A    Has homecare services and agency name:  Yes: [facility].     Julio Hickey

## 2022-10-12 ENCOUNTER — TELEPHONE (OUTPATIENT)
Dept: GASTROENTEROLOGY | Facility: CLINIC | Age: 67
End: 2022-10-12

## 2022-10-12 DIAGNOSIS — K76.9 LESION OF LIVER GREATER THAN 1 CM IN DIAMETER WITH LIVER DISEASE CONFERRING RISK OF HEPATOCELLULAR CARCINOMA: Primary | ICD-10-CM

## 2022-10-12 DIAGNOSIS — Z91.89 LESION OF LIVER GREATER THAN 1 CM IN DIAMETER WITH LIVER DISEASE CONFERRING RISK OF HEPATOCELLULAR CARCINOMA: Primary | ICD-10-CM

## 2022-10-12 NOTE — TELEPHONE ENCOUNTER
Patient updated that he will be discussed at tumor conference this Friday, then Dr. Cevallos will call patient with plan.     Tamia NEWMAN LPN  [facility]

## 2022-10-12 NOTE — TELEPHONE ENCOUNTER
[facility] Call Center    Phone Message    May a detailed message be left on voicemail: yes     Reason for Call: Other:     Pt called to infor the clinic that they were diagnosed with liver cancer. They are requesting a call back to discuss the diagnosis and what the next steps in their treatment will be.    Action Taken: Message routed to:  [facility]    Travel Screening: Not Applicable

## 2022-11-03 NOTE — PROGRESS NOTES
Interventional Radiology Clinic Visit    Date of this visit: 11/4/2022    Alexandro Pool  is referred by Dr. Nehemias Cevallos for treatment recommendations. The patient requires evaluation for diagnosis of cholangiocarcinoma.    Primary Physician: Arthur Abraham        History Of Present Illness:    Alexandro Pool is a 67 year old male who presents with hepatitis C cirrhosis, with sustained virologic response posttreatment, and 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  This was initially a LIRADS 4 lesion that was being followed but grew and progressed to a LIRADS M lesion which was biopsied and found to be cholangiocarcinoma.  We discussed him at liver tumor conference and consensus was to treat this with ablation.    Other pertinent medical history includes coronary artery disease, ischemic cardiomyopathy, diabetes, hypertension, peripheral vascular disease status post right above-knee amputation and left femoral to tibial bypass with subsequent left femoral angioplasty, and current daily smoker.    Denies abdominal pain, nausea, vomiting, appetite changes, chest pain, SOB, cough, or fever. Currently feels well and has no concerns.    Review of Systems:    As above.    Past Medical/Surgical History:    Past Medical History:   Diagnosis Date     Acute kidney failure, unspecified (H)      Blood transfusion      CAD (coronary artery disease)      CARDIOVASCULAR SCREENING; LDL GOAL LESS THAN 100      Cirrhosis (H)      Critical lower limb ischemia (H)      Diabetes mellitus (H) 10/2011     Hypertension      Hypertension goal BP (blood pressure) < 140/90      Ischemic cardiomyopathy      Lymphedema of left leg 5/11/2016     Peripheral arterial disease (H)      PVD (peripheral vascular disease) (H)      Right above knee amputation 4/30/2014     Type 2 diabetes, HbA1C goal < 8% (H)      Past Surgical History:   Procedure Laterality Date     AMPUTATE LEG ABOVE KNEE  11/22/2011     Procedure:AMPUTATE LEG ABOVE KNEE; Revise Right Below Knee Amputation To Above Knee Amputation; Surgeon:MADISON WELLS; [facility]     AMPUTATE LEG BELOW KNEE  11/10/2011    Procedure:AMPUTATE LEG BELOW KNEE; Right Below Knee Amputation; Surgeon:MADISON WELLS; [facility]     BYPASS GRAFT FEMOROTIBIAL  9/19/2013    Procedure: BYPASS GRAFT FEMOROTIBIAL;  Left Femorotibial Bypass Graft Insitu ;  Surgeon: Marisol Suggs MD;  [facility]     CARDIAC SURGERY      cardiac stent     ESOPHAGOSCOPY, GASTROSCOPY, DUODENOSCOPY (EGD), COMBINED  10/1/2012    Procedure: COMBINED ESOPHAGOSCOPY, GASTROSCOPY, DUODENOSCOPY (EGD);;  Surgeon: Nehemias Cevallos MD;  Location:  GI     ESOPHAGOSCOPY, GASTROSCOPY, DUODENOSCOPY (EGD), COMBINED N/A 3/24/2016    Procedure: COMBINED ESOPHAGOSCOPY, GASTROSCOPY, DUODENOSCOPY (EGD);  Surgeon: Gildardo Marks MD;  Location:  GI     IR LIVER BIOPSY PERCUTANEOUS  10/5/2022     IR LOWER EXTREMITY ANGIOGRAM LEFT  12/2/2021     IR STENT VASCULAR LT  3/9/2012          IRRIGATION AND DEBRIDEMENT LOWER EXTREMITY, COMBINED  11/15/2011    Procedure:COMBINED IRRIGATION AND DEBRIDEMENT LOWER EXTREMITY; DRAINAGE OF ABSCESS AT BELOW KNEE AMPUTATION AND KNEE DISARTICULATION.; Surgeon:MADISON WELLS; [facility]     NO HISTORY OF SURGERY  7/12/13    derm     VASCULAR REPAIR LOWER EXTREMITY Left 9/19/2017    Procedure: VASCULAR REPAIR LOWER EXTREMITY;  Ligation Parasitic Branch To Left Lower Extremity ;  Surgeon: Marisol Suggs MD;  [facility]       Current Medications:    Current Outpatient Medications   Medication Sig Dispense Refill     acetaminophen (TYLENOL) 325 MG tablet Take 1 tablet by mouth every 4 hours as needed. 250 tablet 0     amLODIPine (NORVASC) 2.5 MG tablet Take 1 tablet (2.5 mg) by mouth 2 times daily 180 tablet 3     ammonium lactate (AMLACTIN) 12 % cream Apply topically 2 times daily as needed for dry skin apply to both legs twice daily as needed  5     ASPIRIN EC PO  Take 81 mg by mouth daily       atorvastatin (LIPITOR) 80 MG tablet Take 1 tablet (80 mg) by mouth At Bedtime 90 tablet 3     gabapentin (NEURONTIN) 100 MG capsule Take 1 capsule (100 mg) by mouth 3 times daily 90 capsule 0     GLOBAL EASY GLIDE PEN NEEDLES 32G X 4 MM miscellaneous USE 4X DAILY OR AS DIRECTED. 100 each 11     hydrocortisone 1 % cream Apply topically 2 times daily PRN       insulin aspart (NOVOLOG FLEXPEN) 100 UNIT/ML pen Inject 19 Units Subcutaneous 2 times daily (with meals)       insulin glargine (LANTUS PEN) 100 UNIT/ML pen Inject 32 Units Subcutaneous 2 times daily 60 mL 3     insulin lispro (HUMALOG KWIKPEN) 100 UNIT/ML (1 unit dial) KWIKPEN Inject 18 Units Subcutaneous 3 times daily (before meals) 50 mL 3     JARDIANCE 25 MG TABS tablet TAKE 1 TABLET (25 MG) BY MOUTH DAILY 90 tablet 3     lisinopril (ZESTRIL) 2.5 MG tablet TAKE 1 TABLET (2.5 MG) BY MOUTH 2 TIMES DAILY 180 tablet 3     metoprolol succinate ER (TOPROL XL) 50 MG 24 hr tablet Take 1.5 tablets (75 mg) by mouth daily 135 tablet 3     morphine (MS CONTIN) 15 MG 12 hr tablet Take 3 tablets (45 mg) by mouth 2 times daily 60 tablet 0     order for DME Equipment being ordered: FlexiTouch pneumatic compression device.  2-3 times per day to left lower extremity.  Current strength - L4 1 Units      OZEMPIC, 1 MG/DOSE, 4 MG/3ML SOPN        rivaroxaban ANTICOAGULANT (XARELTO ANTICOAGULANT) 2.5 MG TABS tablet Take 1 tablet (2.5 mg) by mouth 2 times daily 180 tablet 3     SENNA PO 1 Tab , bedtime         Allergies:    Ciprofloxacin    Family History:    Family History   Problem Relation Age of Onset     Alcohol/Drug Mother         cirrhosis     Alcohol/Drug Father      C.A.D. No family hx of      Diabetes No family hx of      Cancer No family hx of        Social History:    Lives in assisted living. Independent with bathing, dressing, cooking, basic cleaning. Needs help with changing bedding. Has right leg prosthesis but unable to use well it  so uses motorized scooter. Social drinker, once a month. Smokes cigarettes (2 packs per day). Retired, used to work in construction.       Physical Exam:    /79 (BP Location: Right arm, Patient Position: Sitting, Cuff Size: Adult Regular)   Pulse 77   Temp 98.2  F (36.8  C) (Oral)   SpO2 98%      GENERAL APPEARANCE: healthy, alert and no distress. In motorized wheelchair.  PSYCHIATRIC: mentation appears normal and affect normal.  NEURO: Normal speech and movements  EYES: No jaundice.  SKIN: No jaundice.   RESP: No cough or wheeze.  ABDOMEN:  soft, nontender, no masses and bowel sounds normal.  MUSCULOSKELETAL: Right AKA. Left leg demonstrates edema and hyperpigmentation around the ankle and lower calf.      Laboratory Studies:    Lab Results   Component Value Date    HGB 14.4 10/05/2022    HGB 13.3 07/27/2020     Lab Results   Component Value Date     10/05/2022     07/27/2020     Lab Results   Component Value Date    WBC 6.5 10/05/2022    WBC 6.9 07/27/2020       Lab Results   Component Value Date    INR 1.24 10/05/2022    INR 1.00 08/16/2018       Lab Results   Component Value Date    PROTTOTAL 7.7 09/27/2022    PROTTOTAL 8.1 07/27/2020      Lab Results   Component Value Date    ALBUMIN 3.7 09/27/2022    ALBUMIN 3.2 07/27/2020     Lab Results   Component Value Date    BILITOTAL 0.4 09/27/2022    BILITOTAL 0.4 07/27/2020     Lab Results   Component Value Date    BILICONJ 0.0 04/03/2014      Lab Results   Component Value Date    ALKPHOS 71 09/27/2022    ALKPHOS 70 07/27/2020     Lab Results   Component Value Date    AST 22 09/27/2022    AST 18 07/27/2020     Lab Results   Component Value Date    ALT 21 09/27/2022    ALT 17 07/27/2020       Lab Results   Component Value Date    CR 1.04 09/27/2022    CR 0.89 09/02/2020     Lab Results   Component Value Date    BUN 18 09/27/2022    BUN 17 09/02/2020     AFP 9/27/22 is 5.1    Imaging:     I personally reviewed and interpreted the MRI from  9/3/2022.  It shows a moderately T2 hyperintense lesion measuring 2.2 cm in segment 6 of the liver with ringlike peripheral arterial enhancement that persists on the delayed phases without definitive filling of contrast, compatible with the known biopsy-proven cholangiocarcinoma.  Scattered small hepatic cysts are seen.       ASSESSMENT:      Alexandro Pool is a 67 year old male with hepatitis C cirrhosis and a solitary small biopsy-proven intrahepatic cholangiocarcinoma.    ECOG performance status: 0    The patient is a candidate for percutaneous for microwave ablation of the lesion.     I showed Alexandro the imaging and discussed the findings. We discussed that the goal of the procedure is local cure of the lesion with a survival benefit rather than a cure of the overall disease. Alternatives were reviewed, including surgery. I explained that ablation is performed under general anesthesia. We discussed what will happen before, during and after the procedure; what to expect in the post procedure recovery period; and what the follow-up will be. We discussed the risks of the procedure which include, but are not limited to infection, bleeding, thermal injury to adjacent structures, and incomplete treatment. We also discussed post-ablation syndrome which may occur and consists of varying degrees of pain and fever in the first few days post procedure. The patient also understands that new tumors may develop elsewhere given the nature of the disease. Most patients go home the same day, but occasionally circumstances are such that a longer admission may be required. All of their questions were answered and the patient agreed to proceed.     PLAN:    1. We will schedule Alexandro for the ablation procedure.  2. Preanesthesia clinic beforehand due to comorbidities.  3. Needs staging CT chest and CA 19-9 before hand.  4. Will need to stop Xarelto and aspirin prior to the procedure.      It was a pleasure seeing the patient.      Signed,    Ana Szymanski M.D.    Interventional Radiology  Department of Radiology  [facility]      CC  Patient Care Team:  Arthur Abraham as PCP - General  Darwin Stanley MD as Referring Physician (Family Practice)  Nehemias Cevallos MD as MD (Gastroenterology)  Roger Belle, Giovani Sagastume MD as MD (Vascular Medicine)  Giovani Olson MD as Assigned Heart and Vascular Provider  Nehemias Cevallos MD as Assigned Surgical Provider  SELF, REFERRED           45 minutes spent on the date of the encounter doing chart review, history and exam, imaging review, documentation and further activities per the note.

## 2022-11-04 ENCOUNTER — OFFICE VISIT (OUTPATIENT)
Dept: RADIOLOGY | Facility: CLINIC | Age: 67
End: 2022-11-04
Attending: INTERNAL MEDICINE
Payer: MEDICARE

## 2022-11-04 VITALS
OXYGEN SATURATION: 98 % | DIASTOLIC BLOOD PRESSURE: 79 MMHG | HEART RATE: 77 BPM | SYSTOLIC BLOOD PRESSURE: 124 MMHG | TEMPERATURE: 98.2 F

## 2022-11-04 DIAGNOSIS — Z91.89 LESION OF LIVER GREATER THAN 1 CM IN DIAMETER WITH LIVER DISEASE CONFERRING RISK OF HEPATOCELLULAR CARCINOMA: ICD-10-CM

## 2022-11-04 DIAGNOSIS — K76.9 LESION OF LIVER GREATER THAN 1 CM IN DIAMETER WITH LIVER DISEASE CONFERRING RISK OF HEPATOCELLULAR CARCINOMA: ICD-10-CM

## 2022-11-04 DIAGNOSIS — R97.8 OTHER ABNORMAL TUMOR MARKERS: ICD-10-CM

## 2022-11-04 PROCEDURE — G0463 HOSPITAL OUTPT CLINIC VISIT: HCPCS

## 2022-11-04 PROCEDURE — 99204 OFFICE O/P NEW MOD 45 MIN: CPT | Performed by: RADIOLOGY

## 2022-11-04 RX ORDER — LISINOPRIL 5 MG/1
TABLET ORAL
COMMUNITY
Start: 2022-08-14 | End: 2022-11-21

## 2022-11-04 RX ORDER — ASPIRIN 81 MG/1
81 TABLET, FILM COATED ORAL DAILY
COMMUNITY
Start: 2022-11-02 | End: 2022-11-21

## 2022-11-04 RX ORDER — CAPSAICIN 0.025 %
CREAM (GRAM) TOPICAL
COMMUNITY
Start: 2021-06-02 | End: 2022-11-21

## 2022-11-04 RX ORDER — AMMONIUM LACTATE 12 G/100G
CREAM TOPICAL
COMMUNITY
Start: 2021-08-27

## 2022-11-04 RX ORDER — ACETAMINOPHEN 325 MG/1
325 TABLET ORAL
COMMUNITY
Start: 2022-07-30 | End: 2022-11-21

## 2022-11-04 RX ORDER — AMLODIPINE BESYLATE 2.5 MG/1
1 TABLET ORAL 2 TIMES DAILY
COMMUNITY
Start: 2022-05-09 | End: 2022-11-21

## 2022-11-04 RX ORDER — ATORVASTATIN CALCIUM 80 MG/1
1 TABLET, FILM COATED ORAL DAILY
COMMUNITY
Start: 2021-05-05 | End: 2022-11-21

## 2022-11-04 RX ORDER — CALCITONIN SALMON 200 [IU]/.09ML
1 SPRAY, METERED NASAL DAILY
COMMUNITY
Start: 2020-12-18 | End: 2024-01-05

## 2022-11-04 RX ORDER — GABAPENTIN 300 MG/1
CAPSULE ORAL
COMMUNITY
Start: 2022-10-21

## 2022-11-04 ASSESSMENT — PAIN SCALES - GENERAL: PAINLEVEL: NO PAIN (0)

## 2022-11-04 NOTE — LETTER
11/4/2022         RE: Alexandro Pool  [address]  [place] [address]        Dear Colleague,    Thank you for referring your patient, Alexandro Pool, to the [facility]. Please see a copy of my visit note below.          Interventional Radiology Clinic Visit    Date of this visit: 11/4/2022    Alexandro Pool  is referred by Dr. Nehemias Cevallos for treatment recommendations. The patient requires evaluation for diagnosis of cholangiocarcinoma.    Primary Physician: Arthur Abraham        History Of Present Illness:    Alexandro Pool is a 67 year old male who presents with hepatitis C cirrhosis, with sustained virologic response posttreatment, and 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  This was initially a LIRADS 4 lesion that was being followed but grew and progressed to a LIRADS M lesion which was biopsied and found to be cholangiocarcinoma.  We discussed him at liver tumor conference and consensus was to treat this with ablation.    Other pertinent medical history includes coronary artery disease, ischemic cardiomyopathy, diabetes, hypertension, peripheral vascular disease status post right above-knee amputation and left femoral to tibial bypass with subsequent left femoral angioplasty, and current daily smoker.    Denies abdominal pain, nausea, vomiting, appetite changes, chest pain, SOB, cough, or fever. Currently feels well and has no concerns.    Review of Systems:    As above.    Past Medical/Surgical History:    Past Medical History:   Diagnosis Date     Acute kidney failure, unspecified (H)      Blood transfusion      CAD (coronary artery disease)      CARDIOVASCULAR SCREENING; LDL GOAL LESS THAN 100      Cirrhosis (H)      Critical lower limb ischemia (H)      Diabetes mellitus (H) 10/2011     Hypertension      Hypertension goal BP (blood pressure) < 140/90      Ischemic cardiomyopathy      Lymphedema of left leg  5/11/2016     Peripheral arterial disease (H)      PVD (peripheral vascular disease) (H)      Right above knee amputation 4/30/2014     Type 2 diabetes, HbA1C goal < 8% (H)      Past Surgical History:   Procedure Laterality Date     AMPUTATE LEG ABOVE KNEE  11/22/2011    Procedure:AMPUTATE LEG ABOVE KNEE; Revise Right Below Knee Amputation To Above Knee Amputation; Surgeon:MADISON WELLS; [facility]     AMPUTATE LEG BELOW KNEE  11/10/2011    Procedure:AMPUTATE LEG BELOW KNEE; Right Below Knee Amputation; Surgeon:MADISON WELLS; [facility]     BYPASS GRAFT FEMOROTIBIAL  9/19/2013    Procedure: BYPASS GRAFT FEMOROTIBIAL;  Left Femorotibial Bypass Graft Insitu ;  Surgeon: Marisol Suggs MD;  Location[facility]     CARDIAC SURGERY      cardiac stent     ESOPHAGOSCOPY, GASTROSCOPY, DUODENOSCOPY (EGD), COMBINED  10/1/2012    Procedure: COMBINED ESOPHAGOSCOPY, GASTROSCOPY, DUODENOSCOPY (EGD);;  Surgeon: Nehemias Cevallos MD;  Location:  GI     ESOPHAGOSCOPY, GASTROSCOPY, DUODENOSCOPY (EGD), COMBINED N/A 3/24/2016    Procedure: COMBINED ESOPHAGOSCOPY, GASTROSCOPY, DUODENOSCOPY (EGD);  Surgeon: Gildardo Marks MD;  Location:  GI     IR LIVER BIOPSY PERCUTANEOUS  10/5/2022     IR LOWER EXTREMITY ANGIOGRAM LEFT  12/2/2021     IR STENT VASCULAR LT  3/9/2012          IRRIGATION AND DEBRIDEMENT LOWER EXTREMITY, COMBINED  11/15/2011    Procedure:COMBINED IRRIGATION AND DEBRIDEMENT LOWER EXTREMITY; DRAINAGE OF ABSCESS AT BELOW KNEE AMPUTATION AND KNEE DISARTICULATION.; Surgeon:MADISON WELLS; Location[facility]     NO HISTORY OF SURGERY  7/12/13    derm     VASCULAR REPAIR LOWER EXTREMITY Left 9/19/2017    Procedure: VASCULAR REPAIR LOWER EXTREMITY;  Ligation Parasitic Branch To Left Lower Extremity ;  Surgeon: Marisol Suggs MD;  [facility]       Current Medications:    Current Outpatient Medications   Medication Sig Dispense Refill     acetaminophen (TYLENOL) 325 MG tablet Take 1 tablet by mouth every 4 hours as needed.  250 tablet 0     amLODIPine (NORVASC) 2.5 MG tablet Take 1 tablet (2.5 mg) by mouth 2 times daily 180 tablet 3     ammonium lactate (AMLACTIN) 12 % cream Apply topically 2 times daily as needed for dry skin apply to both legs twice daily as needed  5     ASPIRIN EC PO Take 81 mg by mouth daily       atorvastatin (LIPITOR) 80 MG tablet Take 1 tablet (80 mg) by mouth At Bedtime 90 tablet 3     gabapentin (NEURONTIN) 100 MG capsule Take 1 capsule (100 mg) by mouth 3 times daily 90 capsule 0     GLOBAL EASY GLIDE PEN NEEDLES 32G X 4 MM miscellaneous USE 4X DAILY OR AS DIRECTED. 100 each 11     hydrocortisone 1 % cream Apply topically 2 times daily PRN       insulin aspart (NOVOLOG FLEXPEN) 100 UNIT/ML pen Inject 19 Units Subcutaneous 2 times daily (with meals)       insulin glargine (LANTUS PEN) 100 UNIT/ML pen Inject 32 Units Subcutaneous 2 times daily 60 mL 3     insulin lispro (HUMALOG KWIKPEN) 100 UNIT/ML (1 unit dial) KWIKPEN Inject 18 Units Subcutaneous 3 times daily (before meals) 50 mL 3     JARDIANCE 25 MG TABS tablet TAKE 1 TABLET (25 MG) BY MOUTH DAILY 90 tablet 3     lisinopril (ZESTRIL) 2.5 MG tablet TAKE 1 TABLET (2.5 MG) BY MOUTH 2 TIMES DAILY 180 tablet 3     metoprolol succinate ER (TOPROL XL) 50 MG 24 hr tablet Take 1.5 tablets (75 mg) by mouth daily 135 tablet 3     morphine (MS CONTIN) 15 MG 12 hr tablet Take 3 tablets (45 mg) by mouth 2 times daily 60 tablet 0     order for DME Equipment being ordered: FlexiToCamiant pneumatic compression device.  2-3 times per day to left lower extremity.  Current strength - L4 1 Units      OZEMPIC, 1 MG/DOSE, 4 MG/3ML SOPN        rivaroxaban ANTICOAGULANT (XARELTO ANTICOAGULANT) 2.5 MG TABS tablet Take 1 tablet (2.5 mg) by mouth 2 times daily 180 tablet 3     SENNA PO 1 Tab , bedtime         Allergies:    Ciprofloxacin    Family History:    Family History   Problem Relation Age of Onset     Alcohol/Drug Mother         cirrhosis     Alcohol/Drug Father      C.A.D. No  family hx of      Diabetes No family hx of      Cancer No family hx of        Social History:    Lives in assisted living. Independent with bathing, dressing, cooking, basic cleaning. Needs help with changing bedding. Has right leg prosthesis but unable to use well it so uses motorized scooter. Social drinker, once a month. Smokes cigarettes (2 packs per day). Retired, used to work in construction.       Physical Exam:    /79 (BP Location: Right arm, Patient Position: Sitting, Cuff Size: Adult Regular)   Pulse 77   Temp 98.2  F (36.8  C) (Oral)   SpO2 98%      GENERAL APPEARANCE: healthy, alert and no distress. In motorized wheelchair.  PSYCHIATRIC: mentation appears normal and affect normal.  NEURO: Normal speech and movements  EYES: No jaundice.  SKIN: No jaundice.   RESP: No cough or wheeze.  ABDOMEN:  soft, nontender, no masses and bowel sounds normal.  MUSCULOSKELETAL: Right AKA. Left leg demonstrates edema and hyperpigmentation around the ankle and lower calf.      Laboratory Studies:    Lab Results   Component Value Date    HGB 14.4 10/05/2022    HGB 13.3 07/27/2020     Lab Results   Component Value Date     10/05/2022     07/27/2020     Lab Results   Component Value Date    WBC 6.5 10/05/2022    WBC 6.9 07/27/2020       Lab Results   Component Value Date    INR 1.24 10/05/2022    INR 1.00 08/16/2018       Lab Results   Component Value Date    PROTTOTAL 7.7 09/27/2022    PROTTOTAL 8.1 07/27/2020      Lab Results   Component Value Date    ALBUMIN 3.7 09/27/2022    ALBUMIN 3.2 07/27/2020     Lab Results   Component Value Date    BILITOTAL 0.4 09/27/2022    BILITOTAL 0.4 07/27/2020     Lab Results   Component Value Date    BILICONJ 0.0 04/03/2014      Lab Results   Component Value Date    ALKPHOS 71 09/27/2022    ALKPHOS 70 07/27/2020     Lab Results   Component Value Date    AST 22 09/27/2022    AST 18 07/27/2020     Lab Results   Component Value Date    ALT 21 09/27/2022    ALT 17  07/27/2020       Lab Results   Component Value Date    CR 1.04 09/27/2022    CR 0.89 09/02/2020     Lab Results   Component Value Date    BUN 18 09/27/2022    BUN 17 09/02/2020     AFP 9/27/22 is 5.1    Imaging:     I personally reviewed and interpreted the MRI from 9/3/2022.  It shows a moderately T2 hyperintense lesion measuring 2.2 cm in segment 6 of the liver with ringlike peripheral arterial enhancement that persists on the delayed phases without definitive filling of contrast, compatible with the known biopsy-proven cholangiocarcinoma.  Scattered small hepatic cysts are seen.       ASSESSMENT:      Alexandro Pool is a 67 year old male with hepatitis C cirrhosis and a solitary small biopsy-proven intrahepatic cholangiocarcinoma.    ECOG performance status: 0    The patient is a candidate for percutaneous for microwave ablation of the lesion.     I showed Alexandro the imaging and discussed the findings. We discussed that the goal of the procedure is local cure of the lesion with a survival benefit rather than a cure of the overall disease. Alternatives were reviewed, including surgery. I explained that ablation is performed under general anesthesia. We discussed what will happen before, during and after the procedure; what to expect in the post procedure recovery period; and what the follow-up will be. We discussed the risks of the procedure which include, but are not limited to infection, bleeding, thermal injury to adjacent structures, and incomplete treatment. We also discussed post-ablation syndrome which may occur and consists of varying degrees of pain and fever in the first few days post procedure. The patient also understands that new tumors may develop elsewhere given the nature of the disease. Most patients go home the same day, but occasionally circumstances are such that a longer admission may be required. All of their questions were answered and the patient agreed to proceed.     PLAN:    1. We  will schedule Alexandro for the ablation procedure.  2. Preanesthesia clinic beforehand due to comorbidities.  3. Needs staging CT chest and CA 19-9 before hand.  4. Will need to stop Xarelto and aspirin prior to the procedure.      It was a pleasure seeing the patient.     Ana Watson M.D.    Interventional Radiology  Department of Radiology  [facility]  Patient Care Team:  Arthur Abraham as PCP - General  Dunlap, Darwin CHAVIS MD as Referring Physician (Family Practice)  Nehemias Cevallos MD as MD (Gastroenterology)  Roger Belle, Giovani Sagastume MD as MD (Vascular Medicine)  Giovani Olson MD as Assigned Heart and Vascular Provider  Nehemias Cevallos MD as Assigned Surgical Provider  SELF, REFERRED        45 minutes spent on the date of the encounter doing chart review, history and exam, imaging review, documentation and further activities per the note.

## 2022-11-04 NOTE — NURSING NOTE
"Oncology Rooming Note    November 4, 2022 11:42 AM   Alexandro Pool is a 67 year old male who presents for:    Chief Complaint   Patient presents with     Oncology Clinic Visit     Liver directed therapy      Initial Vitals: /79 (BP Location: Right arm, Patient Position: Sitting, Cuff Size: Adult Regular)   Pulse 77   Temp 98.2  F (36.8  C) (Oral)   SpO2 98%  Estimated body mass index is 30.62 kg/m  as calculated from the following:    Height as of 10/5/22: 1.702 m (5' 7.01\").    Weight as of 10/5/22: 88.7 kg (195 lb 8.8 oz). There is no height or weight on file to calculate BSA.  No Pain (0) Comment: Data Unavailable   No LMP for male patient.  Allergies reviewed: Yes  Medications reviewed: Yes    Medications: Medication refills not needed today.  Pharmacy name entered into [facility]: [facility] - [place], [place] - [address] [place] [address]    Clinical concerns: none.       Freddy Chau"

## 2022-11-10 ENCOUNTER — LAB (OUTPATIENT)
Dept: LAB | Facility: CLINIC | Age: 67
End: 2022-11-10
Payer: MEDICARE

## 2022-11-10 ENCOUNTER — ANCILLARY PROCEDURE (OUTPATIENT)
Dept: CT IMAGING | Facility: CLINIC | Age: 67
End: 2022-11-10
Attending: RADIOLOGY
Payer: MEDICARE

## 2022-11-10 DIAGNOSIS — R97.8 OTHER ABNORMAL TUMOR MARKERS: ICD-10-CM

## 2022-11-10 DIAGNOSIS — K76.9 LESION OF LIVER GREATER THAN 1 CM IN DIAMETER WITH LIVER DISEASE CONFERRING RISK OF HEPATOCELLULAR CARCINOMA: ICD-10-CM

## 2022-11-10 DIAGNOSIS — Z91.89 LESION OF LIVER GREATER THAN 1 CM IN DIAMETER WITH LIVER DISEASE CONFERRING RISK OF HEPATOCELLULAR CARCINOMA: ICD-10-CM

## 2022-11-10 PROCEDURE — 86301 IMMUNOASSAY TUMOR CA 19-9: CPT | Mod: 90 | Performed by: PATHOLOGY

## 2022-11-10 PROCEDURE — 99000 SPECIMEN HANDLING OFFICE-LAB: CPT | Performed by: PATHOLOGY

## 2022-11-10 PROCEDURE — 71250 CT THORAX DX C-: CPT | Mod: ME | Performed by: RADIOLOGY

## 2022-11-10 PROCEDURE — G1010 CDSM STANSON: HCPCS | Mod: GC | Performed by: RADIOLOGY

## 2022-11-10 PROCEDURE — 36415 COLL VENOUS BLD VENIPUNCTURE: CPT | Performed by: PATHOLOGY

## 2022-11-12 LAB — CANCER AG19-9 SERPL IA-ACNC: 18 U/ML

## 2022-11-15 ENCOUNTER — TELEPHONE (OUTPATIENT)
Dept: OTHER | Facility: CLINIC | Age: 67
End: 2022-11-15

## 2022-11-15 NOTE — TELEPHONE ENCOUNTER
Called Angely back, she is looking for pre procedure instructions for pt.   Pt has CT liver ablation on 11/23/22 as ordered by Ana Medina at [facility].  Provided number to Angely to contact Dr. Olivera's office for further information.   Angely notes understanding.   RUDOLPH DavisonN, RN  [facility]  Office:  [phone] Fax: [phone]

## 2022-11-15 NOTE — TELEPHONE ENCOUNTER
[facility]    Who is the name of the provider?:  Whitney    What is the location you see this provider at/preferred location?: [place]  Person calling: Angely Dwyer RN  Phone number:  [phone]  Nurse call back needed:  YES     Reason for call:   Angely has questions about any possible hold instructions for patients 11/23 event. Shadi javier he back.    Pharmacy location:  n/a  Outside Imaging: Not Applicable   Can we leave a detailed message on this number?  YES

## 2022-11-16 NOTE — TELEPHONE ENCOUNTER
FUTURE VISIT INFORMATION      SURGERY INFORMATION:    Date: 11/23/22    [facility]    Surgeon:  Ana Olivera MD    Anesthesia Type:  general    Procedure: ANESTHESIA OUT OF OR CT Guided Liver Ablation @0800    Consult: ov 11/4    RECORDS REQUESTED FROM:       Primary Care Provider: Tricia Ramirez MD- [facility]    Pertinent Medical History: Critical limb ischemia, hypertension, PAD    Most recent EKG+ Tracing: 10/5/22    Most recent ECHO: 5/23/18    Most recent PFT's: 11/1/11

## 2022-11-21 RX ORDER — LIDOCAINE/PRILOCAINE 2.5 %-2.5%
1-2 CREAM (GRAM) TOPICAL 2 TIMES DAILY PRN
COMMUNITY
End: 2024-01-05

## 2022-11-21 RX ORDER — ALBUTEROL SULFATE 90 UG/1
2 AEROSOL, METERED RESPIRATORY (INHALATION) EVERY 4 HOURS PRN
COMMUNITY

## 2022-11-21 RX ORDER — LIRAGLUTIDE 6 MG/ML
1.8 INJECTION SUBCUTANEOUS DAILY
COMMUNITY

## 2022-11-21 RX ORDER — AMOXICILLIN 250 MG
1 CAPSULE ORAL AT BEDTIME
COMMUNITY

## 2022-11-21 RX ORDER — LIDOCAINE 50 MG/G
1-3 OINTMENT TOPICAL 2 TIMES DAILY PRN
COMMUNITY
End: 2024-01-05

## 2022-11-21 RX ORDER — ALBUTEROL SULFATE 0.83 MG/ML
2.5 SOLUTION RESPIRATORY (INHALATION) EVERY 4 HOURS PRN
COMMUNITY
End: 2024-01-05

## 2022-11-21 RX ORDER — SPIRONOLACTONE 25 MG/1
12.5 TABLET ORAL DAILY
COMMUNITY

## 2022-11-21 NOTE — PROGRESS NOTES
Anticoagulation Note - Preoperative Assessment Center (PAC) Pharmacist     Patient was interviewed on November 21, 2022 as a part of PAC clinic appointment. The purpose of this note is to document the perioperative anticoagulation plan outlined by the providers caring for Alexandro Pool.     Current Regimen  Anticoagulation Regimen as of November 21, 2022: rivaroxaban (XARELTO) 2.5 mg by mouth twice daily  Indication: PAD s/p multiple stenting/bypass procedures (See Dr. Olson's notes for details)  Prescriber:  Dr. Olson  Expected Duration of therapy: indefinite  Current medications that may interact with this include: aspirin,   Creatinine   Date Value Ref Range Status   09/27/2022 1.04 0.66 - 1.25 mg/dL Final   09/02/2020 0.89 0.66 - 1.25 mg/dL Final       Perioperative plan  Alexandro Pool is scheduled for ANESTHESIA OUT OF OR CT Guided Liver Ablation @0800 on 11/23/22 with Dr. Olivera and the perioperative anticoagulation plan outlined by Dr. Olivera's nurse (see letter on 11/15/22) is hold Xarelto x 2 doses pre op and hold aspirin x5 days pre op.     Resumption of anticoagulation after procedure will be based on surgery team assessment of bleeding risks and complications.  This plan may require re-assessment and modification by his primary team in the perioperative setting depending on patients clinical situation.        Isma Simms RPH  November 21, 2022  9:18 AM

## 2022-11-22 ENCOUNTER — ANESTHESIA EVENT (OUTPATIENT)
Dept: SURGERY | Facility: CLINIC | Age: 67
End: 2022-11-22
Payer: MEDICARE

## 2022-11-22 ENCOUNTER — VIRTUAL VISIT (OUTPATIENT)
Dept: SURGERY | Facility: CLINIC | Age: 67
End: 2022-11-22
Payer: MEDICARE

## 2022-11-22 ENCOUNTER — PRE VISIT (OUTPATIENT)
Dept: SURGERY | Facility: CLINIC | Age: 67
End: 2022-11-22

## 2022-11-22 DIAGNOSIS — Z91.89 LESION OF LIVER GREATER THAN 1 CM IN DIAMETER WITH LIVER DISEASE CONFERRING RISK OF HEPATOCELLULAR CARCINOMA: ICD-10-CM

## 2022-11-22 DIAGNOSIS — Z01.818 PREOP EXAMINATION: Primary | ICD-10-CM

## 2022-11-22 DIAGNOSIS — K76.9 LESION OF LIVER GREATER THAN 1 CM IN DIAMETER WITH LIVER DISEASE CONFERRING RISK OF HEPATOCELLULAR CARCINOMA: ICD-10-CM

## 2022-11-22 PROCEDURE — 99204 OFFICE O/P NEW MOD 45 MIN: CPT | Mod: 95 | Performed by: CLINICAL NURSE SPECIALIST

## 2022-11-22 ASSESSMENT — LIFESTYLE VARIABLES
TOBACCO_USE: 1
TOBACCO_USE: 1

## 2022-11-22 ASSESSMENT — PAIN SCALES - GENERAL: PAINLEVEL: MILD PAIN (2)

## 2022-11-22 ASSESSMENT — ENCOUNTER SYMPTOMS
SEIZURES: 0
DYSRHYTHMIAS: 0

## 2022-11-22 NOTE — H&P (VIEW-ONLY)
Pre-Operative H & P     CC:  Preoperative exam to assess for increased cardiopulmonary risk while undergoing surgery and anesthesia.    Date of Encounter: 11/22/2022  Primary Care Physician:  Arthur Abraham     Reason for visit:   Encounter Diagnoses   Name Primary?     Preop examination Yes     Lesion of liver greater than 1 cm in diameter with liver disease conferring risk of hepatocellular carcinoma        HPI  Alexandro Pool is a 67 year old male who presents for pre-operative H & P in preparation for  Procedure Information     Case: [id number] Date/Time: 11/23/22 0800    Procedure: ANESTHESIA OUT OF OR CT Guided Liver Ablation @0800 (Update)    Anesthesia type: General    Diagnosis: Lesion of liver greater than 1 cm in diameter with liver disease conferring risk of hepatocellular carcinoma [K76.9, Z91.89]    Pre-op diagnosis: Lesion of liver greater than 1 cm in diameter with liver disease conferring risk of hepatocellular carcinoma [K76.9, Z91.89]    [facility]    Providers: GENERIC ANESTHESIA PROVIDER        History is obtained from the patient, his nurse and chart review    Patient with hepatitis C with sustained virologic response posttreatment, and 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 His case was reviewed at the liver tumor conference with recommendation to treat with ablation. He was referred to Dr. Dominguez and was counseled for above procedure.    His history is otherwise significant for HLD, HYPERTENSION, CAD, s/p GABRIEL to mid LAD, ICM, PAD s/p right above-knee amputation and left femoral to tibial bypass with subsequent left femoral angioplasty, stroke, current smoking, cirrhosis, Diabetes mellitus II, and chronic anticoagulation.      He lives in an assisted care setting where he has nursing care and his medications are managed by staff.     He is followed by a cardiovascular provider, and was last seen on 11/15/22       Hx of abnormal bleeding or  anti-platelet use: ASA on hold.       Past Medical History  Past Medical History:   Diagnosis Date     Acute kidney failure, unspecified (H)      Blood transfusion      CAD (coronary artery disease)      CARDIOVASCULAR SCREENING; LDL GOAL LESS THAN 100      Cirrhosis (H)      Critical lower limb ischemia (H)      Diabetes mellitus (H) 10/2011     Hypertension      Hypertension goal BP (blood pressure) < 140/90      Ischemic cardiomyopathy      Lesion of liver      Lymphedema of left leg 05/11/2016     Peripheral arterial disease (H)      PVD (peripheral vascular disease) (H)      Right above knee amputation 04/30/2014     Type 2 diabetes, HbA1C goal < 8% (H)        Past Surgical History  Past Surgical History:   Procedure Laterality Date     AMPUTATE LEG ABOVE KNEE  11/22/2011    Procedure:AMPUTATE LEG ABOVE KNEE; Revise Right Below Knee Amputation To Above Knee Amputation; Surgeon:MADISON WELLS; Location:[facility] OR     AMPUTATE LEG BELOW KNEE  11/10/2011    Procedure:AMPUTATE LEG BELOW KNEE; Right Below Knee Amputation; Surgeon:MADISON WELLS; Location:[facility]     BYPASS GRAFT FEMOROTIBIAL  9/19/2013    Procedure: BYPASS GRAFT FEMOROTIBIAL;  Left Femorotibial Bypass Graft Insitu ;  Surgeon: Marisol Suggs MD;  Location:  OR     CARDIAC SURGERY      cardiac stent     ESOPHAGOSCOPY, GASTROSCOPY, DUODENOSCOPY (EGD), COMBINED  10/1/2012    Procedure: COMBINED ESOPHAGOSCOPY, GASTROSCOPY, DUODENOSCOPY (EGD);;  Surgeon: Nehemias Cevallos MD;  Location:  GI     ESOPHAGOSCOPY, GASTROSCOPY, DUODENOSCOPY (EGD), COMBINED N/A 3/24/2016    Procedure: COMBINED ESOPHAGOSCOPY, GASTROSCOPY, DUODENOSCOPY (EGD);  Surgeon: Gildardo Marks MD;  Location:  GI     IR LIVER BIOPSY PERCUTANEOUS  10/5/2022     IR LOWER EXTREMITY ANGIOGRAM LEFT  12/2/2021     IR STENT VASCULAR LT  3/9/2012          IRRIGATION AND DEBRIDEMENT LOWER EXTREMITY, COMBINED  11/15/2011    Procedure:COMBINED IRRIGATION AND DEBRIDEMENT LOWER EXTREMITY; DRAINAGE OF  ABSCESS AT BELOW KNEE AMPUTATION AND KNEE DISARTICULATION.; Surgeon:MADISON WELLS; Location:[facility] OR     NO HISTORY OF SURGERY  7/12/13    derm     VASCULAR REPAIR LOWER EXTREMITY Left 9/19/2017    Procedure: VASCULAR REPAIR LOWER EXTREMITY;  Ligation Parasitic Branch To Left Lower Extremity ;  Surgeon: Marisol Suggs MD;  Location: [facility]       Prior to Admission Medications  Current Outpatient Medications   Medication Sig Dispense Refill     acetaminophen (TYLENOL) 325 MG tablet Take 1 tablet by mouth every 4 hours as needed. 250 tablet 0     albuterol (PROAIR HFA/PROVENTIL HFA/VENTOLIN HFA) 108 (90 Base) MCG/ACT inhaler Inhale 2 puffs into the lungs every 4 hours as needed for shortness of breath / dyspnea or wheezing       albuterol (PROVENTIL) (2.5 MG/3ML) 0.083% neb solution Take 2.5 mg by nebulization every 4 hours as needed for shortness of breath / dyspnea or wheezing       amLODIPine (NORVASC) 2.5 MG tablet Take 1 tablet (2.5 mg) by mouth 2 times daily 180 tablet 3     ammonium lactate (AMLACTIN) 12 % external cream Apply to affected area on both legs twice daily as needed for dry skin       atorvastatin (LIPITOR) 80 MG tablet Take 1 tablet (80 mg) by mouth At Bedtime 90 tablet 3     calcitonin, salmon, (MIACALCIN) 200 UNIT/ACT nasal spray Spray 1 spray into one nostril alternating nostrils daily       gabapentin (NEURONTIN) 300 MG capsule Take 600 mg in AM   Take 300 mg in afternoon   Take 600 mg at bedtime  May also take an additional 300 mg if needed at bedtime for pain       hydrocortisone 1 % cream Apply topically 2 times daily PRN       insulin aspart (NOVOLOG FLEXPEN) 100 UNIT/ML pen Inject 19 Units Subcutaneous 2 times daily (with meals) AM and 4 PM       insulin glargine (LANTUS PEN) 100 UNIT/ML pen Inject 32 Units Subcutaneous 2 times daily (Patient taking differently: Inject 32 Units Subcutaneous At Bedtime) 60 mL 3     JARDIANCE 25 MG TABS tablet TAKE 1 TABLET (25 MG) BY MOUTH DAILY 90 tablet 3      lidocaine (XYLOCAINE) 5 % external ointment Apply 1-3 g topically 2 times daily as needed (RLE residual limb pain)       lidocaine-prilocaine (EMLA) 2.5-2.5 % external cream Apply 1-2 g topically 2 times daily as needed for moderate pain (4-6) (RLE residual limb)       liraglutide (VICTOZA) 18 MG/3ML solution Inject 1.8 mg Subcutaneous daily       lisinopril (ZESTRIL) 2.5 MG tablet TAKE 1 TABLET (2.5 MG) BY MOUTH 2 TIMES DAILY 180 tablet 3     metoprolol succinate ER (TOPROL XL) 50 MG 24 hr tablet Take 1.5 tablets (75 mg) by mouth daily 135 tablet 3     naloxone (NARCAN) 4 MG/0.1ML nasal spray Spray 4 mg into one nostril alternating nostrils as needed for opioid reversal every 2-3 minutes until assistance arrives       rivaroxaban ANTICOAGULANT (XARELTO ANTICOAGULANT) 2.5 MG TABS tablet Take 1 tablet (2.5 mg) by mouth 2 times daily 180 tablet 3     senna-docusate (SENOKOT-S/PERICOLACE) 8.6-50 MG tablet Take 1 tablet by mouth At Bedtime       spironolactone (ALDACTONE) 25 MG tablet Take 12.5 mg by mouth daily       ASPIRIN EC PO Take 81 mg by mouth daily (Patient not taking: Reported on 11/21/2022)       blood glucose (NO BRAND SPECIFIED) test strip Use  to test 4 times a day.       gabapentin (NEURONTIN) 100 MG capsule Take 1 capsule (100 mg) by mouth 3 times daily (Patient not taking: Reported on 11/21/2022) 90 capsule 0     GLOBAL EASY GLIDE PEN NEEDLES 32G X 4 MM miscellaneous USE 4X DAILY OR AS DIRECTED. 100 each 11     insulin lispro (HUMALOG KWIKPEN) 100 UNIT/ML (1 unit dial) KWIKPEN Inject 18 Units Subcutaneous 3 times daily (before meals) (Patient not taking: Reported on 11/21/2022) 50 mL 3     order for DME Equipment being ordered: FlexiTouch pneumatic compression device.  2-3 times per day to left lower extremity.  Current strength - L4 1 Units        Allergies  Allergies   Allergen Reactions     Ciprofloxacin Rash       Social History  Social History     Socioeconomic History     Marital status:  "     Spouse name: Not on file     Number of children: 0     Years of education: Not on file     Highest education level: Not on file   Occupational History     Employer: UNKNOWN   Tobacco Use     Smoking status: Every Day     Packs/day: 2.00     Years: 40.00     Pack years: 80.00     Types: Cigarettes     Smokeless tobacco: Never     Tobacco comments:     Is not working on quitting   Substance and Sexual Activity     Alcohol use: Yes     Alcohol/week: 1.0 standard drink     Types: 1 Standard drinks or equivalent per week     Comment: \"Once a year maybe\"     Drug use: No     Sexual activity: Not Currently   Other Topics Concern     Parent/sibling w/ CABG, MI or angioplasty before 65F 55M? No      Service Not Asked     Blood Transfusions Not Asked     Caffeine Concern Yes     Occupational Exposure Not Asked     Hobby Hazards Not Asked     Sleep Concern Not Asked     Stress Concern Not Asked     Weight Concern Not Asked     Special Diet Not Asked     Back Care Not Asked     Exercise No     Bike Helmet Not Asked     Seat Belt Not Asked     Self-Exams Not Asked   Social History Narrative     Not on file     Social Determinants of Health     Financial Resource Strain: Not on file   Food Insecurity: Not on file   Transportation Needs: Not on file   Physical Activity: Not on file   Stress: Not on file   Social Connections: Not on file   Intimate Partner Violence: Not on file   Housing Stability: Not on file       Family History  Family History   Problem Relation Age of Onset     Alcohol/Drug Mother         cirrhosis     Alcohol/Drug Father      C.A.D. No family hx of      Diabetes No family hx of      Cancer No family hx of      Anesthesia Reaction No family hx of      Clotting Disorder No family hx of        Review of Systems  The complete review of systems is negative other than noted in the HPI or here.   Anesthesia Evaluation   Pt has had prior anesthetic. Type: General and MAC.    No history of " anesthetic complications       ROS/MED HX  ENT/Pulmonary: Comment: Denies use of albuterol inhaler or nebs. No hospitalizations in last 6 months    (+) QUETA risk factors, hypertension, tobacco use, Current use, 2 packs/day, 80  Pack-Year Hx,   (-) recent URI   Neurologic:     (+) CVA, date: found on imaging, without deficits,  (-) no seizures   Cardiovascular: Comment: History of right AKA due to critical limb ischemia-PAD    (+) Dyslipidemia hypertension-range: Reported average 138/83/ Peripheral Vascular Disease-- Symptomatic and Other. CAD --stent-Drug Eluting Stent. Taking blood thinners Pt has received instructions: Instructions Given to patient: ASA on hold since11/18, will hold Xarelto x 2 doses. CHF etiology: ischemic Last EF: 36% date: 2018 Previous cardiac testing   Echo: Date: 2018 Results:    Stress Test: Date: Results:    ECG Reviewed: Date: 10/5/22 Results:  SR, LAD, inferior infarct age undetermined  Cath: Date: 2012 Results:   (-) MARTÍNEZ and arrhythmias   METS/Exercise Tolerance: 1 - Eating, dressing    Hematologic:     (+) anemia, history of blood transfusion, no previous transfusion reaction,  (-) history of blood clots   Musculoskeletal: Comment: Right AKA      GI/Hepatic:     (+) hepatitis type C, liver disease,  (-) GERD   Renal/Genitourinary:     (+) renal disease, type: ARF, Pt does not require dialysis,     Endo:     (+) type II DM, Last HgA1c: 7.2, date: 9/27/22, Using insulin, - not using insulin pump. Normal glucose range: 110-150, Diabetic complications: cardiac problems.     Psychiatric/Substance Use:  - neg psychiatric ROS     Infectious Disease:  - neg infectious disease ROS     Malignancy:   (+) Malignancy, History of Other.Other CA cholangiocarcinoma (liver lesion) Active status post.    Other:      (+) , other significant disability Wheelchair bound,          Virtual visit -  No vitals were obtained    Physical Exam  Constitutional: Awake, alert, no apparent distress, and appears stated  age. In w/c. His nurse is present.  HENT: Normocephalic  Respiratory: non labored breathing: no cough.    Neurologic: Oriented to name, place and time.   Neuropsychiatric: Calm, cooperative. Normal affect.      Prior Labs/Diagnostic Studies   All labs and imaging personally reviewed   Lab Results   Component Value Date    WBC 6.5 10/05/2022    WBC 6.9 07/27/2020     Lab Results   Component Value Date    RBC 5.15 10/05/2022    RBC 4.88 07/27/2020     Lab Results   Component Value Date    HGB 14.4 10/05/2022    HGB 13.3 07/27/2020     Lab Results   Component Value Date    HCT 45.5 10/05/2022    HCT 42.0 07/27/2020     Lab Results   Component Value Date    MCV 88 10/05/2022    MCV 86 07/27/2020     Lab Results   Component Value Date    MCH 28.0 10/05/2022    MCH 27.3 07/27/2020     Lab Results   Component Value Date    MCHC 31.6 10/05/2022    MCHC 31.7 07/27/2020     Lab Results   Component Value Date    RDW 15.1 10/05/2022    RDW 15.1 07/27/2020     Lab Results   Component Value Date     10/05/2022     07/27/2020     Last Comprehensive Metabolic Panel:  Sodium   Date Value Ref Range Status   09/27/2022 137 133 - 144 mmol/L Final   09/02/2020 137 133 - 144 mmol/L Final     Potassium   Date Value Ref Range Status   09/27/2022 4.4 3.4 - 5.3 mmol/L Final   09/02/2020 4.0 3.4 - 5.3 mmol/L Final     Chloride   Date Value Ref Range Status   09/27/2022 104 94 - 109 mmol/L Final   09/02/2020 103 94 - 109 mmol/L Final     Carbon Dioxide   Date Value Ref Range Status   09/02/2020 28 20 - 32 mmol/L Final     Carbon Dioxide (CO2)   Date Value Ref Range Status   09/27/2022 23 20 - 32 mmol/L Final     Anion Gap   Date Value Ref Range Status   09/27/2022 10 3 - 14 mmol/L Final   09/02/2020 7 3 - 14 mmol/L Final     Glucose   Date Value Ref Range Status   10/05/2022 122 (H) 70 - 99 mg/dL Final   09/27/2022 125 (H) 70 - 99 mg/dL Final   09/02/2020 142 (H) 70 - 99 mg/dL Final     Urea Nitrogen   Date Value Ref Range Status    09/27/2022 18 7 - 30 mg/dL Final   09/02/2020 17 7 - 30 mg/dL Final     Creatinine   Date Value Ref Range Status   09/27/2022 1.04 0.66 - 1.25 mg/dL Final   09/02/2020 0.89 0.66 - 1.25 mg/dL Final     GFR Estimate   Date Value Ref Range Status   09/27/2022 78 >60 mL/min/1.73m2 Final     Comment:     Effective December 21, 2021 eGFRcr in adults is calculated using the 2021 CKD-EPI creatinine equation which includes age and gender (Dewayne et al., NEJ, DOI: 10.1056/DECBcl7409418)   09/02/2020 89 >60 mL/min/[1.73_m2] Final     Comment:     Non  GFR Calc  Starting 12/18/2018, serum creatinine based estimated GFR (eGFR) will be   calculated using the Chronic Kidney Disease Epidemiology Collaboration   (CKD-EPI) equation.       Calcium   Date Value Ref Range Status   09/27/2022 9.2 8.5 - 10.1 mg/dL Final   09/02/2020 9.0 8.5 - 10.1 mg/dL Final     Bilirubin Total   Date Value Ref Range Status   09/27/2022 0.4 0.2 - 1.3 mg/dL Final   07/27/2020 0.4 0.2 - 1.3 mg/dL Final     Alkaline Phosphatase   Date Value Ref Range Status   09/27/2022 71 40 - 150 U/L Final   07/27/2020 70 40 - 150 U/L Final     ALT   Date Value Ref Range Status   09/27/2022 21 0 - 70 U/L Final   07/27/2020 17 0 - 70 U/L Final     AST   Date Value Ref Range Status   09/27/2022 22 0 - 45 U/L Final   07/27/2020 18 0 - 45 U/L Final     EKG: 10/5/22 Sinus rhythm, left axis deviation, inferior infarct    2018 Echocardiogram   Interpretation Summary  Mild left ventricular dilation is present.  Biplane LV EF 36%.  Large LAD territory akinesis.  Right ventricular function, chamber size, wall motion, and thickness are  normal.  Pulmonary artery systolic pressure cannot be assessed.  The inferior vena cava is normal.  No pericardial effusion is present.    8/2/22 US Lower extremity                                                                   IMPRESSION: Patent left lower extremity femoral to anterior tibial  surgical bypass graft.    US OZZIE  doppler 8/2/22                                                   IMPRESSION:   Normal OZZIE examination left lower extremity as well as toe brachial  index, similar to previous exam.    NM MPI adenosine stress test 2013  1. Abnormal myocardial SPECT study with a summed stress score of 20.   2. Evidence of a transmural infarct but the vascular distribution is atypical. Involvement is the distal demond- apical and the distal lateral wall of the ventricle. This may represent 2 vessel coronary artery disease although a unusual dominant LAD cannot be excluded.   3. No changes to indicate ischemia.     2012 Cardiac cath  2 vessel coronary artery disease (RCA and LAD) without left main lesion  Successful angioplasty of the mid LAD    CT Chest 11/10/22                                                    Impression:? ??  1.  No suspicious pulmonary nodules or mass.  2.  Enlarged right hilar lymph node measuring 9 mm, likely reactive.  3.  Borderline dilatation of the main pulmonary artery which is  suggestive of pulmonary hypertension.  4.  Stable liver lesions, better characterized on MRI abdomen  9/3/2022.    MR abdomen 9/3/22                                                          IMPRESSION:    1. Cirrhosis without evidence of portal hypertension.  2. 2.3 cm arterially rim enhancing, targetoid lesion in hepatic  segment 6 without washout, pseudocapsule. There is associated  increased T2 signal, restricted diffusion, and subthreshold growth.  LIRADS M, recommend tissue sampling.  3. Additional several arterially enhancing foci without washout,  restricted diffusion, abnormal T2 signal, or pseudocapsule formation  throughout the liver, indeterminate LIRADS 3   4. Based on this exam only, the patient is within [place] criteria.  5. Persistent extrahepatic biliary dilatation with common bile duct  measuring up to 1.2 cm, slightly decreased since prior MRI when it  measured as 1.6 cm. No appreciable obstructing lesion.  6.  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    MR Brain 3/30/22  IMPRESSION:   1.  Large area of chronic right MCA territory infarct and adjacent gliosis including involvement of the right occipital lobe which likely explains reported visual symptoms.   2.  No acute intracranial process.   3.  Generalized brain atrophy and chronic small vessel ischemic changes elsewhere.    The patient's records and results personally reviewed by this provider.     Outside records reviewed from: Care Everywhere      Assessment      Alexandro Pool is a 67 year old male seen as a PAC referral for risk assessment and optimization for anesthesia.    Plan/Recommendations  Pt will be optimized for the proposed procedure.  See below for details on the assessment, risk, and preoperative recommendations    NEUROLOGY  Brain imaging from 3/2022 showed large area of chronic right MCA territory infarct and adjacent gliosis including involvement of the right occipital lobe. Patient reports being unaware of stroke, until found on imaging. Denies residual. No recurrence.  Denies seizure history.   -Post Op delirium risk factors:  High co-morbid index    ENT  - No current airway concerns.  Will need to be reassessed day of surgery.  Mallampati: Unable to assess  TM: Unable to assess   Upper/lower dentures    CARDIAC  Complex cardiovascular history as above. Patient denies chest pain, irregular HR or DYSPNEA ON EXERTION. He is in wheelchair with limited activity. Atorvastatin at HS. Will take amlodipine and metoprolol on DOS. Will hold lisinopril and spironolactone. ASA 81 mg on hold since 11/18/22. Chronically anticoagulated with Xarelto. Plan to hold two doses prior to surgery. Last EF 36% on echocardiogram from 2018.     - METS (Metabolic Equivalents)<4    RCRI: 11% risk of serious cardiac events    PULMONARY  Continued smoking 2  "PPD. Denies cough, shortness of breath or need for albuterol.   Intermediate risk for QUETA  - Tobacco History      History   Smoking Status     Every Day     Packs/day: 2.00     Years: 40.00     Types: Cigarettes   Smokeless Tobacco     Never       GI: Denies GERD.  Hepatitis C with sustained virologic response posttreatment  PONV Low Risk  Total Score: 0        /RENAL  - Baseline Creatinine 1.04    ENDOCRINE    - BMI: Estimated body mass index is 30.62 kg/m  as calculated from the following:    Height as of 10/5/22: 1.702 m (5' 7.01\").    Weight as of 10/5/22: 88.7 kg (195 lb 8.8 oz).  Obesity (BMI >30)  DIABETES MELLITUS II. Last A1c 7.2. Will take 80% of Lantus on DOS. Will hold Novolog and Victoza on DOS. Will begin holding Jardiance now (typically a 3 day hold). Average -150    HEME VTE risk 3%    Patient reports blood clots in his legs in past.   History of blood transfusion    MSK: Primarily in w/c. Right AKA      Different anesthesia methods/types have been discussed with the patient, but they are aware that the final plan will be decided by the assigned anesthesia provider on the date of service.    Patient was discussed with Dr Romero  Patient with cancer diagnosis will proceed to procedure without additional testing. This visit was at 3pm today and procedure is tomorrow.     AVS with information on surgery time/arrival time, meds and NPO status given by nursing staff.     Please refer to the physical examination documented by the anesthesiologist in the anesthesia record on the day of surgery.    Video-Visit Details    Type of service:  Video Visit    Provider received verbal consent for a Video Visit from the patient? Yes    Video Start Time: 2:52pm   Video End Time (time video stopped): 3:03pm     Originating Location (pt. Location): Home    Distant Location (provider location): Off-site    Mode of Communication:  Video Conference via [employer]  On the day of service:     Prep time: 22 " minutes  Visit time: 11 minutes  Documentation time: 25 minutes  ------------------------------------------  Total time: 58 minutes      JOHAN Gaitan CNS  Preoperative Assessment Center  [facility]  [facility]  Phone: [phone]  Fax: [phone]

## 2022-11-22 NOTE — ANESTHESIA PREPROCEDURE EVALUATION
Anesthesia Pre-Procedure Evaluation    Patient: Alexandro Pool   MRN: [id number] : 1955        Procedure : Procedure(s):  ANESTHESIA OUT OF OR CT Guided Liver Ablation @0800          Past Medical History:   Diagnosis Date     Acute kidney failure, unspecified (H)      Blood transfusion      CAD (coronary artery disease)      CARDIOVASCULAR SCREENING; LDL GOAL LESS THAN 100      Cirrhosis (H)      Critical lower limb ischemia (H)      Diabetes mellitus (H) 10/2011     Hypertension      Hypertension goal BP (blood pressure) < 140/90      Ischemic cardiomyopathy      Lesion of liver      Lymphedema of left leg 2016     Peripheral arterial disease (H)      PVD (peripheral vascular disease) (H)      Right above knee amputation 2014     Type 2 diabetes, HbA1C goal < 8% (H)       Past Surgical History:   Procedure Laterality Date     AMPUTATE LEG ABOVE KNEE  2011    Procedure:AMPUTATE LEG ABOVE KNEE; Revise Right Below Knee Amputation To Above Knee Amputation; Surgeon:MADISON WELLS; Location:[facility] OR     AMPUTATE LEG BELOW KNEE  11/10/2011    Procedure:AMPUTATE LEG BELOW KNEE; Right Below Knee Amputation; Surgeon:MADISON WELLS; Location:[facility] OR     BYPASS GRAFT FEMOROTIBIAL  2013    Procedure: BYPASS GRAFT FEMOROTIBIAL;  Left Femorotibial Bypass Graft Insitu ;  Surgeon: Marisol Suggs MD;  Location:  OR     CARDIAC SURGERY      cardiac stent     ESOPHAGOSCOPY, GASTROSCOPY, DUODENOSCOPY (EGD), COMBINED  10/1/2012    Procedure: COMBINED ESOPHAGOSCOPY, GASTROSCOPY, DUODENOSCOPY (EGD);;  Surgeon: Nehemias Cevallos MD;  Location:  GI     ESOPHAGOSCOPY, GASTROSCOPY, DUODENOSCOPY (EGD), COMBINED N/A 3/24/2016    Procedure: COMBINED ESOPHAGOSCOPY, GASTROSCOPY, DUODENOSCOPY (EGD);  Surgeon: Gildardo Marks MD;  Location:  GI     IR LIVER BIOPSY PERCUTANEOUS  10/5/2022     IR LOWER EXTREMITY ANGIOGRAM LEFT  2021     IR STENT VASCULAR LT  3/9/2012          IRRIGATION AND DEBRIDEMENT LOWER  "EXTREMITY, COMBINED  11/15/2011    Procedure:COMBINED IRRIGATION AND DEBRIDEMENT LOWER EXTREMITY; DRAINAGE OF ABSCESS AT BELOW KNEE AMPUTATION AND KNEE DISARTICULATION.; Surgeon:MADISON WELLS; Location:[facility] OR     NO HISTORY OF SURGERY  7/12/13    derm     VASCULAR REPAIR LOWER EXTREMITY Left 9/19/2017    Procedure: VASCULAR REPAIR LOWER EXTREMITY;  Ligation Parasitic Branch To Left Lower Extremity ;  Surgeon: Marisol Suggs MD;  Location: [facility] OR      Allergies   Allergen Reactions     Ciprofloxacin Rash      Social History     Tobacco Use     Smoking status: Every Day     Packs/day: 2.00     Years: 40.00     Pack years: 80.00     Types: Cigarettes     Smokeless tobacco: Never     Tobacco comments:     Is not working on quitting   Substance Use Topics     Alcohol use: Yes     Alcohol/week: 1.0 standard drink     Types: 1 Standard drinks or equivalent per week     Comment: \"Once a year maybe\"      Wt Readings from Last 1 Encounters:   10/05/22 88.7 kg (195 lb 8.8 oz)        Anesthesia Evaluation   Pt has had prior anesthetic. Type: General.    No history of anesthetic complications       ROS/MED HX  ENT/Pulmonary:     (+) tobacco use, Current use,     Neurologic:       Cardiovascular:     (+) hypertension--CAD ---    METS/Exercise Tolerance:     Hematologic:       Musculoskeletal:       GI/Hepatic:     (+) hepatitis type C, liver disease,     Renal/Genitourinary:       Endo:     (+) type II DM, Obesity,     Psychiatric/Substance Use:       Infectious Disease:       Malignancy:   (+) Malignancy, History of Other.Other CA Cholangiocarcinoma status post.    Other:            Physical Exam    Airway        Mallampati: II   TM distance: > 3 FB   Neck ROM: full   Mouth opening: > 3 cm    Respiratory Devices and Support         Dental       (+) upper dentures and lower dentures      Cardiovascular   cardiovascular exam normal          Pulmonary                   OUTSIDE LABS:  CBC:   Lab Results   Component Value Date    WBC " 6.5 10/05/2022    WBC 5.9 09/27/2022    HGB 14.4 10/05/2022    HGB 14.7 09/27/2022    HCT 45.5 10/05/2022    HCT 45.2 09/27/2022     (L) 10/05/2022     (L) 09/27/2022     BMP:   Lab Results   Component Value Date     09/27/2022     08/09/2022    POTASSIUM 4.4 09/27/2022    POTASSIUM 4.3 08/09/2022    CHLORIDE 104 09/27/2022    CHLORIDE 106 08/09/2022    CO2 23 09/27/2022    CO2 22 08/09/2022    BUN 18 09/27/2022    BUN 29 08/09/2022    CR 1.04 09/27/2022    CR 0.98 08/09/2022     (H) 10/05/2022     (H) 09/27/2022     COAGS:   Lab Results   Component Value Date    PTT 30 12/02/2021    INR 1.24 (H) 10/05/2022    FIBR 405 10/14/2013     POC:   Lab Results   Component Value Date    BGM 92 09/19/2017     HEPATIC:   Lab Results   Component Value Date    ALBUMIN 3.7 09/27/2022    PROTTOTAL 7.7 09/27/2022    ALT 21 09/27/2022    AST 22 09/27/2022    ALKPHOS 71 09/27/2022    BILITOTAL 0.4 09/27/2022    KELLI 43 (H) 10/14/2013     OTHER:   Lab Results   Component Value Date    PH 7.49 (H) 10/16/2013    LACT 1.7 12/02/2021    A1C 7.2 (H) 09/27/2022    BOGDAN 9.2 09/27/2022    PHOS 3.9 10/16/2013    MAG 1.4 (L) 10/16/2013    LIPASE 201 10/14/2013    AMYLASE 75 10/14/2013    TSH 1.27 07/27/2020    T4 1.10 07/27/2020    CRP 6.01 09/27/2022       Anesthesia Plan    ASA Status:  3      Anesthesia Type: General.     - Airway: ETT   Induction: Intravenous, Propofol.   Maintenance: Balanced.   Techniques and Equipment:     - Lines/Monitors: Arterial Line, 2nd IV     Consents    Anesthesia Plan(s) and associated risks, benefits, and realistic alternatives discussed. Questions answered and patient/representative(s) expressed understanding.    - Discussed:     - Discussed with:  Patient      - Extended Intubation/Ventilatory Support Discussed: No.      - Patient is DNR/DNI Status: No    Use of blood products discussed: Yes.     - Discussed with: Patient.     - Consented: consented to blood products             Reason for refusal: other.     Postoperative Care    Pain management: IV analgesics.   PONV prophylaxis: Ondansetron (or other 5HT-3), Dexamethasone or Solumedrol     Comments:                David Martinez MD

## 2022-11-22 NOTE — PROGRESS NOTES
Alexandro is a 67 year old who is being evaluated via a billable video visit.      How would you like to obtain your AVS?     Email: [email or web address]    HPI         Review of Systems         Objective    Vitals - Patient Reported  Pain Score: Mild Pain (2)        Physical Exam       BRODIE Cyr LPN

## 2022-11-22 NOTE — PATIENT INSTRUCTIONS
Preparing for Your Surgery      Name:  Alexandro Pool   MRN:  [id number]   :  1955   Today's Date:  2022       Arriving for surgery:  Surgery date:  22  Arrival time:  6 am     Surgeries and procedures: Adult patients can have 2 visitors all through the surgery process.     Visiting hours: 8 a.m. to 8:30 p.m.     Hospital: Adult patients and children under age 18 can have 4 visitor at a time     No visitors under the age of 5 are allowed for hospital patients.  Double occupancy rooms: Patients can have only two visitors at a time.     Patients with disabilities: Can have a support person with them (family member, service provider     Or someone well informed about their needs) plus the allowed number of visitors     Patients confirmed or suspected to have symptoms of COVID 19 or flu:     No visitors allowed for adult patients.   Children (under age 18) can have 1 named visitor.     People who are sick or showing symptoms of COVID 19 or flu:    Are not allowed to visit patients--we can only make exceptions in special situations.       Please follow these guidelines for your visit:   Arrive wearing a mask over your mouth and nose; we will give you a medical mask to wear    If you arrive wearing a cloth mask.   Keep it on during your entire visit, even when in patient's room.   If you don't wear a mask we'll ask you to leave.     Clean your hands with alcohol hand . Do this when you arrive at and leave the building and patient room,    And again after you touch your mask or anything in the room.     You can t visit if you have a fever, cough, shortness of breath, muscle aches, headaches, sore throat    Or diarrhea      Stay 6 feet away from others during your visit and between visits     Go directly to and from the room you are visiting.     Stay in the patient s room during your visit. Limit going to other places in the hospital as much as possible     Leave bags and jackets at home or  in the car.     For everyone s health, please don t come and go during your visit. That includes for smoking   during your visit.     Please come to:     [facility] [address]  [address] [place]  [address]    -  Parking is available at the Patient Visitor Ramp on [address] and [facility].    -  When entering the hospital, you will be asked some Covid screening questions and directed to Patient Registration. Patient Registration will then direct you to the 3rd floor Surgery Waiting Room.  [phone]?     - ?If you are in need of directions, wheelchair or escort please stop at the Information Desk in the lobby.  Inform the information person that you are here for surgery; a wheelchair and escort to [address] will be provided.?     What can I eat or drink?  -  You may eat and drink normally up to 8 hours prior to arrival time. (Until 10 pm 11-22-22)  -  You may have clear liquids until 2 hours prior to arrival time. (Until 4 am)    Examples of clear liquids:  Water  Clear broth  Juices (apple, white grape, white cranberry  and cider) without pulp  Noncarbonated, powder based beverages  (lemonade and Fahad-Aid)  Sodas (Sprite, 7-Up, ginger ale and seltzer)  Coffee or tea (without milk or cream)  Gatorade    -  No Alcohol for at least 24 hours before surgery.     Which medicines can I take?  Hold Multivitamins for 7 days before surgery.  Hold Supplements for 7 days before surgery.  Hold Ibuprofen (Advil, Motrin) for 1 day before surgery--unless otherwise directed by surgeon.  Hold Naproxen (Aleve) for 4 days before surgery.    The following instructions are from Dr. Olivera's letter on 11-15-22:  -PLEASE DO NOT TAKE YOUR ASPIRIN 5 DAYS PRIOR TO THIS PROCEDURE AS DISCUSSED.     -PLEASE ALSO HOLD YOUR  JARDIENCE THE DAY OF THE PROCEDURE.      -PLEASE HOLD YOUR FAST ACTING INSULIN THE DAY OF YOUR PROCEDURE. (Aspart/Novolog)     -PLEASE ONLY TAKE 80% OF  YOUR LANTUS THE NIGHT BEFORE YOUR PROCEDURE.  (Decrease the Lantus insulin to 26 units the bedtime prior to surgery.)     -PLEASE HOLD YOUR VICTOZA THE MORNING OF YOUR PROCEDURE.     -PLEASE HOLD YOUR XARELTO FOR TWO DOSES PRIOR TO YOUR PROCEDURE    -  In addition to the above instructions, please DO NOT take these medications the day of surgery:  Lisinopril, Spironolactone,    -  PLEASE TAKE these medications the day of surgery:  Amlodipine (Norvasc), Metoprolol, Gabapentin,   Acetaminophen (Tylenol) if needed  Albuterol inhaler/neb if needed    How do I prepare myself?  -  Bring Albuterol inhaler to hospital.  - Please take 2 showers before surgery using Scrubcare or Hibiclens soap.    Use this soap only from the neck to your toes.     Leave the soap on your skin for one minute--then rinse thoroughly.      You may use your own shampoo and conditioner. No other hair products.   - Please remove all jewelry and body piercings.  - No lotions, deodorants or fragrance.  - Bring your ID and insurance card.    -If you have a Deep Brain Stimulator, Spinal Cord Stimulator, or any Neuro Stimulator device---you must bring the remote control to the hospital.      ALL PATIENTS GOING HOME THE SAME DAY OF SURGERY ARE REQUIRED TO HAVE A RESPONSIBLE ADULT TO DRIVE AND BE IN ATTENDANCE WITH THEM FOR 24 HOURS FOLLOWING SURGERY. A Responsible adult needs to be with you in the apartment at the assisted living.      Questions or Concerns:    - For any questions regarding the day of surgery or your hospital stay, please contact the Pre Admission Nursing Office at [phone].       - If you have health changes between today and your surgery, please call your surgeon.       - For questions after surgery, please call your surgeons office.

## 2022-11-22 NOTE — H&P
Pre-Operative H & P     CC:  Preoperative exam to assess for increased cardiopulmonary risk while undergoing surgery and anesthesia.    Date of Encounter: 11/22/2022  Primary Care Physician:  Arthur Abraham     Reason for visit:   Encounter Diagnoses   Name Primary?     Preop examination Yes     Lesion of liver greater than 1 cm in diameter with liver disease conferring risk of hepatocellular carcinoma        HPI  Alexandro Pool is a 67 year old male who presents for pre-operative H & P in preparation for  Procedure Information     Case: [id number] Date/Time: 11/23/22 0800    Procedure: ANESTHESIA OUT OF OR CT Guided Liver Ablation @0800 (Update)    Anesthesia type: General    Diagnosis: Lesion of liver greater than 1 cm in diameter with liver disease conferring risk of hepatocellular carcinoma [K76.9, Z91.89]    Pre-op diagnosis: Lesion of liver greater than 1 cm in diameter with liver disease conferring risk of hepatocellular carcinoma [K76.9, Z91.89]    [facility]    Providers: GENERIC ANESTHESIA PROVIDER        History is obtained from the patient, his nurse and chart review    Patient with hepatitis C with sustained virologic response posttreatment, and 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 His case was reviewed at the liver tumor conference with recommendation to treat with ablation. He was referred to Dr. Dominguez and was counseled for above procedure.    His history is otherwise significant for HLD, HYPERTENSION, CAD, s/p GABRIEL to mid LAD, ICM, PAD s/p right above-knee amputation and left femoral to tibial bypass with subsequent left femoral angioplasty, stroke, current smoking, cirrhosis, Diabetes mellitus II, and chronic anticoagulation.      He lives in an assisted care setting where he has nursing care and his medications are managed by staff.     He is followed by a cardiovascular provider, and was last seen on 11/15/22       Hx of abnormal bleeding or  anti-platelet use: ASA on hold.       Past Medical History  Past Medical History:   Diagnosis Date     Acute kidney failure, unspecified (H)      Blood transfusion      CAD (coronary artery disease)      CARDIOVASCULAR SCREENING; LDL GOAL LESS THAN 100      Cirrhosis (H)      Critical lower limb ischemia (H)      Diabetes mellitus (H) 10/2011     Hypertension      Hypertension goal BP (blood pressure) < 140/90      Ischemic cardiomyopathy      Lesion of liver      Lymphedema of left leg 05/11/2016     Peripheral arterial disease (H)      PVD (peripheral vascular disease) (H)      Right above knee amputation 04/30/2014     Type 2 diabetes, HbA1C goal < 8% (H)        Past Surgical History  Past Surgical History:   Procedure Laterality Date     AMPUTATE LEG ABOVE KNEE  11/22/2011    Procedure:AMPUTATE LEG ABOVE KNEE; Revise Right Below Knee Amputation To Above Knee Amputation; Surgeon:MADISON WELLS; Location:[facility]     AMPUTATE LEG BELOW KNEE  11/10/2011    Procedure:AMPUTATE LEG BELOW KNEE; Right Below Knee Amputation; Surgeon:MADISON WELLS; Location:[facility]     BYPASS GRAFT FEMOROTIBIAL  9/19/2013    Procedure: BYPASS GRAFT FEMOROTIBIAL;  Left Femorotibial Bypass Graft Insitu ;  Surgeon: Marisol Suggs MD;  Location:  OR     CARDIAC SURGERY      cardiac stent     ESOPHAGOSCOPY, GASTROSCOPY, DUODENOSCOPY (EGD), COMBINED  10/1/2012    Procedure: COMBINED ESOPHAGOSCOPY, GASTROSCOPY, DUODENOSCOPY (EGD);;  Surgeon: Nehemias Cevallos MD;  Location:  GI     ESOPHAGOSCOPY, GASTROSCOPY, DUODENOSCOPY (EGD), COMBINED N/A 3/24/2016    Procedure: COMBINED ESOPHAGOSCOPY, GASTROSCOPY, DUODENOSCOPY (EGD);  Surgeon: Gildardo Marks MD;  Location:  GI     IR LIVER BIOPSY PERCUTANEOUS  10/5/2022     IR LOWER EXTREMITY ANGIOGRAM LEFT  12/2/2021     IR STENT VASCULAR LT  3/9/2012          IRRIGATION AND DEBRIDEMENT LOWER EXTREMITY, COMBINED  11/15/2011    Procedure:COMBINED IRRIGATION AND DEBRIDEMENT LOWER EXTREMITY; DRAINAGE OF  ABSCESS AT BELOW KNEE AMPUTATION AND KNEE DISARTICULATION.; Surgeon:MADISON WELLS; Location:[facility]     NO HISTORY OF SURGERY  7/12/13    derm     VASCULAR REPAIR LOWER EXTREMITY Left 9/19/2017    Procedure: VASCULAR REPAIR LOWER EXTREMITY;  Ligation Parasitic Branch To Left Lower Extremity ;  Surgeon: Marisol Suggs MD;  Location: [facility]       Prior to Admission Medications  Current Outpatient Medications   Medication Sig Dispense Refill     acetaminophen (TYLENOL) 325 MG tablet Take 1 tablet by mouth every 4 hours as needed. 250 tablet 0     albuterol (PROAIR HFA/PROVENTIL HFA/VENTOLIN HFA) 108 (90 Base) MCG/ACT inhaler Inhale 2 puffs into the lungs every 4 hours as needed for shortness of breath / dyspnea or wheezing       albuterol (PROVENTIL) (2.5 MG/3ML) 0.083% neb solution Take 2.5 mg by nebulization every 4 hours as needed for shortness of breath / dyspnea or wheezing       amLODIPine (NORVASC) 2.5 MG tablet Take 1 tablet (2.5 mg) by mouth 2 times daily 180 tablet 3     ammonium lactate (AMLACTIN) 12 % external cream Apply to affected area on both legs twice daily as needed for dry skin       atorvastatin (LIPITOR) 80 MG tablet Take 1 tablet (80 mg) by mouth At Bedtime 90 tablet 3     calcitonin, salmon, (MIACALCIN) 200 UNIT/ACT nasal spray Spray 1 spray into one nostril alternating nostrils daily       gabapentin (NEURONTIN) 300 MG capsule Take 600 mg in AM   Take 300 mg in afternoon   Take 600 mg at bedtime  May also take an additional 300 mg if needed at bedtime for pain       hydrocortisone 1 % cream Apply topically 2 times daily PRN       insulin aspart (NOVOLOG FLEXPEN) 100 UNIT/ML pen Inject 19 Units Subcutaneous 2 times daily (with meals) AM and 4 PM       insulin glargine (LANTUS PEN) 100 UNIT/ML pen Inject 32 Units Subcutaneous 2 times daily (Patient taking differently: Inject 32 Units Subcutaneous At Bedtime) 60 mL 3     JARDIANCE 25 MG TABS tablet TAKE 1 TABLET (25 MG) BY MOUTH DAILY 90 tablet 3      lidocaine (XYLOCAINE) 5 % external ointment Apply 1-3 g topically 2 times daily as needed (RLE residual limb pain)       lidocaine-prilocaine (EMLA) 2.5-2.5 % external cream Apply 1-2 g topically 2 times daily as needed for moderate pain (4-6) (RLE residual limb)       liraglutide (VICTOZA) 18 MG/3ML solution Inject 1.8 mg Subcutaneous daily       lisinopril (ZESTRIL) 2.5 MG tablet TAKE 1 TABLET (2.5 MG) BY MOUTH 2 TIMES DAILY 180 tablet 3     metoprolol succinate ER (TOPROL XL) 50 MG 24 hr tablet Take 1.5 tablets (75 mg) by mouth daily 135 tablet 3     naloxone (NARCAN) 4 MG/0.1ML nasal spray Spray 4 mg into one nostril alternating nostrils as needed for opioid reversal every 2-3 minutes until assistance arrives       rivaroxaban ANTICOAGULANT (XARELTO ANTICOAGULANT) 2.5 MG TABS tablet Take 1 tablet (2.5 mg) by mouth 2 times daily 180 tablet 3     senna-docusate (SENOKOT-S/PERICOLACE) 8.6-50 MG tablet Take 1 tablet by mouth At Bedtime       spironolactone (ALDACTONE) 25 MG tablet Take 12.5 mg by mouth daily       ASPIRIN EC PO Take 81 mg by mouth daily (Patient not taking: Reported on 11/21/2022)       blood glucose (NO BRAND SPECIFIED) test strip Use  to test 4 times a day.       gabapentin (NEURONTIN) 100 MG capsule Take 1 capsule (100 mg) by mouth 3 times daily (Patient not taking: Reported on 11/21/2022) 90 capsule 0     GLOBAL EASY GLIDE PEN NEEDLES 32G X 4 MM miscellaneous USE 4X DAILY OR AS DIRECTED. 100 each 11     insulin lispro (HUMALOG KWIKPEN) 100 UNIT/ML (1 unit dial) KWIKPEN Inject 18 Units Subcutaneous 3 times daily (before meals) (Patient not taking: Reported on 11/21/2022) 50 mL 3     order for DME Equipment being ordered: FlexiTouch pneumatic compression device.  2-3 times per day to left lower extremity.  Current strength - L4 1 Units        Allergies  Allergies   Allergen Reactions     Ciprofloxacin Rash       Social History  Social History     Socioeconomic History     Marital status:  "     Spouse name: Not on file     Number of children: 0     Years of education: Not on file     Highest education level: Not on file   Occupational History     Employer: UNKNOWN   Tobacco Use     Smoking status: Every Day     Packs/day: 2.00     Years: 40.00     Pack years: 80.00     Types: Cigarettes     Smokeless tobacco: Never     Tobacco comments:     Is not working on quitting   Substance and Sexual Activity     Alcohol use: Yes     Alcohol/week: 1.0 standard drink     Types: 1 Standard drinks or equivalent per week     Comment: \"Once a year maybe\"     Drug use: No     Sexual activity: Not Currently   Other Topics Concern     Parent/sibling w/ CABG, MI or angioplasty before 65F 55M? No      Service Not Asked     Blood Transfusions Not Asked     Caffeine Concern Yes     Occupational Exposure Not Asked     Hobby Hazards Not Asked     Sleep Concern Not Asked     Stress Concern Not Asked     Weight Concern Not Asked     Special Diet Not Asked     Back Care Not Asked     Exercise No     Bike Helmet Not Asked     Seat Belt Not Asked     Self-Exams Not Asked   Social History Narrative     Not on file     Social Determinants of Health     Financial Resource Strain: Not on file   Food Insecurity: Not on file   Transportation Needs: Not on file   Physical Activity: Not on file   Stress: Not on file   Social Connections: Not on file   Intimate Partner Violence: Not on file   Housing Stability: Not on file       Family History  Family History   Problem Relation Age of Onset     Alcohol/Drug Mother         cirrhosis     Alcohol/Drug Father      C.A.D. No family hx of      Diabetes No family hx of      Cancer No family hx of      Anesthesia Reaction No family hx of      Clotting Disorder No family hx of        Review of Systems  The complete review of systems is negative other than noted in the HPI or here.   Anesthesia Evaluation   Pt has had prior anesthetic. Type: General and MAC.    No history of " anesthetic complications       ROS/MED HX  ENT/Pulmonary: Comment: Denies use of albuterol inhaler or nebs. No hospitalizations in last 6 months    (+) QUETA risk factors, hypertension, tobacco use, Current use, 2 packs/day, 80  Pack-Year Hx,   (-) recent URI   Neurologic:     (+) CVA, date: found on imaging, without deficits,  (-) no seizures   Cardiovascular: Comment: History of right AKA due to critical limb ischemia-PAD    (+) Dyslipidemia hypertension-range: Reported average 138/83/ Peripheral Vascular Disease-- Symptomatic and Other. CAD --stent-Drug Eluting Stent. Taking blood thinners Pt has received instructions: Instructions Given to patient: ASA on hold since11/18, will hold Xarelto x 2 doses. CHF etiology: ischemic Last EF: 36% date: 2018 Previous cardiac testing   Echo: Date: 2018 Results:    Stress Test: Date: Results:    ECG Reviewed: Date: 10/5/22 Results:  SR, LAD, inferior infarct age undetermined  Cath: Date: 2012 Results:   (-) MARTÍNEZ and arrhythmias   METS/Exercise Tolerance: 1 - Eating, dressing    Hematologic:     (+) anemia, history of blood transfusion, no previous transfusion reaction,  (-) history of blood clots   Musculoskeletal: Comment: Right AKA      GI/Hepatic:     (+) hepatitis type C, liver disease,  (-) GERD   Renal/Genitourinary:     (+) renal disease, type: ARF, Pt does not require dialysis,     Endo:     (+) type II DM, Last HgA1c: 7.2, date: 9/27/22, Using insulin, - not using insulin pump. Normal glucose range: 110-150, Diabetic complications: cardiac problems.     Psychiatric/Substance Use:  - neg psychiatric ROS     Infectious Disease:  - neg infectious disease ROS     Malignancy:   (+) Malignancy, History of Other.Other CA cholangiocarcinoma (liver lesion) Active status post.    Other:      (+) , other significant disability Wheelchair bound,          Virtual visit -  No vitals were obtained    Physical Exam  Constitutional: Awake, alert, no apparent distress, and appears stated  age. In w/c. His nurse is present.  HENT: Normocephalic  Respiratory: non labored breathing: no cough.    Neurologic: Oriented to name, place and time.   Neuropsychiatric: Calm, cooperative. Normal affect.      Prior Labs/Diagnostic Studies   All labs and imaging personally reviewed   Lab Results   Component Value Date    WBC 6.5 10/05/2022    WBC 6.9 07/27/2020     Lab Results   Component Value Date    RBC 5.15 10/05/2022    RBC 4.88 07/27/2020     Lab Results   Component Value Date    HGB 14.4 10/05/2022    HGB 13.3 07/27/2020     Lab Results   Component Value Date    HCT 45.5 10/05/2022    HCT 42.0 07/27/2020     Lab Results   Component Value Date    MCV 88 10/05/2022    MCV 86 07/27/2020     Lab Results   Component Value Date    MCH 28.0 10/05/2022    MCH 27.3 07/27/2020     Lab Results   Component Value Date    MCHC 31.6 10/05/2022    MCHC 31.7 07/27/2020     Lab Results   Component Value Date    RDW 15.1 10/05/2022    RDW 15.1 07/27/2020     Lab Results   Component Value Date     10/05/2022     07/27/2020     Last Comprehensive Metabolic Panel:  Sodium   Date Value Ref Range Status   09/27/2022 137 133 - 144 mmol/L Final   09/02/2020 137 133 - 144 mmol/L Final     Potassium   Date Value Ref Range Status   09/27/2022 4.4 3.4 - 5.3 mmol/L Final   09/02/2020 4.0 3.4 - 5.3 mmol/L Final     Chloride   Date Value Ref Range Status   09/27/2022 104 94 - 109 mmol/L Final   09/02/2020 103 94 - 109 mmol/L Final     Carbon Dioxide   Date Value Ref Range Status   09/02/2020 28 20 - 32 mmol/L Final     Carbon Dioxide (CO2)   Date Value Ref Range Status   09/27/2022 23 20 - 32 mmol/L Final     Anion Gap   Date Value Ref Range Status   09/27/2022 10 3 - 14 mmol/L Final   09/02/2020 7 3 - 14 mmol/L Final     Glucose   Date Value Ref Range Status   10/05/2022 122 (H) 70 - 99 mg/dL Final   09/27/2022 125 (H) 70 - 99 mg/dL Final   09/02/2020 142 (H) 70 - 99 mg/dL Final     Urea Nitrogen   Date Value Ref Range Status    09/27/2022 18 7 - 30 mg/dL Final   09/02/2020 17 7 - 30 mg/dL Final     Creatinine   Date Value Ref Range Status   09/27/2022 1.04 0.66 - 1.25 mg/dL Final   09/02/2020 0.89 0.66 - 1.25 mg/dL Final     GFR Estimate   Date Value Ref Range Status   09/27/2022 78 >60 mL/min/1.73m2 Final     Comment:     Effective December 21, 2021 eGFRcr in adults is calculated using the 2021 CKD-EPI creatinine equation which includes age and gender (Dewayne et al., NEJ, DOI: 10.1056/XKIHdp0068824)   09/02/2020 89 >60 mL/min/[1.73_m2] Final     Comment:     Non  GFR Calc  Starting 12/18/2018, serum creatinine based estimated GFR (eGFR) will be   calculated using the Chronic Kidney Disease Epidemiology Collaboration   (CKD-EPI) equation.       Calcium   Date Value Ref Range Status   09/27/2022 9.2 8.5 - 10.1 mg/dL Final   09/02/2020 9.0 8.5 - 10.1 mg/dL Final     Bilirubin Total   Date Value Ref Range Status   09/27/2022 0.4 0.2 - 1.3 mg/dL Final   07/27/2020 0.4 0.2 - 1.3 mg/dL Final     Alkaline Phosphatase   Date Value Ref Range Status   09/27/2022 71 40 - 150 U/L Final   07/27/2020 70 40 - 150 U/L Final     ALT   Date Value Ref Range Status   09/27/2022 21 0 - 70 U/L Final   07/27/2020 17 0 - 70 U/L Final     AST   Date Value Ref Range Status   09/27/2022 22 0 - 45 U/L Final   07/27/2020 18 0 - 45 U/L Final     EKG: 10/5/22 Sinus rhythm, left axis deviation, inferior infarct    2018 Echocardiogram   Interpretation Summary  Mild left ventricular dilation is present.  Biplane LV EF 36%.  Large LAD territory akinesis.  Right ventricular function, chamber size, wall motion, and thickness are  normal.  Pulmonary artery systolic pressure cannot be assessed.  The inferior vena cava is normal.  No pericardial effusion is present.    8/2/22 US Lower extremity                                                                   IMPRESSION: Patent left lower extremity femoral to anterior tibial  surgical bypass graft.    US OZZIE  doppler 8/2/22                                                   IMPRESSION:   Normal OZZIE examination left lower extremity as well as toe brachial  index, similar to previous exam.    NM MPI adenosine stress test 2013  1. Abnormal myocardial SPECT study with a summed stress score of 20.   2. Evidence of a transmural infarct but the vascular distribution is atypical. Involvement is the distal demond- apical and the distal lateral wall of the ventricle. This may represent 2 vessel coronary artery disease although a unusual dominant LAD cannot be excluded.   3. No changes to indicate ischemia.     2012 Cardiac cath  2 vessel coronary artery disease (RCA and LAD) without left main lesion  Successful angioplasty of the mid LAD    CT Chest 11/10/22                                                    Impression:? ??  1.  No suspicious pulmonary nodules or mass.  2.  Enlarged right hilar lymph node measuring 9 mm, likely reactive.  3.  Borderline dilatation of the main pulmonary artery which is  suggestive of pulmonary hypertension.  4.  Stable liver lesions, better characterized on MRI abdomen  9/3/2022.    MR abdomen 9/3/22                                                          IMPRESSION:    1. Cirrhosis without evidence of portal hypertension.  2. 2.3 cm arterially rim enhancing, targetoid lesion in hepatic  segment 6 without washout, pseudocapsule. There is associated  increased T2 signal, restricted diffusion, and subthreshold growth.  LIRADS M, recommend tissue sampling.  3. Additional several arterially enhancing foci without washout,  restricted diffusion, abnormal T2 signal, or pseudocapsule formation  throughout the liver, indeterminate LIRADS 3   4. Based on this exam only, the patient is within [place] criteria.  5. Persistent extrahepatic biliary dilatation with common bile duct  measuring up to 1.2 cm, slightly decreased since prior MRI when it  measured as 1.6 cm. No appreciable obstructing lesion.  6.  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    MR Brain 3/30/22  IMPRESSION:   1.  Large area of chronic right MCA territory infarct and adjacent gliosis including involvement of the right occipital lobe which likely explains reported visual symptoms.   2.  No acute intracranial process.   3.  Generalized brain atrophy and chronic small vessel ischemic changes elsewhere.    The patient's records and results personally reviewed by this provider.     Outside records reviewed from: Care Everywhere      Assessment      Alexandro Pool is a 67 year old male seen as a PAC referral for risk assessment and optimization for anesthesia.    Plan/Recommendations  Pt will be optimized for the proposed procedure.  See below for details on the assessment, risk, and preoperative recommendations    NEUROLOGY  Brain imaging from 3/2022 showed large area of chronic right MCA territory infarct and adjacent gliosis including involvement of the right occipital lobe. Patient reports being unaware of stroke, until found on imaging. Denies residual. No recurrence.  Denies seizure history.   -Post Op delirium risk factors:  High co-morbid index    ENT  - No current airway concerns.  Will need to be reassessed day of surgery.  Mallampati: Unable to assess  TM: Unable to assess   Upper/lower dentures    CARDIAC  Complex cardiovascular history as above. Patient denies chest pain, irregular HR or DYSPNEA ON EXERTION. He is in wheelchair with limited activity. Atorvastatin at HS. Will take amlodipine and metoprolol on DOS. Will hold lisinopril and spironolactone. ASA 81 mg on hold since 11/18/22. Chronically anticoagulated with Xarelto. Plan to hold two doses prior to surgery. Last EF 36% on echocardiogram from 2018.     - METS (Metabolic Equivalents)<4    RCRI: 11% risk of serious cardiac events    PULMONARY  Continued smoking 2  "PPD. Denies cough, shortness of breath or need for albuterol.   Intermediate risk for QUETA  - Tobacco History      History   Smoking Status     Every Day     Packs/day: 2.00     Years: 40.00     Types: Cigarettes   Smokeless Tobacco     Never       GI: Denies GERD.  Hepatitis C with sustained virologic response posttreatment  PONV Low Risk  Total Score: 0        /RENAL  - Baseline Creatinine 1.04    ENDOCRINE    - BMI: Estimated body mass index is 30.62 kg/m  as calculated from the following:    Height as of 10/5/22: 1.702 m (5' 7.01\").    Weight as of 10/5/22: 88.7 kg (195 lb 8.8 oz).  Obesity (BMI >30)  DIABETES MELLITUS II. Last A1c 7.2. Will take 80% of Lantus on DOS. Will hold Novolog and Victoza on DOS. Will begin holding Jardiance now (typically a 3 day hold). Average -150    HEME VTE risk 3%    Patient reports blood clots in his legs in past.   History of blood transfusion    MSK: Primarily in w/c. Right AKA      Different anesthesia methods/types have been discussed with the patient, but they are aware that the final plan will be decided by the assigned anesthesia provider on the date of service.    Patient was discussed with Dr Romero  Patient with cancer diagnosis will proceed to procedure without additional testing. This visit was at 3pm today and procedure is tomorrow.     AVS with information on surgery time/arrival time, meds and NPO status given by nursing staff.     Please refer to the physical examination documented by the anesthesiologist in the anesthesia record on the day of surgery.    Video-Visit Details    Type of service:  Video Visit    Provider received verbal consent for a Video Visit from the patient? Yes    Video Start Time: 2:52pm   Video End Time (time video stopped): 3:03pm     Originating Location (pt. Location): Home    Distant Location (provider location): Off-site    Mode of Communication:  Video Conference via [employer]  On the day of service:     Prep time: 22 " minutes  Visit time: 11 minutes  Documentation time: 25 minutes  ------------------------------------------  Total time: 58 minutes      JOHAN Gaitan CNS  Preoperative Assessment Center  [facility]  Phone: [phone]  Fax: [phone]

## 2022-11-23 ENCOUNTER — APPOINTMENT (OUTPATIENT)
Dept: INTERVENTIONAL RADIOLOGY/VASCULAR | Facility: CLINIC | Age: 67
End: 2022-11-23
Attending: RADIOLOGY
Payer: MEDICARE

## 2022-11-23 ENCOUNTER — HOSPITAL ENCOUNTER (OUTPATIENT)
Facility: CLINIC | Age: 67
Discharge: HOME | End: 2022-11-23
Attending: RADIOLOGY | Admitting: RADIOLOGY
Payer: MEDICARE

## 2022-11-23 ENCOUNTER — ANESTHESIA (OUTPATIENT)
Dept: SURGERY | Facility: CLINIC | Age: 67
End: 2022-11-23
Payer: MEDICARE

## 2022-11-23 VITALS
TEMPERATURE: 98.7 F | HEART RATE: 76 BPM | OXYGEN SATURATION: 97 % | DIASTOLIC BLOOD PRESSURE: 86 MMHG | RESPIRATION RATE: 16 BRPM | SYSTOLIC BLOOD PRESSURE: 143 MMHG

## 2022-11-23 DIAGNOSIS — Z91.89 LESION OF LIVER GREATER THAN 1 CM IN DIAMETER WITH LIVER DISEASE CONFERRING RISK OF HEPATOCELLULAR CARCINOMA: ICD-10-CM

## 2022-11-23 DIAGNOSIS — K76.9 LESION OF LIVER GREATER THAN 1 CM IN DIAMETER WITH LIVER DISEASE CONFERRING RISK OF HEPATOCELLULAR CARCINOMA: ICD-10-CM

## 2022-11-23 DIAGNOSIS — K74.60 CIRRHOSIS OF LIVER WITHOUT ASCITES, UNSPECIFIED HEPATIC CIRRHOSIS TYPE (H): ICD-10-CM

## 2022-11-23 LAB
ABO/RH(D): NORMAL
ALBUMIN SERPL BCG-MCNC: 4.1 G/DL (ref 3.5–5.2)
ALP SERPL-CCNC: 66 U/L (ref 40–129)
ALT SERPL W P-5'-P-CCNC: 10 U/L (ref 10–50)
ANION GAP SERPL CALCULATED.3IONS-SCNC: 14 MMOL/L (ref 7–15)
ANTIBODY SCREEN: NEGATIVE
AST SERPL W P-5'-P-CCNC: 26 U/L (ref 10–50)
BILIRUB DIRECT SERPL-MCNC: <0.2 MG/DL (ref 0–0.3)
BILIRUB SERPL-MCNC: 0.3 MG/DL
BUN SERPL-MCNC: 25.9 MG/DL (ref 8–23)
CALCIUM SERPL-MCNC: 9.2 MG/DL (ref 8.8–10.2)
CHLORIDE SERPL-SCNC: 102 MMOL/L (ref 98–107)
CREAT SERPL-MCNC: 0.93 MG/DL (ref 0.67–1.17)
DEPRECATED HCO3 PLAS-SCNC: 20 MMOL/L (ref 22–29)
ERYTHROCYTE [DISTWIDTH] IN BLOOD BY AUTOMATED COUNT: 15.5 % (ref 10–15)
GFR SERPL CREATININE-BSD FRML MDRD: 89 ML/MIN/1.73M2
GLUCOSE BLDC GLUCOMTR-MCNC: 149 MG/DL (ref 70–99)
GLUCOSE BLDC GLUCOMTR-MCNC: 212 MG/DL (ref 70–99)
GLUCOSE BLDC GLUCOMTR-MCNC: 217 MG/DL (ref 70–99)
GLUCOSE SERPL-MCNC: 145 MG/DL (ref 70–99)
HCT VFR BLD AUTO: 46.4 % (ref 40–53)
HGB BLD-MCNC: 15.6 G/DL (ref 13.3–17.7)
INR PPP: 1.09 (ref 0.85–1.15)
MCH RBC QN AUTO: 29.1 PG (ref 26.5–33)
MCHC RBC AUTO-ENTMCNC: 33.6 G/DL (ref 31.5–36.5)
MCV RBC AUTO: 86 FL (ref 78–100)
PLATELET # BLD AUTO: 148 10E3/UL (ref 150–450)
POTASSIUM SERPL-SCNC: 4.7 MMOL/L (ref 3.4–5.3)
PROT SERPL-MCNC: 7.6 G/DL (ref 6.4–8.3)
RBC # BLD AUTO: 5.37 10E6/UL (ref 4.4–5.9)
SODIUM SERPL-SCNC: 136 MMOL/L (ref 136–145)
SPECIMEN EXPIRATION DATE: NORMAL
WBC # BLD AUTO: 6.5 10E3/UL (ref 4–11)

## 2022-11-23 PROCEDURE — 250N000011 HC RX IP 250 OP 636: Performed by: NURSE PRACTITIONER

## 2022-11-23 PROCEDURE — 250N000013 HC RX MED GY IP 250 OP 250 PS 637: Performed by: ANESTHESIOLOGY

## 2022-11-23 PROCEDURE — 85027 COMPLETE CBC AUTOMATED: CPT | Performed by: NURSE PRACTITIONER

## 2022-11-23 PROCEDURE — 710N000010 HC RECOVERY PHASE 1, LEVEL 2, PER MIN

## 2022-11-23 PROCEDURE — 77013 CT GUIDE FOR TISSUE ABLATION: CPT | Mod: 26 | Performed by: RADIOLOGY

## 2022-11-23 PROCEDURE — 85610 PROTHROMBIN TIME: CPT | Performed by: NURSE PRACTITIONER

## 2022-11-23 PROCEDURE — 999N000141 HC STATISTIC PRE-PROCEDURE NURSING ASSESSMENT

## 2022-11-23 PROCEDURE — 47382 PERCUT ABLATE LIVER RF: CPT | Mod: GC | Performed by: RADIOLOGY

## 2022-11-23 PROCEDURE — 250N000009 HC RX 250: Performed by: RADIOLOGY

## 2022-11-23 PROCEDURE — 250N000011 HC RX IP 250 OP 636: Performed by: REGISTERED NURSE

## 2022-11-23 PROCEDURE — 36415 COLL VENOUS BLD VENIPUNCTURE: CPT | Performed by: NURSE PRACTITIONER

## 2022-11-23 PROCEDURE — 250N000011 HC RX IP 250 OP 636: Performed by: ANESTHESIOLOGY

## 2022-11-23 PROCEDURE — 86850 RBC ANTIBODY SCREEN: CPT | Performed by: REGISTERED NURSE

## 2022-11-23 PROCEDURE — 82962 GLUCOSE BLOOD TEST: CPT | Mod: 91

## 2022-11-23 PROCEDURE — 250N000009 HC RX 250: Performed by: REGISTERED NURSE

## 2022-11-23 PROCEDURE — 258N000003 HC RX IP 258 OP 636: Performed by: ANESTHESIOLOGY

## 2022-11-23 PROCEDURE — 258N000003 HC RX IP 258 OP 636: Performed by: REGISTERED NURSE

## 2022-11-23 PROCEDURE — 47382 PERCUT ABLATE LIVER RF: CPT

## 2022-11-23 PROCEDURE — 250N000025 HC SEVOFLURANE, PER MIN

## 2022-11-23 PROCEDURE — 272N000504 HC NEEDLE CR4

## 2022-11-23 PROCEDURE — 710N000012 HC RECOVERY PHASE 2, PER MINUTE

## 2022-11-23 PROCEDURE — 86901 BLOOD TYPING SEROLOGIC RH(D): CPT | Performed by: REGISTERED NURSE

## 2022-11-23 PROCEDURE — 82248 BILIRUBIN DIRECT: CPT | Performed by: NURSE PRACTITIONER

## 2022-11-23 PROCEDURE — 370N000017 HC ANESTHESIA TECHNICAL FEE, PER MIN

## 2022-11-23 RX ORDER — ONDANSETRON 2 MG/ML
4 INJECTION INTRAMUSCULAR; INTRAVENOUS EVERY 30 MIN PRN
Status: DISCONTINUED | OUTPATIENT
Start: 2022-11-23 | End: 2022-11-23 | Stop reason: HOSPADM

## 2022-11-23 RX ORDER — CEFAZOLIN SODIUM/WATER 2 G/20 ML
2 SYRINGE (ML) INTRAVENOUS
Status: COMPLETED | OUTPATIENT
Start: 2022-11-23 | End: 2022-11-23

## 2022-11-23 RX ORDER — HYDROMORPHONE HYDROCHLORIDE 1 MG/ML
0.4 INJECTION, SOLUTION INTRAMUSCULAR; INTRAVENOUS; SUBCUTANEOUS EVERY 5 MIN PRN
Status: DISCONTINUED | OUTPATIENT
Start: 2022-11-23 | End: 2022-11-23 | Stop reason: HOSPADM

## 2022-11-23 RX ORDER — EPHEDRINE SULFATE 50 MG/ML
INJECTION, SOLUTION INTRAMUSCULAR; INTRAVENOUS; SUBCUTANEOUS PRN
Status: DISCONTINUED | OUTPATIENT
Start: 2022-11-23 | End: 2022-11-23

## 2022-11-23 RX ORDER — LIDOCAINE HYDROCHLORIDE 10 MG/ML
1-30 INJECTION, SOLUTION EPIDURAL; INFILTRATION; INTRACAUDAL; PERINEURAL
Status: COMPLETED | OUTPATIENT
Start: 2022-11-23 | End: 2022-11-23

## 2022-11-23 RX ORDER — DEXTROSE MONOHYDRATE 25 G/50ML
25-50 INJECTION, SOLUTION INTRAVENOUS
Status: DISCONTINUED | OUTPATIENT
Start: 2022-11-23 | End: 2022-11-23 | Stop reason: HOSPADM

## 2022-11-23 RX ORDER — FENTANYL CITRATE 50 UG/ML
25 INJECTION, SOLUTION INTRAMUSCULAR; INTRAVENOUS EVERY 5 MIN PRN
Status: DISCONTINUED | OUTPATIENT
Start: 2022-11-23 | End: 2022-11-23 | Stop reason: HOSPADM

## 2022-11-23 RX ORDER — SODIUM CHLORIDE 9 MG/ML
INJECTION, SOLUTION INTRAVENOUS CONTINUOUS
Status: DISCONTINUED | OUTPATIENT
Start: 2022-11-23 | End: 2022-11-23 | Stop reason: HOSPADM

## 2022-11-23 RX ORDER — CEFAZOLIN SODIUM/WATER 2 G/20 ML
2 SYRINGE (ML) INTRAVENOUS SEE ADMIN INSTRUCTIONS
Status: DISCONTINUED | OUTPATIENT
Start: 2022-11-23 | End: 2022-11-23 | Stop reason: HOSPADM

## 2022-11-23 RX ORDER — DIAZEPAM 10 MG/2ML
2.5 INJECTION, SOLUTION INTRAMUSCULAR; INTRAVENOUS
Status: DISCONTINUED | OUTPATIENT
Start: 2022-11-23 | End: 2022-11-23 | Stop reason: HOSPADM

## 2022-11-23 RX ORDER — MEPERIDINE HYDROCHLORIDE 25 MG/ML
12.5 INJECTION INTRAMUSCULAR; INTRAVENOUS; SUBCUTANEOUS
Status: DISCONTINUED | OUTPATIENT
Start: 2022-11-23 | End: 2022-11-23 | Stop reason: HOSPADM

## 2022-11-23 RX ORDER — HYDRALAZINE HYDROCHLORIDE 20 MG/ML
2.5-5 INJECTION INTRAMUSCULAR; INTRAVENOUS EVERY 10 MIN PRN
Status: DISCONTINUED | OUTPATIENT
Start: 2022-11-23 | End: 2022-11-23 | Stop reason: HOSPADM

## 2022-11-23 RX ORDER — SODIUM CHLORIDE, SODIUM LACTATE, POTASSIUM CHLORIDE, CALCIUM CHLORIDE 600; 310; 30; 20 MG/100ML; MG/100ML; MG/100ML; MG/100ML
INJECTION, SOLUTION INTRAVENOUS CONTINUOUS PRN
Status: DISCONTINUED | OUTPATIENT
Start: 2022-11-23 | End: 2022-11-23

## 2022-11-23 RX ORDER — HYDROMORPHONE HYDROCHLORIDE 1 MG/ML
0.2 INJECTION, SOLUTION INTRAMUSCULAR; INTRAVENOUS; SUBCUTANEOUS EVERY 5 MIN PRN
Status: DISCONTINUED | OUTPATIENT
Start: 2022-11-23 | End: 2022-11-23 | Stop reason: HOSPADM

## 2022-11-23 RX ORDER — OXYCODONE HYDROCHLORIDE 5 MG/1
5 TABLET ORAL EVERY 6 HOURS PRN
Qty: 12 TABLET | Refills: 0 | Status: SHIPPED | OUTPATIENT
Start: 2022-11-23 | End: 2024-01-05

## 2022-11-23 RX ORDER — FENTANYL CITRATE 50 UG/ML
25 INJECTION, SOLUTION INTRAMUSCULAR; INTRAVENOUS
Status: CANCELLED | OUTPATIENT
Start: 2022-11-23

## 2022-11-23 RX ORDER — SODIUM CHLORIDE, SODIUM LACTATE, POTASSIUM CHLORIDE, CALCIUM CHLORIDE 600; 310; 30; 20 MG/100ML; MG/100ML; MG/100ML; MG/100ML
INJECTION, SOLUTION INTRAVENOUS CONTINUOUS
Status: DISCONTINUED | OUTPATIENT
Start: 2022-11-23 | End: 2022-11-23 | Stop reason: HOSPADM

## 2022-11-23 RX ORDER — PROPOFOL 10 MG/ML
INJECTION, EMULSION INTRAVENOUS PRN
Status: DISCONTINUED | OUTPATIENT
Start: 2022-11-23 | End: 2022-11-23

## 2022-11-23 RX ORDER — ONDANSETRON 2 MG/ML
INJECTION INTRAMUSCULAR; INTRAVENOUS PRN
Status: DISCONTINUED | OUTPATIENT
Start: 2022-11-23 | End: 2022-11-23

## 2022-11-23 RX ORDER — FENTANYL CITRATE 50 UG/ML
50 INJECTION, SOLUTION INTRAMUSCULAR; INTRAVENOUS EVERY 5 MIN PRN
Status: DISCONTINUED | OUTPATIENT
Start: 2022-11-23 | End: 2022-11-23 | Stop reason: HOSPADM

## 2022-11-23 RX ORDER — NICOTINE POLACRILEX 4 MG
15-30 LOZENGE BUCCAL
Status: DISCONTINUED | OUTPATIENT
Start: 2022-11-23 | End: 2022-11-23 | Stop reason: HOSPADM

## 2022-11-23 RX ORDER — ONDANSETRON 2 MG/ML
4 INJECTION INTRAMUSCULAR; INTRAVENOUS ONCE
Status: DISCONTINUED | OUTPATIENT
Start: 2022-11-23 | End: 2022-11-23 | Stop reason: HOSPADM

## 2022-11-23 RX ORDER — HALOPERIDOL 5 MG/ML
1 INJECTION INTRAMUSCULAR
Status: DISCONTINUED | OUTPATIENT
Start: 2022-11-23 | End: 2022-11-23 | Stop reason: HOSPADM

## 2022-11-23 RX ORDER — ONDANSETRON 4 MG/1
4 TABLET, ORALLY DISINTEGRATING ORAL EVERY 30 MIN PRN
Status: DISCONTINUED | OUTPATIENT
Start: 2022-11-23 | End: 2022-11-23 | Stop reason: HOSPADM

## 2022-11-23 RX ORDER — FENTANYL CITRATE 50 UG/ML
INJECTION, SOLUTION INTRAMUSCULAR; INTRAVENOUS PRN
Status: DISCONTINUED | OUTPATIENT
Start: 2022-11-23 | End: 2022-11-23

## 2022-11-23 RX ORDER — DEXAMETHASONE SODIUM PHOSPHATE 4 MG/ML
INJECTION, SOLUTION INTRA-ARTICULAR; INTRALESIONAL; INTRAMUSCULAR; INTRAVENOUS; SOFT TISSUE PRN
Status: DISCONTINUED | OUTPATIENT
Start: 2022-11-23 | End: 2022-11-23

## 2022-11-23 RX ADMIN — Medication 50 MG: at 08:34

## 2022-11-23 RX ADMIN — FENTANYL CITRATE 25 MCG: 50 INJECTION, SOLUTION INTRAMUSCULAR; INTRAVENOUS at 12:36

## 2022-11-23 RX ADMIN — PROPOFOL 30 MG: 10 INJECTION, EMULSION INTRAVENOUS at 08:36

## 2022-11-23 RX ADMIN — NOREPINEPHRINE BITARTRATE 6.4 MCG: 1 INJECTION, SOLUTION, CONCENTRATE INTRAVENOUS at 09:01

## 2022-11-23 RX ADMIN — Medication 10 MG: at 09:16

## 2022-11-23 RX ADMIN — PHENYLEPHRINE HYDROCHLORIDE 100 MCG: 10 INJECTION INTRAVENOUS at 09:18

## 2022-11-23 RX ADMIN — FENTANYL CITRATE 100 MCG: 50 INJECTION, SOLUTION INTRAMUSCULAR; INTRAVENOUS at 08:33

## 2022-11-23 RX ADMIN — Medication 20 MG: at 09:39

## 2022-11-23 RX ADMIN — PHENYLEPHRINE HYDROCHLORIDE 0.5 MCG/KG/MIN: 10 INJECTION INTRAVENOUS at 09:35

## 2022-11-23 RX ADMIN — Medication 20 MG: at 11:29

## 2022-11-23 RX ADMIN — FENTANYL CITRATE 50 MCG: 50 INJECTION, SOLUTION INTRAMUSCULAR; INTRAVENOUS at 11:42

## 2022-11-23 RX ADMIN — ONDANSETRON 4 MG: 2 INJECTION INTRAMUSCULAR; INTRAVENOUS at 11:54

## 2022-11-23 RX ADMIN — Medication 2 G: at 08:42

## 2022-11-23 RX ADMIN — LIDOCAINE HYDROCHLORIDE 6 ML: 10 INJECTION, SOLUTION EPIDURAL; INFILTRATION; INTRACAUDAL; PERINEURAL at 10:29

## 2022-11-23 RX ADMIN — SUGAMMADEX 200 MG: 100 INJECTION, SOLUTION INTRAVENOUS at 11:57

## 2022-11-23 RX ADMIN — PHENYLEPHRINE HYDROCHLORIDE 100 MCG: 10 INJECTION INTRAVENOUS at 09:01

## 2022-11-23 RX ADMIN — DEXAMETHASONE SODIUM PHOSPHATE 6 MG: 4 INJECTION, SOLUTION INTRA-ARTICULAR; INTRALESIONAL; INTRAMUSCULAR; INTRAVENOUS; SOFT TISSUE at 08:34

## 2022-11-23 RX ADMIN — SODIUM CHLORIDE, POTASSIUM CHLORIDE, SODIUM LACTATE AND CALCIUM CHLORIDE: 600; 310; 30; 20 INJECTION, SOLUTION INTRAVENOUS at 08:24

## 2022-11-23 RX ADMIN — HYDROMORPHONE HYDROCHLORIDE 0.2 MG: 1 INJECTION, SOLUTION INTRAMUSCULAR; INTRAVENOUS; SUBCUTANEOUS at 13:19

## 2022-11-23 RX ADMIN — SODIUM CHLORIDE, POTASSIUM CHLORIDE, SODIUM LACTATE AND CALCIUM CHLORIDE: 600; 310; 30; 20 INJECTION, SOLUTION INTRAVENOUS at 12:59

## 2022-11-23 RX ADMIN — FENTANYL CITRATE 25 MCG: 50 INJECTION, SOLUTION INTRAMUSCULAR; INTRAVENOUS at 12:48

## 2022-11-23 RX ADMIN — SODIUM CHLORIDE, POTASSIUM CHLORIDE, SODIUM LACTATE AND CALCIUM CHLORIDE: 600; 310; 30; 20 INJECTION, SOLUTION INTRAVENOUS at 11:07

## 2022-11-23 RX ADMIN — Medication 15 MG: at 09:27

## 2022-11-23 RX ADMIN — PROPOFOL 90 MG: 10 INJECTION, EMULSION INTRAVENOUS at 08:33

## 2022-11-23 RX ADMIN — FENTANYL CITRATE 25 MCG: 50 INJECTION, SOLUTION INTRAMUSCULAR; INTRAVENOUS at 13:09

## 2022-11-23 RX ADMIN — HUMAN INSULIN 3 UNITS: 100 INJECTION, SOLUTION SUBCUTANEOUS at 12:50

## 2022-11-23 RX ADMIN — FENTANYL CITRATE 25 MCG: 50 INJECTION, SOLUTION INTRAMUSCULAR; INTRAVENOUS at 13:03

## 2022-11-23 RX ADMIN — FENTANYL CITRATE 50 MCG: 50 INJECTION, SOLUTION INTRAMUSCULAR; INTRAVENOUS at 11:01

## 2022-11-23 RX ADMIN — Medication 10 MG: at 10:34

## 2022-11-23 ASSESSMENT — ACTIVITIES OF DAILY LIVING (ADL)
ADLS_ACUITY_SCORE: 37
ADLS_ACUITY_SCORE: 39

## 2022-11-23 NOTE — ANESTHESIA PROCEDURE NOTES
Airway       Patient location during procedure: OR       Procedure Start/Stop Times: 11/23/2022 8:37 AM  Staff -        Anesthesiologist:  David Martinez MD       CRNA: Pamela Foreman APRN CRNA       Performed By: CRNA  Consent for Airway        Urgency: elective  Indications and Patient Condition       Indications for airway management: deangelo-procedural       Induction type:intravenous       Mask difficulty assessment: 2 - vent by mask + OA or adjuvant +/- NMBA    Final Airway Details       Final airway type: endotracheal airway       Successful airway: ETT - single  Endotracheal Airway Details        ETT size (mm): 8.0       Cuffed: yes       Successful intubation technique: direct laryngoscopy       DL Blade Type: MAC 3       Grade View of Cords: 1       Adjucts: stylet       Position: Right       Measured from: lips       Secured at (cm): 23       Bite block used: None    Post intubation assessment        Placement verified by: capnometry, equal breath sounds and chest rise        Number of attempts at approach: 1       Number of other approaches attempted: 0       Secured with: plastic tape and other (comment) (tube tamer)       Ease of procedure: easy       Dentition: Intact and Unchanged    Medication(s) Administered   Medication Administration Time: 11/23/2022 8:37 AM

## 2022-11-23 NOTE — OP NOTE
Pt has Darlyn, a neighbor stay overnight once he is back at the assisted living. I called and verified.     Pt ride is [employer]. All pt stated that had to be done is to call and they show up within the hour. (Usually)  Phone # [phone]     Spoke with Dr. Martinez, notifying that patient is unaware of the medications he took this morning. The nursing staff does not arrive until 0900. The medication list that was faxed over did not provide information on last date of medication received. He said he was okay with that.

## 2022-11-23 NOTE — ANESTHESIA CARE TRANSFER NOTE
Patient: Alexandro Pool    Procedure: Procedure(s):  ANESTHESIA OUT OF OR CT Guided Liver Ablation @0800       Diagnosis: Lesion of liver greater than 1 cm in diameter with liver disease conferring risk of hepatocellular carcinoma [K76.9, Z91.89]  Diagnosis Additional Information: No value filed.    Anesthesia Type:   General     Note:    Oropharynx: oropharynx clear of all foreign objects and spontaneously breathing  Level of Consciousness: awake  Oxygen Supplementation: face mask  Level of Supplemental Oxygen (L/min / FiO2): 10  Independent Airway: airway patency satisfactory and stable  Dentition: dentition unchanged  Vital Signs Stable: post-procedure vital signs reviewed and stable  Report to RN Given: handoff report given  Patient transferred to: PACU    Handoff Report: Identifed the Patient, Identified the Reponsible Provider, Reviewed the pertinent medical history, Discussed the surgical course, Reviewed Intra-OP anesthesia mangement and issues during anesthesia, Set expectations for post-procedure period and Allowed opportunity for questions and acknowledgement of understanding      Vitals:  Vitals Value Taken Time   /86 11/23/22 1230   Temp     Pulse 75 11/23/22 1238   Resp 22 11/23/22 1226   SpO2 99 % 11/23/22 1238   Vitals shown include unvalidated device data.    Electronically Signed By: JOHAN Michael CRNA  November 23, 2022  12:39 PM

## 2022-11-23 NOTE — INTERVAL H&P NOTE
I have reviewed the surgical (or preoperative) H&P that is linked to this encounter, and examined the patient. There are no significant changes    Clinical Conditions Present on Arrival:  Clinically Significant Risk Factors Present on Admission                  # Drug Induced Coagulation Defect: home medication list includes an anticoagulant medication   # DMII: A1C = 7.2 % (Ref range: 0.0 - 5.6 %) within past 3 months

## 2022-11-23 NOTE — ANESTHESIA POSTPROCEDURE EVALUATION
Patient: Alexandro Pool    Procedure: Procedure(s):  ANESTHESIA OUT OF OR CT Guided Liver Ablation @0800       Anesthesia Type:  General    Note:  Disposition: Inpatient   Postop Pain Control: Uneventful            Sign Out: Well controlled pain   PONV: No   Neuro/Psych: Uneventful            Sign Out: Acceptable/Baseline neuro status   Airway/Respiratory: Uneventful            Sign Out: Acceptable/Baseline resp. status   CV/Hemodynamics: Uneventful            Sign Out: Acceptable CV status; No obvious hypovolemia; No obvious fluid overload   Other NRE:             Error/ Injury: Positioning Injury   DID A NON-ROUTINE EVENT OCCUR? YES    Event details/Postop Comments:  Patient's arm fell out of arm wrapping when table was moved,  resulting in a  small skin tear and blood blister on hand near other already extant bruising related to patients blood thinners.  Patient had no complaints or movement/neurologic abnormalities on exam.           Last vitals:  Vitals Value Taken Time   /86 11/23/22 1230   Temp     Pulse 81 11/23/22 1234   Resp 22 11/23/22 1226   SpO2 99 % 11/23/22 1234   Vitals shown include unvalidated device data.    Electronically Signed By: Daivd Martinez MD  November 23, 2022  12:35 PM

## 2022-11-23 NOTE — DISCHARGE INSTRUCTIONS
[facility]  Same-Day Surgery   Adult Discharge Orders & Instructions     For 24 hours after surgery    Get plenty of rest.  A responsible adult must stay with you for at least 24 hours after you leave the hospital.   Do not drive or use heavy equipment.  If you have weakness or tingling, don't drive or use heavy equipment until this feeling goes away.  Do not drink alcohol.  Avoid strenuous or risky activities.  Ask for help when climbing stairs.   You may feel lightheaded.  IF so, sit for a few minutes before standing.  Have someone help you get up.   If you have nausea (feel sick to your stomach): Drink only clear liquids such as apple juice, ginger ale, broth or 7-Up.  Rest may also help.  Be sure to drink enough fluids.  Move to a regular diet as you feel able.  You may have a slight fever. Call the doctor if your fever is over 100 F (37.7 C) (taken under the tongue) or lasts longer than 24 hours.  You may have a dry mouth, a sore throat, muscle aches or trouble sleeping.  These should go away after 24 hours.  Do not make important or legal decisions.   Call your doctor for any of the followin.  Signs of infection (fever, growing tenderness at the surgery site, a large amount of drainage or bleeding, severe pain, foul-smelling drainage, redness, swelling).    2. It has been over 8 to 10 hours since surgery and you are still not able to urinate (pass water).    3.  Headache for over 24 hours.      '   [phone] and ask for the resident on call for IR (answered 24 hours a day)  '   Emergency Department:    [facility]: [phone]       (TTY for hearing impaired: [phone])

## 2022-11-23 NOTE — IR NOTE
Patient Name: Alexandro Pool  Medical Record Number: [id number]  Today's Date: 11/23/2022    Procedure: Image guided liver lesion ablation  Proceduralist: Dr. Olivera, Dr. Warren  Pathology present: na    Procedure Start: 0924  Procedure end: 1152  Sedation medications administered: General Anesthesia     Report given to: PACU  : sandra    Other Notes: Pt arrived to IR room CT2 from . Consent reviewed. Pt denies any questions or concerns regarding procedure. Pt positioned supine and monitored per protocol.    Pt tolerated procedure without any noted complications.   Puncture sites (3) CDI.    Pt transferred to PACU.

## 2022-11-23 NOTE — OR NURSING
Patient blood sugar 212 upon arrival to pacu. WAQAS Martinez was notified and he gave a verbal order to give 3 units of regular insulin IV.      1250: 3 units regular insulin has been given, will continue to monitor patient.

## 2022-11-23 NOTE — PROCEDURES
[facility]    Procedure: IR Procedure Note    Date/Time: 11/23/2022 12:01 PM  Performed by: Myron Warren MD  Authorized by: Ana Olivera MD       UNIVERSAL PROTOCOL   Site Marked: NA  Prior Images Obtained and Reviewed:  Yes  Required items: Required blood products, implants, devices and special equipment available    Patient identity confirmed:  Verbally with patient, arm band, provided demographic data and hospital-assigned identification number  Patient was reevaluated immediately before administering moderate or deep sedation or anesthesia  Confirmation Checklist:  Patient's identity using two indicators, relevant allergies, procedure was appropriate and matched the consent or emergent situation and correct equipment/implants were available  Time out: Immediately prior to the procedure a time out was called    Universal Protocol: the Joint Commission Universal Protocol was followed    Preparation: Patient was prepped and draped in usual sterile fashion       ANESTHESIA    Anesthesia: Local infiltration  Local Anesthetic:  Lidocaine 1% without epinephrine      SEDATION  Patient Sedated: Yes    Sedation:  Fentanyl and midazolam  Vital signs: Vital signs monitored during sedation    See dictated procedure note for full details.  Findings: CT guided liver lesion ablation    Specimens: none    Complications: None    Condition: Stable    Plan: CT guided liver lesion ablation      PROCEDURE  Describe Procedure: CT guided liver lesion ablation  Patient Tolerance:  Patient tolerated the procedure well with no immediate complications  Length of time physician/provider present for 1:1 monitoring during sedation: 60

## 2022-12-01 ENCOUNTER — TELEPHONE (OUTPATIENT)
Dept: RADIOLOGY | Facility: CLINIC | Age: 67
End: 2022-12-01

## 2022-12-01 DIAGNOSIS — Z91.89 LESION OF LIVER GREATER THAN 1 CM IN DIAMETER WITH LIVER DISEASE CONFERRING RISK OF HEPATOCELLULAR CARCINOMA: Primary | ICD-10-CM

## 2022-12-01 DIAGNOSIS — K76.9 LESION OF LIVER GREATER THAN 1 CM IN DIAMETER WITH LIVER DISEASE CONFERRING RISK OF HEPATOCELLULAR CARCINOMA: Primary | ICD-10-CM

## 2022-12-01 DIAGNOSIS — R97.8 OTHER ABNORMAL TUMOR MARKERS: ICD-10-CM

## 2022-12-01 NOTE — TELEPHONE ENCOUNTER
Called and spoke with Alexandro regarding his procedure on 12/23. Reports he is feeling good. Does reports mild abdominal muscle soreness. Denies any post op fevers, nausea or vomiting. Abdominal incisions remain clean dry and intact. Discussed plan for follow up. Patient is scheduled for a repeat MRI on 12/27 and visit with Dr. Szymanski on 1/20.     Jazmín PACE RN, BSN   Interventional Radiology [facility]   [phone]

## 2022-12-27 ENCOUNTER — ANCILLARY PROCEDURE (OUTPATIENT)
Dept: MRI IMAGING | Facility: CLINIC | Age: 67
End: 2022-12-27
Payer: MEDICARE

## 2022-12-27 ENCOUNTER — LAB (OUTPATIENT)
Dept: LAB | Facility: CLINIC | Age: 67
End: 2022-12-27
Attending: RADIOLOGY
Payer: MEDICARE

## 2022-12-27 DIAGNOSIS — K76.9 LESION OF LIVER GREATER THAN 1 CM IN DIAMETER WITH LIVER DISEASE CONFERRING RISK OF HEPATOCELLULAR CARCINOMA: ICD-10-CM

## 2022-12-27 DIAGNOSIS — K74.60 CIRRHOSIS OF LIVER WITHOUT ASCITES, UNSPECIFIED HEPATIC CIRRHOSIS TYPE (H): ICD-10-CM

## 2022-12-27 DIAGNOSIS — R97.8 OTHER ABNORMAL TUMOR MARKERS: ICD-10-CM

## 2022-12-27 DIAGNOSIS — Z91.89 LESION OF LIVER GREATER THAN 1 CM IN DIAMETER WITH LIVER DISEASE CONFERRING RISK OF HEPATOCELLULAR CARCINOMA: ICD-10-CM

## 2022-12-27 LAB
ALBUMIN SERPL BCG-MCNC: 4.2 G/DL (ref 3.5–5.2)
ALP SERPL-CCNC: 81 U/L (ref 40–129)
ALT SERPL W P-5'-P-CCNC: 17 U/L (ref 10–50)
ANION GAP SERPL CALCULATED.3IONS-SCNC: 11 MMOL/L (ref 7–15)
AST SERPL W P-5'-P-CCNC: 26 U/L (ref 10–50)
BILIRUB DIRECT SERPL-MCNC: <0.2 MG/DL (ref 0–0.3)
BILIRUB SERPL-MCNC: 0.5 MG/DL
BUN SERPL-MCNC: 26.2 MG/DL (ref 8–23)
CALCIUM SERPL-MCNC: 9.7 MG/DL (ref 8.8–10.2)
CHLORIDE SERPL-SCNC: 101 MMOL/L (ref 98–107)
CREAT SERPL-MCNC: 0.93 MG/DL (ref 0.67–1.17)
DEPRECATED HCO3 PLAS-SCNC: 24 MMOL/L (ref 22–29)
ERYTHROCYTE [DISTWIDTH] IN BLOOD BY AUTOMATED COUNT: 15.3 % (ref 10–15)
GFR SERPL CREATININE-BSD FRML MDRD: 89 ML/MIN/1.73M2
GLUCOSE SERPL-MCNC: 159 MG/DL (ref 70–99)
HCT VFR BLD AUTO: 45.9 % (ref 40–53)
HGB BLD-MCNC: 14.7 G/DL (ref 13.3–17.7)
INR PPP: 1.25 (ref 0.85–1.15)
MCH RBC QN AUTO: 28.1 PG (ref 26.5–33)
MCHC RBC AUTO-ENTMCNC: 32 G/DL (ref 31.5–36.5)
MCV RBC AUTO: 88 FL (ref 78–100)
PLATELET # BLD AUTO: 139 10E3/UL (ref 150–450)
POTASSIUM SERPL-SCNC: 4.8 MMOL/L (ref 3.4–5.3)
PROT SERPL-MCNC: 8.1 G/DL (ref 6.4–8.3)
RBC # BLD AUTO: 5.24 10E6/UL (ref 4.4–5.9)
SODIUM SERPL-SCNC: 136 MMOL/L (ref 136–145)
WBC # BLD AUTO: 5.6 10E3/UL (ref 4–11)

## 2022-12-27 PROCEDURE — 82248 BILIRUBIN DIRECT: CPT

## 2022-12-27 PROCEDURE — G1010 CDSM STANSON: HCPCS | Mod: GC | Performed by: RADIOLOGY

## 2022-12-27 PROCEDURE — 85027 COMPLETE CBC AUTOMATED: CPT

## 2022-12-27 PROCEDURE — 85610 PROTHROMBIN TIME: CPT

## 2022-12-27 PROCEDURE — 86301 IMMUNOASSAY TUMOR CA 19-9: CPT

## 2022-12-27 PROCEDURE — 74183 MRI ABD W/O CNTR FLWD CNTR: CPT | Mod: MG | Performed by: RADIOLOGY

## 2022-12-27 PROCEDURE — 82310 ASSAY OF CALCIUM: CPT

## 2022-12-27 PROCEDURE — A9585 GADOBUTROL INJECTION: HCPCS | Performed by: RADIOLOGY

## 2022-12-27 PROCEDURE — 36415 COLL VENOUS BLD VENIPUNCTURE: CPT

## 2022-12-27 RX ORDER — GADOBUTROL 604.72 MG/ML
10 INJECTION INTRAVENOUS ONCE
Status: COMPLETED | OUTPATIENT
Start: 2022-12-27 | End: 2022-12-27

## 2022-12-27 RX ADMIN — GADOBUTROL 9 ML: 604.72 INJECTION INTRAVENOUS at 14:26

## 2022-12-27 NOTE — NURSING NOTE
Chief Complaint   Patient presents with     Blood Draw     Labs drawn via PIV start by RN.      Labs drawn with PIV start by rn.  Pt tolerated well.  VS taken and pt checked in for next appt.    Evangelist Dela Cruz RN

## 2022-12-27 NOTE — DISCHARGE INSTRUCTIONS
MRI Contrast Discharge Instructions    The IV contrast you received today will pass out of your body in your  urine. This will happen in the next 24 hours. You will not feel this process.  Your urine will not change color.    Drink at least 4 extra glasses of water or juice today (unless your doctor  has restricted your fluids). This reduces the stress on your kidneys.  You may take your regular medicines.    If you are on dialysis: It is best to have dialysis today.    If you have a reaction: Most reactions happen right away. If you have  any new symptoms after leaving the hospital (such as hives or swelling),  call your hospital at the correct number below. Or call your family doctor.  If you have breathing distress or wheezing, call 911.    Special instructions: ***    I have read and understand the above information.    Signature:______________________________________ Date:___________    Staff:__________________________________________ Date:___________     Time:__________    [facility] [facility] Departments:    ___[place]: [phone]  ___[facility]: [phone]  ___[place]: [phone] ___[facility]: [phone]  ___[facility]: [phone]  ___[facility]: [phone]  ___[phone]  ___[facility]: [phone]  ___[phone]

## 2022-12-29 LAB — CANCER AG19-9 SERPL IA-ACNC: 22 U/ML

## 2023-01-15 ENCOUNTER — HEALTH MAINTENANCE LETTER (OUTPATIENT)
Age: 68
End: 2023-01-15

## 2023-01-17 NOTE — PROGRESS NOTES
Interventional Radiology Clinic Visit    Date of this visit: 1/20/2023    Alexandro Pool returns for follow up post percutaneous microwave ablation of intrahepatic glandular carcinoma.    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 Had small skin tear on left arm at time of ablation from arm brushing against CT gantry.     Other pertinent medical history includes coronary artery disease, ischemic cardiomyopathy, diabetes, hypertension, peripheral vascular disease status post right above-knee amputation and left femoral to tibial bypass with subsequent left femoral angioplasty, and current daily smoker.    He presents for follow-up after ablation today.    States he had some abdominal pain in RUQ for a week - used some oxycodone, and pain resolved. No new or recurrent pain. No fevers, No nausea or vomiting.    Currently feels well and had no concerns. Had a fall recently with multiple skin tears and bruising on arms and leg that are healing.    Review of Systems:    As above    Past Medical/Surgical History:    Past Medical History:   Diagnosis Date     Acute kidney failure, unspecified (H)      Blood transfusion      CAD (coronary artery disease)      CARDIOVASCULAR SCREENING; LDL GOAL LESS THAN 100      Cirrhosis (H)      Critical lower limb ischemia (H)      Diabetes mellitus (H) 10/2011     Hypertension      Hypertension goal BP (blood pressure) < 140/90      Ischemic cardiomyopathy      Lesion of liver      Lymphedema of left leg 05/11/2016     Peripheral arterial disease (H)      PVD (peripheral vascular disease) (H)      Right above knee amputation 04/30/2014     Type 2 diabetes, HbA1C goal < 8% (H)      Past  Surgical History:   Procedure Laterality Date     AMPUTATE LEG ABOVE KNEE  11/22/2011    Procedure:AMPUTATE LEG ABOVE KNEE; Revise Right Below Knee Amputation To Above Knee Amputation; Surgeon:MADISON WELLS; Location:[facility] OR     AMPUTATE LEG BELOW KNEE  11/10/2011    Procedure:AMPUTATE LEG BELOW KNEE; Right Below Knee Amputation; Surgeon:MADISON WELLS; Location:[facility] OR     BYPASS GRAFT FEMOROTIBIAL  9/19/2013    Procedure: BYPASS GRAFT FEMOROTIBIAL;  Left Femorotibial Bypass Graft Insitu ;  Surgeon: Marisol Suggs MD;  Location: [facility] OR     CARDIAC SURGERY      cardiac stent     ESOPHAGOSCOPY, GASTROSCOPY, DUODENOSCOPY (EGD), COMBINED  10/1/2012    Procedure: COMBINED ESOPHAGOSCOPY, GASTROSCOPY, DUODENOSCOPY (EGD);;  Surgeon: Nehemias Cevallos MD;  Location: [facility] GI     ESOPHAGOSCOPY, GASTROSCOPY, DUODENOSCOPY (EGD), COMBINED N/A 3/24/2016    Procedure: COMBINED ESOPHAGOSCOPY, GASTROSCOPY, DUODENOSCOPY (EGD);  Surgeon: Gildardo Marks MD;  [facility]     IR LIVER BIOPSY PERCUTANEOUS  10/5/2022     IR LOWER EXTREMITY ANGIOGRAM LEFT  12/2/2021     IR STENT VASCULAR LT  3/9/2012          IRRIGATION AND DEBRIDEMENT LOWER EXTREMITY, COMBINED  11/15/2011    Procedure:COMBINED IRRIGATION AND DEBRIDEMENT LOWER EXTREMITY; DRAINAGE OF ABSCESS AT BELOW KNEE AMPUTATION AND KNEE DISARTICULATION.; Surgeon:MADISON WELLS; Location:[facility] OR     NO HISTORY OF SURGERY  7/12/13    derm     VASCULAR REPAIR LOWER EXTREMITY Left 9/19/2017    Procedure: VASCULAR REPAIR LOWER EXTREMITY;  Ligation Parasitic Branch To Left Lower Extremity ;  Surgeon: Marisol Suggs MD;  Location: [facility] OR       Current Medications:    Current Outpatient Medications   Medication Sig Dispense Refill     acetaminophen (TYLENOL) 325 MG tablet Take 1 tablet by mouth every 4 hours as needed. 250 tablet 0     albuterol (PROAIR HFA/PROVENTIL HFA/VENTOLIN HFA) 108 (90 Base) MCG/ACT inhaler Inhale 2 puffs into the lungs every 4 hours as needed for shortness of breath /  dyspnea or wheezing       albuterol (PROVENTIL) (2.5 MG/3ML) 0.083% neb solution Take 2.5 mg by nebulization every 4 hours as needed for shortness of breath / dyspnea or wheezing       amLODIPine (NORVASC) 2.5 MG tablet Take 1 tablet (2.5 mg) by mouth 2 times daily 180 tablet 3     ammonium lactate (AMLACTIN) 12 % external cream Apply to affected area on both legs twice daily as needed for dry skin       ASPIRIN EC PO Take 81 mg by mouth daily (Patient not taking: Reported on 11/21/2022)       atorvastatin (LIPITOR) 80 MG tablet Take 1 tablet (80 mg) by mouth At Bedtime 90 tablet 3     blood glucose (NO BRAND SPECIFIED) test strip Use  to test 4 times a day.       calcitonin, salmon, (MIACALCIN) 200 UNIT/ACT nasal spray Spray 1 spray into one nostril alternating nostrils daily       gabapentin (NEURONTIN) 100 MG capsule Take 1 capsule (100 mg) by mouth 3 times daily (Patient not taking: Reported on 11/21/2022) 90 capsule 0     gabapentin (NEURONTIN) 300 MG capsule Take 600 mg in AM   Take 300 mg in afternoon   Take 600 mg at bedtime  May also take an additional 300 mg if needed at bedtime for pain       GLOBAL EASY GLIDE PEN NEEDLES 32G X 4 MM miscellaneous USE 4X DAILY OR AS DIRECTED. 100 each 11     hydrocortisone 1 % cream Apply topically 2 times daily PRN       insulin aspart (NOVOLOG FLEXPEN) 100 UNIT/ML pen Inject 19 Units Subcutaneous 2 times daily (with meals) AM and 4 PM       insulin glargine (LANTUS PEN) 100 UNIT/ML pen Inject 32 Units Subcutaneous 2 times daily (Patient taking differently: Inject 32 Units Subcutaneous At Bedtime) 60 mL 3     insulin lispro (HUMALOG KWIKPEN) 100 UNIT/ML (1 unit dial) KWIKPEN Inject 18 Units Subcutaneous 3 times daily (before meals) (Patient not taking: Reported on 11/21/2022) 50 mL 3     JARDIANCE 25 MG TABS tablet TAKE 1 TABLET (25 MG) BY MOUTH DAILY 90 tablet 3     lidocaine (XYLOCAINE) 5 % external ointment Apply 1-3 g topically 2 times daily as needed (RLE residual  limb pain)       lidocaine-prilocaine (EMLA) 2.5-2.5 % external cream Apply 1-2 g topically 2 times daily as needed for moderate pain (4-6) (RLE residual limb)       liraglutide (VICTOZA) 18 MG/3ML solution Inject 1.8 mg Subcutaneous daily       lisinopril (ZESTRIL) 2.5 MG tablet TAKE 1 TABLET (2.5 MG) BY MOUTH 2 TIMES DAILY 180 tablet 3     metoprolol succinate ER (TOPROL XL) 50 MG 24 hr tablet Take 1.5 tablets (75 mg) by mouth daily 135 tablet 3     naloxone (NARCAN) 4 MG/0.1ML nasal spray Spray 4 mg into one nostril alternating nostrils as needed for opioid reversal every 2-3 minutes until assistance arrives       order for DME Equipment being ordered: FlexiTouch pneumatic compression device.  2-3 times per day to left lower extremity.  Current strength - L4 1 Units      oxyCODONE (ROXICODONE) 5 MG tablet Take 1 tablet (5 mg) by mouth every 6 hours as needed for pain 12 tablet 0     rivaroxaban ANTICOAGULANT (XARELTO ANTICOAGULANT) 2.5 MG TABS tablet Take 1 tablet (2.5 mg) by mouth 2 times daily 180 tablet 3     senna-docusate (SENOKOT-S/PERICOLACE) 8.6-50 MG tablet Take 1 tablet by mouth At Bedtime       spironolactone (ALDACTONE) 25 MG tablet Take 12.5 mg by mouth daily         Allergies:    Ciprofloxacin    Family History:    Family History   Problem Relation Age of Onset     Alcohol/Drug Mother         cirrhosis     Alcohol/Drug Father      C.A.D. No family hx of      Diabetes No family hx of      Cancer No family hx of      Anesthesia Reaction No family hx of      Clotting Disorder No family hx of        Social History:    Lives in assisted living.    Social History     Socioeconomic History     Marital status:      Number of children: 0   Occupational History     Employer: UNKNOWN   Tobacco Use     Smoking status: Every Day     Packs/day: 2.00     Years: 40.00     Pack years: 80.00     Types: Cigarettes     Smokeless tobacco: Never     Tobacco comments:     Is not working on quitting   Substance and  "Sexual Activity     Alcohol use: Yes     Alcohol/week: 1.0 standard drink     Types: 1 Standard drinks or equivalent per week     Comment: \"Once a year maybe\"     Drug use: No     Sexual activity: Not Currently   Other Topics Concern     Parent/sibling w/ CABG, MI or angioplasty before 65F 55M? No     Caffeine Concern Yes     Exercise No       Physical Exam:    /81 (BP Location: Right arm, Patient Position: Sitting, Cuff Size: Adult Large)   Pulse 75   Temp 97.7  F (36.5  C) (Oral)   Resp 16   SpO2 99%      GENERAL APPEARANCE: healthy, alert and no distress. Smells of cigarette smoke.  PSYCHIATRIC: mentation appears normal and affect normal.  NEURO: normal speech and movements  EYES: No jaundice.  SKIN: No jaundice.   ABDOMEN:  RUQ soft, nontender, mild old bruising at ablation entry site.  MUSCULOSKELETAL: Left arm skin healed well.    Laboratory Studies:    Lab Results   Component Value Date    HGB 14.7 12/27/2022    HGB 13.3 07/27/2020     Lab Results   Component Value Date     12/27/2022     07/27/2020     Lab Results   Component Value Date    WBC 5.6 12/27/2022    WBC 6.9 07/27/2020       Lab Results   Component Value Date    INR 1.25 12/27/2022    INR 1.00 08/16/2018       Lab Results   Component Value Date    PROTTOTAL 8.1 12/27/2022    PROTTOTAL 8.1 07/27/2020      Lab Results   Component Value Date    ALBUMIN 4.2 12/27/2022    ALBUMIN 3.7 09/27/2022    ALBUMIN 3.2 07/27/2020     Lab Results   Component Value Date    BILITOTAL 0.5 12/27/2022    BILITOTAL 0.4 07/27/2020     Lab Results   Component Value Date    BILICONJ 0.0 04/03/2014      Lab Results   Component Value Date    ALKPHOS 81 12/27/2022    ALKPHOS 70 07/27/2020     Lab Results   Component Value Date    AST 26 12/27/2022    AST 18 07/27/2020     Lab Results   Component Value Date    ALT 17 12/27/2022    ALT 17 07/27/2020       Lab Results   Component Value Date    CR 0.93 12/27/2022    CR 0.89 09/02/2020     Lab Results "   Component Value Date    BUN 26.2 12/27/2022    BUN 18 09/27/2022    BUN 17 09/02/2020       CA 19-9 12/27/22 = 22      Imaging:     I personally reviewed and interpreted the MRI from 12/27/22. It shows the ablation zone in segment 6, extending into segment 7, with no evidence of residual tumor.  No new or worrisome liver lesions.    ASSESSMENT/PLAN:      Alexandro Pool is a 67 year old male who is s/p percutaneous microwave ablation of a solitary biopsy-proven intrahepatic cholangiocarcinoma in segment 6 on 11/23/22. He made a good recovery. Imaging shows the tumor is treated with no evidence of residual tumor, and no new lesions elsewhere in the liver.  I showed him the imaging and discussed the findings.     I will see him again in 3 months with a new MRI liver, staging CT chest and clinic visit.  He expressed understanding and agreed with this plan.        It was a pleasure seeing the patient.     SignedAna M.D.    Interventional Radiology  Department of Radiology  [facility]  Patient Care Team:  Tricia Ramirez MD as PCP - General  Lizeth, Darwin CHAVIS MD as Referring Physician (Family Practice)  Nehemias Cevallos MD as MD (Gastroenterology)  Roger Belle, Giovani Sagastume MD as MD (Vascular Medicine)  Giovani Olson MD as Assigned Heart and Vascular Provider  Nehemias Cevallos MD as Assigned Surgical Provider  Paula Ramires APRN CNS as Clinical Nurse Specialist (Anesthesiology)             25 minutes spent on the date of the encounter doing chart review, history and exam, imaging review, documentation and further activities per the note.

## 2023-01-20 ENCOUNTER — OFFICE VISIT (OUTPATIENT)
Dept: RADIOLOGY | Facility: CLINIC | Age: 68
End: 2023-01-20
Attending: RADIOLOGY
Payer: MEDICARE

## 2023-01-20 VITALS
TEMPERATURE: 97.7 F | HEART RATE: 75 BPM | OXYGEN SATURATION: 99 % | SYSTOLIC BLOOD PRESSURE: 121 MMHG | DIASTOLIC BLOOD PRESSURE: 81 MMHG | RESPIRATION RATE: 16 BRPM

## 2023-01-20 DIAGNOSIS — K74.60 CIRRHOSIS OF LIVER (H): Primary | ICD-10-CM

## 2023-01-20 DIAGNOSIS — K76.9 LESION OF LIVER GREATER THAN 1 CM IN DIAMETER WITH LIVER DISEASE CONFERRING RISK OF HEPATOCELLULAR CARCINOMA: ICD-10-CM

## 2023-01-20 DIAGNOSIS — Z91.89 LESION OF LIVER GREATER THAN 1 CM IN DIAMETER WITH LIVER DISEASE CONFERRING RISK OF HEPATOCELLULAR CARCINOMA: ICD-10-CM

## 2023-01-20 PROCEDURE — G0463 HOSPITAL OUTPT CLINIC VISIT: HCPCS

## 2023-01-20 PROCEDURE — 99213 OFFICE O/P EST LOW 20 MIN: CPT | Performed by: RADIOLOGY

## 2023-01-20 ASSESSMENT — PAIN SCALES - GENERAL: PAINLEVEL: NO PAIN (0)

## 2023-01-20 NOTE — LETTER
1/20/2023         RE: Alexandro Pool  [address]  [place] [address]        Dear Colleague,    Thank you for referring your patient, Alexandro Pool, to the [facility]. Please see a copy of my visit note below.        Interventional Radiology Clinic Visit    Date of this visit: 1/20/2023    Alexandro Pool returns for follow up post percutaneous microwave ablation of intrahepatic glandular carcinoma.    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 Had small skin tear on left arm at time of ablation from arm brushing against CT gantry.     Other pertinent medical history includes coronary artery disease, ischemic cardiomyopathy, diabetes, hypertension, peripheral vascular disease status post right above-knee amputation and left femoral to tibial bypass with subsequent left femoral angioplasty, and current daily smoker.    He presents for follow-up after ablation today.    States he had some abdominal pain in RUQ for a week - used some oxycodone, and pain resolved. No new or recurrent pain. No fevers, No nausea or vomiting.    Currently feels well and had no concerns. Had a fall recently with multiple skin tears and bruising on arms and leg that are healing.    Review of Systems:    As above    Past Medical/Surgical History:    Past Medical History:   Diagnosis Date     Acute kidney failure, unspecified (H)      Blood transfusion      CAD (coronary artery disease)      CARDIOVASCULAR SCREENING; LDL GOAL LESS THAN 100      Cirrhosis (H)      Critical lower limb ischemia (H)      Diabetes mellitus (H) 10/2011     Hypertension      Hypertension goal BP (blood pressure) <  140/90      Ischemic cardiomyopathy      Lesion of liver      Lymphedema of left leg 05/11/2016     Peripheral arterial disease (H)      PVD (peripheral vascular disease) (H)      Right above knee amputation 04/30/2014     Type 2 diabetes, HbA1C goal < 8% (H)      Past Surgical History:   Procedure Laterality Date     AMPUTATE LEG ABOVE KNEE  11/22/2011    Procedure:AMPUTATE LEG ABOVE KNEE; Revise Right Below Knee Amputation To Above Knee Amputation; Surgeon:MADISON WELLS; Location:[facility] OR     AMPUTATE LEG BELOW KNEE  11/10/2011    Procedure:AMPUTATE LEG BELOW KNEE; Right Below Knee Amputation; Surgeon:MADISON WELLS; Location:[facility] OR     BYPASS GRAFT FEMOROTIBIAL  9/19/2013    Procedure: BYPASS GRAFT FEMOROTIBIAL;  Left Femorotibial Bypass Graft Insitu ;  Surgeon: Marisol Suggs MD;  Location: [facility] OR     CARDIAC SURGERY      cardiac stent     ESOPHAGOSCOPY, GASTROSCOPY, DUODENOSCOPY (EGD), COMBINED  10/1/2012    Procedure: COMBINED ESOPHAGOSCOPY, GASTROSCOPY, DUODENOSCOPY (EGD);;  Surgeon: Nehemias Cevallos MD;  Location:  GI     ESOPHAGOSCOPY, GASTROSCOPY, DUODENOSCOPY (EGD), COMBINED N/A 3/24/2016    Procedure: COMBINED ESOPHAGOSCOPY, GASTROSCOPY, DUODENOSCOPY (EGD);  Surgeon: Gildardo Marks MD;  Location:  GI     IR LIVER BIOPSY PERCUTANEOUS  10/5/2022     IR LOWER EXTREMITY ANGIOGRAM LEFT  12/2/2021     IR STENT VASCULAR LT  3/9/2012          IRRIGATION AND DEBRIDEMENT LOWER EXTREMITY, COMBINED  11/15/2011    Procedure:COMBINED IRRIGATION AND DEBRIDEMENT LOWER EXTREMITY; DRAINAGE OF ABSCESS AT BELOW KNEE AMPUTATION AND KNEE DISARTICULATION.; Surgeon:MADISON WELLS; Location:[facility] OR     NO HISTORY OF SURGERY  7/12/13    derm     VASCULAR REPAIR LOWER EXTREMITY Left 9/19/2017    Procedure: VASCULAR REPAIR LOWER EXTREMITY;  Ligation Parasitic Branch To Left Lower Extremity ;  Surgeon: Marisol Suggs MD;  Location: [facility] OR       Current Medications:    Current Outpatient Medications   Medication Sig Dispense Refill      acetaminophen (TYLENOL) 325 MG tablet Take 1 tablet by mouth every 4 hours as needed. 250 tablet 0     albuterol (PROAIR HFA/PROVENTIL HFA/VENTOLIN HFA) 108 (90 Base) MCG/ACT inhaler Inhale 2 puffs into the lungs every 4 hours as needed for shortness of breath / dyspnea or wheezing       albuterol (PROVENTIL) (2.5 MG/3ML) 0.083% neb solution Take 2.5 mg by nebulization every 4 hours as needed for shortness of breath / dyspnea or wheezing       amLODIPine (NORVASC) 2.5 MG tablet Take 1 tablet (2.5 mg) by mouth 2 times daily 180 tablet 3     ammonium lactate (AMLACTIN) 12 % external cream Apply to affected area on both legs twice daily as needed for dry skin       ASPIRIN EC PO Take 81 mg by mouth daily (Patient not taking: Reported on 11/21/2022)       atorvastatin (LIPITOR) 80 MG tablet Take 1 tablet (80 mg) by mouth At Bedtime 90 tablet 3     blood glucose (NO BRAND SPECIFIED) test strip Use  to test 4 times a day.       calcitonin, salmon, (MIACALCIN) 200 UNIT/ACT nasal spray Spray 1 spray into one nostril alternating nostrils daily       gabapentin (NEURONTIN) 100 MG capsule Take 1 capsule (100 mg) by mouth 3 times daily (Patient not taking: Reported on 11/21/2022) 90 capsule 0     gabapentin (NEURONTIN) 300 MG capsule Take 600 mg in AM   Take 300 mg in afternoon   Take 600 mg at bedtime  May also take an additional 300 mg if needed at bedtime for pain       GLOBAL EASY GLIDE PEN NEEDLES 32G X 4 MM miscellaneous USE 4X DAILY OR AS DIRECTED. 100 each 11     hydrocortisone 1 % cream Apply topically 2 times daily PRN       insulin aspart (NOVOLOG FLEXPEN) 100 UNIT/ML pen Inject 19 Units Subcutaneous 2 times daily (with meals) AM and 4 PM       insulin glargine (LANTUS PEN) 100 UNIT/ML pen Inject 32 Units Subcutaneous 2 times daily (Patient taking differently: Inject 32 Units Subcutaneous At Bedtime) 60 mL 3     insulin lispro (HUMALOG KWIKPEN) 100 UNIT/ML (1 unit dial) KWIKPEN Inject 18 Units Subcutaneous 3 times  daily (before meals) (Patient not taking: Reported on 11/21/2022) 50 mL 3     JARDIANCE 25 MG TABS tablet TAKE 1 TABLET (25 MG) BY MOUTH DAILY 90 tablet 3     lidocaine (XYLOCAINE) 5 % external ointment Apply 1-3 g topically 2 times daily as needed (RLE residual limb pain)       lidocaine-prilocaine (EMLA) 2.5-2.5 % external cream Apply 1-2 g topically 2 times daily as needed for moderate pain (4-6) (RLE residual limb)       liraglutide (VICTOZA) 18 MG/3ML solution Inject 1.8 mg Subcutaneous daily       lisinopril (ZESTRIL) 2.5 MG tablet TAKE 1 TABLET (2.5 MG) BY MOUTH 2 TIMES DAILY 180 tablet 3     metoprolol succinate ER (TOPROL XL) 50 MG 24 hr tablet Take 1.5 tablets (75 mg) by mouth daily 135 tablet 3     naloxone (NARCAN) 4 MG/0.1ML nasal spray Spray 4 mg into one nostril alternating nostrils as needed for opioid reversal every 2-3 minutes until assistance arrives       order for DME Equipment being ordered: FlexiTouch pneumatic compression device.  2-3 times per day to left lower extremity.  Current strength - L4 1 Units      oxyCODONE (ROXICODONE) 5 MG tablet Take 1 tablet (5 mg) by mouth every 6 hours as needed for pain 12 tablet 0     rivaroxaban ANTICOAGULANT (XARELTO ANTICOAGULANT) 2.5 MG TABS tablet Take 1 tablet (2.5 mg) by mouth 2 times daily 180 tablet 3     senna-docusate (SENOKOT-S/PERICOLACE) 8.6-50 MG tablet Take 1 tablet by mouth At Bedtime       spironolactone (ALDACTONE) 25 MG tablet Take 12.5 mg by mouth daily         Allergies:    Ciprofloxacin    Family History:    Family History   Problem Relation Age of Onset     Alcohol/Drug Mother         cirrhosis     Alcohol/Drug Father      C.A.D. No family hx of      Diabetes No family hx of      Cancer No family hx of      Anesthesia Reaction No family hx of      Clotting Disorder No family hx of        Social History:    Lives in assisted living.    Social History     Socioeconomic History     Marital status:      Number of children: 0  "  Occupational History     Employer: UNKNOWN   Tobacco Use     Smoking status: Every Day     Packs/day: 2.00     Years: 40.00     Pack years: 80.00     Types: Cigarettes     Smokeless tobacco: Never     Tobacco comments:     Is not working on quitting   Substance and Sexual Activity     Alcohol use: Yes     Alcohol/week: 1.0 standard drink     Types: 1 Standard drinks or equivalent per week     Comment: \"Once a year maybe\"     Drug use: No     Sexual activity: Not Currently   Other Topics Concern     Parent/sibling w/ CABG, MI or angioplasty before 65F 55M? No     Caffeine Concern Yes     Exercise No       Physical Exam:    /81 (BP Location: Right arm, Patient Position: Sitting, Cuff Size: Adult Large)   Pulse 75   Temp 97.7  F (36.5  C) (Oral)   Resp 16   SpO2 99%      GENERAL APPEARANCE: healthy, alert and no distress. Smells of cigarette smoke.  PSYCHIATRIC: mentation appears normal and affect normal.  NEURO: normal speech and movements  EYES: No jaundice.  SKIN: No jaundice.   ABDOMEN:  RUQ soft, nontender, mild old bruising at ablation entry site.  MUSCULOSKELETAL: Left arm skin healed well.    Laboratory Studies:    Lab Results   Component Value Date    HGB 14.7 12/27/2022    HGB 13.3 07/27/2020     Lab Results   Component Value Date     12/27/2022     07/27/2020     Lab Results   Component Value Date    WBC 5.6 12/27/2022    WBC 6.9 07/27/2020       Lab Results   Component Value Date    INR 1.25 12/27/2022    INR 1.00 08/16/2018       Lab Results   Component Value Date    PROTTOTAL 8.1 12/27/2022    PROTTOTAL 8.1 07/27/2020      Lab Results   Component Value Date    ALBUMIN 4.2 12/27/2022    ALBUMIN 3.7 09/27/2022    ALBUMIN 3.2 07/27/2020     Lab Results   Component Value Date    BILITOTAL 0.5 12/27/2022    BILITOTAL 0.4 07/27/2020     Lab Results   Component Value Date    BILICONJ 0.0 04/03/2014      Lab Results   Component Value Date    ALKPHOS 81 12/27/2022    ALKPHOS 70 07/27/2020 "     Lab Results   Component Value Date    AST 26 12/27/2022    AST 18 07/27/2020     Lab Results   Component Value Date    ALT 17 12/27/2022    ALT 17 07/27/2020       Lab Results   Component Value Date    CR 0.93 12/27/2022    CR 0.89 09/02/2020     Lab Results   Component Value Date    BUN 26.2 12/27/2022    BUN 18 09/27/2022    BUN 17 09/02/2020       CA 19-9 12/27/22 = 22      Imaging:     I personally reviewed and interpreted the MRI from 12/27/22. It shows the ablation zone in segment 6, extending into segment 7, with no evidence of residual tumor.  No new or worrisome liver lesions.    ASSESSMENT/PLAN:      Alexandro Pool is a 67 year old male who is s/p percutaneous microwave ablation of a solitary biopsy-proven intrahepatic cholangiocarcinoma in segment 6 on 11/23/22. He made a good recovery. Imaging shows the tumor is treated with no evidence of residual tumor, and no new lesions elsewhere in the liver.  I showed him the imaging and discussed the findings.     I will see him again in 3 months with a new MRI liver, staging CT chest and clinic visit.  He expressed understanding and agreed with this plan.        It was a pleasure seeing the patient.     Signed,    Ana Szymanski M.D.    Interventional Radiology  Department of Radiology  [facility]  Patient Care Team:  Tricia Ramirez MD as PCP - General  Deal, Darwin CHAVIS MD as Referring Physician (Family Practice)  Nehemias Cevallos MD as MD (Gastroenterology)  Roger Belle NP Foley, Christopher Andrew, MD as MD (Vascular Medicine)  Giovani Olson MD as Assigned Heart and Vascular Provider  Nehemias Cevallos MD as Assigned Surgical Provider  Paula Ramires APRN CNS as Clinical Nurse Specialist (Anesthesiology)      25 minutes spent on the date of the encounter doing chart review, history and exam, imaging review, documentation and further activities per the note.

## 2023-01-20 NOTE — PATIENT INSTRUCTIONS
You were seen today in the IR Clinic by Dr Szymanski for follow up regarding post CT Liver Ablation.    Plan:    - Follow up in clinic in 3 months with imaging prior  - Repeat MRI liver and CT Chest prior to appointment.    Please call or send a MyChart with any questions or concerns.    Jazmín CORCORAN RNCC  Vascular and Interventional Radiology Care Coordinator  [phone]

## 2023-01-20 NOTE — NURSING NOTE
"Oncology Rooming Note    January 20, 2023 9:22 AM   Alexandro Pool is a 67 year old male who presents for:    Chief Complaint   Patient presents with     Oncology Clinic Visit     S/p Liver Ablation     Initial Vitals: /81 (BP Location: Right arm, Patient Position: Sitting, Cuff Size: Adult Large)   Pulse 75   Temp 97.7  F (36.5  C) (Oral)   Resp 16   SpO2 99%  Estimated body mass index is 30.62 kg/m  as calculated from the following:    Height as of 10/5/22: 1.702 m (5' 7.01\").    Weight as of 10/5/22: 88.7 kg (195 lb 8.8 oz). There is no height or weight on file to calculate BSA.  No Pain (0) Comment: Data Unavailable   No LMP for male patient.  Allergies reviewed: Yes  Medications reviewed: No  Unable to review    Medications: unknown if refills are needed  Pharmacy name entered into [facility]: [facility] - [place], [place] - [address]    Clinical concerns: Pt states he has no idea of the medications he takes.  He did not bring a list from his Assisted Living site.       Margi Jean LPN  1/20/2023              "

## 2023-02-06 ENCOUNTER — DOCUMENTATION ONLY (OUTPATIENT)
Dept: LAB | Facility: CLINIC | Age: 68
End: 2023-02-06
Payer: MEDICARE

## 2023-02-06 DIAGNOSIS — K74.60 CIRRHOSIS OF LIVER WITHOUT ASCITES, UNSPECIFIED HEPATIC CIRRHOSIS TYPE (H): Primary | ICD-10-CM

## 2023-02-07 ENCOUNTER — OFFICE VISIT (OUTPATIENT)
Dept: GASTROENTEROLOGY | Facility: CLINIC | Age: 68
End: 2023-02-07
Attending: INTERNAL MEDICINE
Payer: MEDICARE

## 2023-02-07 ENCOUNTER — LAB (OUTPATIENT)
Dept: LAB | Facility: CLINIC | Age: 68
End: 2023-02-07
Payer: MEDICARE

## 2023-02-07 VITALS
TEMPERATURE: 97.4 F | SYSTOLIC BLOOD PRESSURE: 117 MMHG | WEIGHT: 195 LBS | DIASTOLIC BLOOD PRESSURE: 61 MMHG | HEART RATE: 74 BPM | BODY MASS INDEX: 30.53 KG/M2

## 2023-02-07 DIAGNOSIS — K74.60 CIRRHOSIS OF LIVER WITHOUT ASCITES, UNSPECIFIED HEPATIC CIRRHOSIS TYPE (H): Primary | ICD-10-CM

## 2023-02-07 DIAGNOSIS — C22.8 MALIGNANT NEOPLASM OF LIVER, PRIMARY (H): ICD-10-CM

## 2023-02-07 DIAGNOSIS — K74.60 CIRRHOSIS OF LIVER WITHOUT ASCITES, UNSPECIFIED HEPATIC CIRRHOSIS TYPE (H): ICD-10-CM

## 2023-02-07 LAB
AFP SERPL-MCNC: 2.8 NG/ML
ALBUMIN SERPL BCG-MCNC: 4 G/DL (ref 3.5–5.2)
ALP SERPL-CCNC: 101 U/L (ref 40–129)
ALT SERPL W P-5'-P-CCNC: 22 U/L (ref 10–50)
ANION GAP SERPL CALCULATED.3IONS-SCNC: 11 MMOL/L (ref 7–15)
AST SERPL W P-5'-P-CCNC: 32 U/L (ref 10–50)
BILIRUB SERPL-MCNC: 0.4 MG/DL
BUN SERPL-MCNC: 20.9 MG/DL (ref 8–23)
CALCIUM SERPL-MCNC: 9.3 MG/DL (ref 8.8–10.2)
CHLORIDE SERPL-SCNC: 101 MMOL/L (ref 98–107)
CREAT SERPL-MCNC: 0.94 MG/DL (ref 0.67–1.17)
DEPRECATED HCO3 PLAS-SCNC: 22 MMOL/L (ref 22–29)
GFR SERPL CREATININE-BSD FRML MDRD: 88 ML/MIN/1.73M2
GLUCOSE SERPL-MCNC: 161 MG/DL (ref 70–99)
POTASSIUM SERPL-SCNC: 4.7 MMOL/L (ref 3.4–5.3)
PROT SERPL-MCNC: 7.7 G/DL (ref 6.4–8.3)
SODIUM SERPL-SCNC: 134 MMOL/L (ref 136–145)

## 2023-02-07 PROCEDURE — 80053 COMPREHEN METABOLIC PANEL: CPT | Performed by: PATHOLOGY

## 2023-02-07 PROCEDURE — 36415 COLL VENOUS BLD VENIPUNCTURE: CPT | Performed by: PATHOLOGY

## 2023-02-07 PROCEDURE — 99215 OFFICE O/P EST HI 40 MIN: CPT | Performed by: INTERNAL MEDICINE

## 2023-02-07 PROCEDURE — G0463 HOSPITAL OUTPT CLINIC VISIT: HCPCS | Performed by: INTERNAL MEDICINE

## 2023-02-07 PROCEDURE — 82105 ALPHA-FETOPROTEIN SERUM: CPT | Performed by: INTERNAL MEDICINE

## 2023-02-07 ASSESSMENT — PAIN SCALES - GENERAL: PAINLEVEL: NO PAIN (0)

## 2023-02-07 NOTE — LETTER
2/7/2023         RE: Alexandro Pool  [address]  [place] [address]        Dear Colleague,    Thank you for referring your patient, Alexandro Pool, to the [facility] [place]. Please see a copy of my visit note below.    HISTORY OF PRESENT ILLNESS:  I had the pleasure of seeing Alexandro Pool for followup in the Liver Clinic at the [facility] on 02/07/2023.  Mr. 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  His disease was complicated by the development of a liver lesion which on biopsy subsequently came back cholangiocarcinoma as opposed to hepatocellular carcinoma.  He does not check alpha fetoprotein or CA 19-9.  It was treated with ablation, which he tolerated well.    At present, he denies any abdominal pain, itching or skin rash, or fatigue.  He denies any increased abdominal girth or any lower extremity edema.  He has not had any gastrointestinal bleeding or any overt signs of hepatic encephalopathy.    He denies any fevers or chills, cough or shortness of breath.  He denies any nausea or vomiting or constipation.  He said he has had diarrhea for many, many years that has been stable.  He reports his appetite has been good and his weight has been stable.    There otherwise have been no other new events since he was last seen.    Current Outpatient Medications   Medication     acetaminophen (TYLENOL) 325 MG tablet     albuterol (PROAIR HFA/PROVENTIL HFA/VENTOLIN HFA) 108 (90 Base) MCG/ACT inhaler     albuterol (PROVENTIL) (2.5 MG/3ML) 0.083% neb solution     amLODIPine (NORVASC) 2.5 MG tablet     ammonium lactate (AMLACTIN) 12 % external cream     ASPIRIN EC PO     atorvastatin (LIPITOR) 80 MG tablet     blood glucose (NO BRAND SPECIFIED) test strip     calcitonin, salmon, (MIACALCIN) 200 UNIT/ACT nasal spray     gabapentin (NEURONTIN) 100 MG capsule     gabapentin (NEURONTIN) 300 MG capsule     GLOBAL  EASY GLIDE PEN NEEDLES 32G X 4 MM miscellaneous     hydrocortisone 1 % cream     insulin aspart (NOVOLOG FLEXPEN) 100 UNIT/ML pen     insulin glargine (LANTUS PEN) 100 UNIT/ML pen     insulin lispro (HUMALOG KWIKPEN) 100 UNIT/ML (1 unit dial) KWIKPEN     JARDIANCE 25 MG TABS tablet     lidocaine (XYLOCAINE) 5 % external ointment     lidocaine-prilocaine (EMLA) 2.5-2.5 % external cream     liraglutide (VICTOZA) 18 MG/3ML solution     lisinopril (ZESTRIL) 2.5 MG tablet     metoprolol succinate ER (TOPROL XL) 50 MG 24 hr tablet     naloxone (NARCAN) 4 MG/0.1ML nasal spray     order for DME     oxyCODONE (ROXICODONE) 5 MG tablet     rivaroxaban ANTICOAGULANT (XARELTO ANTICOAGULANT) 2.5 MG TABS tablet     senna-docusate (SENOKOT-S/PERICOLACE) 8.6-50 MG tablet     spironolactone (ALDACTONE) 25 MG tablet     No current facility-administered medications for this visit.     /61   Pulse 74   Temp 97.4  F (36.3  C)   Wt 88.5 kg (195 lb)   BMI 30.53 kg/m      PHYSICAL EXAMINATION:    GENERAL:  He looks largely unchanged.  HEENT:  No scleral icterus or temporal muscle wasting.  CHEST:  Clear.  ABDOMEN:  No increase in girth.  No masses or tenderness to palpation are present.  His liver is 10 cm in span without left lobe enlargement.  No spleen tip is palpable.  EXTREMITIES:  AKA on the right foot with no edema on the left.  SKIN:  No stigmata of chronic liver disease.  NEUROLOGIC:  No asterixis.    Recent Results (from the past 168 hour(s))   Comprehensive metabolic panel    Collection Time: 02/07/23 10:49 AM   Result Value Ref Range    Sodium 134 (L) 136 - 145 mmol/L    Potassium 4.7 3.4 - 5.3 mmol/L    Chloride 101 98 - 107 mmol/L    Carbon Dioxide (CO2) 22 22 - 29 mmol/L    Anion Gap 11 7 - 15 mmol/L    Urea Nitrogen 20.9 8.0 - 23.0 mg/dL    Creatinine 0.94 0.67 - 1.17 mg/dL    Calcium 9.3 8.8 - 10.2 mg/dL    Glucose 161 (H) 70 - 99 mg/dL    Alkaline Phosphatase 101 40 - 129 U/L    AST 32 10 - 50 U/L    ALT 22 10 -  50 U/L    Protein Total 7.7 6.4 - 8.3 g/dL    Albumin 4.0 3.5 - 5.2 g/dL    Bilirubin Total 0.4 <=1.2 mg/dL    GFR Estimate 88 >60 mL/min/1.73m2      I did review his most recent imaging study which was from December, which was 1 month after his ablation therapy, that showed a clean ablation cavity.  He tolerated the ablation without difficulty.    IMPRESSION:  Mr. 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  His disease is well compensated at this point in time, and his liver tests are completely normal.    He does have the liver tumor complicating his liver disease.  The biopsy showed adenocarcinoma, but he has no alpha fetoprotein or CA 19-9.  He is going to get another MRI in 2 months, and I will see him back in 6 months for repeat imaging and blood work.    He is up to date for the most part with regard to vaccines.  He could get a COVID-19 booster, which I have informed him of, and he will do that at some point in the not so distant future.  He is otherwise up to date with regard to screening for other complications of liver disease.    I did spend total of 40 minutes (on the date of the encounter), including 30 minutes of face-to-face clinic time including counseling. The rest of the time was spent in documentation and review of records.     Thank you very much for allowing me to participate in the care of this patient.  If you have any questions regarding recommendations, please do not hesitate to contact me.         Nehemias Cevallos MD      Professor of Medicine  [employer]      Executive Medical Director, Solid Organ Transplant Program  [facility]

## 2023-02-07 NOTE — PROGRESS NOTES
HISTORY OF PRESENT ILLNESS:  I had the pleasure of seeing Alexandro Pool for followup in the Liver Clinic at the [facility] on 02/07/2023.  Mr. 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  His disease was complicated by the development of a liver lesion which on biopsy subsequently came back cholangiocarcinoma as opposed to hepatocellular carcinoma.  He does not check alpha fetoprotein or CA 19-9.  It was treated with ablation, which he tolerated well.    At present, he denies any abdominal pain, itching or skin rash, or fatigue.  He denies any increased abdominal girth or any lower extremity edema.  He has not had any gastrointestinal bleeding or any overt signs of hepatic encephalopathy.    He denies any fevers or chills, cough or shortness of breath.  He denies any nausea or vomiting or constipation.  He said he has had diarrhea for many, many years that has been stable.  He reports his appetite has been good and his weight has been stable.    There otherwise have been no other new events since he was last seen.    Current Outpatient Medications   Medication     acetaminophen (TYLENOL) 325 MG tablet     albuterol (PROAIR HFA/PROVENTIL HFA/VENTOLIN HFA) 108 (90 Base) MCG/ACT inhaler     albuterol (PROVENTIL) (2.5 MG/3ML) 0.083% neb solution     amLODIPine (NORVASC) 2.5 MG tablet     ammonium lactate (AMLACTIN) 12 % external cream     ASPIRIN EC PO     atorvastatin (LIPITOR) 80 MG tablet     blood glucose (NO BRAND SPECIFIED) test strip     calcitonin, salmon, (MIACALCIN) 200 UNIT/ACT nasal spray     gabapentin (NEURONTIN) 100 MG capsule     gabapentin (NEURONTIN) 300 MG capsule     GLOBAL EASY GLIDE PEN NEEDLES 32G X 4 MM miscellaneous     hydrocortisone 1 % cream     insulin aspart (NOVOLOG FLEXPEN) 100 UNIT/ML pen     insulin glargine (LANTUS PEN) 100 UNIT/ML pen     insulin lispro (HUMALOG KWIKPEN) 100 UNIT/ML (1 unit dial) KWIKPEN     JARDIANCE 25 MG TABS  tablet     lidocaine (XYLOCAINE) 5 % external ointment     lidocaine-prilocaine (EMLA) 2.5-2.5 % external cream     liraglutide (VICTOZA) 18 MG/3ML solution     lisinopril (ZESTRIL) 2.5 MG tablet     metoprolol succinate ER (TOPROL XL) 50 MG 24 hr tablet     naloxone (NARCAN) 4 MG/0.1ML nasal spray     order for DME     oxyCODONE (ROXICODONE) 5 MG tablet     rivaroxaban ANTICOAGULANT (XARELTO ANTICOAGULANT) 2.5 MG TABS tablet     senna-docusate (SENOKOT-S/PERICOLACE) 8.6-50 MG tablet     spironolactone (ALDACTONE) 25 MG tablet     No current facility-administered medications for this visit.     /61   Pulse 74   Temp 97.4  F (36.3  C)   Wt 88.5 kg (195 lb)   BMI 30.53 kg/m      PHYSICAL EXAMINATION:    GENERAL:  He looks largely unchanged.  HEENT:  No scleral icterus or temporal muscle wasting.  CHEST:  Clear.  ABDOMEN:  No increase in girth.  No masses or tenderness to palpation are present.  His liver is 10 cm in span without left lobe enlargement.  No spleen tip is palpable.  EXTREMITIES:  AKA on the right foot with no edema on the left.  SKIN:  No stigmata of chronic liver disease.  NEUROLOGIC:  No asterixis.    Recent Results (from the past 168 hour(s))   Comprehensive metabolic panel    Collection Time: 02/07/23 10:49 AM   Result Value Ref Range    Sodium 134 (L) 136 - 145 mmol/L    Potassium 4.7 3.4 - 5.3 mmol/L    Chloride 101 98 - 107 mmol/L    Carbon Dioxide (CO2) 22 22 - 29 mmol/L    Anion Gap 11 7 - 15 mmol/L    Urea Nitrogen 20.9 8.0 - 23.0 mg/dL    Creatinine 0.94 0.67 - 1.17 mg/dL    Calcium 9.3 8.8 - 10.2 mg/dL    Glucose 161 (H) 70 - 99 mg/dL    Alkaline Phosphatase 101 40 - 129 U/L    AST 32 10 - 50 U/L    ALT 22 10 - 50 U/L    Protein Total 7.7 6.4 - 8.3 g/dL    Albumin 4.0 3.5 - 5.2 g/dL    Bilirubin Total 0.4 <=1.2 mg/dL    GFR Estimate 88 >60 mL/min/1.73m2      I did review his most recent imaging study which was from December, which was 1 month after his ablation therapy, that showed  a clean ablation cavity.  He tolerated the ablation without difficulty.    IMPRESSION:  Mr. 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  His disease is well compensated at this point in time, and his liver tests are completely normal.    He does have the liver tumor complicating his liver disease.  The biopsy showed adenocarcinoma, but he has no alpha fetoprotein or CA 19-9.  He is going to get another MRI in 2 months, and I will see him back in 6 months for repeat imaging and blood work.    He is up to date for the most part with regard to vaccines.  He could get a COVID-19 booster, which I have informed him of, and he will do that at some point in the not so distant future.  He is otherwise up to date with regard to screening for other complications of liver disease.    I did spend total of 40 minutes (on the date of the encounter), including 30 minutes of face-to-face clinic time including counseling. The rest of the time was spent in documentation and review of records.     Thank you very much for allowing me to participate in the care of this patient.  If you have any questions regarding recommendations, please do not hesitate to contact me.         Nehemias Cevallos MD      Professor of Medicine  [employer] [employer]      Executive Medical Director, Solid Organ Transplant Program  [facility]

## 2023-03-06 DIAGNOSIS — I73.9 PAD (PERIPHERAL ARTERY DISEASE) (H): ICD-10-CM

## 2023-03-06 DIAGNOSIS — E11.8 TYPE 2 DIABETES MELLITUS WITH COMPLICATION, WITH LONG-TERM CURRENT USE OF INSULIN (H): ICD-10-CM

## 2023-03-06 DIAGNOSIS — Z79.4 TYPE 2 DIABETES MELLITUS WITH COMPLICATION, WITH LONG-TERM CURRENT USE OF INSULIN (H): ICD-10-CM

## 2023-03-06 NOTE — TELEPHONE ENCOUNTER
JARDIANCE 25 MG TABS tablet  Last Written Prescription Date:  3/22/22  Last Fill Quantity: 90,  # refills: 3    Last office visit:  10/11/22  Follow up recommended:  October 2023    Rx transfer.    Berna Hoover RN BSN  [facility]  [phone]

## 2023-03-08 ENCOUNTER — TELEPHONE (OUTPATIENT)
Dept: GASTROENTEROLOGY | Facility: CLINIC | Age: 68
End: 2023-03-08
Payer: MEDICARE

## 2023-04-13 ENCOUNTER — TELEPHONE (OUTPATIENT)
Dept: OTHER | Facility: CLINIC | Age: 68
End: 2023-04-13
Payer: MEDICARE

## 2023-04-13 DIAGNOSIS — I73.9 PAD (PERIPHERAL ARTERY DISEASE) (H): ICD-10-CM

## 2023-04-13 RX ORDER — METOPROLOL SUCCINATE 50 MG/1
75 TABLET, EXTENDED RELEASE ORAL DAILY
Qty: 135 TABLET | Refills: 2 | Status: SHIPPED | OUTPATIENT
Start: 2023-04-13 | End: 2024-01-22

## 2023-04-13 NOTE — TELEPHONE ENCOUNTER
See orders only encounter today    Adrianna UNDERWOOD, RN    [facility]  Vascular [facility]  Office: [phone]  Fax: [phone]

## 2023-04-13 NOTE — TELEPHONE ENCOUNTER
[facility] VASCULAR [facility] CENTER    Who is the name of the provider?:  Dr. Olson    What is the location you see this provider at/preferred location?: [place]  Nurse call back needed:  Yes     Reason for call:  [facility] and the patient is at the pharmacy now.  Denise states they called back on 03/30/23 to get patient's metoprolol 75 mg refilled but they have not heard back.  Patient is at the pharmacy now to  his medication.  I told Denise that I do not see an encounter but will route this note to the nurse to try to get filled.      Note that this medication was previously filled by [facility], but the patient needs medication refilled at [facility], [address] [place] [address]

## 2023-04-13 NOTE — PROGRESS NOTES
"Last Written Prescription Date:  10/11/22  Last Fill Quantity: 135,  # refills: 3   Last office visit: 10/11/2022    \"(I10) Hypertension goal BP (blood pressure) < 140/90  Comment: at goal  Plan: no med changes\"  prescribing provider:  Dr. Olson   Future Office Visit:  Due 10/2023.     Order filled as continuance of previous order, pharmacy location only changed.     Called pt back, explained this, pt verbalized understanding.     YASMINE Davison, RN  [facility]  Office:  [phone] Fax: [phone]        "

## 2023-04-19 DIAGNOSIS — Z91.89 LESION OF LIVER GREATER THAN 1 CM IN DIAMETER WITH LIVER DISEASE CONFERRING RISK OF HEPATOCELLULAR CARCINOMA: ICD-10-CM

## 2023-04-19 DIAGNOSIS — K74.60 CIRRHOSIS OF LIVER (H): Primary | ICD-10-CM

## 2023-04-19 DIAGNOSIS — K76.9 LESION OF LIVER GREATER THAN 1 CM IN DIAMETER WITH LIVER DISEASE CONFERRING RISK OF HEPATOCELLULAR CARCINOMA: ICD-10-CM

## 2023-04-28 ENCOUNTER — ANCILLARY PROCEDURE (OUTPATIENT)
Dept: CT IMAGING | Facility: CLINIC | Age: 68
End: 2023-04-28
Attending: RADIOLOGY
Payer: MEDICARE

## 2023-04-28 ENCOUNTER — ANCILLARY PROCEDURE (OUTPATIENT)
Dept: MRI IMAGING | Facility: CLINIC | Age: 68
End: 2023-04-28
Attending: RADIOLOGY
Payer: MEDICARE

## 2023-04-28 DIAGNOSIS — Z91.89 LESION OF LIVER GREATER THAN 1 CM IN DIAMETER WITH LIVER DISEASE CONFERRING RISK OF HEPATOCELLULAR CARCINOMA: ICD-10-CM

## 2023-04-28 DIAGNOSIS — K74.60 CIRRHOSIS OF LIVER (H): ICD-10-CM

## 2023-04-28 DIAGNOSIS — K76.9 LESION OF LIVER GREATER THAN 1 CM IN DIAMETER WITH LIVER DISEASE CONFERRING RISK OF HEPATOCELLULAR CARCINOMA: ICD-10-CM

## 2023-04-28 PROCEDURE — 74183 MRI ABD W/O CNTR FLWD CNTR: CPT | Mod: MG | Performed by: STUDENT IN AN ORGANIZED HEALTH CARE EDUCATION/TRAINING PROGRAM

## 2023-04-28 PROCEDURE — G1010 CDSM STANSON: HCPCS | Performed by: STUDENT IN AN ORGANIZED HEALTH CARE EDUCATION/TRAINING PROGRAM

## 2023-04-28 PROCEDURE — A9585 GADOBUTROL INJECTION: HCPCS | Performed by: STUDENT IN AN ORGANIZED HEALTH CARE EDUCATION/TRAINING PROGRAM

## 2023-04-28 PROCEDURE — G1010 CDSM STANSON: HCPCS | Mod: GC | Performed by: RADIOLOGY

## 2023-04-28 PROCEDURE — 71250 CT THORAX DX C-: CPT | Mod: MG | Performed by: RADIOLOGY

## 2023-04-28 RX ORDER — GADOBUTROL 604.72 MG/ML
10 INJECTION INTRAVENOUS ONCE
Status: COMPLETED | OUTPATIENT
Start: 2023-04-28 | End: 2023-04-28

## 2023-04-28 RX ADMIN — GADOBUTROL 8.5 ML: 604.72 INJECTION INTRAVENOUS at 15:20

## 2023-04-28 NOTE — DISCHARGE INSTRUCTIONS
MRI Contrast Discharge Instructions    The IV contrast you received today will pass out of your body in your  urine. This will happen in the next 24 hours. You will not feel this process.  Your urine will not change color.    Drink at least 4 extra glasses of water or juice today (unless your doctor  has restricted your fluids). This reduces the stress on your kidneys.  You may take your regular medicines.    If you are on dialysis: It is best to have dialysis today.    If you have a reaction: Most reactions happen right away. If you have  any new symptoms after leaving the hospital (such as hives or swelling),  call your hospital at the correct number below. Or call your family doctor.  If you have breathing distress or wheezing, call 911.    Special instructions: ***    I have read and understand the above information.    Signature:______________________________________ Date:___________    Staff:__________________________________________ Date:___________     Time:__________    [place] [facility] Departments:    ___[place]: [phone]  ___[facility]: [phone]  ___[place]: [phone] ___[facility]: [phone]  ___[facility]: [phone]  ___[facility]: [phone]  ___[phone]  ___[facility]: [phone]  ___[phone]

## 2023-04-29 ENCOUNTER — HEALTH MAINTENANCE LETTER (OUTPATIENT)
Age: 68
End: 2023-04-29

## 2023-05-03 NOTE — PROGRESS NOTES
Interventional Radiology Clinic Visit    Date of this visit: 5/5/2023    Alexandro Pool returns for follow up post percutaneous microwave ablation of intrahepatic cholangiocarcinoma.    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     When I saw him for post ablation follow-up last time on 1/20/2023 he had recovered well and imaging showed successful ablation without evidence of residual tumor and no new lesions.    He presents for further follow-up today.    Today he states he feels very well. Has been sitting outside in the sun a lot enjoying the weather. No abdominal pain and swelling. No new illnesses or recent hospitalizations. No concerns at this time.      Review of Systems:    As above    Past Medical/Surgical History:    Past Medical History:   Diagnosis Date     Acute kidney failure, unspecified (H)      Blood transfusion      CAD (coronary artery disease)      CARDIOVASCULAR SCREENING; LDL GOAL LESS THAN 100      Cirrhosis (H)      Critical lower limb ischemia (H)      Diabetes mellitus (H) 10/2011     Hypertension      Hypertension goal BP (blood pressure) < 140/90      Ischemic cardiomyopathy      Lesion of liver      Lymphedema of left leg 05/11/2016     Peripheral arterial disease (H)      PVD (peripheral vascular disease) (H)      Right above knee amputation 04/30/2014     Type 2 diabetes, HbA1C goal < 8% (H)      Past Surgical History:   Procedure Laterality Date     AMPUTATE LEG ABOVE KNEE  11/22/2011    Procedure:AMPUTATE LEG ABOVE KNEE; Revise Right Below Knee Amputation To Above Knee Amputation; Surgeon:MADISON WELLS; Location:[facility] OR     AMPUTATE LEG BELOW KNEE  11/10/2011    Procedure:AMPUTATE  LEG BELOW KNEE; Right Below Knee Amputation; Surgeon:MADISON WELLS; Location[facility] OR     BYPASS GRAFT FEMOROTIBIAL  9/19/2013    Procedure: BYPASS GRAFT FEMOROTIBIAL;  Left Femorotibial Bypass Graft Insitu ;  Surgeon: Marisol Suggs MD;  Location: [facility] OR     CARDIAC SURGERY      cardiac stent     ESOPHAGOSCOPY, GASTROSCOPY, DUODENOSCOPY (EGD), COMBINED  10/1/2012    Procedure: COMBINED ESOPHAGOSCOPY, GASTROSCOPY, DUODENOSCOPY (EGD);;  Surgeon: Nehemias Cevallos MD;  Location: [facility] GI     ESOPHAGOSCOPY, GASTROSCOPY, DUODENOSCOPY (EGD), COMBINED N/A 3/24/2016    Procedure: COMBINED ESOPHAGOSCOPY, GASTROSCOPY, DUODENOSCOPY (EGD);  Surgeon: Gildardo Marks MD;  Location: [facility] GI     IR LIVER BIOPSY PERCUTANEOUS  10/5/2022     IR LOWER EXTREMITY ANGIOGRAM LEFT  12/2/2021     IR STENT VASCULAR LT  3/9/2012          IRRIGATION AND DEBRIDEMENT LOWER EXTREMITY, COMBINED  11/15/2011    Procedure:COMBINED IRRIGATION AND DEBRIDEMENT LOWER EXTREMITY; DRAINAGE OF ABSCESS AT BELOW KNEE AMPUTATION AND KNEE DISARTICULATION.; Surgeon:MADISON WELLS; Location:[facility] OR     NO HISTORY OF SURGERY  7/12/13    derm     VASCULAR REPAIR LOWER EXTREMITY Left 9/19/2017    Procedure: VASCULAR REPAIR LOWER EXTREMITY;  Ligation Parasitic Branch To Left Lower Extremity ;  Surgeon: Marisol Suggs MD;  Location: [facility] OR       Current Medications:    Current Outpatient Medications   Medication Sig Dispense Refill     acetaminophen (TYLENOL) 325 MG tablet Take 1 tablet by mouth every 4 hours as needed. 250 tablet 0     albuterol (PROAIR HFA/PROVENTIL HFA/VENTOLIN HFA) 108 (90 Base) MCG/ACT inhaler Inhale 2 puffs into the lungs every 4 hours as needed for shortness of breath / dyspnea or wheezing       albuterol (PROVENTIL) (2.5 MG/3ML) 0.083% neb solution Take 2.5 mg by nebulization every 4 hours as needed for shortness of breath / dyspnea or wheezing       amLODIPine (NORVASC) 2.5 MG tablet Take 1 tablet (2.5 mg) by mouth 2 times daily 180 tablet 3     ammonium  lactate (AMLACTIN) 12 % external cream Apply to affected area on both legs twice daily as needed for dry skin       ASPIRIN EC PO Take 81 mg by mouth daily (Patient not taking: Reported on 11/21/2022)       atorvastatin (LIPITOR) 80 MG tablet Take 1 tablet (80 mg) by mouth At Bedtime 90 tablet 3     blood glucose (NO BRAND SPECIFIED) test strip Use  to test 4 times a day.       calcitonin, salmon, (MIACALCIN) 200 UNIT/ACT nasal spray Spray 1 spray into one nostril alternating nostrils daily       empagliflozin (JARDIANCE) 25 MG TABS tablet Take 1 tablet (25 mg) by mouth daily 90 tablet 1     gabapentin (NEURONTIN) 100 MG capsule Take 1 capsule (100 mg) by mouth 3 times daily (Patient not taking: Reported on 11/21/2022) 90 capsule 0     gabapentin (NEURONTIN) 300 MG capsule Take 600 mg in AM   Take 300 mg in afternoon   Take 600 mg at bedtime  May also take an additional 300 mg if needed at bedtime for pain       GLOBAL EASY GLIDE PEN NEEDLES 32G X 4 MM miscellaneous USE 4X DAILY OR AS DIRECTED. 100 each 11     hydrocortisone 1 % cream Apply topically 2 times daily PRN       insulin aspart (NOVOLOG FLEXPEN) 100 UNIT/ML pen Inject 19 Units Subcutaneous 2 times daily (with meals) AM and 4 PM       insulin glargine (LANTUS PEN) 100 UNIT/ML pen Inject 32 Units Subcutaneous 2 times daily (Patient taking differently: Inject 32 Units Subcutaneous At Bedtime) 60 mL 3     insulin lispro (HUMALOG KWIKPEN) 100 UNIT/ML (1 unit dial) KWIKPEN Inject 18 Units Subcutaneous 3 times daily (before meals) (Patient not taking: Reported on 11/21/2022) 50 mL 3     lidocaine (XYLOCAINE) 5 % external ointment Apply 1-3 g topically 2 times daily as needed (RLE residual limb pain)       lidocaine-prilocaine (EMLA) 2.5-2.5 % external cream Apply 1-2 g topically 2 times daily as needed for moderate pain (4-6) (RLE residual limb)       liraglutide (VICTOZA) 18 MG/3ML solution Inject 1.8 mg Subcutaneous daily       lisinopril (ZESTRIL) 2.5 MG  "tablet TAKE 1 TABLET (2.5 MG) BY MOUTH 2 TIMES DAILY 180 tablet 3     metoprolol succinate ER (TOPROL XL) 50 MG 24 hr tablet Take 1.5 tablets (75 mg) by mouth daily 135 tablet 2     naloxone (NARCAN) 4 MG/0.1ML nasal spray Spray 4 mg into one nostril alternating nostrils as needed for opioid reversal every 2-3 minutes until assistance arrives       order for DME Equipment being ordered: FlexiTouch pneumatic compression device.  2-3 times per day to left lower extremity.  Current strength - L4 1 Units      oxyCODONE (ROXICODONE) 5 MG tablet Take 1 tablet (5 mg) by mouth every 6 hours as needed for pain 12 tablet 0     rivaroxaban ANTICOAGULANT (XARELTO ANTICOAGULANT) 2.5 MG TABS tablet Take 1 tablet (2.5 mg) by mouth 2 times daily 180 tablet 3     senna-docusate (SENOKOT-S/PERICOLACE) 8.6-50 MG tablet Take 1 tablet by mouth At Bedtime       spironolactone (ALDACTONE) 25 MG tablet Take 12.5 mg by mouth daily         Allergies:    Ciprofloxacin    Family History:    Family History   Problem Relation Age of Onset     Alcohol/Drug Mother         cirrhosis     Alcohol/Drug Father      C.A.D. No family hx of      Diabetes No family hx of      Cancer No family hx of      Anesthesia Reaction No family hx of      Clotting Disorder No family hx of        Social History:    Social History     Socioeconomic History     Marital status:      Number of children: 0   Occupational History     Employer: UNKNOWN   Tobacco Use     Smoking status: Every Day     Packs/day: 2.00     Years: 40.00     Pack years: 80.00     Types: Cigarettes     Smokeless tobacco: Never     Tobacco comments:     Is not working on quitting   Substance and Sexual Activity     Alcohol use: Yes     Alcohol/week: 1.0 standard drink of alcohol     Types: 1 Standard drinks or equivalent per week     Comment: \"Once a year maybe\"     Drug use: No     Sexual activity: Not Currently   Other Topics Concern     Parent/sibling w/ CABG, MI or angioplasty before 65F " 55M? No     Caffeine Concern Yes     Exercise No       Physical Exam:    BP (!) 141/74   Pulse 71   Temp 97.4  F (36.3  C) (Oral)   Resp 16   Wt 90 kg (198 lb 6.4 oz)   SpO2 98%   BMI 31.07 kg/m      GENERAL APPEARANCE: healthy, alert and no distress  PSYCHIATRIC: mentation appears normal and affect normal.  NEURO: normal speech and movements  EYES: No jaundice.  SKIN: No jaundice.   RESP: No cough or wheeze.  MUSCULOSKELETAL: Right AKA.    Laboratory Studies:    Lab Results   Component Value Date    HGB 14.9 05/05/2023    HGB 13.3 07/27/2020     Lab Results   Component Value Date     05/05/2023     07/27/2020     Lab Results   Component Value Date    WBC 5.0 05/05/2023    WBC 6.9 07/27/2020       Lab Results   Component Value Date    INR 1.44 05/05/2023    INR 1.00 08/16/2018       Lab Results   Component Value Date    PROTTOTAL 7.7 05/05/2023    PROTTOTAL 8.1 07/27/2020      Lab Results   Component Value Date    ALBUMIN 4.1 05/05/2023    ALBUMIN 3.7 09/27/2022    ALBUMIN 3.2 07/27/2020     Lab Results   Component Value Date    BILITOTAL 0.4 05/05/2023    BILITOTAL 0.4 07/27/2020     Lab Results   Component Value Date    BILICONJ 0.0 04/03/2014      Lab Results   Component Value Date    ALKPHOS 83 05/05/2023    ALKPHOS 70 07/27/2020     Lab Results   Component Value Date    AST 30 05/05/2023    AST 18 07/27/2020     Lab Results   Component Value Date    ALT 19 05/05/2023    ALT 17 07/27/2020       Lab Results   Component Value Date    CR 1.04 05/05/2023    CR 0.89 09/02/2020       Imaging:     I personally reviewed and interpreted the imaging as follows:    MRI abdomen 4/28/2023: Non-viable ablation site without evidence of local recurrence.  No evidence of new or worrisome liver lesion.    CT chest 4/28/2023: No evidence of metastatic disease.    ASSESSMENT/PLAN:      Alexandro Pool is a 67 year old male who is s/p percutaneous microwave ablation of a solitary biopsy-proven intrahepatic  cholangiocarcinoma in segment 6 on 11/23/22. He made a good recovery. Imaging shows the tumor is well-treated with no evidence of viable tumor, and no new lesions elsewhere in the liver. Recent staging CT chest shows no evidence of metastatic disease.  We discussed the imaging findings with him.  I discussed my recommendation that his long-term ongoing follow-up be with medical oncology.  He is in agreement with this.  He does not currently have an oncologist and would like to stay in our system so we will make a referral to our medical oncology team accordingly.  I discussed with him that should he develop any new lesion in the liver, he will be referred back to me for discussion of further liver directed therapy.      It was a pleasure seeing the patient.     Signed,    Ana Szymanski M.D.    Interventional Radiology  Department of Radiology  [facility]  Patient Care Team:  Tricia Ramirez MD as PCP - General  Darwin Stanley MD as Referring Physician (Family Practice)  Nehemias Cevallos MD as MD (Gastroenterology)  Roger Belle, Giovani Sagastume MD as MD (Vascular Medicine)  Giovani Olson MD as Assigned Heart and Vascular Provider  Nehemias Cevallos MD as Assigned Surgical Provider  Paula Ramires APRN CNS as Clinical Nurse Specialist (Anesthesiology)  SELF, REFERRED           25 minutes spent by me on the date of the encounter doing chart review, history and exam, imaging review, documentation and further activities per the note.

## 2023-05-04 ENCOUNTER — TELEPHONE (OUTPATIENT)
Dept: GASTROENTEROLOGY | Facility: CLINIC | Age: 68
End: 2023-05-04
Payer: MEDICARE

## 2023-05-05 ENCOUNTER — ONCOLOGY VISIT (OUTPATIENT)
Dept: RADIOLOGY | Facility: CLINIC | Age: 68
End: 2023-05-05
Attending: RADIOLOGY
Payer: MEDICARE

## 2023-05-05 ENCOUNTER — APPOINTMENT (OUTPATIENT)
Dept: LAB | Facility: CLINIC | Age: 68
End: 2023-05-05
Attending: RADIOLOGY
Payer: MEDICARE

## 2023-05-05 VITALS
WEIGHT: 198.4 LBS | HEART RATE: 71 BPM | RESPIRATION RATE: 16 BRPM | DIASTOLIC BLOOD PRESSURE: 74 MMHG | SYSTOLIC BLOOD PRESSURE: 141 MMHG | BODY MASS INDEX: 31.07 KG/M2 | TEMPERATURE: 97.4 F | OXYGEN SATURATION: 98 %

## 2023-05-05 DIAGNOSIS — C22.1 CHOLANGIOCARCINOMA METASTATIC TO LIVER (H): Primary | ICD-10-CM

## 2023-05-05 DIAGNOSIS — C78.7 CHOLANGIOCARCINOMA METASTATIC TO LIVER (H): Primary | ICD-10-CM

## 2023-05-05 DIAGNOSIS — K74.60 CIRRHOSIS OF LIVER (H): ICD-10-CM

## 2023-05-05 LAB
AFP SERPL-MCNC: 3.3 NG/ML
ALBUMIN SERPL BCG-MCNC: 4.1 G/DL (ref 3.5–5.2)
ALP SERPL-CCNC: 83 U/L (ref 40–129)
ALT SERPL W P-5'-P-CCNC: 19 U/L (ref 10–50)
ANION GAP SERPL CALCULATED.3IONS-SCNC: 9 MMOL/L (ref 7–15)
AST SERPL W P-5'-P-CCNC: 30 U/L (ref 10–50)
BILIRUB DIRECT SERPL-MCNC: <0.2 MG/DL (ref 0–0.3)
BILIRUB SERPL-MCNC: 0.4 MG/DL
BUN SERPL-MCNC: 21.7 MG/DL (ref 8–23)
CALCIUM SERPL-MCNC: 9.5 MG/DL (ref 8.8–10.2)
CHLORIDE SERPL-SCNC: 103 MMOL/L (ref 98–107)
CREAT SERPL-MCNC: 1.04 MG/DL (ref 0.67–1.17)
DEPRECATED HCO3 PLAS-SCNC: 23 MMOL/L (ref 22–29)
ERYTHROCYTE [DISTWIDTH] IN BLOOD BY AUTOMATED COUNT: 15.6 % (ref 10–15)
GFR SERPL CREATININE-BSD FRML MDRD: 78 ML/MIN/1.73M2
GLUCOSE SERPL-MCNC: 146 MG/DL (ref 70–99)
HCT VFR BLD AUTO: 45.3 % (ref 40–53)
HGB BLD-MCNC: 14.9 G/DL (ref 13.3–17.7)
INR PPP: 1.44 (ref 0.85–1.15)
MCH RBC QN AUTO: 28.5 PG (ref 26.5–33)
MCHC RBC AUTO-ENTMCNC: 32.9 G/DL (ref 31.5–36.5)
MCV RBC AUTO: 87 FL (ref 78–100)
PLATELET # BLD AUTO: 150 10E3/UL (ref 150–450)
POTASSIUM SERPL-SCNC: 5.4 MMOL/L (ref 3.4–5.3)
PROT SERPL-MCNC: 7.7 G/DL (ref 6.4–8.3)
RBC # BLD AUTO: 5.23 10E6/UL (ref 4.4–5.9)
SODIUM SERPL-SCNC: 135 MMOL/L (ref 136–145)
WBC # BLD AUTO: 5 10E3/UL (ref 4–11)

## 2023-05-05 PROCEDURE — 36415 COLL VENOUS BLD VENIPUNCTURE: CPT | Performed by: RADIOLOGY

## 2023-05-05 PROCEDURE — 85027 COMPLETE CBC AUTOMATED: CPT | Performed by: RADIOLOGY

## 2023-05-05 PROCEDURE — 85610 PROTHROMBIN TIME: CPT | Performed by: RADIOLOGY

## 2023-05-05 PROCEDURE — 82248 BILIRUBIN DIRECT: CPT | Performed by: RADIOLOGY

## 2023-05-05 PROCEDURE — 80053 COMPREHEN METABOLIC PANEL: CPT | Performed by: RADIOLOGY

## 2023-05-05 PROCEDURE — 82105 ALPHA-FETOPROTEIN SERUM: CPT | Performed by: RADIOLOGY

## 2023-05-05 PROCEDURE — 99213 OFFICE O/P EST LOW 20 MIN: CPT | Performed by: RADIOLOGY

## 2023-05-05 PROCEDURE — G0463 HOSPITAL OUTPT CLINIC VISIT: HCPCS | Performed by: RADIOLOGY

## 2023-05-05 ASSESSMENT — PAIN SCALES - GENERAL: PAINLEVEL: NO PAIN (0)

## 2023-05-05 NOTE — NURSING NOTE
"Oncology Rooming Note    May 5, 2023 9:34 AM   Alexandro oPol is a 67 year old male who presents for:    Chief Complaint   Patient presents with     Blood Draw     Labs collected from venipuncture by RN. Vitals taken. Checked in for appointment(s).      Oncology Clinic Visit     [facility] RETURN - Cirrhosis of liver      Initial Vitals: BP (!) 141/74   Pulse 71   Temp 97.4  F (36.3  C) (Oral)   Resp 16   Wt 90 kg (198 lb 6.4 oz)   SpO2 98%   BMI 31.07 kg/m   Estimated body mass index is 31.07 kg/m  as calculated from the following:    Height as of 10/5/22: 1.702 m (5' 7.01\").    Weight as of this encounter: 90 kg (198 lb 6.4 oz). Body surface area is 2.06 meters squared.  No Pain (0) Comment: Data Unavailable   No LMP for male patient.  Allergies reviewed: Yes  Medications reviewed: Yes    Medications: Medication refills not needed today.  Pharmacy name entered into [facility]:    [facility] - [place], [place] - [id number] [place] - [address], [place]            "

## 2023-05-05 NOTE — LETTER
5/5/2023         RE: Alexandro Pool  [address]  [place] [address]        Dear Colleague,    Thank you for referring your patient, Alexandro Pool, to the [facility]. Please see a copy of my visit note below.        Interventional Radiology Clinic Visit    Date of this visit: 5/5/2023    Alexandro Pool returns for follow up post percutaneous microwave ablation of intrahepatic cholangiocarcinoma.    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     When I saw him for post ablation follow-up last time on 1/20/2023 he had recovered well and imaging showed successful ablation without evidence of residual tumor and no new lesions.    He presents for further follow-up today.    Today he states he feels very well. Has been sitting outside in the sun a lot enjoying the weather. No abdominal pain and swelling. No new illnesses or recent hospitalizations. No concerns at this time.      Review of Systems:    As above    Past Medical/Surgical History:    Past Medical History:   Diagnosis Date    Acute kidney failure, unspecified (H)     Blood transfusion     CAD (coronary artery disease)     CARDIOVASCULAR SCREENING; LDL GOAL LESS THAN 100     Cirrhosis (H)     Critical lower limb ischemia (H)     Diabetes mellitus (H) 10/2011    Hypertension     Hypertension goal BP (blood pressure) < 140/90     Ischemic cardiomyopathy     Lesion of liver     Lymphedema of left leg 05/11/2016    Peripheral arterial disease (H)     PVD (peripheral vascular disease) (H)     Right above knee amputation 04/30/2014    Type 2 diabetes, HbA1C goal < 8% (H)      Past Surgical History:   Procedure  Laterality Date    AMPUTATE LEG ABOVE KNEE  11/22/2011    Procedure:AMPUTATE LEG ABOVE KNEE; Revise Right Below Knee Amputation To Above Knee Amputation; Surgeon:MADISON WELLS; Location:[facility] OR    AMPUTATE LEG BELOW KNEE  11/10/2011    Procedure:AMPUTATE LEG BELOW KNEE; Right Below Knee Amputation; Surgeon:MADISON WELLS; Location:[facility]    BYPASS GRAFT FEMOROTIBIAL  9/19/2013    Procedure: BYPASS GRAFT FEMOROTIBIAL;  Left Femorotibial Bypass Graft Insitu ;  Surgeon: Marisol Suggs MD;  Location: [facility] OR    CARDIAC SURGERY      cardiac stent    ESOPHAGOSCOPY, GASTROSCOPY, DUODENOSCOPY (EGD), COMBINED  10/1/2012    Procedure: COMBINED ESOPHAGOSCOPY, GASTROSCOPY, DUODENOSCOPY (EGD);;  Surgeon: Nehemias Cevallos MD;  Location: [facility] GI    ESOPHAGOSCOPY, GASTROSCOPY, DUODENOSCOPY (EGD), COMBINED N/A 3/24/2016    Procedure: COMBINED ESOPHAGOSCOPY, GASTROSCOPY, DUODENOSCOPY (EGD);  Surgeon: Gildardo Marks MD;  Location: [facility]    IR LIVER BIOPSY PERCUTANEOUS  10/5/2022    IR LOWER EXTREMITY ANGIOGRAM LEFT  12/2/2021    IR STENT VASCULAR LT  3/9/2012         IRRIGATION AND DEBRIDEMENT LOWER EXTREMITY, COMBINED  11/15/2011    Procedure:COMBINED IRRIGATION AND DEBRIDEMENT LOWER EXTREMITY; DRAINAGE OF ABSCESS AT BELOW KNEE AMPUTATION AND KNEE DISARTICULATION.; Surgeon:MADISON WELLS; Location:[facility] OR    NO HISTORY OF SURGERY  7/12/13    derm    VASCULAR REPAIR LOWER EXTREMITY Left 9/19/2017    Procedure: VASCULAR REPAIR LOWER EXTREMITY;  Ligation Parasitic Branch To Left Lower Extremity ;  Surgeon: Marisol Suggs MD;  Location: [facility] OR       Current Medications:    Current Outpatient Medications   Medication Sig Dispense Refill    acetaminophen (TYLENOL) 325 MG tablet Take 1 tablet by mouth every 4 hours as needed. 250 tablet 0    albuterol (PROAIR HFA/PROVENTIL HFA/VENTOLIN HFA) 108 (90 Base) MCG/ACT inhaler Inhale 2 puffs into the lungs every 4 hours as needed for shortness of breath / dyspnea or wheezing      albuterol (PROVENTIL)  (2.5 MG/3ML) 0.083% neb solution Take 2.5 mg by nebulization every 4 hours as needed for shortness of breath / dyspnea or wheezing      amLODIPine (NORVASC) 2.5 MG tablet Take 1 tablet (2.5 mg) by mouth 2 times daily 180 tablet 3    ammonium lactate (AMLACTIN) 12 % external cream Apply to affected area on both legs twice daily as needed for dry skin      ASPIRIN EC PO Take 81 mg by mouth daily (Patient not taking: Reported on 11/21/2022)      atorvastatin (LIPITOR) 80 MG tablet Take 1 tablet (80 mg) by mouth At Bedtime 90 tablet 3    blood glucose (NO BRAND SPECIFIED) test strip Use  to test 4 times a day.      calcitonin, salmon, (MIACALCIN) 200 UNIT/ACT nasal spray Spray 1 spray into one nostril alternating nostrils daily      empagliflozin (JARDIANCE) 25 MG TABS tablet Take 1 tablet (25 mg) by mouth daily 90 tablet 1    gabapentin (NEURONTIN) 100 MG capsule Take 1 capsule (100 mg) by mouth 3 times daily (Patient not taking: Reported on 11/21/2022) 90 capsule 0    gabapentin (NEURONTIN) 300 MG capsule Take 600 mg in AM   Take 300 mg in afternoon   Take 600 mg at bedtime  May also take an additional 300 mg if needed at bedtime for pain      GLOBAL EASY GLIDE PEN NEEDLES 32G X 4 MM miscellaneous USE 4X DAILY OR AS DIRECTED. 100 each 11    hydrocortisone 1 % cream Apply topically 2 times daily PRN      insulin aspart (NOVOLOG FLEXPEN) 100 UNIT/ML pen Inject 19 Units Subcutaneous 2 times daily (with meals) AM and 4 PM      insulin glargine (LANTUS PEN) 100 UNIT/ML pen Inject 32 Units Subcutaneous 2 times daily (Patient taking differently: Inject 32 Units Subcutaneous At Bedtime) 60 mL 3    insulin lispro (HUMALOG KWIKPEN) 100 UNIT/ML (1 unit dial) KWIKPEN Inject 18 Units Subcutaneous 3 times daily (before meals) (Patient not taking: Reported on 11/21/2022) 50 mL 3    lidocaine (XYLOCAINE) 5 % external ointment Apply 1-3 g topically 2 times daily as needed (RLE residual limb pain)      lidocaine-prilocaine (EMLA)  2.5-2.5 % external cream Apply 1-2 g topically 2 times daily as needed for moderate pain (4-6) (RLE residual limb)      liraglutide (VICTOZA) 18 MG/3ML solution Inject 1.8 mg Subcutaneous daily      lisinopril (ZESTRIL) 2.5 MG tablet TAKE 1 TABLET (2.5 MG) BY MOUTH 2 TIMES DAILY 180 tablet 3    metoprolol succinate ER (TOPROL XL) 50 MG 24 hr tablet Take 1.5 tablets (75 mg) by mouth daily 135 tablet 2    naloxone (NARCAN) 4 MG/0.1ML nasal spray Spray 4 mg into one nostril alternating nostrils as needed for opioid reversal every 2-3 minutes until assistance arrives      order for DME Equipment being ordered: FlexiTouch pneumatic compression device.  2-3 times per day to left lower extremity.  Current strength - L4 1 Units     oxyCODONE (ROXICODONE) 5 MG tablet Take 1 tablet (5 mg) by mouth every 6 hours as needed for pain 12 tablet 0    rivaroxaban ANTICOAGULANT (XARELTO ANTICOAGULANT) 2.5 MG TABS tablet Take 1 tablet (2.5 mg) by mouth 2 times daily 180 tablet 3    senna-docusate (SENOKOT-S/PERICOLACE) 8.6-50 MG tablet Take 1 tablet by mouth At Bedtime      spironolactone (ALDACTONE) 25 MG tablet Take 12.5 mg by mouth daily         Allergies:    Ciprofloxacin    Family History:    Family History   Problem Relation Age of Onset    Alcohol/Drug Mother         cirrhosis    Alcohol/Drug Father     C.A.D. No family hx of     Diabetes No family hx of     Cancer No family hx of     Anesthesia Reaction No family hx of     Clotting Disorder No family hx of        Social History:    Social History     Socioeconomic History    Marital status:     Number of children: 0   Occupational History     Employer: UNKNOWN   Tobacco Use    Smoking status: Every Day     Packs/day: 2.00     Years: 40.00     Pack years: 80.00     Types: Cigarettes    Smokeless tobacco: Never    Tobacco comments:     Is not working on quitting   Substance and Sexual Activity    Alcohol use: Yes     Alcohol/week: 1.0 standard drink of alcohol     Types:  "1 Standard drinks or equivalent per week     Comment: \"Once a year maybe\"    Drug use: No    Sexual activity: Not Currently   Other Topics Concern    Parent/sibling w/ CABG, MI or angioplasty before 65F 55M? No    Caffeine Concern Yes    Exercise No       Physical Exam:    BP (!) 141/74   Pulse 71   Temp 97.4  F (36.3  C) (Oral)   Resp 16   Wt 90 kg (198 lb 6.4 oz)   SpO2 98%   BMI 31.07 kg/m      GENERAL APPEARANCE: healthy, alert and no distress  PSYCHIATRIC: mentation appears normal and affect normal.  NEURO: normal speech and movements  EYES: No jaundice.  SKIN: No jaundice.   RESP: No cough or wheeze.  MUSCULOSKELETAL: Right AKA.    Laboratory Studies:    Lab Results   Component Value Date    HGB 14.9 05/05/2023    HGB 13.3 07/27/2020     Lab Results   Component Value Date     05/05/2023     07/27/2020     Lab Results   Component Value Date    WBC 5.0 05/05/2023    WBC 6.9 07/27/2020       Lab Results   Component Value Date    INR 1.44 05/05/2023    INR 1.00 08/16/2018       Lab Results   Component Value Date    PROTTOTAL 7.7 05/05/2023    PROTTOTAL 8.1 07/27/2020      Lab Results   Component Value Date    ALBUMIN 4.1 05/05/2023    ALBUMIN 3.7 09/27/2022    ALBUMIN 3.2 07/27/2020     Lab Results   Component Value Date    BILITOTAL 0.4 05/05/2023    BILITOTAL 0.4 07/27/2020     Lab Results   Component Value Date    BILICONJ 0.0 04/03/2014      Lab Results   Component Value Date    ALKPHOS 83 05/05/2023    ALKPHOS 70 07/27/2020     Lab Results   Component Value Date    AST 30 05/05/2023    AST 18 07/27/2020     Lab Results   Component Value Date    ALT 19 05/05/2023    ALT 17 07/27/2020       Lab Results   Component Value Date    CR 1.04 05/05/2023    CR 0.89 09/02/2020       Imaging:     I personally reviewed and interpreted the imaging as follows:    MRI abdomen 4/28/2023: Non-viable ablation site without evidence of local recurrence.  No evidence of new or worrisome liver lesion.    CT " chest 4/28/2023: No evidence of metastatic disease.    ASSESSMENT/PLAN:      Alexandro Pool is a 67 year old male who is s/p percutaneous microwave ablation of a solitary biopsy-proven intrahepatic cholangiocarcinoma in segment 6 on 11/23/22. He made a good recovery. Imaging shows the tumor is well-treated with no evidence of viable tumor, and no new lesions elsewhere in the liver. Recent staging CT chest shows no evidence of metastatic disease.  We discussed the imaging findings with him.  I discussed my recommendation that his long-term ongoing follow-up be with medical oncology.  He is in agreement with this.  He does not currently have an oncologist and would like to stay in our system so we will make a referral to our medical oncology team accordingly.  I discussed with him that should he develop any new lesion in the liver, he will be referred back to me for discussion of further liver directed therapy.      It was a pleasure seeing the patient.     SignedAna M.D.    Interventional Radiology  Department of Radiology  [facility]  Patient Care Team:  Tricia Ramirez MD as PCP - Darwin Peralta MD as Referring Physician (Family Practice)  Nehemias Cevallos MD as MD (Gastroenterology)  Roger Belle NP Foley, Christopher Andrew, MD as MD (Vascular Medicine)  Giovani Olson MD as Assigned Heart and Vascular Provider  Nehemias Cevallos MD as Assigned Surgical Provider  Paula Ramires APRN CNS as Clinical Nurse Specialist (Anesthesiology)  SELF, REFERRED           25 minutes spent by me on the date of the encounter doing chart review, history and exam, imaging review, documentation and further activities per the note.

## 2023-05-05 NOTE — NURSING NOTE
Chief Complaint   Patient presents with     Blood Draw     Labs collected from venipuncture by RN. Vitals taken. Checked in for appointment(s).      Sonia KEATING RN PHN BSN  BMT/Oncology Lab

## 2023-05-08 ENCOUNTER — PATIENT OUTREACH (OUTPATIENT)
Dept: ONCOLOGY | Facility: CLINIC | Age: 68
End: 2023-05-08
Payer: MEDICARE

## 2023-05-08 NOTE — PROGRESS NOTES
New Patient Oncology Nurse Navigator Note     Referring provider: Dr Olivera, IR    Referring Clinic/Organization: [facility]     Referred to: Medical Oncology    Requested provider (if applicable): First available - did not specify     Referral Received: 05/05/23       Evaluation for : cholangiocarcinoma     Clinical History (per Nurse review of records provided):        9/3/2022 MRI Abd ([facility])    IMPRESSION:    1. Cirrhosis without evidence of portal hypertension.  2. 2.3 cm arterially rim enhancing, targetoid lesion in hepatic  segment 6 without washout, pseudocapsule. There is associated  increased T2 signal, restricted diffusion, and subthreshold growth.  LIRADS M, recommend tissue sampling.  3. Additional several arterially enhancing foci without washout,  restricted diffusion, abnormal T2 signal, or pseudocapsule formation  throughout the liver, indeterminate LIRADS 3   4. Based on this exam only, the patient is within Salavdor criteria.  5. Persistent extrahepatic biliary dilatation with common bile duct  measuring up to 1.2 cm, slightly decreased since prior MRI when it  measured as 1.6 cm. No appreciable obstructing lesion.  6. 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         10/5/2022 liver biopsy (N)  Final Diagnosis   A. LIVER, SEGMENT 6 LESION, BIOPSY:  -Positive for adenocarcinoma, likely cholangiocarcinoma (see comment)  -Background liver shows features of cirrhosis   Electronically signed by Millie Nichols MD on 10/6/2022 at  1:14 PM   Comment   [id number]   Sections show features of adenocarcinoma most likely cholangiocarcinoma.  However, if radiological concerns for hepatocellular carcinoma(HCC) persist, the possibility of combined hepatocellular/cholangiocarcinoma in which the HCC component may not have been represented should be entertained.        11/4/2022 Pt met w/ IR   Joselin to discuss liver directed tx, recommending perc microwave ablation of lesion.      11/23/2022   IMPRESSION:   Microwave ablation of hepatic segment 6 cholangiocarcinoma.      Clinical Assessment / Barriers to Care (Per Nurse):    Per Dr Olivera, who saw pt 5/5/23 for follow up, continued surveillance by Med Onc. Will offer next available Med Onc for cholangiocarcinoma per pt preference.         Records Location: [facility]     Records Needed:     None    Additional testing needed prior to consult:     None          Keith Kilgore, RN, BSN, OCN  Oncology New Patient Nurse Navigator   [facility]  [phone]

## 2023-05-17 ENCOUNTER — ONCOLOGY VISIT (OUTPATIENT)
Dept: ONCOLOGY | Facility: HOSPITAL | Age: 68
End: 2023-05-17
Attending: RADIOLOGY
Payer: MEDICARE

## 2023-05-17 VITALS
SYSTOLIC BLOOD PRESSURE: 122 MMHG | OXYGEN SATURATION: 97 % | HEART RATE: 69 BPM | RESPIRATION RATE: 18 BRPM | TEMPERATURE: 98.1 F | WEIGHT: 198 LBS | BODY MASS INDEX: 31.08 KG/M2 | HEIGHT: 67 IN | DIASTOLIC BLOOD PRESSURE: 64 MMHG

## 2023-05-17 DIAGNOSIS — C22.1 CHOLANGIOCARCINOMA METASTATIC TO LIVER (H): ICD-10-CM

## 2023-05-17 DIAGNOSIS — C22.1 INTRAHEPATIC CHOLANGIOCARCINOMA (H): Primary | ICD-10-CM

## 2023-05-17 DIAGNOSIS — C78.7 CHOLANGIOCARCINOMA METASTATIC TO LIVER (H): ICD-10-CM

## 2023-05-17 PROCEDURE — 99204 OFFICE O/P NEW MOD 45 MIN: CPT | Performed by: INTERNAL MEDICINE

## 2023-05-17 PROCEDURE — G0463 HOSPITAL OUTPT CLINIC VISIT: HCPCS | Performed by: INTERNAL MEDICINE

## 2023-05-17 ASSESSMENT — PAIN SCALES - GENERAL: PAINLEVEL: NO PAIN (0)

## 2023-05-17 NOTE — LETTER
"    5/17/2023         RE: Alexandro Pool  [address]  [place] [address]        Dear Colleague,    Thank you for referring your patient, Alexandro Pool, to the [facility]. Please see a copy of my visit note below.    Oncology Rooming Note    May 17, 2023 1:06 PM   Alexandro Pool is a 67 year old male who presents for:    Chief Complaint   Patient presents with     Oncology Clinic Visit     Cholangiocarcinoma      Initial Vitals: /64   Pulse 69   Temp 98.1  F (36.7  C)   Resp 18   Ht 1.702 m (5' 7\")   Wt 89.8 kg (198 lb)   SpO2 97%   BMI 31.01 kg/m   Estimated body mass index is 31.01 kg/m  as calculated from the following:    Height as of this encounter: 1.702 m (5' 7\").    Weight as of this encounter: 89.8 kg (198 lb). Body surface area is 2.06 meters squared.  No Pain (0) Comment: Data Unavailable   No LMP for male patient.  Allergies reviewed: Yes  Medications reviewed: No    Medications: Medication refills not needed today.  Pharmacy name entered into [employer]:    [facility] - [place], [place] - [address] [facility] [facility] [facility] [place] [facility] [place] [facility] [address]    Clinical concerns: None       Carley Bynum LPN              [facility] Hematology and Oncology Consult Note      Patient: Alexandro Pool  MRN: [id number]  Date of Service: May 17, 2023      We have been asked by Dr. Szymanski  to evaluate Alexandro Pool for cholangiocarcinoma.    Assessment/Plan:    1.  Cholangiocarcinoma: He recently had follow-up MRI imaging performed.  This showed no masslike enhancement or tumor signal.  No abdominal adenopathy.  Repeat MRI in 6 months.  Also a noncontrast CT of the chest.  AFP from May 5 is normal at 3.3.  The plan was rediscussed.  Questions were answered.  We will see him back in clinic after his MRI.    2.  Pancreatic cyst: Largest measuring 5 x 5 mm.  Follow radiographically.    ECOG " Performance  1    Staging History:    Cancer Staging   No matching staging information was found for the patient.    History:    Alexandro is a 67-year-old gentleman with a history of cholangiocarcinoma, intrahepatic.  He was treated with percutaneous microwave ablation in November 23, 2022.  He also has hepatitis C cirrhosis.  He has been doing well since his treatment.  No acute complaints today.  Denies abdominal pain.    Past History:    Past Medical History:   Diagnosis Date     Acute kidney failure, unspecified (H)      Blood transfusion      CAD (coronary artery disease)      CARDIOVASCULAR SCREENING; LDL GOAL LESS THAN 100      Cirrhosis (H)      Critical lower limb ischemia (H)      Diabetes mellitus (H) 10/2011     Hypertension      Hypertension goal BP (blood pressure) < 140/90      Ischemic cardiomyopathy      Lesion of liver      Lymphedema of left leg 05/11/2016     Peripheral arterial disease (H)      PVD (peripheral vascular disease) (H)      Right above knee amputation 04/30/2014     Type 2 diabetes, HbA1C goal < 8% (H)     Family History   Problem Relation Age of Onset     Alcohol/Drug Mother         cirrhosis     Alcohol/Drug Father      C.A.D. No family hx of      Diabetes No family hx of      Cancer No family hx of      Anesthesia Reaction No family hx of      Clotting Disorder No family hx of       [unfilled] Social History     Socioeconomic History     Marital status:      Spouse name: Not on file     Number of children: 0     Years of education: Not on file     Highest education level: Not on file   Occupational History     Employer: UNKNOWN   Tobacco Use     Smoking status: Every Day     Packs/day: 2.00     Years: 40.00     Pack years: 80.00     Types: Cigarettes     Smokeless tobacco: Never     Tobacco comments:     Is not working on quitting   Vaping Use     Vaping status: Not on file   Substance and Sexual Activity     Alcohol use: Yes     Alcohol/week: 1.0 standard drink of alcohol      "Types: 1 Standard drinks or equivalent per week     Comment: \"Once a year maybe\"     Drug use: No     Sexual activity: Not Currently   Other Topics Concern     Parent/sibling w/ CABG, MI or angioplasty before 65F 55M? No      Service Not Asked     Blood Transfusions Not Asked     Caffeine Concern Yes     Occupational Exposure Not Asked     Hobby Hazards Not Asked     Sleep Concern Not Asked     Stress Concern Not Asked     Weight Concern Not Asked     Special Diet Not Asked     Back Care Not Asked     Exercise No     Bike Helmet Not Asked     Seat Belt Not Asked     Self-Exams Not Asked   Social History Narrative     Not on file     Social Determinants of Health     Financial Resource Strain: Not on file   Food Insecurity: Not on file   Transportation Needs: Not on file   Physical Activity: Not on file   Stress: Not on file   Social Connections: Not on file   Intimate Partner Violence: Not on file   Housing Stability: Not on file        Allergies:    Allergies   Allergen Reactions     Ciprofloxacin Rash       Review of Systems:    As above in the history.     Review of Systems otherwise Negative for:  General: chills, fever or night sweats  Psychological: anxiety or depression  Ophthalmic: blurry vision, double vision or loss of vision, vision change  ENT: epistaxis, oral lesions, hearing changes  Hematological and Lymphatic: bleeding, bruising, jaundice, swollen lymph nodes  Endocrine: hot flashes, unexpected weight changes  Respiratory: cough, hemoptysis, orthopnea or shortness of breath/MARTÍNEZ  Cardiovascular: chest pain, edema, palpitations or PND  Gastrointestinal: abdominal pain, blood in stools, change in bowel habits, constipation, diarrhea or nausea/vomiting  Genito-Urinary: change in urinary stream, incontinence, frequency/urgency  Musculoskeletal: joint pain, stiffness, swelling, muscle pain  Neurological: dizziness, headaches, numbness/tingling  Dermatological: lumps and rash    Physical Exam:    BP " "122/64   Pulse 69   Temp 98.1  F (36.7  C)   Resp 18   Ht 1.702 m (5' 7\")   Wt 89.8 kg (198 lb)   SpO2 97%   BMI 31.01 kg/m      General: patient appears stated age of 67 year old. Nontoxic and in no distress.   HEENT: Head: atraumatic, normocephalic. Sclerae anicteric.  Chest:  Normal respiratory effort  Cardiac:  No edema.   Abdomen: abdomen is non-distended  Extremities: normal tone and muscle bulk.   Skin: no lesions or rash on visible skin. Warm and dry.   CNS: alert and oriented. Grossly non-focal.   Psychiatric: normal mood and affect.     Lab Results:    No results found for this or any previous visit (from the past 168 hour(s)).    Imaging Results:    MR Abdomen w/o & w Contrast    Result Date: 5/6/2023  Exam: MR ABDOMEN W/O & W CONTRAST, 4/28/2023 3:32 PM Indication: post cholangiocarcinoma ablation; Cirrhosis of liver; Cirrhosis of liver (H); Lesion of liver greater than 1 cm in diameter with liver disease conferring risk of hepatocellular carcinoma; Lesion of liver greater than 1 cm in diameter with liver disease conferring risk of hepatocellular carcinoma Alexandro Pool is a 67 year old male with?hepatitis C cirrhosis, with sustained virologic response posttreatment,?in whom we performed percutaneous microwave ablation for a solitary biopsy-proven intrahepatic cholangiocarcinoma in segment 6?on 11/23/22.? Comparison: 9/3/2022, 12/27/2022 Technique: Images were acquired with and without intravenous contrast through the abdomen. The following MR images were acquired: TrueFISP, multiplanar T2 weighted, axial T1 in/out of phase, axial fat-saturated T1, diffusion-weighted. Multiplanar T1-weighted images with fat saturation were before contrast administration and at multiple time points following the administration of intravenous contrast. Contrast dose:  8.5 mL Gadavist FINDINGS: Liver: Cirrhotic morphology. No significant stenosis or iron deposition Stable postablation treatment changes in " hepatic segment 6. Wedge-shaped increased arterial enhancement along the peripheral segment 7/6, likely perfusional, similar to prior. No masslike enhancement or tumor signal. Similar appearing arterial enhancing observations hepatic segment 4 (series 26, images 27 and 34), without washout or pseudocapsule. No signal abnormality on remaining sequences. Subcentimeter cysts. Gallbladder/Bile Ducts: No cholelithiasis. Ductal dilation measuring up to 11 mm in the common bile duct. Mild intrahepatic biliary dilation. No obstructive lesion was dictated Spleen: Normal Pancreas: Scattered pancreatic cysts the largest measuring 0.5 x 0.5 cm in the head (series 18, image 33). There are multiple additional 0.1 to 0.2 cm cysts scattered throughout the pancreas For example in the pancreatic neck (series 3, image 23). No suspicious feature detected. Adrenal glands: Normal Kidneys: Similar renal cysts, no hydronephrosis. Bowel: Nondilated Lymph nodes: No new significant adenopathy Blood vessels: No abdominal aortic aneurysm Lung bases: Normal Bones and soft tissues: No acute osseous abnormality Mesentery and abdominal wall: No hernia Ascites: No     IMPRESSION: 1.  Stable posttreatment changes hepatic segment 6. No residual/recurrent disease. 2.  Similar arterial enhancing observations hepatic segment 4. LI-RADS 3, recommend continued attention on follow-up. 3.  Pancreatic cysts measuring up to 0.5 cm. Likely side branch IPMN's. No suspicious features. Consider follow-up as detailed below. 4.  Cirrhosis. No definite new perfusion HCC 5.  Stable biliary ductal dilation without obstructing lesion. 6.  Based on this exam, the patient is within [place] criteria. OPTN/LIRADS definitions.  LIRADS v2018. LIRADS 1:  Definitely benign. LIRADS 2:  Probably benign. LIRADS 3:  Indeterminate for HCC. LIRADS 4:  Probably HCC. LIRADS M:  Probably malignant.  Not specific for HCC. LIRADS 5TR- nonviable:  Previously treated HCC without residual  malignancy identified LIRADS 5TR- viable:  Previously treated HCC with findings indicating residual viable malignancy LIRADS 5TR- equivocal:  Previously treated HCC with findings that may be treatment changes or viable malignancy OPTN 5a:  Diagnostic for HCC.  < 2 cm. OPTN 5b:  Diagnostic for HCC.  > Or = 2 cm and < 5 cm. OPTN 5x:  Diagnostic for HCC.  > Or = 5 cm. Richards criteria for liver transplantation:  1. Presence of no HCCs greater than 5 cm. One HCC measuring 3-5 cm is allowed if no other HCCs are present. 2. Maximum of 3 HCCs measuring 3 cm or less. 3. No vascular invasion. 4. No extrahepatic metastases. [facility] recommendations for asymptomatic pancreatic cysts, modified from international consensus guidelines* Size of largest cyst: Less than 1 cm: Follow-up imaging in 6 months to 1 year, then lengthen interval to 2-3 years if no change. 1-2 cm: Follow-up imaging at 6 months, then yearly for 2 years, then lengthen interval if no change. The optimal initial study or first follow-up exam is MRI/MRCP performed with intravenous gadolinium contrast to allow full cyst characterization. Once characterized, contrast is optional on follow-up MRIs. If MRI is contraindicated, CT with contrast is recommended. GI consultation for possible endoscopic ultrasound is recommended for cysts with the following features: Size > 2 cm, >20% growth on followup, wall thickening or enhancement, mural nodule, duct > 5 mm, or abrupt change in duct caliber with distal atrophy. GI or surgical consultation is also recommended for symptomatic cysts. *Reference: International Consensus Guidelines for Management of IPMN and MCN of the pancreas. Pancreatology: 12:(2012); 183-197. I have personally reviewed the examination and initial interpretation and I agree with the findings. TOBIN KC MD   SYSTEM ID:  [id number]    CT Chest w/o contrast    Result Date: 4/28/2023  CT CHEST W/O CONTRAST 4/28/2023 2:34 PM History: post  cholangiocarcinoma ablation; Cirrhosis of liver. Eval for mets.; Cirrhosis of liver (H); Lesion of liver greater than 1 cm in diameter with liver disease conferring risk of hepatocellular carcinoma; Lesion of liver greater than 1 cm in diameter with liver disease conferring risk of hepatocellular carcinoma Comparison: 11/10/2022 Technique: CT of the chest was obtained without intravenous contrast. Axial, coronal, and sagittal reconstructions were obtained and reviewed. Contrast: None Findings: Lungs: No pneumothorax, pleural effusion, focal airspace opacity, or suspicious pulmonary nodule. Apical predominant emphysematous changes with pleural blebs predominantly in the right lung. Airways: Central tracheobronchial tree is clear. Vessels: Main pulmonary artery and aorta are normal in caliber. Normal three-vessel arch coronary artery calcifications. Heart: Heart size is normal without pericardial effusion. Large fatty metaplasia of LV. The ascending aorta is normal in diameter. The main pulmonary artery is enlarged to 3.3 cm in diameter. Lymph nodes: No suspicious mediastinal or hilar lymphadenopathy. Stable 9 mm right hilar lymph node with fatty hilum is seen on series 3 image 147. Thyroid: Within normal limits. Esophagus: Within normal limits Upper abdomen: Cirrhotic configuration of liver with large hypodensity in hepatic segment 6. Similar appearance of hypodensity adjacent to the gallbladder fossa. Bones and soft tissues: No suspicious axillary lymphadenopathy or soft tissue mass. No suspicious osseous lesion. Unchanged T7 and L1 vertebral body wedge shape deformities. Bilateral symmetric gynecomastia..     Impression: 1. No suspicious pulmonary nodule or mass. 2. Borderline dilation of the main pulmonary artery which is suggestive of pulmonary hypertension. 3. Cirrhotic configuration of the liver with changes of microwave ablation.. I have personally reviewed the examination and initial interpretation and I  agree with the findings. JUSTEN OTTO MD   SYSTEM ID:  [id number]      Signed by: Ashutosh Cardozo MD        Again, thank you for allowing me to participate in the care of your patient.        Sincerely,        Ashutosh Cardozo MD

## 2023-05-17 NOTE — PROGRESS NOTES
"Oncology Rooming Note    May 17, 2023 1:06 PM   Alexandro Pool is a 67 year old male who presents for:    Chief Complaint   Patient presents with     Oncology Clinic Visit     Cholangiocarcinoma      Initial Vitals: /64   Pulse 69   Temp 98.1  F (36.7  C)   Resp 18   Ht 1.702 m (5' 7\")   Wt 89.8 kg (198 lb)   SpO2 97%   BMI 31.01 kg/m   Estimated body mass index is 31.01 kg/m  as calculated from the following:    Height as of this encounter: 1.702 m (5' 7\").    Weight as of this encounter: 89.8 kg (198 lb). Body surface area is 2.06 meters squared.  No Pain (0) Comment: Data Unavailable   No LMP for male patient.  Allergies reviewed: Yes  Medications reviewed: No    Medications: Medication refills not needed today.  Pharmacy name entered into [employer]:    [facility] - [place], [place] - [address] [id number] [place] - [address]    Clinical concerns: None       Carley Bynum LPN            "

## 2023-05-17 NOTE — PROGRESS NOTES
[facility] Hematology and Oncology Consult Note      Patient: Alexandro Pool  MRN: [id number]  Date of Service: May 17, 2023      We have been asked by Dr. Szymanski  to evaluate Alexandro Pool for cholangiocarcinoma.    Assessment/Plan:    1.  Cholangiocarcinoma: He recently had follow-up MRI imaging performed.  This showed no masslike enhancement or tumor signal.  No abdominal adenopathy.  Repeat MRI in 6 months.  Also a noncontrast CT of the chest.  AFP from May 5 is normal at 3.3.  The plan was rediscussed.  Questions were answered.  We will see him back in clinic after his MRI.    2.  Pancreatic cyst: Largest measuring 5 x 5 mm.  Follow radiographically.    ECOG Performance  1    Staging History:    Cancer Staging   No matching staging information was found for the patient.    History:    Alexandro is a 67-year-old gentleman with a history of cholangiocarcinoma, intrahepatic.  He was treated with percutaneous microwave ablation in November 23, 2022.  He also has hepatitis C cirrhosis.  He has been doing well since his treatment.  No acute complaints today.  Denies abdominal pain.    Past History:    Past Medical History:   Diagnosis Date     Acute kidney failure, unspecified (H)      Blood transfusion      CAD (coronary artery disease)      CARDIOVASCULAR SCREENING; LDL GOAL LESS THAN 100      Cirrhosis (H)      Critical lower limb ischemia (H)      Diabetes mellitus (H) 10/2011     Hypertension      Hypertension goal BP (blood pressure) < 140/90      Ischemic cardiomyopathy      Lesion of liver      Lymphedema of left leg 05/11/2016     Peripheral arterial disease (H)      PVD (peripheral vascular disease) (H)      Right above knee amputation 04/30/2014     Type 2 diabetes, HbA1C goal < 8% (H)     Family History   Problem Relation Age of Onset     Alcohol/Drug Mother         cirrhosis     Alcohol/Drug Father      C.A.D. No family hx of      Diabetes No family hx of      Cancer No family hx of       "Anesthesia Reaction No family hx of      Clotting Disorder No family hx of       [unfilled] Social History     Socioeconomic History     Marital status:      Spouse name: Not on file     Number of children: 0     Years of education: Not on file     Highest education level: Not on file   Occupational History     Employer: UNKNOWN   Tobacco Use     Smoking status: Every Day     Packs/day: 2.00     Years: 40.00     Pack years: 80.00     Types: Cigarettes     Smokeless tobacco: Never     Tobacco comments:     Is not working on quitting   Vaping Use     Vaping status: Not on file   Substance and Sexual Activity     Alcohol use: Yes     Alcohol/week: 1.0 standard drink of alcohol     Types: 1 Standard drinks or equivalent per week     Comment: \"Once a year maybe\"     Drug use: No     Sexual activity: Not Currently   Other Topics Concern     Parent/sibling w/ CABG, MI or angioplasty before 65F 55M? No      Service Not Asked     Blood Transfusions Not Asked     Caffeine Concern Yes     Occupational Exposure Not Asked     Hobby Hazards Not Asked     Sleep Concern Not Asked     Stress Concern Not Asked     Weight Concern Not Asked     Special Diet Not Asked     Back Care Not Asked     Exercise No     Bike Helmet Not Asked     Seat Belt Not Asked     Self-Exams Not Asked   Social History Narrative     Not on file     Social Determinants of Health     Financial Resource Strain: Not on file   Food Insecurity: Not on file   Transportation Needs: Not on file   Physical Activity: Not on file   Stress: Not on file   Social Connections: Not on file   Intimate Partner Violence: Not on file   Housing Stability: Not on file        Allergies:    Allergies   Allergen Reactions     Ciprofloxacin Rash       Review of Systems:    As above in the history.     Review of Systems otherwise Negative for:  General: chills, fever or night sweats  Psychological: anxiety or depression  Ophthalmic: blurry vision, double vision or loss of " "vision, vision change  ENT: epistaxis, oral lesions, hearing changes  Hematological and Lymphatic: bleeding, bruising, jaundice, swollen lymph nodes  Endocrine: hot flashes, unexpected weight changes  Respiratory: cough, hemoptysis, orthopnea or shortness of breath/MARTÍNEZ  Cardiovascular: chest pain, edema, palpitations or PND  Gastrointestinal: abdominal pain, blood in stools, change in bowel habits, constipation, diarrhea or nausea/vomiting  Genito-Urinary: change in urinary stream, incontinence, frequency/urgency  Musculoskeletal: joint pain, stiffness, swelling, muscle pain  Neurological: dizziness, headaches, numbness/tingling  Dermatological: lumps and rash    Physical Exam:    /64   Pulse 69   Temp 98.1  F (36.7  C)   Resp 18   Ht 1.702 m (5' 7\")   Wt 89.8 kg (198 lb)   SpO2 97%   BMI 31.01 kg/m      General: patient appears stated age of 67 year old. Nontoxic and in no distress.   HEENT: Head: atraumatic, normocephalic. Sclerae anicteric.  Chest:  Normal respiratory effort  Cardiac:  No edema.   Abdomen: abdomen is non-distended  Extremities: normal tone and muscle bulk.   Skin: no lesions or rash on visible skin. Warm and dry.   CNS: alert and oriented. Grossly non-focal.   Psychiatric: normal mood and affect.     Lab Results:    No results found for this or any previous visit (from the past 168 hour(s)).    Imaging Results:    MR Abdomen w/o & w Contrast    Result Date: 5/6/2023  Exam: MR ABDOMEN W/O & W CONTRAST, 4/28/2023 3:32 PM Indication: post cholangiocarcinoma ablation; Cirrhosis of liver; Cirrhosis of liver (H); Lesion of liver greater than 1 cm in diameter with liver disease conferring risk of hepatocellular carcinoma; Lesion of liver greater than 1 cm in diameter with liver disease conferring risk of hepatocellular carcinoma Alexandro Pool is a 67 year old male with?hepatitis C cirrhosis, with sustained virologic response posttreatment,?in whom we performed percutaneous microwave " ablation for a solitary biopsy-proven intrahepatic cholangiocarcinoma in segment 6?on 11/23/22.? Comparison: 9/3/2022, 12/27/2022 Technique: Images were acquired with and without intravenous contrast through the abdomen. The following MR images were acquired: TrueFISP, multiplanar T2 weighted, axial T1 in/out of phase, axial fat-saturated T1, diffusion-weighted. Multiplanar T1-weighted images with fat saturation were before contrast administration and at multiple time points following the administration of intravenous contrast. Contrast dose:  8.5 mL Gadavist FINDINGS: Liver: Cirrhotic morphology. No significant stenosis or iron deposition Stable postablation treatment changes in hepatic segment 6. Wedge-shaped increased arterial enhancement along the peripheral segment 7/6, likely perfusional, similar to prior. No masslike enhancement or tumor signal. Similar appearing arterial enhancing observations hepatic segment 4 (series 26, images 27 and 34), without washout or pseudocapsule. No signal abnormality on remaining sequences. Subcentimeter cysts. Gallbladder/Bile Ducts: No cholelithiasis. Ductal dilation measuring up to 11 mm in the common bile duct. Mild intrahepatic biliary dilation. No obstructive lesion was dictated Spleen: Normal Pancreas: Scattered pancreatic cysts the largest measuring 0.5 x 0.5 cm in the head (series 18, image 33). There are multiple additional 0.1 to 0.2 cm cysts scattered throughout the pancreas For example in the pancreatic neck (series 3, image 23). No suspicious feature detected. Adrenal glands: Normal Kidneys: Similar renal cysts, no hydronephrosis. Bowel: Nondilated Lymph nodes: No new significant adenopathy Blood vessels: No abdominal aortic aneurysm Lung bases: Normal Bones and soft tissues: No acute osseous abnormality Mesentery and abdominal wall: No hernia Ascites: No     IMPRESSION: 1.  Stable posttreatment changes hepatic segment 6. No residual/recurrent disease. 2.  Similar  arterial enhancing observations hepatic segment 4. LI-RADS 3, recommend continued attention on follow-up. 3.  Pancreatic cysts measuring up to 0.5 cm. Likely side branch IPMN's. No suspicious features. Consider follow-up as detailed below. 4.  Cirrhosis. No definite new perfusion HCC 5.  Stable biliary ductal dilation without obstructing lesion. 6.  Based on this exam, the patient is within [place] criteria. OPTN/LIRADS definitions.  LIRADS v2018. LIRADS 1:  Definitely benign. LIRADS 2:  Probably benign. LIRADS 3:  Indeterminate for HCC. LIRADS 4:  Probably HCC. LIRADS M:  Probably malignant.  Not specific for HCC. LIRADS 5TR- nonviable:  Previously treated HCC without residual malignancy identified LIRADS 5TR- viable:  Previously treated HCC with findings indicating residual viable malignancy LIRADS 5TR- equivocal:  Previously treated HCC with findings that may be treatment changes or viable malignancy OPTN 5a:  Diagnostic for HCC.  < 2 cm. OPTN 5b:  Diagnostic for HCC.  > Or = 2 cm and < 5 cm. OPTN 5x:  Diagnostic for HCC.  > Or = 5 cm. [place] criteria for liver transplantation:  1. Presence of no HCCs greater than 5 cm. One HCC measuring 3-5 cm is allowed if no other HCCs are present. 2. Maximum of 3 HCCs measuring 3 cm or less. 3. No vascular invasion. 4. No extrahepatic metastases. [facility] recommendations for asymptomatic pancreatic cysts, modified from international consensus guidelines* Size of largest cyst: Less than 1 cm: Follow-up imaging in 6 months to 1 year, then lengthen interval to 2-3 years if no change. 1-2 cm: Follow-up imaging at 6 months, then yearly for 2 years, then lengthen interval if no change. The optimal initial study or first follow-up exam is MRI/MRCP performed with intravenous gadolinium contrast to allow full cyst characterization. Once characterized, contrast is optional on follow-up MRIs. If MRI is contraindicated, CT with contrast is recommended. GI consultation for  possible endoscopic ultrasound is recommended for cysts with the following features: Size > 2 cm, >20% growth on followup, wall thickening or enhancement, mural nodule, duct > 5 mm, or abrupt change in duct caliber with distal atrophy. GI or surgical consultation is also recommended for symptomatic cysts. *Reference: International Consensus Guidelines for Management of IPMN and MCN of the pancreas. Pancreatology: 12:(2012); 183-197. I have personally reviewed the examination and initial interpretation and I agree with the findings. TOBIN KC MD   SYSTEM ID:  [id number]    CT Chest w/o contrast    Result Date: 4/28/2023  CT CHEST W/O CONTRAST 4/28/2023 2:34 PM History: post cholangiocarcinoma ablation; Cirrhosis of liver. Eval for mets.; Cirrhosis of liver (H); Lesion of liver greater than 1 cm in diameter with liver disease conferring risk of hepatocellular carcinoma; Lesion of liver greater than 1 cm in diameter with liver disease conferring risk of hepatocellular carcinoma Comparison: 11/10/2022 Technique: CT of the chest was obtained without intravenous contrast. Axial, coronal, and sagittal reconstructions were obtained and reviewed. Contrast: None Findings: Lungs: No pneumothorax, pleural effusion, focal airspace opacity, or suspicious pulmonary nodule. Apical predominant emphysematous changes with pleural blebs predominantly in the right lung. Airways: Central tracheobronchial tree is clear. Vessels: Main pulmonary artery and aorta are normal in caliber. Normal three-vessel arch coronary artery calcifications. Heart: Heart size is normal without pericardial effusion. Large fatty metaplasia of LV. The ascending aorta is normal in diameter. The main pulmonary artery is enlarged to 3.3 cm in diameter. Lymph nodes: No suspicious mediastinal or hilar lymphadenopathy. Stable 9 mm right hilar lymph node with fatty hilum is seen on series 3 image 147. Thyroid: Within normal limits. Esophagus: Within normal limits  Upper abdomen: Cirrhotic configuration of liver with large hypodensity in hepatic segment 6. Similar appearance of hypodensity adjacent to the gallbladder fossa. Bones and soft tissues: No suspicious axillary lymphadenopathy or soft tissue mass. No suspicious osseous lesion. Unchanged T7 and L1 vertebral body wedge shape deformities. Bilateral symmetric gynecomastia..     Impression: 1. No suspicious pulmonary nodule or mass. 2. Borderline dilation of the main pulmonary artery which is suggestive of pulmonary hypertension. 3. Cirrhotic configuration of the liver with changes of microwave ablation.. I have personally reviewed the examination and initial interpretation and I agree with the findings. JUSTEN OTTO MD   SYSTEM ID:  [id number]      Signed by: Ashutosh Cardozo MD

## 2023-06-13 ENCOUNTER — TELEPHONE (OUTPATIENT)
Dept: GASTROENTEROLOGY | Facility: CLINIC | Age: 68
End: 2023-06-13
Payer: MEDICARE

## 2023-08-11 ENCOUNTER — HOSPITAL ENCOUNTER (OUTPATIENT)
Dept: ULTRASOUND IMAGING | Facility: CLINIC | Age: 68
Discharge: HOME | End: 2023-08-11
Attending: RADIOLOGY
Payer: MEDICARE

## 2023-08-11 DIAGNOSIS — I73.9 PAD (PERIPHERAL ARTERY DISEASE) (H): ICD-10-CM

## 2023-08-11 PROCEDURE — 93926 LOWER EXTREMITY STUDY: CPT | Mod: LT

## 2023-08-11 PROCEDURE — 93922 UPR/L XTREMITY ART 2 LEVELS: CPT

## 2023-08-14 DIAGNOSIS — I70.422 ATHEROSCLEROSIS OF AUTOLOGOUS VEIN BYPASS GRAFT(S) OF THE EXTREMITIES WITH REST PAIN, LEFT LEG (H): Primary | ICD-10-CM

## 2023-08-14 NOTE — RESULT ENCOUNTER NOTE
Called patient with results. Currently being treated for cellulitis after injury.  No open wounds.  Discussed 1 year follow up.  Watch for healing and call if concerns.  Mckenna Beaulieu RN  IR nurse clinician  [phone]

## 2023-08-14 NOTE — RESULT ENCOUNTER NOTE
Called patient with results.  Currently being treated for cellulitis after injury.  No open wounds.  Discussed 1 year follow up.  Watch for healing and call if concerns.  Mckenna Beaulieu RN  IR nurse clinician  [phone]

## 2023-08-28 DIAGNOSIS — I73.9 PAD (PERIPHERAL ARTERY DISEASE) (H): ICD-10-CM

## 2023-08-28 DIAGNOSIS — Z79.4 TYPE 2 DIABETES MELLITUS WITH COMPLICATION, WITH LONG-TERM CURRENT USE OF INSULIN (H): ICD-10-CM

## 2023-08-28 DIAGNOSIS — E11.8 TYPE 2 DIABETES MELLITUS WITH COMPLICATION, WITH LONG-TERM CURRENT USE OF INSULIN (H): ICD-10-CM

## 2023-08-28 RX ORDER — EMPAGLIFLOZIN 25 MG/1
25 TABLET, FILM COATED ORAL DAILY
Qty: 30 TABLET | Refills: 2 | Status: SHIPPED | OUTPATIENT
Start: 2023-08-28 | End: 2023-11-17

## 2023-08-28 NOTE — TELEPHONE ENCOUNTER
empagliflozin (JARDIANCE) 25 MG TABS tablet     Last Written Prescription Date:  3/6/23  Last Fill Quantity: 90,  # refills: 1    Last visit with provider:  10/11/22  Follow up recommended:  One year.

## 2023-09-18 DIAGNOSIS — I73.9 PAD (PERIPHERAL ARTERY DISEASE) (H): ICD-10-CM

## 2023-09-18 DIAGNOSIS — I10 HYPERTENSION GOAL BP (BLOOD PRESSURE) < 140/90: ICD-10-CM

## 2023-09-18 RX ORDER — RIVAROXABAN 2.5 MG/1
2.5 TABLET, FILM COATED ORAL 2 TIMES DAILY
Qty: 180 TABLET | Refills: 0 | Status: SHIPPED | OUTPATIENT
Start: 2023-09-18 | End: 2023-12-12

## 2023-09-18 RX ORDER — AMLODIPINE BESYLATE 2.5 MG/1
2.5 TABLET ORAL 2 TIMES DAILY
Qty: 180 TABLET | Refills: 0 | Status: SHIPPED | OUTPATIENT
Start: 2023-09-18 | End: 2023-12-12

## 2023-09-18 NOTE — TELEPHONE ENCOUNTER
amLODIPine (NORVASC) 2.5 MG tablet   Last Written Prescription Date:  10/11/22  Last Fill Quantity: 180,  # refills: 3    rivaroxaban ANTICOAGULANT (XARELTO ANTICOAGULANT) 2.5 MG TABS tablet   Last Written Prescription Date:  10/11/22  Last Fill Quantity: 180,  # refills: 3    Last visit with provider:  10/11/22  Follow up recommended:  One year.      APPOINTMENT NEEDED FOR FURTHER REFILL

## 2023-09-19 ENCOUNTER — OFFICE VISIT (OUTPATIENT)
Dept: GASTROENTEROLOGY | Facility: CLINIC | Age: 68
End: 2023-09-19
Attending: INTERNAL MEDICINE
Payer: MEDICARE

## 2023-09-19 ENCOUNTER — LAB (OUTPATIENT)
Dept: LAB | Facility: CLINIC | Age: 68
End: 2023-09-19
Attending: INTERNAL MEDICINE
Payer: MEDICARE

## 2023-09-19 VITALS
DIASTOLIC BLOOD PRESSURE: 84 MMHG | HEART RATE: 83 BPM | OXYGEN SATURATION: 100 % | TEMPERATURE: 98.5 F | BODY MASS INDEX: 31.01 KG/M2 | WEIGHT: 198 LBS | SYSTOLIC BLOOD PRESSURE: 133 MMHG

## 2023-09-19 DIAGNOSIS — E11.9 TYPE 2 DIABETES, HBA1C GOAL < 8% (H): Primary | ICD-10-CM

## 2023-09-19 DIAGNOSIS — E11.9 TYPE 2 DIABETES, HBA1C GOAL < 8% (H): ICD-10-CM

## 2023-09-19 DIAGNOSIS — K74.60 CIRRHOSIS OF LIVER WITHOUT ASCITES, UNSPECIFIED HEPATIC CIRRHOSIS TYPE (H): ICD-10-CM

## 2023-09-19 LAB
AFP SERPL-MCNC: 3.7 NG/ML
ALBUMIN SERPL BCG-MCNC: 4.4 G/DL (ref 3.5–5.2)
ALP SERPL-CCNC: 81 U/L (ref 40–129)
ALT SERPL W P-5'-P-CCNC: 28 U/L (ref 0–70)
ANION GAP SERPL CALCULATED.3IONS-SCNC: 13 MMOL/L (ref 7–15)
AST SERPL W P-5'-P-CCNC: 40 U/L (ref 0–45)
BILIRUB DIRECT SERPL-MCNC: <0.2 MG/DL (ref 0–0.3)
BILIRUB SERPL-MCNC: 0.3 MG/DL
BUN SERPL-MCNC: 17.9 MG/DL (ref 8–23)
CALCIUM SERPL-MCNC: 9.3 MG/DL (ref 8.8–10.2)
CHLORIDE SERPL-SCNC: 99 MMOL/L (ref 98–107)
CREAT SERPL-MCNC: 0.83 MG/DL (ref 0.67–1.17)
DEPRECATED HCO3 PLAS-SCNC: 23 MMOL/L (ref 22–29)
EGFRCR SERPLBLD CKD-EPI 2021: >90 ML/MIN/1.73M2
ERYTHROCYTE [DISTWIDTH] IN BLOOD BY AUTOMATED COUNT: 15.3 % (ref 10–15)
GLUCOSE SERPL-MCNC: 176 MG/DL (ref 70–99)
HBA1C MFR BLD: 7.9 %
HCT VFR BLD AUTO: 45.8 % (ref 40–53)
HGB BLD-MCNC: 15.1 G/DL (ref 13.3–17.7)
INR PPP: 1.5 (ref 0.85–1.15)
MCH RBC QN AUTO: 29.8 PG (ref 26.5–33)
MCHC RBC AUTO-ENTMCNC: 33 G/DL (ref 31.5–36.5)
MCV RBC AUTO: 91 FL (ref 78–100)
PLATELET # BLD AUTO: 144 10E3/UL (ref 150–450)
POTASSIUM SERPL-SCNC: 5.3 MMOL/L (ref 3.4–5.3)
PROT SERPL-MCNC: 8.2 G/DL (ref 6.4–8.3)
RBC # BLD AUTO: 5.06 10E6/UL (ref 4.4–5.9)
SODIUM SERPL-SCNC: 135 MMOL/L (ref 136–145)
WBC # BLD AUTO: 6.1 10E3/UL (ref 4–11)

## 2023-09-19 PROCEDURE — 36415 COLL VENOUS BLD VENIPUNCTURE: CPT | Performed by: PATHOLOGY

## 2023-09-19 PROCEDURE — 86301 IMMUNOASSAY TUMOR CA 19-9: CPT | Mod: 90 | Performed by: PATHOLOGY

## 2023-09-19 PROCEDURE — 99000 SPECIMEN HANDLING OFFICE-LAB: CPT | Performed by: PATHOLOGY

## 2023-09-19 PROCEDURE — 83036 HEMOGLOBIN GLYCOSYLATED A1C: CPT | Performed by: INTERNAL MEDICINE

## 2023-09-19 PROCEDURE — 82105 ALPHA-FETOPROTEIN SERUM: CPT | Performed by: INTERNAL MEDICINE

## 2023-09-19 PROCEDURE — G0463 HOSPITAL OUTPT CLINIC VISIT: HCPCS | Performed by: INTERNAL MEDICINE

## 2023-09-19 PROCEDURE — 99215 OFFICE O/P EST HI 40 MIN: CPT | Performed by: INTERNAL MEDICINE

## 2023-09-19 PROCEDURE — 80053 COMPREHEN METABOLIC PANEL: CPT | Performed by: PATHOLOGY

## 2023-09-19 PROCEDURE — 82248 BILIRUBIN DIRECT: CPT | Performed by: PATHOLOGY

## 2023-09-19 PROCEDURE — 85610 PROTHROMBIN TIME: CPT | Performed by: PATHOLOGY

## 2023-09-19 PROCEDURE — 85027 COMPLETE CBC AUTOMATED: CPT | Performed by: PATHOLOGY

## 2023-09-19 ASSESSMENT — PAIN SCALES - GENERAL: PAINLEVEL: NO PAIN (0)

## 2023-09-19 NOTE — NURSING NOTE
Chief Complaint   Patient presents with    RECHECK     6 mo f/u       /84   Pulse 83   Temp 98.5  F (36.9  C) (Oral)   Wt 89.8 kg (198 lb)   SpO2 100%   BMI 31.01 kg/m      Pablito Borden on 9/19/2023 at 9:33 AM

## 2023-09-19 NOTE — PROGRESS NOTES
Hepatology Follow-Up Visit:     HISTORY OF PRESENT ILLNESS:   I had the pleasure of seeing Alexandro Pool for followup in the Liver Clinic at the [facility] [place] on September 19, 2023. Mr. 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  His disease was complicated by the development of a liver lesion which on biopsy subsequently came back as likely cholangiocarcinoma as opposed to hepatocellular carcinoma. It was treated with ablation, which he tolerated well.     At present, he denies any abdominal pain, itching or skin rash, or fatigue.  He denies any increased abdominal girth or any lower extremity edema.  He has not had any gastrointestinal bleeding or any overt signs of hepatic encephalopathy.     He denies any fevers or chills, cough or shortness of breath.  He denies any nausea or vomiting or constipation.  He said he has had diarrhea for many, many years that has been stable.  He reports his appetite has been good and his weight has been stable.     There otherwise have been no other new events since he was last seen.    Medications:   Current Outpatient Medications   Medication    acetaminophen (TYLENOL) 325 MG tablet    amLODIPine (NORVASC) 2.5 MG tablet    ammonium lactate (AMLACTIN) 12 % external cream    ASPIRIN EC PO    atorvastatin (LIPITOR) 80 MG tablet    blood glucose (NO BRAND SPECIFIED) test strip    calcitonin, salmon, (MIACALCIN) 200 UNIT/ACT nasal spray    gabapentin (NEURONTIN) 100 MG capsule    gabapentin (NEURONTIN) 300 MG capsule    GLOBAL EASY GLIDE PEN NEEDLES 32G X 4 MM miscellaneous    hydrocortisone 1 % cream    insulin aspart (NOVOLOG FLEXPEN) 100 UNIT/ML pen    insulin glargine (LANTUS PEN) 100 UNIT/ML pen    insulin lispro (HUMALOG KWIKPEN) 100 UNIT/ML (1 unit dial) KWIKPEN    JARDIANCE 25 MG TABS tablet    lidocaine (XYLOCAINE) 5 % external ointment    lidocaine-prilocaine (EMLA) 2.5-2.5 % external cream    liraglutide (VICTOZA)  18 MG/3ML solution    lisinopril (ZESTRIL) 2.5 MG tablet    metoprolol succinate ER (TOPROL XL) 50 MG 24 hr tablet    naloxone (NARCAN) 4 MG/0.1ML nasal spray    order for DME    oxyCODONE (ROXICODONE) 5 MG tablet    senna-docusate (SENOKOT-S/PERICOLACE) 8.6-50 MG tablet    spironolactone (ALDACTONE) 25 MG tablet    XARELTO ANTICOAGULANT 2.5 MG TABS tablet    albuterol (PROAIR HFA/PROVENTIL HFA/VENTOLIN HFA) 108 (90 Base) MCG/ACT inhaler    albuterol (PROVENTIL) (2.5 MG/3ML) 0.083% neb solution     No current facility-administered medications for this visit.      Vitals:   /84   Pulse 83   Temp 98.5  F (36.9  C) (Oral)   Wt 89.8 kg (198 lb)   SpO2 100%   BMI 31.01 kg/m      Physical Exam:   In general he looks quite well. HEENT exam shows no scleral icterus or temporal muscle wasting. Chest is clear. Abdominal exam shows no increase in girth. No masses or tenderness to palpation are present. Liver is 10 cm in span without left lobe enlargement. No spleen tip is palpable. Extremity exam shows no edema. Skin exam shows no stigmata of chronic liver disease. Neurologic exam shows no asterixis.     Labs:   Lab Results   Component Value Date     (L) 09/19/2023    POTASSIUM 5.3 09/19/2023    CHLORIDE 99 09/19/2023    ANIONGAP 13 09/19/2023    CO2 23 09/19/2023    BUN 17.9 09/19/2023    CR 0.83 09/19/2023    GFRESTIMATED >90 09/19/2023    BOGDAN 9.3 09/19/2023      Lab Results   Component Value Date    WBC 6.1 09/19/2023    HGB 15.1 09/19/2023    HCT 45.8 09/19/2023    MCV 91 09/19/2023    MCH 29.8 09/19/2023    MCHC 33.0 09/19/2023    RDW 15.3 (H) 09/19/2023     (L) 09/19/2023     Lab Results   Component Value Date    ALBUMIN 4.4 09/19/2023    ALKPHOS 81 09/19/2023    AST 40 09/19/2023    BILICONJ 0.0 04/03/2014     Lab Results   Component Value Date    INR 1.50 (H) 09/19/2023       MELD 3.0: 11 at 9/19/2023  9:09 AM  MELD-Na: 11 at 9/19/2023  9:09 AM  Calculated from:  Serum Creatinine: 0.83 mg/dL  (Using min of 1 mg/dL) at 9/19/2023  9:09 AM  Serum Sodium: 135 mmol/L at 9/19/2023  9:09 AM  Total Bilirubin: 0.3 mg/dL (Using min of 1 mg/dL) at 9/19/2023  9:09 AM  Serum Albumin: 4.4 g/dL (Using max of 3.5 g/dL) at 9/19/2023  9:09 AM  INR(ratio): 1.50 at 9/19/2023  9:09 AM  Age at listing (hypothetical): 68 years  Sex: Male at 9/19/2023  9:09 AM    Imaging:   No images are attached to the encounter.     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  His disease is well compensated at this point in time, and his liver tests are completely normal.     He was diagnosed with an adenocarcinoma in his liver.  He was treated with ablation and he is going to get another MRI in 2 months, and I will see him back in 6 months for repeat imaging and blood work.     He is up to date for the most part with regard to vaccines.  He could get a COVID-19 vaccine, which I have informed him of, and he will do that at some point in the not so distant future.  I also recommend that he be vaccinated against influenza and RSV.  He is otherwise up to date with regard to screening for cancer and other complications of liver disease.     I did spend total of 40 minutes (on the date of the encounter), including 30 minutes of face-to-face clinic time including counseling. The rest of the time was spent in documentation and review of records.    Thank you very much for allowing me to participate in the care of this patient.  If you have any questions regarding my recommendations, please do not hesitate to contact me.      Sincerely,   Nehemias Cevallos MD      Professor of Medicine  [employer]      Executive Medical Director, Solid Organ Transplant Program  [facility]

## 2023-09-19 NOTE — LETTER
9/19/2023         RE: Alexandro Pool  [address]  [place] [address]        Dear Colleague,    Thank you for referring your patient, Alexandro Pool, to the [facility] [place]. Please see a copy of my visit note below.    Hepatology Follow-Up Visit:     HISTORY OF PRESENT ILLNESS:   I had the pleasure of seeing Alexandro Pool for followup in the Liver Clinic at the [facility] on September 19, 2023. Mr. 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  His disease was complicated by the development of a liver lesion which on biopsy subsequently came back as likely cholangiocarcinoma as opposed to hepatocellular carcinoma. It was treated with ablation, which he tolerated well.     At present, he denies any abdominal pain, itching or skin rash, or fatigue.  He denies any increased abdominal girth or any lower extremity edema.  He has not had any gastrointestinal bleeding or any overt signs of hepatic encephalopathy.     He denies any fevers or chills, cough or shortness of breath.  He denies any nausea or vomiting or constipation.  He said he has had diarrhea for many, many years that has been stable.  He reports his appetite has been good and his weight has been stable.     There otherwise have been no other new events since he was last seen.    Medications:   Current Outpatient Medications   Medication    acetaminophen (TYLENOL) 325 MG tablet    amLODIPine (NORVASC) 2.5 MG tablet    ammonium lactate (AMLACTIN) 12 % external cream    ASPIRIN EC PO    atorvastatin (LIPITOR) 80 MG tablet    blood glucose (NO BRAND SPECIFIED) test strip    calcitonin, salmon, (MIACALCIN) 200 UNIT/ACT nasal spray    gabapentin (NEURONTIN) 100 MG capsule    gabapentin (NEURONTIN) 300 MG capsule    GLOBAL EASY GLIDE PEN NEEDLES 32G X 4 MM miscellaneous    hydrocortisone 1 % cream    insulin aspart (NOVOLOG FLEXPEN) 100 UNIT/ML pen     insulin glargine (LANTUS PEN) 100 UNIT/ML pen    insulin lispro (HUMALOG KWIKPEN) 100 UNIT/ML (1 unit dial) KWIKPEN    JARDIANCE 25 MG TABS tablet    lidocaine (XYLOCAINE) 5 % external ointment    lidocaine-prilocaine (EMLA) 2.5-2.5 % external cream    liraglutide (VICTOZA) 18 MG/3ML solution    lisinopril (ZESTRIL) 2.5 MG tablet    metoprolol succinate ER (TOPROL XL) 50 MG 24 hr tablet    naloxone (NARCAN) 4 MG/0.1ML nasal spray    order for DME    oxyCODONE (ROXICODONE) 5 MG tablet    senna-docusate (SENOKOT-S/PERICOLACE) 8.6-50 MG tablet    spironolactone (ALDACTONE) 25 MG tablet    XARELTO ANTICOAGULANT 2.5 MG TABS tablet    albuterol (PROAIR HFA/PROVENTIL HFA/VENTOLIN HFA) 108 (90 Base) MCG/ACT inhaler    albuterol (PROVENTIL) (2.5 MG/3ML) 0.083% neb solution     No current facility-administered medications for this visit.      Vitals:   /84   Pulse 83   Temp 98.5  F (36.9  C) (Oral)   Wt 89.8 kg (198 lb)   SpO2 100%   BMI 31.01 kg/m      Physical Exam:   In general he looks quite well. HEENT exam shows no scleral icterus or temporal muscle wasting. Chest is clear. Abdominal exam shows no increase in girth. No masses or tenderness to palpation are present. Liver is 10 cm in span without left lobe enlargement. No spleen tip is palpable. Extremity exam shows no edema. Skin exam shows no stigmata of chronic liver disease. Neurologic exam shows no asterixis.     Labs:   Lab Results   Component Value Date     (L) 09/19/2023    POTASSIUM 5.3 09/19/2023    CHLORIDE 99 09/19/2023    ANIONGAP 13 09/19/2023    CO2 23 09/19/2023    BUN 17.9 09/19/2023    CR 0.83 09/19/2023    GFRESTIMATED >90 09/19/2023    BOGDAN 9.3 09/19/2023      Lab Results   Component Value Date    WBC 6.1 09/19/2023    HGB 15.1 09/19/2023    HCT 45.8 09/19/2023    MCV 91 09/19/2023    MCH 29.8 09/19/2023    MCHC 33.0 09/19/2023    RDW 15.3 (H) 09/19/2023     (L) 09/19/2023     Lab Results   Component Value Date    ALBUMIN 4.4  09/19/2023    ALKPHOS 81 09/19/2023    AST 40 09/19/2023    BILICONJ 0.0 04/03/2014     Lab Results   Component Value Date    INR 1.50 (H) 09/19/2023       MELD 3.0: 11 at 9/19/2023  9:09 AM  MELD-Na: 11 at 9/19/2023  9:09 AM  Calculated from:  Serum Creatinine: 0.83 mg/dL (Using min of 1 mg/dL) at 9/19/2023  9:09 AM  Serum Sodium: 135 mmol/L at 9/19/2023  9:09 AM  Total Bilirubin: 0.3 mg/dL (Using min of 1 mg/dL) at 9/19/2023  9:09 AM  Serum Albumin: 4.4 g/dL (Using max of 3.5 g/dL) at 9/19/2023  9:09 AM  INR(ratio): 1.50 at 9/19/2023  9:09 AM  Age at listing (hypothetical): 68 years  Sex: Male at 9/19/2023  9:09 AM    Imaging:   No images are attached to the encounter.     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  His disease is well compensated at this point in time, and his liver tests are completely normal.     He was diagnosed with an adenocarcinoma in his liver.  He was treated with ablation and he is going to get another MRI in 2 months, and I will see him back in 6 months for repeat imaging and blood work.     He is up to date for the most part with regard to vaccines.  He could get a COVID-19 vaccine, which I have informed him of, and he will do that at some point in the not so distant future.  I also recommend that he be vaccinated against influenza and RSV.  He is otherwise up to date with regard to screening for cancer and other complications of liver disease.     I did spend total of 40 minutes (on the date of the encounter), including 30 minutes of face-to-face clinic time including counseling. The rest of the time was spent in documentation and review of records.    Thank you very much for allowing me to participate in the care of this patient.  If you have any questions regarding my recommendations, please do not hesitate to contact me.      Sincerely,   Nehemias Cevallos MD      Professor of Medicine  [facility] [employer]       Executive Medical Director, Solid Organ Transplant Program  [facility]        Again, thank you for allowing me to participate in the care of your patient.        Sincerely,        Nehemias Cevallos MD

## 2023-09-20 ENCOUNTER — TRANSFERRED RECORDS (OUTPATIENT)
Dept: MULTI SPECIALTY CLINIC | Facility: CLINIC | Age: 68
End: 2023-09-20

## 2023-09-20 LAB
CANCER AG19-9 SERPL IA-ACNC: 28 U/ML
RETINOPATHY: NORMAL

## 2023-10-10 DIAGNOSIS — I10 HYPERTENSION GOAL BP (BLOOD PRESSURE) < 140/90: ICD-10-CM

## 2023-10-10 RX ORDER — LISINOPRIL 2.5 MG/1
TABLET ORAL
Qty: 60 TABLET | Refills: 0 | Status: SHIPPED | OUTPATIENT
Start: 2023-10-10 | End: 2023-11-17

## 2023-10-10 NOTE — TELEPHONE ENCOUNTER
lisinopril (ZESTRIL) 2.5 MG tablet     Last Written Prescription Date:  10/11/22  Last Fill Quantity: 180,  # refills: 3    Last visit with provider:  10/11/2022  Follow up recommended:  10/2023    30 day refill loaded for review/signature.  Patient notified via [employer] that appointment is required for further refill.  Follow up orders updated in Epic.

## 2023-10-11 ENCOUNTER — TELEPHONE (OUTPATIENT)
Dept: GASTROENTEROLOGY | Facility: CLINIC | Age: 68
End: 2023-10-11
Payer: MEDICARE

## 2023-10-11 NOTE — TELEPHONE ENCOUNTER
Left Voicemail (1st Attempt) for the patient to call back and schedule the following:    Appointment type: RGL  Provider: [facility]  Return date: MARCH 2024  Specialty phone number: [phone]  Additional appointment(s) needed: LAB  Additonal Notes: RESCHEDULE NEEDED.

## 2023-10-24 DIAGNOSIS — Z79.4 TYPE 2 DIABETES MELLITUS WITH COMPLICATION, WITH LONG-TERM CURRENT USE OF INSULIN (H): ICD-10-CM

## 2023-10-24 DIAGNOSIS — I73.9 PAD (PERIPHERAL ARTERY DISEASE) (H): ICD-10-CM

## 2023-10-24 DIAGNOSIS — E78.5 HYPERLIPIDEMIA LDL GOAL <70: ICD-10-CM

## 2023-10-24 DIAGNOSIS — I10 HYPERTENSION GOAL BP (BLOOD PRESSURE) < 140/90: ICD-10-CM

## 2023-10-24 DIAGNOSIS — E11.8 TYPE 2 DIABETES MELLITUS WITH COMPLICATION, WITH LONG-TERM CURRENT USE OF INSULIN (H): ICD-10-CM

## 2023-10-24 RX ORDER — LISINOPRIL 2.5 MG/1
TABLET ORAL
Qty: 28 TABLET | Refills: 0 | OUTPATIENT
Start: 2023-10-24

## 2023-10-24 RX ORDER — ATORVASTATIN CALCIUM 80 MG/1
80 TABLET, FILM COATED ORAL AT BEDTIME
Qty: 14 TABLET | Refills: 6 | OUTPATIENT
Start: 2023-10-24

## 2023-10-24 RX ORDER — EMPAGLIFLOZIN 25 MG/1
25 TABLET, FILM COATED ORAL DAILY
Qty: 14 TABLET | Refills: 2 | OUTPATIENT
Start: 2023-10-24

## 2023-10-30 ENCOUNTER — TELEPHONE (OUTPATIENT)
Dept: ONCOLOGY | Facility: HOSPITAL | Age: 68
End: 2023-10-30
Payer: MEDICARE

## 2023-11-03 DIAGNOSIS — I73.9 PAD (PERIPHERAL ARTERY DISEASE) (H): ICD-10-CM

## 2023-11-03 DIAGNOSIS — E78.5 HYPERLIPIDEMIA LDL GOAL <70: ICD-10-CM

## 2023-11-03 RX ORDER — ATORVASTATIN CALCIUM 80 MG/1
80 TABLET, FILM COATED ORAL AT BEDTIME
Qty: 14 TABLET | Refills: 6 | OUTPATIENT
Start: 2023-11-03

## 2023-11-10 DIAGNOSIS — I10 HYPERTENSION GOAL BP (BLOOD PRESSURE) < 140/90: ICD-10-CM

## 2023-11-10 DIAGNOSIS — Z79.4 TYPE 2 DIABETES MELLITUS WITH COMPLICATION, WITH LONG-TERM CURRENT USE OF INSULIN (H): ICD-10-CM

## 2023-11-10 DIAGNOSIS — I73.9 PAD (PERIPHERAL ARTERY DISEASE) (H): ICD-10-CM

## 2023-11-10 DIAGNOSIS — E78.5 HYPERLIPIDEMIA LDL GOAL <70: ICD-10-CM

## 2023-11-10 DIAGNOSIS — E11.8 TYPE 2 DIABETES MELLITUS WITH COMPLICATION, WITH LONG-TERM CURRENT USE OF INSULIN (H): ICD-10-CM

## 2023-11-10 RX ORDER — EMPAGLIFLOZIN 25 MG/1
25 TABLET, FILM COATED ORAL DAILY
Qty: 14 TABLET | Refills: 2 | OUTPATIENT
Start: 2023-11-10

## 2023-11-10 RX ORDER — LISINOPRIL 2.5 MG/1
TABLET ORAL
Qty: 28 TABLET | Refills: 0 | OUTPATIENT
Start: 2023-11-10

## 2023-11-10 RX ORDER — ATORVASTATIN CALCIUM 80 MG/1
80 TABLET, FILM COATED ORAL AT BEDTIME
Qty: 14 TABLET | Refills: 0 | OUTPATIENT
Start: 2023-11-10

## 2023-11-10 NOTE — TELEPHONE ENCOUNTER
Routing refill request to provider for review/approval because:  Drugs did not pass protocol. Patient needs appointment.

## 2023-11-13 ENCOUNTER — ANCILLARY PROCEDURE (OUTPATIENT)
Dept: CT IMAGING | Facility: CLINIC | Age: 68
End: 2023-11-13
Attending: INTERNAL MEDICINE
Payer: MEDICARE

## 2023-11-13 ENCOUNTER — ANCILLARY PROCEDURE (OUTPATIENT)
Dept: MRI IMAGING | Facility: CLINIC | Age: 68
End: 2023-11-13
Attending: INTERNAL MEDICINE
Payer: MEDICARE

## 2023-11-13 DIAGNOSIS — C22.1 CHOLANGIOCARCINOMA METASTATIC TO LIVER (H): ICD-10-CM

## 2023-11-13 DIAGNOSIS — C78.7 CHOLANGIOCARCINOMA METASTATIC TO LIVER (H): ICD-10-CM

## 2023-11-13 PROCEDURE — G1010 CDSM STANSON: HCPCS | Performed by: RADIOLOGY

## 2023-11-13 PROCEDURE — 74183 MRI ABD W/O CNTR FLWD CNTR: CPT | Mod: MG | Performed by: RADIOLOGY

## 2023-11-13 PROCEDURE — 71250 CT THORAX DX C-: CPT | Mod: MG | Performed by: RADIOLOGY

## 2023-11-13 PROCEDURE — A9585 GADOBUTROL INJECTION: HCPCS | Mod: JZ | Performed by: RADIOLOGY

## 2023-11-13 RX ORDER — GADOBUTROL 604.72 MG/ML
10 INJECTION INTRAVENOUS ONCE
Status: COMPLETED | OUTPATIENT
Start: 2023-11-13 | End: 2023-11-13

## 2023-11-13 RX ADMIN — GADOBUTROL 9 ML: 604.72 INJECTION INTRAVENOUS at 15:01

## 2023-11-16 ENCOUNTER — TELEPHONE (OUTPATIENT)
Dept: ONCOLOGY | Facility: HOSPITAL | Age: 68
End: 2023-11-16

## 2023-11-16 NOTE — TELEPHONE ENCOUNTER
Left message for patient relaying that he does not need to come for lab appointment 11/29/23 at 2:15 as he had labs done in September and will do them again in March. Relayed that he should keep appointment with Dr Cardozo 11/29/23 at 2:45.  Janine Ashley CMA ([facility])    (He just had everything done in September and will get them in March. I do not need to redraw them. Can someone call him and cancel the labs appt and let him know the right time to come? Thx. From Kathryn Ziegler NP)

## 2023-11-17 ENCOUNTER — TELEPHONE (OUTPATIENT)
Dept: OTHER | Facility: CLINIC | Age: 68
End: 2023-11-17
Payer: MEDICARE

## 2023-11-17 ENCOUNTER — MYC REFILL (OUTPATIENT)
Dept: OTHER | Facility: CLINIC | Age: 68
End: 2023-11-17
Payer: MEDICARE

## 2023-11-17 DIAGNOSIS — Z79.4 TYPE 2 DIABETES MELLITUS WITH COMPLICATION, WITH LONG-TERM CURRENT USE OF INSULIN (H): ICD-10-CM

## 2023-11-17 DIAGNOSIS — I73.9 PAD (PERIPHERAL ARTERY DISEASE) (H): ICD-10-CM

## 2023-11-17 DIAGNOSIS — E78.5 HYPERLIPIDEMIA LDL GOAL <70: ICD-10-CM

## 2023-11-17 DIAGNOSIS — E11.8 TYPE 2 DIABETES MELLITUS WITH COMPLICATION, WITH LONG-TERM CURRENT USE OF INSULIN (H): ICD-10-CM

## 2023-11-17 DIAGNOSIS — I10 HYPERTENSION GOAL BP (BLOOD PRESSURE) < 140/90: ICD-10-CM

## 2023-11-17 NOTE — TELEPHONE ENCOUNTER
LOV 10/11/22 with Dr. Olson.  I returned call to Alexandro asking what medications he needs refilled and he stated he will need to check with the assisted living nurse and will send [email or web address] message with what medications he needs refilled.     Adrianna UNDERWOOD, RN    [facility]  [facility]  Office: [phone]  Fax: [phone]

## 2023-11-17 NOTE — TELEPHONE ENCOUNTER
"Unable to refill per [facility] protocol.  Patient is scheduled for follow up on 12/12/23  Requested Prescriptions   Pending Prescriptions Disp Refills    atorvastatin (LIPITOR) 80 MG tablet 90 tablet 3     Sig: Take 1 tablet (80 mg) by mouth at bedtime       Statins Protocol Failed - 11/17/2023  3:07 PM        Failed - LDL on file in past 12 months     Recent Labs   Lab Test 12/02/21  1124   LDL 13             Failed - Recent (12 mo) or future (30 days) visit within the authorizing provider's specialty     Patient has had an office visit with the authorizing provider or a provider within the authorizing providers department within the previous 12 mos or has a future within next 30 days. See \"Patient Info\" tab in inbasket, or \"Choose Columns\" in Meds & Orders section of the refill encounter.              Passed - No abnormal creatine kinase in past 12 months     No lab results found.             Passed - Medication is active on med list        Passed - Patient is age 18 or older          empagliflozin (JARDIANCE) 25 MG TABS tablet 30 tablet 2     Sig: Take 1 tablet (25 mg) by mouth daily       Sodium Glucose Co-Transport Inhibitor Agents Passed - 11/17/2023  3:07 PM        Passed - Patient has documented A1c within the specified period of time.     If HgbA1C is 8 or greater, it needs to be on file within the past 3 months.  If less than 8, must be on file within the past 6 months.     Recent Labs   Lab Test 09/19/23  0909   A1C 7.9*             Passed - No creatinine >1.4 or GFR <45 within the past 12 mos     Recent Labs   Lab Test 09/19/23  0909 11/02/21  0900 09/02/20  1509   GFRESTIMATED >90   < > 89   GFRESTBLACK  --   --  >90    < > = values in this interval not displayed.       Recent Labs   Lab Test 09/19/23  0909   CR 0.83             Passed - Medication is active on med list        Passed - Patient is age 18 or older        Passed - Patient has documented normal Potassium within the last 12 mos.     Recent Labs " "  Lab Test 09/19/23  0909   POTASSIUM 5.3             Passed - Recent (6 mo) or future (30 days) visit within the authorizing provider's specialty     Patient had office visit in the last 6 months or has a visit in the next 30 days with authorizing provider or within the authorizing provider's specialty.  See \"Patient Info\" tab in inbasket, or \"Choose Columns\" in Meds & Orders section of the refill encounter.              lisinopril (ZESTRIL) 2.5 MG tablet 60 tablet 0     Sig: TAKE 1 TABLET (2.5 MG) BY MOUTH 2 TIMES DAILY. APPOINTMENT NEEDED FOR FURTHER REFILL       ACE Inhibitors (Including Combos) Protocol Failed - 11/17/2023  3:07 PM        Failed - Recent (12 mo) or future (30 days) visit within the authorizing provider's specialty     Patient has had an office visit with the authorizing provider or a provider within the authorizing providers department within the previous 12 mos or has a future within next 30 days. See \"Patient Info\" tab in inbasket, or \"Choose Columns\" in Meds & Orders section of the refill encounter.              Passed - Blood pressure under 140/90 in past 12 months     BP Readings from Last 3 Encounters:   09/19/23 133/84   05/17/23 122/64   05/05/23 (!) 141/74                 Passed - Medication is active on med list        Passed - Patient is age 18 or older        Passed - Normal serum creatinine on file in past 12 months     Recent Labs   Lab Test 09/19/23  0909   CR 0.83       Ok to refill medication if creatinine is low          Passed - Normal serum potassium on file in past 12 months     Recent Labs   Lab Test 09/19/23  0909   POTASSIUM 5.3                Adrianna UNDERWOOD, RN    [facility]  Office: [phone]  Fax: [phone]      "

## 2023-11-17 NOTE — TELEPHONE ENCOUNTER
[facility]    Who is the name of the provider?:  VIKY URIARTE   What is the location you see this provider at/preferred location?: [place]  Person calling / Facility: Alexandro Pool  Phone number:  [phone] (home)  Nurse call back needed:  YES     Reason for call:  Patient scheduled for US and will do walk in labs on 11/28. Follow up with Dr Uriarte on 12/12.    Future Appointments   Date Time Provider Department Center   11/28/2023  9:00 AM [facility] US 1 [facility]   11/28/2023 10:[facility]   12/12/2023 11:40 AM Viky Uriarte MD [facility]     Patient requesting assistance with bridge prescriptions until 12/12. Please call him back.    Pharmacy location:     [facility] - [place], [place] - [address]  Outside Imaging: n/a   Can we leave a detailed message on this number?  YES

## 2023-11-20 RX ORDER — ATORVASTATIN CALCIUM 80 MG/1
80 TABLET, FILM COATED ORAL AT BEDTIME
Qty: 30 TABLET | Refills: 0 | Status: SHIPPED | OUTPATIENT
Start: 2023-11-20 | End: 2023-12-12

## 2023-11-20 RX ORDER — LISINOPRIL 2.5 MG/1
TABLET ORAL
Qty: 60 TABLET | Refills: 0 | Status: SHIPPED | OUTPATIENT
Start: 2023-11-20 | End: 2023-12-12

## 2023-11-28 ENCOUNTER — HOSPITAL ENCOUNTER (OUTPATIENT)
Dept: ULTRASOUND IMAGING | Facility: HOSPITAL | Age: 68
Discharge: HOME | End: 2023-11-28
Attending: INTERNAL MEDICINE
Payer: MEDICARE

## 2023-11-28 DIAGNOSIS — I73.9 PAD (PERIPHERAL ARTERY DISEASE) (H): ICD-10-CM

## 2023-11-28 PROCEDURE — 93922 UPR/L XTREMITY ART 2 LEVELS: CPT

## 2023-11-28 PROCEDURE — 93926 LOWER EXTREMITY STUDY: CPT | Mod: LT

## 2023-11-29 ENCOUNTER — ONCOLOGY VISIT (OUTPATIENT)
Dept: ONCOLOGY | Facility: HOSPITAL | Age: 68
End: 2023-11-29
Attending: INTERNAL MEDICINE
Payer: MEDICARE

## 2023-11-29 VITALS
DIASTOLIC BLOOD PRESSURE: 71 MMHG | HEIGHT: 67 IN | BODY MASS INDEX: 31.08 KG/M2 | HEART RATE: 70 BPM | OXYGEN SATURATION: 97 % | SYSTOLIC BLOOD PRESSURE: 138 MMHG | TEMPERATURE: 98.1 F | RESPIRATION RATE: 16 BRPM | WEIGHT: 198 LBS

## 2023-11-29 DIAGNOSIS — C22.1 CHOLANGIOCARCINOMA (H): Primary | ICD-10-CM

## 2023-11-29 PROCEDURE — G0463 HOSPITAL OUTPT CLINIC VISIT: HCPCS | Performed by: INTERNAL MEDICINE

## 2023-11-29 PROCEDURE — 99213 OFFICE O/P EST LOW 20 MIN: CPT | Performed by: INTERNAL MEDICINE

## 2023-11-29 ASSESSMENT — PAIN SCALES - GENERAL: PAINLEVEL: NO PAIN (0)

## 2023-11-29 NOTE — PROGRESS NOTES
[facility] Hematology and Oncology Progress Note    Patient: Alexandro Pool  MRN: [id number]  Date of Service: Nov 29, 2023        Assessment and Plan:    1.  Cholangiocarcinoma: He had an MRI of the liver performed on November 13.  This showed changes of ablation but no evidence of recurrent or residual viable tumor.  Clinically he is feeling well.  Recent LFTs were reviewed and were normal showing bilirubin of 0.3, alk phos 81, AST 40, ALT 28.  He is 1 year out from his treatment.  Will repeat imaging with an MRI in 6 months.    2.  Pancreatic cyst: Stable on imaging.  Likely IPMN's.  No high risk imaging characteristics.  Follow radiographically.    ECOG Performance  1    Diagnosis:    1.  Cholangiocarcinoma: Diagnosed October 2022.    Treatment:    Percutaneous microwave ablation performed in segment 6 of the liver on November 23, 2022.    Interim History:    Alexandro comes in today for 6-month follow-up visit.  States he has been doing well in the interim.  No acute complaints today.  Denies abdominal pain, weight loss, nausea or vomiting.    Review of Systems:    As above in the history.     Review of Systems otherwise Negative for:  General: chills, fever or night sweats  Psychological: anxiety or depression  Ophthalmic: blurry vision, double vision or loss of vision, vision change  ENT: epistaxis, oral lesions, hearing changes  Hematological and Lymphatic: bleeding, bruising, jaundice, swollen lymph nodes  Endocrine: hot flashes, unexpected weight changes  Respiratory: cough, hemoptysis, orthopnea or shortness of breath/MARTÍNEZ  Cardiovascular: chest pain, edema, palpitations or PND  Gastrointestinal: abdominal pain, blood in stools, change in bowel habits, constipation, diarrhea or nausea/vomiting  Genito-Urinary: change in urinary stream, incontinence, frequency/urgency  Musculoskeletal: joint pain, stiffness, swelling, muscle pain  Neurological: dizziness, headaches, numbness/tingling  Dermatological:  "lumps and rash    Past History:    Past Medical History:   Diagnosis Date    Acute kidney failure, unspecified (H24)     Blood transfusion     CAD (coronary artery disease)     CARDIOVASCULAR SCREENING; LDL GOAL LESS THAN 100     Cirrhosis (H)     Critical lower limb ischemia (H)     Diabetes mellitus (H) 10/2011    Hypertension     Hypertension goal BP (blood pressure) < 140/90     Ischemic cardiomyopathy     Lesion of liver     Lymphedema of left leg 05/11/2016    Peripheral arterial disease (H24)     PVD (peripheral vascular disease) (H24)     Right above knee amputation 04/30/2014    Type 2 diabetes, HbA1C goal < 8% (H)      Physical Exam:    /71 (BP Location: Left arm, Patient Position: Sitting, Cuff Size: Adult Regular)   Pulse 70   Temp 98.1  F (36.7  C) (Oral)   Resp 16   Ht 1.702 m (5' 7\")   Wt 89.8 kg (198 lb)   SpO2 97%   BMI 31.01 kg/m      General: patient appears stated age of 68 year old. Nontoxic and in no distress.   HEENT: Head: atraumatic, normocephalic. Sclerae anicteric.  Chest:  Normal respiratory effort  Cardiac:  No edema.   Abdomen: abdomen is non-distended  Extremities: normal tone and muscle bulk.  Left above-the-knee amputation  Skin: no lesions or rash on visible skin. Warm and dry.   CNS: alert and oriented. Grossly non-focal.   Psychiatric: normal mood and affect.     Lab Results:    No results found for this or any previous visit (from the past 168 hour(s)).    Imaging:    US Lower Extremity Arterial Duplex Left    Result Date: 11/28/2023  LOCATION: [facility] DATE: 11/28/2023 1. EXAM: RESTING ANKLE-BRACHIAL INDICES (ABIs) 2. DUPLEX ARTERIAL ULTRASOUND OF THE LEFT LOWER LOWER EXTREMITY 11/28/2023 9:42 AM CST INDICATION: Known peripheral arterial disease, follow-up. TECHNIQUE: Duplex imaging is performed utilizing gray-scale, two-dimensional images and color-flow imaging. Doppler waveform analysis and spectral Doppler imaging is also performed. " COMPARISON: 08/11/2023. FINDINGS: SEGMENTAL BP RIGHT (mmHg) Brachial: 133; Index Ankle (PT): Ankle (DP): Digit: LEFT (mmHg) Brachial: 135 Ankle (PT): 128; Index 0.95 Ankle (DP): 135; Index 1.00 Digit: 191; Index 1.41 WAVEFORMS: Slight blunting of left lower extremity waveforms. ARTERIAL DUPLEX VELOCITIES (cm/s): LEFT EIA: 147 CFA: 164 PFA: SFA-Proximal: SFA-Mid: SFA-Distal: Popliteal: PTA: 31 SENIA: 65 DPA: 36 BYPASS GRAFT VELOCITIES (cm/s): LEFT Proximal Native: 163 Proximal Anastomosis: 120 Proximal: 103 Mid: 45 Distal: 45 Distal Anastomosis: 65 Distal Native: 65 WAVEFORMS/FINDINGS: Monophasic left external iliac artery waveform. Left superficial femoral to anterior tibial artery bypass graft appears widely patent. Poststenotic posterior tibial artery waveform. Normal DP waveform.     IMPRESSION: 1.  RIGHT LOWER EXTREMITY: Postsurgical changes of below-knee amputation. 2.  LEFT LOWER EXTREMITY: OZZIE at rest is within normal limits measuring 1.00. TBI is within normal limits. Doppler arterial ultrasound demonstrates a patent left superficial femoral artery to anterior tibial artery bypass graft with normal distal SENIA/DP waveforms. No significant stenosis observed.    US OZZIE with PPG wo Exercise    Result Date: 11/28/2023  LOCATION: [facility] DATE: 11/28/2023 1. EXAM: RESTING ANKLE-BRACHIAL INDICES (ABIs) 2. DUPLEX ARTERIAL ULTRASOUND OF THE LEFT LOWER LOWER EXTREMITY 11/28/2023 9:42 AM CST INDICATION: Known peripheral arterial disease, follow-up. TECHNIQUE: Duplex imaging is performed utilizing gray-scale, two-dimensional images and color-flow imaging. Doppler waveform analysis and spectral Doppler imaging is also performed. COMPARISON: 08/11/2023. FINDINGS: SEGMENTAL BP RIGHT (mmHg) Brachial: 133; Index Ankle (PT): Ankle (DP): Digit: LEFT (mmHg) Brachial: 135 Ankle (PT): 128; Index 0.95 Ankle (DP): 135; Index 1.00 Digit: 191; Index 1.41 WAVEFORMS: Slight blunting of left lower extremity  waveforms. ARTERIAL DUPLEX VELOCITIES (cm/s): LEFT EIA: 147 CFA: 164 PFA: SFA-Proximal: SFA-Mid: SFA-Distal: Popliteal: PTA: 31 SENIA: 65 DPA: 36 BYPASS GRAFT VELOCITIES (cm/s): LEFT Proximal Native: 163 Proximal Anastomosis: 120 Proximal: 103 Mid: 45 Distal: 45 Distal Anastomosis: 65 Distal Native: 65 WAVEFORMS/FINDINGS: Monophasic left external iliac artery waveform. Left superficial femoral to anterior tibial artery bypass graft appears widely patent. Poststenotic posterior tibial artery waveform. Normal DP waveform.     IMPRESSION: 1.  RIGHT LOWER EXTREMITY: Postsurgical changes of below-knee amputation. 2.  LEFT LOWER EXTREMITY: OZZIE at rest is within normal limits measuring 1.00. TBI is within normal limits. Doppler arterial ultrasound demonstrates a patent left superficial femoral artery to anterior tibial artery bypass graft with normal distal SENIA/DP waveforms. No significant stenosis observed.    MR Liver wo & w Contrast    Result Date: 11/14/2023  MRI ABDOMEN CLINICAL HISTORY:  Follow-up cholangiocarcinoma TECHNIQUE: Images were acquired with and without intravenous contrast through the abdomen. The following MR images were acquired: TrueFISP, multiplanar T2 weighted, axial T1 in/out of phase, axial fat-saturated T1, diffusion-weighted. Multiplanar T1-weighted images with fat saturation were before contrast administration and at multiple time points following the administration of intravenous contrast. Contrast dose: 9 mL Gadavist Comparison study: MRI 4/28/2023. FINDINGS: Liver: Cirrhotic configuration of the liver with nodular surface contour. Progressive reticular enhancement predominantly within the right hepatic lobe, suggestive of fibrosis. Diffuse hepatic steatosis. There are scattered T2 hyperintense simple cysts which are unchanged. Decreased size of ablation changes in hepatic segment 6 measuring 5.3 cm (series 20 image 30) previously measuring 6.1 cm. Similar prominence of wedge-shaped area of  arterial enhancement involving hepatic segments 6 and 7 likely perfusional in etiology. No suspicious arterial enhancement. Stable appearing arterially enhancing observations in hepatic segment 4, including a 1.9 cm observation in hepatic segment 4b (series 20 image 33). No evidence of washout or pseudocapsule formation. No restricted diffusion. LR-3. Gallbladder/biliary tree: Filling defect at the cystic neck (series 17 image 15), likely artifact as no stones are visualized on other sequences. No evidence of gallbladder wall thickening or pericholecystic fluid. There is stable dilation of the common bile duct measuring up to 12 mm. Stable mild intrahepatic biliary dilation. No evidence of obstructive lesion. Spleen: Not enlarged. Kidneys: Symmetric renal enhancement. Stable bilateral T2 hyperintense simple renal cysts. No hydronephrosis. No solid mass. Adrenal glands: Within normal limits. Pancreas: There are multiple subcentimeter T2 hyperintense nonenhancing cystic lesions throughout the pancreas the largest of which measures up to 4 mm and is located peripherally within the uncinate process (series 3 image 26). Preservation of intrinsic T1 hyperintense signal. No ductal dilation. No pancreatic divisum. No solid pancreatic mass. Bowel: Obstructive appearance of the visualized large and small bowel. Lymph nodes: No abdominal lymphadenopathy by size criteria. Blood vessels: The major abdominal arteries, veins, and portal vessels are patent. Mild-to-moderate atherosclerotic disease within the abdominal aorta. Lung bases: Minimal bibasilar subsegmental atelectasis. Stable fatty metaplasia along the interventricular septum and left ventricular apex. Bones and soft tissues: No acute or suspicious osseous lesion. Mild degenerative changes of the spine. Mesentery and abdominal wall: There is cutaneous thickening and enhancement within the left lower quadrant abdominal wall (series 22 image 80), unchanged. Ascites: No  free fluid.     IMPRESSION: 1. Interval decreased size of ablation bed within hepatic segment 6. No evidence of locally recurrent disease. 2. Cirrhotic morphology of the liver. Stable arterially enhancing observations in hepatic segment 4. LR-3. Recommend continued attention on follow-up. 3. Unchanged pancreatic cysts measuring up to 0.4 cm, likely representing side branch IPMN's without suspicious features. Consider follow-up as detailed below. 4. Stable biliary ductal dilation without obstructing lesion. 5. Based on this exam, this patient is within [place] criteria. OPTN/LIRADS definitions. LIRADS v2018. LIRADS 1: Definitely benign. LIRADS 2: Probably benign. LIRADS 3: Indeterminate for HCC. LIRADS 4: Probably HCC. LIRADS M: Probably malignant. Not specific for HCC. LIRADS 5TR- nonviable: Previously treated HCC without residual malignancy identified LIRADS 5TR- viable: Previously treated HCC with findings indicating residual viable malignancy LIRADS 5TR- equivocal: Previously treated HCC with findings that may be treatment changes or viable malignancy OPTN 5a: Diagnostic for HCC. < 2 cm. OPTN 5b: Diagnostic for HCC. > Or = 2 cm and < 5 cm. OPTN 5x: Diagnostic for HCC. > Or = 5 cm. [place] criteria for liver transplantation: 1. Presence of no HCCs greater than 5 cm. One HCC measuring 3-5 cm is allowed if no other HCCs are present. 2. Maximum of 3 HCCs measuring 3 cm or less. 3. No vascular invasion. 4. No extrahepatic metastases. [facility] recommendations for asymptomatic pancreatic cysts, modified from international consensus guidelines* Size of largest cyst: Less than 1 cm: Follow-up imaging in 6 months to 1 year, then lengthen interval to 2-3 years if no change. 1-2 cm: Follow-up imaging at 6 months, then yearly for 2 years, then lengthen interval if no change. The optimal initial study or first follow-up exam is MRI/MRCP performed with intravenous gadolinium contrast to allow full cyst  characterization. Once characterized, contrast is optional on follow-up MRIs. If MRI is contraindicated, CT with contrast is recommended. GI consultation for possible endoscopic ultrasound is recommended for cysts with the following features: Size > 2 cm, >20% growth on followup, wall thickening or enhancement, mural nodule, duct > 5 mm, or abrupt change in duct caliber with distal atrophy. GI or surgical consultation is also recommended for symptomatic cysts. *Reference: International Consensus Guidelines for Management of IPMN and MCN of the pancreas. Pancreatology: 12:(2012); 183-197. I have personally reviewed the examination and initial interpretation and I agree with the findings. SHAQUILLE HUGHES MD   SYSTEM ID:  [id number]    CT Chest w/o Contrast    Result Date: 11/13/2023  Examination: CT CHEST W/O CONTRAST, 11/13/2023 1:35 PM History: lung nodule; Cholangiocarcinoma metastatic to liver (H); Cholangiocarcinoma metastatic to liver (H) Comparison: 4/28/2023 TECHNIQUE: Helical acquisition of CT images from the lung apices to the kidneys without IV contrast. Coronal and axial MIP images were reconstructed from the source data. FINDINGS: Lungs/Pleura: No pleural effusion or pneumothorax. Mild-to-moderate apical predominant centrilobular and paraseptal emphysema. Scattered peripheral pleural parenchymal scarring, as seen previously. No suspicious new or enlarging pulmonary nodule. No focal consolidation. The central tracheobronchial tree is patent. Mild central bronchial wall thickening. Mucoid debris in the mainstem bronchi and right bronchus intermedius. Chest: Normal heart size. Subendocardial fat deposition and calcification along the anteroseptal and apical left ventricular wall severe coronary artery calcifications. No pericardial effusion. Normal caliber ascending aorta and main pulmonary artery. No thoracic lymphadenopathy. Upper abdomen: Limited evaluation of the upper abdomen. Please refer to same day MRI  for further details. Bones and soft tissues: No acute or aggressive osseus abnormality. Stable T3 and T7 superior endplate and L1 inferior endplate compression deformities. Multilevel degenerative change in the spine. Chronic bilateral rib fracture deformities.     IMPRESSION: 1. No evidence of metastatic disease in the chest. 2. Moderate apical predominant centrilobular and paraseptal emphysema with stable scattered peripheral pleural parenchymal scarring. 3. Other chronic and incidental findings as detailed in the body of the report. 4. Please refer to same day MRI for further details. JOSE CARLOS PABON DO   SYSTEM ID:  [id number]       Signed by: Ashutosh Cardozo MD

## 2023-11-29 NOTE — LETTER
"    11/29/2023         RE: Alexandro Pool  [address]  [place] [address]        Dear Colleague,    Thank you for referring your patient, Alexandro Pool, to the [facility]. Please see a copy of my visit note below.    Oncology Rooming Note    November 29, 2023 3:05 PM   Alexandro Pool is a 68 year old male who presents for:    Chief Complaint   Patient presents with     Oncology Clinic Visit     Return visit 6 month with lab. MRI 11/13/23. Intrahepatic cholangiocarcinoma.     Initial Vitals: /71 (BP Location: Left arm, Patient Position: Sitting, Cuff Size: Adult Regular)   Pulse 70   Temp 98.1  F (36.7  C) (Oral)   Resp 16   Ht 1.702 m (5' 7\")   Wt 89.8 kg (198 lb)   SpO2 97%   BMI 31.01 kg/m   Estimated body mass index is 31.01 kg/m  as calculated from the following:    Height as of this encounter: 1.702 m (5' 7\").    Weight as of this encounter: 89.8 kg (198 lb). Body surface area is 2.06 meters squared.  No Pain (0) Comment: Data Unavailable   No LMP for male patient.  Allergies reviewed: Yes  Medications reviewed: Yes    Medications: Medication refills not needed today.  Pharmacy name entered into [employer]:    [facility] - [place], [place] - [address] [facility] - [place], [place] - [address] [place]    Clinical concerns: Return visit 6 month with lab. MRI 11/13/23. Intrahepatic cholangiocarcinoma.      Janine Ashley MA              [facility] Hematology and Oncology Progress Note    Patient: Alexandro Pool  MRN: [id number]  Date of Service: Nov 29, 2023        Assessment and Plan:    1.  Cholangiocarcinoma: He had an MRI of the liver performed on November 13.  This showed changes of ablation but no evidence of recurrent or residual viable tumor.  Clinically he is feeling well.  Recent LFTs were reviewed and were normal showing bilirubin of 0.3, alk phos 81, AST 40, ALT 28.  He is 1 year out " from his treatment.  Will repeat imaging with an MRI in 6 months.    2.  Pancreatic cyst: Stable on imaging.  Likely IPMN's.  No high risk imaging characteristics.  Follow radiographically.    ECOG Performance  1    Diagnosis:    1.  Cholangiocarcinoma: Diagnosed October 2022.    Treatment:    Percutaneous microwave ablation performed in segment 6 of the liver on November 23, 2022.    Interim History:    Alexandro comes in today for 6-month follow-up visit.  States he has been doing well in the interim.  No acute complaints today.  Denies abdominal pain, weight loss, nausea or vomiting.    Review of Systems:    As above in the history.     Review of Systems otherwise Negative for:  General: chills, fever or night sweats  Psychological: anxiety or depression  Ophthalmic: blurry vision, double vision or loss of vision, vision change  ENT: epistaxis, oral lesions, hearing changes  Hematological and Lymphatic: bleeding, bruising, jaundice, swollen lymph nodes  Endocrine: hot flashes, unexpected weight changes  Respiratory: cough, hemoptysis, orthopnea or shortness of breath/MARTÍNEZ  Cardiovascular: chest pain, edema, palpitations or PND  Gastrointestinal: abdominal pain, blood in stools, change in bowel habits, constipation, diarrhea or nausea/vomiting  Genito-Urinary: change in urinary stream, incontinence, frequency/urgency  Musculoskeletal: joint pain, stiffness, swelling, muscle pain  Neurological: dizziness, headaches, numbness/tingling  Dermatological: lumps and rash    Past History:    Past Medical History:   Diagnosis Date     Acute kidney failure, unspecified (H24)      Blood transfusion      CAD (coronary artery disease)      CARDIOVASCULAR SCREENING; LDL GOAL LESS THAN 100      Cirrhosis (H)      Critical lower limb ischemia (H)      Diabetes mellitus (H) 10/2011     Hypertension      Hypertension goal BP (blood pressure) < 140/90      Ischemic cardiomyopathy      Lesion of liver      Lymphedema of left leg  "05/11/2016     Peripheral arterial disease (H24)      PVD (peripheral vascular disease) (H24)      Right above knee amputation 04/30/2014     Type 2 diabetes, HbA1C goal < 8% (H)      Physical Exam:    /71 (BP Location: Left arm, Patient Position: Sitting, Cuff Size: Adult Regular)   Pulse 70   Temp 98.1  F (36.7  C) (Oral)   Resp 16   Ht 1.702 m (5' 7\")   Wt 89.8 kg (198 lb)   SpO2 97%   BMI 31.01 kg/m      General: patient appears stated age of 68 year old. Nontoxic and in no distress.   HEENT: Head: atraumatic, normocephalic. Sclerae anicteric.  Chest:  Normal respiratory effort  Cardiac:  No edema.   Abdomen: abdomen is non-distended  Extremities: normal tone and muscle bulk.  Left above-the-knee amputation  Skin: no lesions or rash on visible skin. Warm and dry.   CNS: alert and oriented. Grossly non-focal.   Psychiatric: normal mood and affect.     Lab Results:    No results found for this or any previous visit (from the past 168 hour(s)).    Imaging:    US Lower Extremity Arterial Duplex Left    Result Date: 11/28/2023  LOCATION: [facility] DATE: 11/28/2023 1. EXAM: RESTING ANKLE-BRACHIAL INDICES (ABIs) 2. DUPLEX ARTERIAL ULTRASOUND OF THE LEFT LOWER LOWER EXTREMITY 11/28/2023 9:42 AM CST INDICATION: Known peripheral arterial disease, follow-up. TECHNIQUE: Duplex imaging is performed utilizing gray-scale, two-dimensional images and color-flow imaging. Doppler waveform analysis and spectral Doppler imaging is also performed. COMPARISON: 08/11/2023. FINDINGS: SEGMENTAL BP RIGHT (mmHg) Brachial: 133; Index Ankle (PT): Ankle (DP): Digit: LEFT (mmHg) Brachial: 135 Ankle (PT): 128; Index 0.95 Ankle (DP): 135; Index 1.00 Digit: 191; Index 1.41 WAVEFORMS: Slight blunting of left lower extremity waveforms. ARTERIAL DUPLEX VELOCITIES (cm/s): LEFT EIA: 147 CFA: 164 PFA: SFA-Proximal: SFA-Mid: SFA-Distal: Popliteal: PTA: 31 SENIA: 65 DPA: 36 BYPASS GRAFT VELOCITIES (cm/s): LEFT " Proximal Native: 163 Proximal Anastomosis: 120 Proximal: 103 Mid: 45 Distal: 45 Distal Anastomosis: 65 Distal Native: 65 WAVEFORMS/FINDINGS: Monophasic left external iliac artery waveform. Left superficial femoral to anterior tibial artery bypass graft appears widely patent. Poststenotic posterior tibial artery waveform. Normal DP waveform.     IMPRESSION: 1.  RIGHT LOWER EXTREMITY: Postsurgical changes of below-knee amputation. 2.  LEFT LOWER EXTREMITY: OZZIE at rest is within normal limits measuring 1.00. TBI is within normal limits. Doppler arterial ultrasound demonstrates a patent left superficial femoral artery to anterior tibial artery bypass graft with normal distal SENIA/DP waveforms. No significant stenosis observed.    US OZZIE with PPG wo Exercise    Result Date: 11/28/2023  LOCATION: [facility] DATE: 11/28/2023 1. EXAM: RESTING ANKLE-BRACHIAL INDICES (ABIs) 2. DUPLEX ARTERIAL ULTRASOUND OF THE LEFT LOWER LOWER EXTREMITY 11/28/2023 9:42 AM CST INDICATION: Known peripheral arterial disease, follow-up. TECHNIQUE: Duplex imaging is performed utilizing gray-scale, two-dimensional images and color-flow imaging. Doppler waveform analysis and spectral Doppler imaging is also performed. COMPARISON: 08/11/2023. FINDINGS: SEGMENTAL BP RIGHT (mmHg) Brachial: 133; Index Ankle (PT): Ankle (DP): Digit: LEFT (mmHg) Brachial: 135 Ankle (PT): 128; Index 0.95 Ankle (DP): 135; Index 1.00 Digit: 191; Index 1.41 WAVEFORMS: Slight blunting of left lower extremity waveforms. ARTERIAL DUPLEX VELOCITIES (cm/s): LEFT EIA: 147 CFA: 164 PFA: SFA-Proximal: SFA-Mid: SFA-Distal: Popliteal: PTA: 31 SENIA: 65 DPA: 36 BYPASS GRAFT VELOCITIES (cm/s): LEFT Proximal Native: 163 Proximal Anastomosis: 120 Proximal: 103 Mid: 45 Distal: 45 Distal Anastomosis: 65 Distal Native: 65 WAVEFORMS/FINDINGS: Monophasic left external iliac artery waveform. Left superficial femoral to anterior tibial artery bypass graft appears widely  patent. Poststenotic posterior tibial artery waveform. Normal DP waveform.     IMPRESSION: 1.  RIGHT LOWER EXTREMITY: Postsurgical changes of below-knee amputation. 2.  LEFT LOWER EXTREMITY: OZZIE at rest is within normal limits measuring 1.00. TBI is within normal limits. Doppler arterial ultrasound demonstrates a patent left superficial femoral artery to anterior tibial artery bypass graft with normal distal SENIA/DP waveforms. No significant stenosis observed.    MR Liver wo & w Contrast    Result Date: 11/14/2023  MRI ABDOMEN CLINICAL HISTORY:  Follow-up cholangiocarcinoma TECHNIQUE: Images were acquired with and without intravenous contrast through the abdomen. The following MR images were acquired: TrueFISP, multiplanar T2 weighted, axial T1 in/out of phase, axial fat-saturated T1, diffusion-weighted. Multiplanar T1-weighted images with fat saturation were before contrast administration and at multiple time points following the administration of intravenous contrast. Contrast dose: 9 mL Gadavist Comparison study: MRI 4/28/2023. FINDINGS: Liver: Cirrhotic configuration of the liver with nodular surface contour. Progressive reticular enhancement predominantly within the right hepatic lobe, suggestive of fibrosis. Diffuse hepatic steatosis. There are scattered T2 hyperintense simple cysts which are unchanged. Decreased size of ablation changes in hepatic segment 6 measuring 5.3 cm (series 20 image 30) previously measuring 6.1 cm. Similar prominence of wedge-shaped area of arterial enhancement involving hepatic segments 6 and 7 likely perfusional in etiology. No suspicious arterial enhancement. Stable appearing arterially enhancing observations in hepatic segment 4, including a 1.9 cm observation in hepatic segment 4b (series 20 image 33). No evidence of washout or pseudocapsule formation. No restricted diffusion. LR-3. Gallbladder/biliary tree: Filling defect at the cystic neck (series 17 image 15), likely artifact  as no stones are visualized on other sequences. No evidence of gallbladder wall thickening or pericholecystic fluid. There is stable dilation of the common bile duct measuring up to 12 mm. Stable mild intrahepatic biliary dilation. No evidence of obstructive lesion. Spleen: Not enlarged. Kidneys: Symmetric renal enhancement. Stable bilateral T2 hyperintense simple renal cysts. No hydronephrosis. No solid mass. Adrenal glands: Within normal limits. Pancreas: There are multiple subcentimeter T2 hyperintense nonenhancing cystic lesions throughout the pancreas the largest of which measures up to 4 mm and is located peripherally within the uncinate process (series 3 image 26). Preservation of intrinsic T1 hyperintense signal. No ductal dilation. No pancreatic divisum. No solid pancreatic mass. Bowel: Obstructive appearance of the visualized large and small bowel. Lymph nodes: No abdominal lymphadenopathy by size criteria. Blood vessels: The major abdominal arteries, veins, and portal vessels are patent. Mild-to-moderate atherosclerotic disease within the abdominal aorta. Lung bases: Minimal bibasilar subsegmental atelectasis. Stable fatty metaplasia along the interventricular septum and left ventricular apex. Bones and soft tissues: No acute or suspicious osseous lesion. Mild degenerative changes of the spine. Mesentery and abdominal wall: There is cutaneous thickening and enhancement within the left lower quadrant abdominal wall (series 22 image 80), unchanged. Ascites: No free fluid.     IMPRESSION: 1. Interval decreased size of ablation bed within hepatic segment 6. No evidence of locally recurrent disease. 2. Cirrhotic morphology of the liver. Stable arterially enhancing observations in hepatic segment 4. LR-3. Recommend continued attention on follow-up. 3. Unchanged pancreatic cysts measuring up to 0.4 cm, likely representing side branch IPMN's without suspicious features. Consider follow-up as detailed below. 4.  Stable biliary ductal dilation without obstructing lesion. 5. Based on this exam, this patient is within [place] criteria. OPTN/LIRADS definitions. LIRADS v2018. LIRADS 1: Definitely benign. LIRADS 2: Probably benign. LIRADS 3: Indeterminate for HCC. LIRADS 4: Probably HCC. LIRADS M: Probably malignant. Not specific for HCC. LIRADS 5TR- nonviable: Previously treated HCC without residual malignancy identified LIRADS 5TR- viable: Previously treated HCC with findings indicating residual viable malignancy LIRADS 5TR- equivocal: Previously treated HCC with findings that may be treatment changes or viable malignancy OPTN 5a: Diagnostic for HCC. < 2 cm. OPTN 5b: Diagnostic for HCC. > Or = 2 cm and < 5 cm. OPTN 5x: Diagnostic for HCC. > Or = 5 cm. [place] criteria for liver transplantation: 1. Presence of no HCCs greater than 5 cm. One HCC measuring 3-5 cm is allowed if no other HCCs are present. 2. Maximum of 3 HCCs measuring 3 cm or less. 3. No vascular invasion. 4. No extrahepatic metastases. [facility] recommendations for asymptomatic pancreatic cysts, modified from international consensus guidelines* Size of largest cyst: Less than 1 cm: Follow-up imaging in 6 months to 1 year, then lengthen interval to 2-3 years if no change. 1-2 cm: Follow-up imaging at 6 months, then yearly for 2 years, then lengthen interval if no change. The optimal initial study or first follow-up exam is MRI/MRCP performed with intravenous gadolinium contrast to allow full cyst characterization. Once characterized, contrast is optional on follow-up MRIs. If MRI is contraindicated, CT with contrast is recommended. GI consultation for possible endoscopic ultrasound is recommended for cysts with the following features: Size > 2 cm, >20% growth on followup, wall thickening or enhancement, mural nodule, duct > 5 mm, or abrupt change in duct caliber with distal atrophy. GI or surgical consultation is also recommended for symptomatic cysts.  *Reference: International Consensus Guidelines for Management of IPMN and MCN of the pancreas. Pancreatology: 12:(2012); 183-197. I have personally reviewed the examination and initial interpretation and I agree with the findings. SHAQUILLE HUGHES MD   SYSTEM ID:  [id number]    CT Chest w/o Contrast    Result Date: 11/13/2023  Examination: CT CHEST W/O CONTRAST, 11/13/2023 1:35 PM History: lung nodule; Cholangiocarcinoma metastatic to liver (H); Cholangiocarcinoma metastatic to liver (H) Comparison: 4/28/2023 TECHNIQUE: Helical acquisition of CT images from the lung apices to the kidneys without IV contrast. Coronal and axial MIP images were reconstructed from the source data. FINDINGS: Lungs/Pleura: No pleural effusion or pneumothorax. Mild-to-moderate apical predominant centrilobular and paraseptal emphysema. Scattered peripheral pleural parenchymal scarring, as seen previously. No suspicious new or enlarging pulmonary nodule. No focal consolidation. The central tracheobronchial tree is patent. Mild central bronchial wall thickening. Mucoid debris in the mainstem bronchi and right bronchus intermedius. Chest: Normal heart size. Subendocardial fat deposition and calcification along the anteroseptal and apical left ventricular wall severe coronary artery calcifications. No pericardial effusion. Normal caliber ascending aorta and main pulmonary artery. No thoracic lymphadenopathy. Upper abdomen: Limited evaluation of the upper abdomen. Please refer to same day MRI for further details. Bones and soft tissues: No acute or aggressive osseus abnormality. Stable T3 and T7 superior endplate and L1 inferior endplate compression deformities. Multilevel degenerative change in the spine. Chronic bilateral rib fracture deformities.     IMPRESSION: 1. No evidence of metastatic disease in the chest. 2. Moderate apical predominant centrilobular and paraseptal emphysema with stable scattered peripheral pleural parenchymal scarring. 3.  Other chronic and incidental findings as detailed in the body of the report. 4. Please refer to same day MRI for further details. JOSE CARLOS PABON,    SYSTEM ID:  [id number]       Signed by: Ashutosh Cardozo MD      Again, thank you for allowing me to participate in the care of your patient.        Sincerely,        Ashutosh Cardozo MD

## 2023-11-29 NOTE — PROGRESS NOTES
"Oncology Rooming Note    November 29, 2023 3:05 PM   Alexandro Pool is a 68 year old male who presents for:    Chief Complaint   Patient presents with    Oncology Clinic Visit     Return visit 6 month with lab. MRI 11/13/23. Intrahepatic cholangiocarcinoma.     Initial Vitals: /71 (BP Location: Left arm, Patient Position: Sitting, Cuff Size: Adult Regular)   Pulse 70   Temp 98.1  F (36.7  C) (Oral)   Resp 16   Ht 1.702 m (5' 7\")   Wt 89.8 kg (198 lb)   SpO2 97%   BMI 31.01 kg/m   Estimated body mass index is 31.01 kg/m  as calculated from the following:    Height as of this encounter: 1.702 m (5' 7\").    Weight as of this encounter: 89.8 kg (198 lb). Body surface area is 2.06 meters squared.  No Pain (0) Comment: Data Unavailable   No LMP for male patient.  Allergies reviewed: Yes  Medications reviewed: Yes    Medications: Medication refills not needed today.  Pharmacy name entered into [employer]:    [facility] - [place], [place] - [address] [facility] [place] [facility] [place] [facility] [place] [facility] [address]    Clinical concerns: Return visit 6 month with lab. MRI 11/13/23. Intrahepatic cholangiocarcinoma.      Janine Ashley MA            "

## 2023-12-12 ENCOUNTER — OFFICE VISIT (OUTPATIENT)
Dept: OTHER | Facility: CLINIC | Age: 68
End: 2023-12-12
Attending: INTERNAL MEDICINE
Payer: MEDICARE

## 2023-12-12 ENCOUNTER — TELEPHONE (OUTPATIENT)
Dept: OTHER | Facility: CLINIC | Age: 68
End: 2023-12-12

## 2023-12-12 VITALS
DIASTOLIC BLOOD PRESSURE: 81 MMHG | SYSTOLIC BLOOD PRESSURE: 131 MMHG | BODY MASS INDEX: 30.1 KG/M2 | WEIGHT: 192.2 LBS | HEART RATE: 80 BPM | OXYGEN SATURATION: 97 %

## 2023-12-12 DIAGNOSIS — I10 HYPERTENSION GOAL BP (BLOOD PRESSURE) < 140/90: ICD-10-CM

## 2023-12-12 DIAGNOSIS — E78.5 HYPERLIPIDEMIA LDL GOAL <70: ICD-10-CM

## 2023-12-12 DIAGNOSIS — Z79.4 TYPE 2 DIABETES MELLITUS WITH COMPLICATION, WITH LONG-TERM CURRENT USE OF INSULIN (H): ICD-10-CM

## 2023-12-12 DIAGNOSIS — E11.8 TYPE 2 DIABETES MELLITUS WITH COMPLICATION, WITH LONG-TERM CURRENT USE OF INSULIN (H): ICD-10-CM

## 2023-12-12 DIAGNOSIS — I73.9 PAD (PERIPHERAL ARTERY DISEASE) (H): ICD-10-CM

## 2023-12-12 PROCEDURE — 99214 OFFICE O/P EST MOD 30 MIN: CPT | Performed by: INTERNAL MEDICINE

## 2023-12-12 PROCEDURE — G0463 HOSPITAL OUTPT CLINIC VISIT: HCPCS | Performed by: INTERNAL MEDICINE

## 2023-12-12 RX ORDER — ATORVASTATIN CALCIUM 80 MG/1
80 TABLET, FILM COATED ORAL AT BEDTIME
Qty: 14 TABLET | Refills: 0 | OUTPATIENT
Start: 2023-12-12

## 2023-12-12 RX ORDER — ATORVASTATIN CALCIUM 80 MG/1
80 TABLET, FILM COATED ORAL AT BEDTIME
Qty: 90 TABLET | Refills: 3 | Status: SHIPPED | OUTPATIENT
Start: 2023-12-12 | End: 2024-01-05

## 2023-12-12 RX ORDER — AMLODIPINE BESYLATE 2.5 MG/1
2.5 TABLET ORAL 2 TIMES DAILY
Qty: 180 TABLET | Refills: 3 | Status: SHIPPED | OUTPATIENT
Start: 2023-12-12

## 2023-12-12 RX ORDER — LISINOPRIL 2.5 MG/1
TABLET ORAL
Qty: 90 TABLET | Refills: 3 | Status: SHIPPED | OUTPATIENT
Start: 2023-12-12

## 2023-12-12 RX ORDER — PROCHLORPERAZINE 25 MG/1
SUPPOSITORY RECTAL
COMMUNITY

## 2023-12-12 RX ORDER — AMLODIPINE BESYLATE 2.5 MG/1
2.5 TABLET ORAL 2 TIMES DAILY
Qty: 28 TABLET | Refills: 2 | OUTPATIENT
Start: 2023-12-12

## 2023-12-12 RX ORDER — RIVAROXABAN 2.5 MG/1
2.5 TABLET, FILM COATED ORAL 2 TIMES DAILY
Qty: 28 TABLET | Refills: 2 | OUTPATIENT
Start: 2023-12-12

## 2023-12-12 RX ORDER — LISINOPRIL 2.5 MG/1
TABLET ORAL
Qty: 28 TABLET | Refills: 0 | OUTPATIENT
Start: 2023-12-12

## 2023-12-12 RX ORDER — EMPAGLIFLOZIN 25 MG/1
25 TABLET, FILM COATED ORAL DAILY
Qty: 14 TABLET | Refills: 0 | OUTPATIENT
Start: 2023-12-12

## 2023-12-12 NOTE — PROGRESS NOTES
[facility]        Patient is here for a follow up to discuss labs and recent ultrasound.    Pt is currently taking Aspirin, Statin, and Xarelto.    There were no vitals taken for this visit.    The provider has been notified that the patient has no concerns.     Questions patient would like addressed today are: N/A.    Refills are needed: N/A    Has homecare services and agency name:  Yes: resides at [address] [address]    RUDOLPH GomesN, RN, CV-BC  [facility] [place]

## 2023-12-12 NOTE — TELEPHONE ENCOUNTER
atorvastatin (LIPITOR) 80 MG tablet  empagliflozin (JARDIANCE) 25 MG TABS tablet   lisinopril (ZESTRIL) 2.5 MG tablet  XARELTO ANTICOAGULANT 2.5 MG TABS tablet    Patient has an appointment with Dr. Whitney hill.  These medications loaded for review/signature during appointment (pre-charting).

## 2023-12-12 NOTE — PROGRESS NOTES
Alexandro Pool is a 68 year old male who is presenting at the current time to discuss his diagnosi(es) of            Tobacco use disorder  PAD (peripheral artery disease) (H)  Hyperlipidemia LDL goal <70  Hypertension goal BP (blood pressure) < 140/90  Type 2 diabetes mellitus with complication, with long-term current use of insulin (H)  Cholangiocarcinoma (H)     .           HPI:  Alexandro Pool is a 67 year old male with a past medical history of current  smoking which he refuses to stop, peripheral arterial disease, status post right above-the-knee amputation in 2012 and left femoral to tibial bypass graft, hyperlipidemia, hypertension, type 2 diabetes, coronary artery disease S/P GABRIEL.  The patient has had a slow healing left foot dorsal wound which is being managed twice a week by [facility] in [place] with slow improvement in healing. He had previous vascular imaging with decreased ABIs and ultrasound flow noted in the left graft.  He underwent a left lower extremity angiogram on 12/2/2021 at [facility].  There was a significant stenosis due to eccentric plaque in the left common femoral artery at the level of the proximal anastomosis of the left femoral anterior tibial artery bypass graft.  There were significant stenoses in the right common internal and external iliac arteries.  An angioplasty was performed to the left common femoral artery.  Follow-up angiogram showed significant improvement in the luminal diameter and improved flow.  Post angiography imaging showed a filling defect in the distal aspect of the femoral anterior tibial artery bypass graft.  This was due to embolized calcified plaque from the site of the angioplasty. Successful suction thrombectomy as well as snare retrieval was done to remove the embolus.  Due to periprocedural blood loss the patient was kept overnight for further monitoring.  He was then discharged the next day. Today, the patient states his  wound is improving.  There is a dressing present that was just placed three days ago.  He is not having any fever chills or any new symptoms in the left foot.  He states that there is no signs of infection.  He is on vascular dosed Xarelto with no reports of unusual bleeding. We discussed the results of his ABIs ultrasound that were performed in August.  The left superficial femoral to anterior tibial artery bypass is patent with no evidence of stenosis or occlusion. His OZZIE was normal.  He is on a statin and has an LDL-C < 70. He is on max dose Jardiance, Victoza, and is also on a LA/SA insulin combination. His A1C is 7.2%.     On 10/05/22 he had a liver biopsy which was ordered by his hepatologist Dr. Cevallos. The pathology was positive for cholangiocarcinoma.                 Review Of Systems  Skin: negative  Eyes: negative  Ears/Nose/Throat: negative  Respiratory: No shortness of breath, dyspnea on exertion, cough, or hemoptysis  Cardiovascular: negative  Gastrointestinal: negative  Genitourinary: negative  Musculoskeletal: negative  Neurologic: negative  Psychiatric: negative  Hematologic/Lymphatic/Immunologic: negative  Endocrine: negative           PAST MEDICAL HISTORY:                  Past Medical History           Past Medical History:   Diagnosis Date    Acute kidney failure, unspecified (H)      Blood transfusion      CAD (coronary artery disease)      CARDIOVASCULAR SCREENING; LDL GOAL LESS THAN 100      Cirrhosis (H)      Critical lower limb ischemia (H)      Diabetes mellitus (H) 10/2011    Hypertension      Hypertension goal BP (blood pressure) < 140/90      Ischemic cardiomyopathy      Lymphedema of left leg 5/11/2016    Peripheral arterial disease (H)      PVD (peripheral vascular disease) (H)      Right above knee amputation 4/30/2014    Type 2 diabetes, HbA1C goal < 8% (H)              PAST SURGICAL HISTORY:                  Past Surgical History             Past Surgical History:   Procedure  Laterality Date    AMPUTATE LEG ABOVE KNEE   11/22/2011     Procedure:AMPUTATE LEG ABOVE KNEE; Revise Right Below Knee Amputation To Above Knee Amputation; Surgeon:MADISON WELLS; Location:[facility] OR    AMPUTATE LEG BELOW KNEE   11/10/2011     Procedure:AMPUTATE LEG BELOW KNEE; Right Below Knee Amputation; Surgeon:MADISON WELLS; Location:[facility] OR    BYPASS GRAFT FEMOROTIBIAL   9/19/2013     Procedure: BYPASS GRAFT FEMOROTIBIAL;  Left Femorotibial Bypass Graft Insitu ;  Surgeon: Marisol Suggs MD;  Location: [facility] OR    CARDIAC SURGERY         cardiac stent    ESOPHAGOSCOPY, GASTROSCOPY, DUODENOSCOPY (EGD), COMBINED   10/1/2012     Procedure: COMBINED ESOPHAGOSCOPY, GASTROSCOPY, DUODENOSCOPY (EGD);;  Surgeon: Nehemias Cevallos MD;  Location: [facility] GI    ESOPHAGOSCOPY, GASTROSCOPY, DUODENOSCOPY (EGD), COMBINED N/A 3/24/2016     Procedure: COMBINED ESOPHAGOSCOPY, GASTROSCOPY, DUODENOSCOPY (EGD);  Surgeon: Gildardo Marks MD;  Location: [facility]    IR LOWER EXTREMITY ANGIOGRAM LEFT   12/2/2021    IR STENT VASCULAR LT   3/9/2012          IRRIGATION AND DEBRIDEMENT LOWER EXTREMITY, COMBINED   11/15/2011     Procedure:COMBINED IRRIGATION AND DEBRIDEMENT LOWER EXTREMITY; DRAINAGE OF ABSCESS AT BELOW KNEE AMPUTATION AND KNEE DISARTICULATION.; Surgeon:MADISON WELLS; Location:[facility] OR    NO HISTORY OF SURGERY   7/12/13     derm    VASCULAR REPAIR LOWER EXTREMITY Left 9/19/2017     Procedure: VASCULAR REPAIR LOWER EXTREMITY;  Ligation Parasitic Branch To Left Lower Extremity ;  Surgeon: Marisol Suggs MD;  Location: [facility] OR            CURRENT MEDICATIONS:                  Current Outpatient Prescriptions               Current Outpatient Medications   Medication Sig Dispense Refill    acetaminophen (TYLENOL) 325 MG tablet Take 1 tablet by mouth every 4 hours as needed. 250 tablet 0    amLODIPine (NORVASC) 2.5 MG tablet Take 1 tablet (2.5 mg) by mouth 2 times daily 180 tablet 3    ammonium lactate (AMLACTIN) 12 % cream Apply topically 2 times daily  "as needed for dry skin apply to both legs twice daily as needed   5    ASPIRIN EC PO Take 81 mg by mouth daily        atorvastatin (LIPITOR) 80 MG tablet Take 1 tablet (80 mg) by mouth At Bedtime 90 tablet 3    bisacodyl (DULCOLAX) 5 MG EC tablet Take 1 tablet (5 mg) by mouth See Admin Instructions Refer to \"Getting Ready for a Colonoscopy\" instruction handout 4 tablet 0    gabapentin (NEURONTIN) 100 MG capsule Take 1 capsule (100 mg) by mouth 3 times daily 90 capsule 0    hydrocortisone 1 % cream Apply topically 2 times daily PRN        insulin aspart (NOVOLOG FLEXPEN) 100 UNIT/ML pen Inject 19 Units Subcutaneous 2 times daily (with meals)        insulin glargine (LANTUS PEN) 100 UNIT/ML pen Inject 28 Units Subcutaneous 2 times daily 60 mL 3    insulin lispro (HUMALOG KWIKPEN) 100 UNIT/ML (1 unit dial) KWIKPEN Inject 18 Units Subcutaneous 3 times daily (before meals) 50 mL 3    insulin pen needle (BD KWABENA U/F) 32G X 4 MM miscellaneous Use 4x  daily or as directed. 120 each 11    JARDIANCE 25 MG TABS tablet TAKE 1 TABLET (25 MG) BY MOUTH DAILY 90 tablet 3    liraglutide (VICTOZA) 18 MG/3ML solution Inject 1.8 mg Subcutaneous daily 27 mL 3    lisinopril (ZESTRIL) 2.5 MG tablet TAKE 1 TABLET (2.5 MG) BY MOUTH 2 TIMES DAILY 180 tablet 3    metoprolol succinate ER (TOPROL-XL) 50 MG 24 hr tablet Take 1.5 tablets (75 mg) by mouth daily 135 tablet 3    morphine (MS CONTIN) 15 MG 12 hr tablet Take 3 tablets (45 mg) by mouth 2 times daily 60 tablet 0    order for DME Equipment being ordered: FlexiTouch pneumatic compression device.  2-3 times per day to left lower extremity.  Current strength - L4 1 Units      polyethylene glycol (GOLYTELY) 236 g suspension Take 4,000 mLs by mouth See Admin Instructions Refer to \"Getting Ready for a Colonoscopy\" instruction handout 4000 mL 0    rivaroxaban ANTICOAGULANT (XARELTO ANTICOAGULANT) 2.5 MG TABS tablet Take 1 tablet (2.5 mg) by mouth 2 times daily 180 tablet 3    SENNA PO 1 Tab , " "bedtime                ALLERGIES:                          Allergies   Allergen Reactions    Ciprofloxacin Rash         SOCIAL HISTORY:                  Social History   Social History                Socioeconomic History    Marital status:        Spouse name: Not on file    Number of children: 0    Years of education: Not on file    Highest education level: Not on file   Occupational History       Employer: UNKNOWN   Tobacco Use    Smoking status: Current Every Day Smoker       Packs/day: 2.00       Years: 40.00       Pack years: 80.00       Types: Cigarettes    Smokeless tobacco: Never Used    Tobacco comment: Is not working on quitting   Substance and Sexual Activity    Alcohol use: Yes       Alcohol/week: 1.0 standard drink       Types: 1 Standard drinks or equivalent per week       Comment: \"Once a year maybe\"    Drug use: No    Sexual activity: Not Currently   Other Topics Concern    Parent/sibling w/ CABG, MI or angioplasty before 65F 55M? No     Service Not Asked    Blood Transfusions Not Asked    Caffeine Concern Yes    Occupational Exposure Not Asked    Hobby Hazards Not Asked    Sleep Concern Not Asked    Stress Concern Not Asked    Weight Concern Not Asked    Special Diet Not Asked    Back Care Not Asked    Exercise No    Bike Helmet Not Asked    Seat Belt Not Asked    Self-Exams Not Asked   Social History Narrative    Not on file      Social Determinants of Health      Financial Resource Strain: Not on file   Food Insecurity: Not on file   Transportation Needs: Not on file   Physical Activity: Not on file   Stress: Not on file   Social Connections: Not on file   Intimate Partner Violence: Not on file   Housing Stability: Not on file            FAMILY HISTORY:                   Family History             Family History   Problem Relation Age of Onset    Alcohol/Drug Mother           cirrhosis    Alcohol/Drug Father      C.A.D. No family hx of      Diabetes No family hx of      Cancer No " family hx of                       Physical exam Reveals:     O/P: WNL  HEENT: WNL  NECK: No JVD, thyromegaly, or lymphadenopathy  HEART: RRR, no murmurs, gallops, or rubs  LUNGS: CTA bilaterally without rales, wheezes, or rhonchi  GI: NABS, nondistended, nontender, soft  EXT:without cyanosis, clubbing, or edema; Darkened gaiter are on remaining LLE.   NEURO: nonfocal  : no flank tenderness     Component      Latest Ref Rng & Units 12/2/2021 5/2/2022   Sodium      133 - 144 mmol/L   135   Potassium      3.4 - 5.3 mmol/L   4.9   Chloride      94 - 109 mmol/L   104   Carbon Dioxide      20 - 32 mmol/L   25   Anion Gap      3 - 14 mmol/L   6   Urea Nitrogen      7 - 30 mg/dL   15   Creatinine      0.66 - 1.25 mg/dL   0.96   Calcium      8.5 - 10.1 mg/dL   9.0   Glucose      70 - 99 mg/dL   134 (H)   Alkaline Phosphatase      40 - 150 U/L   66   AST      0 - 45 U/L   25   ALT      0 - 70 U/L   23   Protein Total      6.8 - 8.8 g/dL   7.6   Albumin      3.4 - 5.0 g/dL   3.5   Bilirubin Total      0.2 - 1.3 mg/dL   0.4   GFR Estimate      >60 mL/min/1.73m2   87   Total Cholesterol      <=199 mg/dL   121   Triglycerides      30 - 149 mg/dL 292 (H) 305 (H)   HDL Cholesterol      40 - 59 mg/dL   26 (L)   LDL Cholesterol      <=129 mg/dL   34   HDL Size      >=8.9 nm   8.6 (L)   VLDL Size      <=46.7 nm   55.7 (H)   LDL Particle Size      >=20.7 nm   20.4 (L)   Lge HDL Particle number      >=4.2 umol/L   <2.8 (L)   HDL Particle Number NMR      >=33.0 umol/L   20.4 (L)   Lge VLDL Part number      <=2.7 nmol/L   12.3 (H)   Small LDL Particle number      <=634 nmol/L   315   LDL Particle Number      <=1135 nmol/L   854   EER LipoFit by NMR         See Note   Cholesterol      <200 mg/dL 94     HDL Cholesterol      >=40 mg/dL 23 (L)     LDL Cholesterol Calculated      <=100 mg/dL 13     Non HDL Cholesterol      <130 mg/dL 71     Patient Fasting > 8hrs?       Yes     C-Reactive Protein High Sensitivity      mg/L   5.5    Lipoprotein (a)      <=29 mg/dL   6   Hemoglobin A1C      0.0 - 5.6 %   7.4 (H)          US OZZIE DOPPLER NO EXERCISE, 1-2 LEVELS, BILATERAL   8/2/2022 11:26 AM      HISTORY: History of left SA to anterior tibial bypass, with  angioplasty done on 12/2. Comparison study done 1/24/2022, 6-month  followup; PAD (peripheral artery disease) (H).     COMPARISON: None.     FINDINGS:  Right OZZIE:   Patient has history of right above knee amputation.     Left OZZIE:   PT: 110, index of 0.82, previously 0.81.  DP: 130, index of 0.97, previously 0.93      Left Digital Brachial index: 125, index of 0.93, previously 0.67     Waveforms: Distal posterior tibial arterial waveform is monophasic and  dorsalis pedis waveform is biphasic/triphasic. Digital waveform  appears intact in morphology and amplitude.                                                                      IMPRESSION:   Normal OZZIE examination left lower extremity as well as toe brachial  index, similar to previous exam.     OZZIE CRITERIA:  >1.4 NC  0.95-1.4 Normal  0.90 - 0.94 Mild  0.5 - 0.89 Moderate  0.2 - 0.49 Severe  <0.2 Critical     JILLIAN TELLEZ MD      ULTRASOUND LEFT LOWER EXTREMITY ARTERIAL DUPLEX  8/2/2022 11:27 AM     HISTORY:  66-year-old patient with history of peripheral arterial  disease and left femoral to anterior tibial surgical arterial bypass  graft performed in 2013. Patient had angiogram on December 2, 2021.  Nonocclusive embolus was noted within the distal graft at that time,  though retrieved successfully.     COMPARISON: January 24, 2022.     TECHNIQUE: Color Doppler and spectral waveform analysis obtained  throughout the left lower extremity surgical arterial bypass graft and  adjacent native arteries.     FINDINGS: Left lower extremity surgical arterial bypass graft is  patent. Proximal segment is somewhat aneurysmal measuring up to 8-10  mm. Velocities otherwise intact throughout the bypass graft. No  obvious visible filling defect.                                                                       IMPRESSION: Patent left lower extremity femoral to anterior tibial  surgical bypass graft.     JILLIAN TELLEZ MD         Component      Latest Ref Rng & Units 9/27/2022   Total Cholesterol      <=199 mg/dL 124   Triglycerides      30 - 149 mg/dL 329 (H)   HDL Cholesterol      40 - 59 mg/dL 26 (L)   LDL Cholesterol      <=129 mg/dL 32   HDL Size      >=8.9 nm 8.6 (L)   VLDL Size      <=46.7 nm 58.5 (H)   LDL Particle Size      >=20.7 nm 20.7   Lge HDL Particle number      >=4.2 umol/L <2.8 (L)   HDL Particle Number NMR      >=33.0 umol/L 19.1 (L)   Lge VLDL Part number      <=2.7 nmol/L 13.1 (H)   Small LDL Particle number      <=634 nmol/L 217   LDL Particle Number      <=1135 nmol/L 818   EER LipoFit by NMR       See Note   Hemoglobin A1C      0.0 - 5.6 % 7.2 (H)   Lipoprotein (a)      <30 mg/dL 10   C-Reactive Protein High Sensitivity       6.01       LOCATION: [facility]  DATE: 11/28/2023     1. EXAM: RESTING ANKLE-BRACHIAL INDICES (ABIs)   2. DUPLEX ARTERIAL ULTRASOUND OF THE LEFT LOWER LOWER EXTREMITY  11/28/2023 9:42 AM CST     INDICATION: Known peripheral arterial disease, follow-up.  TECHNIQUE: Duplex imaging is performed utilizing gray-scale, two-dimensional images and color-flow imaging. Doppler waveform analysis and spectral Doppler imaging is also performed.  COMPARISON: 08/11/2023.     FINDINGS:   SEGMENTAL BP  RIGHT (mmHg)  Brachial: 133; Index  Ankle (PT):  Ankle (DP):  Digit:     LEFT (mmHg)  Brachial: 135  Ankle (PT): 128; Index 0.95  Ankle (DP): 135; Index 1.00  Digit: 191; Index 1.41     WAVEFORMS: Slight blunting of left lower extremity waveforms.     ARTERIAL DUPLEX VELOCITIES (cm/s):  LEFT   EIA: 147  CFA: 164  PFA:  SFA-Proximal:  SFA-Mid:  SFA-Distal:  Popliteal:  PTA: 31  SENIA: 65  DPA: 36     BYPASS GRAFT VELOCITIES (cm/s):  LEFT   Proximal Native: 163  Proximal Anastomosis: 120  Proximal: 103  Mid:  45  Distal: 45  Distal Anastomosis: 65  Distal Native: 65     WAVEFORMS/FINDINGS: Monophasic left external iliac artery waveform. Left superficial femoral to anterior tibial artery bypass graft appears widely patent. Poststenotic posterior tibial artery waveform. Normal DP waveform.                                                                      IMPRESSION:  1.  RIGHT LOWER EXTREMITY: Postsurgical changes of below-knee amputation.  2.  LEFT LOWER EXTREMITY: OZZIE at rest is within normal limits measuring 1.00. TBI is within normal limits. Doppler arterial ultrasound demonstrates a patent left superficial femoral artery to anterior tibial artery bypass graft with normal distal SENIA/DP   waveforms. No significant stenosis observed.      A/P;         (F17.200) Tobacco use disorder  Comment: I again advised him to quit smoking.  Plan: He again refuses to quit smoking.      (I73.9) PAD S/P Lt SFA stenting 3-9-2012, Lt fem-SENIA bypass w/ ISGSV 9-, S/P Rt AKA 2014, ligation of parasitic branch 9/19/2017  Comment: The bypass is patent. His left foot has a wound on it which is being managed at [facility] in [place]. The wound is persistent but scabbed over. There is no exposed base to the wound and he has no actionable vascular stenoses which could be addressed to facilitate complete healing. He is advised to RTC prn wound worsening.   Plan: He did not check LipoFit by NMR, Lipoprotein (a), C-Reactive Protein, High Sensitivity as ordered so we will obtain these results in the next several weeks, and he will RTC two weeks later to discuss.         Repeat imaging in one year.     (E78.5) Hyperlipidemia LDL goal <70  Comment: at goal  Plan: He did not check LipoFit by NMR, Lipoprotein (a), C-Reactive Protein, High Sensitivity as ordered so we will obtain these results in the next several weeks, and he will RTC two weeks later to discuss.             (I10) Hypertension goal BP (blood pressure) < 140/90  Comment:  at goal  Plan: no med changes     (E11.8,  Z79.4) Type 2 diabetes mellitus with complication, with long-term current use of insulin (H)  Comment: not at a1c goal of < 7%;Increassed Lantus from 56 to 64 units daily but split dosing to 32units BID when seen on 5/17/2022. He has an endocrinologist, who changed lariglutide to semaglutide in August of this year..   Plan: Further diabetic med changes through his endocrinologist.       (C22.1) Cholangiocarcinoma (H)  (primary encounter diagnosis)  Comment: This is being addressed by Dr. Cardozo  Plan: Per Dr. Cardozo.      35 minutes total medical care on today's date.

## 2023-12-13 NOTE — TELEPHONE ENCOUNTER
Future Appointments   Date Time Provider Department Center   12/22/2023  9:00 AM [facility]   1/5/2024 11:10 AM Giovani Olson MD [facility]

## 2023-12-22 ENCOUNTER — LAB (OUTPATIENT)
Dept: LAB | Facility: CLINIC | Age: 68
End: 2023-12-22
Payer: MEDICARE

## 2023-12-22 DIAGNOSIS — E11.8 TYPE 2 DIABETES MELLITUS WITH COMPLICATION, WITH LONG-TERM CURRENT USE OF INSULIN (H): ICD-10-CM

## 2023-12-22 DIAGNOSIS — E78.5 HYPERLIPIDEMIA LDL GOAL <70: ICD-10-CM

## 2023-12-22 DIAGNOSIS — Z79.4 TYPE 2 DIABETES MELLITUS WITH COMPLICATION, WITH LONG-TERM CURRENT USE OF INSULIN (H): ICD-10-CM

## 2023-12-22 LAB
ALBUMIN SERPL BCG-MCNC: 4.1 G/DL (ref 3.5–5.2)
ALP SERPL-CCNC: 64 U/L (ref 40–150)
ALT SERPL W P-5'-P-CCNC: 19 U/L (ref 0–70)
ANION GAP SERPL CALCULATED.3IONS-SCNC: 10 MMOL/L (ref 7–15)
APO A-I SERPL-MCNC: 6 MG/DL
AST SERPL W P-5'-P-CCNC: 28 U/L (ref 0–45)
BILIRUB SERPL-MCNC: 0.4 MG/DL
BUN SERPL-MCNC: 19.4 MG/DL (ref 8–23)
CALCIUM SERPL-MCNC: 9.7 MG/DL (ref 8.8–10.2)
CHLORIDE SERPL-SCNC: 102 MMOL/L (ref 98–107)
CREAT SERPL-MCNC: 0.82 MG/DL (ref 0.67–1.17)
CRP SERPL HS-MCNC: 3.84 MG/L
DEPRECATED HCO3 PLAS-SCNC: 25 MMOL/L (ref 22–29)
EGFRCR SERPLBLD CKD-EPI 2021: >90 ML/MIN/1.73M2
GLUCOSE SERPL-MCNC: 170 MG/DL (ref 70–99)
HBA1C MFR BLD: 8.2 %
POTASSIUM SERPL-SCNC: 5 MMOL/L (ref 3.4–5.3)
PROT SERPL-MCNC: 7.7 G/DL (ref 6.4–8.3)
SODIUM SERPL-SCNC: 137 MMOL/L (ref 135–145)

## 2023-12-22 PROCEDURE — 36415 COLL VENOUS BLD VENIPUNCTURE: CPT | Performed by: PATHOLOGY

## 2023-12-22 PROCEDURE — 83704 LIPOPROTEIN BLD QUAN PART: CPT | Mod: 90 | Performed by: PATHOLOGY

## 2023-12-22 PROCEDURE — 83036 HEMOGLOBIN GLYCOSYLATED A1C: CPT | Performed by: INTERNAL MEDICINE

## 2023-12-22 PROCEDURE — 80061 LIPID PANEL: CPT | Mod: 90 | Performed by: PATHOLOGY

## 2023-12-22 PROCEDURE — 86141 C-REACTIVE PROTEIN HS: CPT | Performed by: INTERNAL MEDICINE

## 2023-12-22 PROCEDURE — 99000 SPECIMEN HANDLING OFFICE-LAB: CPT | Performed by: PATHOLOGY

## 2023-12-22 PROCEDURE — 80053 COMPREHEN METABOLIC PANEL: CPT | Performed by: PATHOLOGY

## 2023-12-22 PROCEDURE — 83695 ASSAY OF LIPOPROTEIN(A): CPT | Performed by: INTERNAL MEDICINE

## 2023-12-25 LAB
CHOLEST SERPL-MCNC: 126 MG/DL
HDL SERPL QN: 8.3 NM
HDL SERPL-SCNC: 21.3 UMOL/L
HDLC SERPL-MCNC: 28 MG/DL
HLD.LARGE SERPL-SCNC: <2.8 UMOL/L
LDL SERPL QN: 20.3 NM
LDL SERPL-SCNC: 1044 NMOL/L
LDL SMALL SERPL-SCNC: 513 NMOL/L
LDLC SERPL CALC-MCNC: 35 MG/DL
PATHOLOGY STUDY: ABNORMAL
TRIGL SERPL-MCNC: 313 MG/DL
VLDL LARGE SERPL-SCNC: 15.1 NMOL/L
VLDL SERPL QN: 58.4 NM

## 2024-01-04 NOTE — PROGRESS NOTES
Alexandro Pool is a 68 year old male who is presenting at the current time to discuss his diagnosi(es) of            Tobacco use disorder  PAD (peripheral artery disease) (H)  Hyperlipidemia LDL goal <70  Hypertension goal BP (blood pressure) < 140/90  Type 2 diabetes mellitus with complication, with long-term current use of insulin (H)  Cholangiocarcinoma (H)     .           HPI:  Alexandro Pool is a 68 year old male with a past medical history of current  smoking which he refuses to stop, peripheral arterial disease, status post right above-the-knee amputation in 2012 and left femoral to tibial bypass graft, hyperlipidemia, hypertension, type 2 diabetes, coronary artery disease S/P DESing.  The patient has had a slow healing left foot dorsal wound which is being managed twice a week by [facility] in [place] with slow improvement in healing. He had previous vascular imaging with decreased ABIs and ultrasound flow noted in the left graft.  He underwent a left lower extremity angiogram on 12/2/2021 at [facility].  There was a significant stenosis due to eccentric plaque in the left common femoral artery at the level of the proximal anastomosis of the left femoral anterior tibial artery bypass graft.  There were significant stenoses in the right common internal and external iliac arteries.  An angioplasty was performed to the left common femoral artery. Follow-up angiogram showed significant improvement in the luminal diameter and improved flow.  Post angiography imaging showed a filling defect in the distal aspect of the femoral to anterior tibial artery bypass graft.  This was due to embolized calcified plaque from the site of the angioplasty. Successful suction thrombectomy as well as snare retrieval was done to remove the embolus.  Due to periprocedural blood loss the patient was kept overnight for further monitoring.  He was then discharged the next day. Today, the patient states  his wound is improved. He is on vascular dosed Xarelto with no reports of unusual bleeding. We discussed the results of his ABIs ultrasound that were performed in August.  The left superficial femoral to anterior tibial artery bypass is patent with no evidence of stenosis or occlusion. His OZZIE was normal.  He is on a statin and has an LDL-C < 70, but LDL-P, and cardiac CRP elevations. He is on max dose Jardiance, Victoza, and is also on a LA/SA insulin combination. His A1C is 8.2%.     On 10/05/22 he had a liver biopsy which was ordered by his hepatologist Dr. Cevallos. The pathology was positive for cholangiocarcinoma.                 Review Of Systems  Skin: negative  Eyes: negative  Ears/Nose/Throat: negative  Respiratory: No shortness of breath, dyspnea on exertion, cough, or hemoptysis  Cardiovascular: negative  Gastrointestinal: negative  Genitourinary: negative  Musculoskeletal: negative  Neurologic: negative  Psychiatric: negative  Hematologic/Lymphatic/Immunologic: negative  Endocrine: negative           PAST MEDICAL HISTORY:                  Past Medical History           Past Medical History:   Diagnosis Date    Acute kidney failure, unspecified (H)      Blood transfusion      CAD (coronary artery disease)      CARDIOVASCULAR SCREENING; LDL GOAL LESS THAN 100      Cirrhosis (H)      Critical lower limb ischemia (H)      Diabetes mellitus (H) 10/2011    Hypertension      Hypertension goal BP (blood pressure) < 140/90      Ischemic cardiomyopathy      Lymphedema of left leg 5/11/2016    Peripheral arterial disease (H)      PVD (peripheral vascular disease) (H)      Right above knee amputation 4/30/2014    Type 2 diabetes, HbA1C goal < 8% (H)              PAST SURGICAL HISTORY:                  Past Surgical History             Past Surgical History:   Procedure Laterality Date    AMPUTATE LEG ABOVE KNEE   11/22/2011     Procedure:AMPUTATE LEG ABOVE KNEE; Revise Right Below Knee Amputation To Above Knee  Amputation; Surgeon:MADISON WELLS; Location:[facility] OR    AMPUTATE LEG BELOW KNEE   11/10/2011     Procedure:AMPUTATE LEG BELOW KNEE; Right Below Knee Amputation; Surgeon:MADISON WELLS; Location:[facility] OR    BYPASS GRAFT FEMOROTIBIAL   9/19/2013     Procedure: BYPASS GRAFT FEMOROTIBIAL;  Left Femorotibial Bypass Graft Insitu ;  Surgeon: Marisol Suggs MD;  Location: [facility] OR    CARDIAC SURGERY         cardiac stent    ESOPHAGOSCOPY, GASTROSCOPY, DUODENOSCOPY (EGD), COMBINED   10/1/2012     Procedure: COMBINED ESOPHAGOSCOPY, GASTROSCOPY, DUODENOSCOPY (EGD);;  Surgeon: Nehemias Cevallos MD;  Location: [facility] GI    ESOPHAGOSCOPY, GASTROSCOPY, DUODENOSCOPY (EGD), COMBINED N/A 3/24/2016     Procedure: COMBINED ESOPHAGOSCOPY, GASTROSCOPY, DUODENOSCOPY (EGD);  Surgeon: Gildardo Marks MD;  Location: [facility]    IR LOWER EXTREMITY ANGIOGRAM LEFT   12/2/2021    IR STENT VASCULAR LT   3/9/2012          IRRIGATION AND DEBRIDEMENT LOWER EXTREMITY, COMBINED   11/15/2011     Procedure:COMBINED IRRIGATION AND DEBRIDEMENT LOWER EXTREMITY; DRAINAGE OF ABSCESS AT BELOW KNEE AMPUTATION AND KNEE DISARTICULATION.; Surgeon:MADISON WELLS; Location:[facility] OR    NO HISTORY OF SURGERY   7/12/13     derm    VASCULAR REPAIR LOWER EXTREMITY Left 9/19/2017     Procedure: VASCULAR REPAIR LOWER EXTREMITY;  Ligation Parasitic Branch To Left Lower Extremity ;  Surgeon: Marisol Suggs MD;  Location: [facility] OR            CURRENT MEDICATIONS:                  Current Outpatient Prescriptions               Current Outpatient Medications   Medication Sig Dispense Refill    acetaminophen (TYLENOL) 325 MG tablet Take 1 tablet by mouth every 4 hours as needed. 250 tablet 0    amLODIPine (NORVASC) 2.5 MG tablet Take 1 tablet (2.5 mg) by mouth 2 times daily 180 tablet 3    ammonium lactate (AMLACTIN) 12 % cream Apply topically 2 times daily as needed for dry skin apply to both legs twice daily as needed   5    ASPIRIN EC PO Take 81 mg by mouth daily        atorvastatin (LIPITOR) 80  "MG tablet Take 1 tablet (80 mg) by mouth At Bedtime 90 tablet 3    bisacodyl (DULCOLAX) 5 MG EC tablet Take 1 tablet (5 mg) by mouth See Admin Instructions Refer to \"Getting Ready for a Colonoscopy\" instruction handout 4 tablet 0    gabapentin (NEURONTIN) 100 MG capsule Take 1 capsule (100 mg) by mouth 3 times daily 90 capsule 0    hydrocortisone 1 % cream Apply topically 2 times daily PRN        insulin aspart (NOVOLOG FLEXPEN) 100 UNIT/ML pen Inject 19 Units Subcutaneous 2 times daily (with meals)        insulin glargine (LANTUS PEN) 100 UNIT/ML pen Inject 28 Units Subcutaneous 2 times daily 60 mL 3    insulin lispro (HUMALOG KWIKPEN) 100 UNIT/ML (1 unit dial) KWIKPEN Inject 18 Units Subcutaneous 3 times daily (before meals) 50 mL 3    insulin pen needle (BD KWABENA U/F) 32G X 4 MM miscellaneous Use 4x  daily or as directed. 120 each 11    JARDIANCE 25 MG TABS tablet TAKE 1 TABLET (25 MG) BY MOUTH DAILY 90 tablet 3    liraglutide (VICTOZA) 18 MG/3ML solution Inject 1.8 mg Subcutaneous daily 27 mL 3    lisinopril (ZESTRIL) 2.5 MG tablet TAKE 1 TABLET (2.5 MG) BY MOUTH 2 TIMES DAILY 180 tablet 3    metoprolol succinate ER (TOPROL-XL) 50 MG 24 hr tablet Take 1.5 tablets (75 mg) by mouth daily 135 tablet 3    morphine (MS CONTIN) 15 MG 12 hr tablet Take 3 tablets (45 mg) by mouth 2 times daily 60 tablet 0    order for DME Equipment being ordered: FlexiTouch pneumatic compression device.  2-3 times per day to left lower extremity.  Current strength - L4 1 Units      polyethylene glycol (GOLYTELY) 236 g suspension Take 4,000 mLs by mouth See Admin Instructions Refer to \"Getting Ready for a Colonoscopy\" instruction handout 4000 mL 0    rivaroxaban ANTICOAGULANT (XARELTO ANTICOAGULANT) 2.5 MG TABS tablet Take 1 tablet (2.5 mg) by mouth 2 times daily 180 tablet 3    SENNA PO 1 Tab , bedtime                ALLERGIES:                          Allergies   Allergen Reactions    Ciprofloxacin Rash         SOCIAL HISTORY:           " "       Social History   Social History                Socioeconomic History    Marital status:        Spouse name: Not on file    Number of children: 0    Years of education: Not on file    Highest education level: Not on file   Occupational History       Employer: UNKNOWN   Tobacco Use    Smoking status: Current Every Day Smoker       Packs/day: 2.00       Years: 40.00       Pack years: 80.00       Types: Cigarettes    Smokeless tobacco: Never Used    Tobacco comment: Is not working on quitting   Substance and Sexual Activity    Alcohol use: Yes       Alcohol/week: 1.0 standard drink       Types: 1 Standard drinks or equivalent per week       Comment: \"Once a year maybe\"    Drug use: No    Sexual activity: Not Currently   Other Topics Concern    Parent/sibling w/ CABG, MI or angioplasty before 65F 55M? No     Service Not Asked    Blood Transfusions Not Asked    Caffeine Concern Yes    Occupational Exposure Not Asked    Hobby Hazards Not Asked    Sleep Concern Not Asked    Stress Concern Not Asked    Weight Concern Not Asked    Special Diet Not Asked    Back Care Not Asked    Exercise No    Bike Helmet Not Asked    Seat Belt Not Asked    Self-Exams Not Asked   Social History Narrative    Not on file      Social Determinants of Health      Financial Resource Strain: Not on file   Food Insecurity: Not on file   Transportation Needs: Not on file   Physical Activity: Not on file   Stress: Not on file   Social Connections: Not on file   Intimate Partner Violence: Not on file   Housing Stability: Not on file            FAMILY HISTORY:                   Family History             Family History   Problem Relation Age of Onset    Alcohol/Drug Mother           cirrhosis    Alcohol/Drug Father      C.A.D. No family hx of      Diabetes No family hx of      Cancer No family hx of                       Physical exam Reveals:     O/P: WNL  HEENT: WNL  NECK: No JVD, thyromegaly, or lymphadenopathy  HEART: RRR, no " murmurs, gallops, or rubs  LUNGS: CTA bilaterally without rales, wheezes, or rhonchi  GI: NABS, nondistended, nontender, soft  EXT:without cyanosis, clubbing, or edema, S/P Rt AKA; Darkened gaiter are on remaining LLE.   NEURO: nonfocal  : no flank tenderness     Component      Latest Ref Rng & Units 12/2/2021 5/2/2022   Sodium      133 - 144 mmol/L   135   Potassium      3.4 - 5.3 mmol/L   4.9   Chloride      94 - 109 mmol/L   104   Carbon Dioxide      20 - 32 mmol/L   25   Anion Gap      3 - 14 mmol/L   6   Urea Nitrogen      7 - 30 mg/dL   15   Creatinine      0.66 - 1.25 mg/dL   0.96   Calcium      8.5 - 10.1 mg/dL   9.0   Glucose      70 - 99 mg/dL   134 (H)   Alkaline Phosphatase      40 - 150 U/L   66   AST      0 - 45 U/L   25   ALT      0 - 70 U/L   23   Protein Total      6.8 - 8.8 g/dL   7.6   Albumin      3.4 - 5.0 g/dL   3.5   Bilirubin Total      0.2 - 1.3 mg/dL   0.4   GFR Estimate      >60 mL/min/1.73m2   87   Total Cholesterol      <=199 mg/dL   121   Triglycerides      30 - 149 mg/dL 292 (H) 305 (H)   HDL Cholesterol      40 - 59 mg/dL   26 (L)   LDL Cholesterol      <=129 mg/dL   34   HDL Size      >=8.9 nm   8.6 (L)   VLDL Size      <=46.7 nm   55.7 (H)   LDL Particle Size      >=20.7 nm   20.4 (L)   Lge HDL Particle number      >=4.2 umol/L   <2.8 (L)   HDL Particle Number NMR      >=33.0 umol/L   20.4 (L)   Lge VLDL Part number      <=2.7 nmol/L   12.3 (H)   Small LDL Particle number      <=634 nmol/L   315   LDL Particle Number      <=1135 nmol/L   854   EER LipoFit by NMR         See Note   Cholesterol      <200 mg/dL 94     HDL Cholesterol      >=40 mg/dL 23 (L)     LDL Cholesterol Calculated      <=100 mg/dL 13     Non HDL Cholesterol      <130 mg/dL 71     Patient Fasting > 8hrs?       Yes     C-Reactive Protein High Sensitivity      mg/L   5.5   Lipoprotein (a)      <=29 mg/dL   6   Hemoglobin A1C      0.0 - 5.6 %   7.4 (H)          US OZZIE DOPPLER NO EXERCISE, 1-2 LEVELS, BILATERAL    8/2/2022 11:26 AM      HISTORY: History of left SA to anterior tibial bypass, with  angioplasty done on 12/2. Comparison study done 1/24/2022, 6-month  followup; PAD (peripheral artery disease) (H).     COMPARISON: None.     FINDINGS:  Right OZZIE:   Patient has history of right above knee amputation.     Left OZZIE:   PT: 110, index of 0.82, previously 0.81.  DP: 130, index of 0.97, previously 0.93      Left Digital Brachial index: 125, index of 0.93, previously 0.67     Waveforms: Distal posterior tibial arterial waveform is monophasic and  dorsalis pedis waveform is biphasic/triphasic. Digital waveform  appears intact in morphology and amplitude.                                                                      IMPRESSION:   Normal OZZIE examination left lower extremity as well as toe brachial  index, similar to previous exam.     OZZIE CRITERIA:  >1.4 NC  0.95-1.4 Normal  0.90 - 0.94 Mild  0.5 - 0.89 Moderate  0.2 - 0.49 Severe  <0.2 Critical     JILLIAN TELLEZ MD      ULTRASOUND LEFT LOWER EXTREMITY ARTERIAL DUPLEX  8/2/2022 11:27 AM     HISTORY:  66-year-old patient with history of peripheral arterial  disease and left femoral to anterior tibial surgical arterial bypass  graft performed in 2013. Patient had angiogram on December 2, 2021.  Nonocclusive embolus was noted within the distal graft at that time,  though retrieved successfully.     COMPARISON: January 24, 2022.     TECHNIQUE: Color Doppler and spectral waveform analysis obtained  throughout the left lower extremity surgical arterial bypass graft and  adjacent native arteries.     FINDINGS: Left lower extremity surgical arterial bypass graft is  patent. Proximal segment is somewhat aneurysmal measuring up to 8-10  mm. Velocities otherwise intact throughout the bypass graft. No  obvious visible filling defect.                                                                      IMPRESSION: Patent left lower extremity femoral to anterior tibial  surgical  bypass graft.     JILLIAN TELLEZ MD         Component      Latest Ref Rng & Units 9/27/2022   Total Cholesterol      <=199 mg/dL 124   Triglycerides      30 - 149 mg/dL 329 (H)   HDL Cholesterol      40 - 59 mg/dL 26 (L)   LDL Cholesterol      <=129 mg/dL 32   HDL Size      >=8.9 nm 8.6 (L)   VLDL Size      <=46.7 nm 58.5 (H)   LDL Particle Size      >=20.7 nm 20.7   Lge HDL Particle number      >=4.2 umol/L <2.8 (L)   HDL Particle Number NMR      >=33.0 umol/L 19.1 (L)   Lge VLDL Part number      <=2.7 nmol/L 13.1 (H)   Small LDL Particle number      <=634 nmol/L 217   LDL Particle Number      <=1135 nmol/L 818   EER LipoFit by NMR       See Note   Hemoglobin A1C      0.0 - 5.6 % 7.2 (H)   Lipoprotein (a)      <30 mg/dL 10   C-Reactive Protein High Sensitivity       6.01       LOCATION: [facility]  DATE: 11/28/2023     1. EXAM: RESTING ANKLE-BRACHIAL INDICES (ABIs)   2. DUPLEX ARTERIAL ULTRASOUND OF THE LEFT LOWER LOWER EXTREMITY  11/28/2023 9:42 AM CST     INDICATION: Known peripheral arterial disease, follow-up.  TECHNIQUE: Duplex imaging is performed utilizing gray-scale, two-dimensional images and color-flow imaging. Doppler waveform analysis and spectral Doppler imaging is also performed.  COMPARISON: 08/11/2023.     FINDINGS:   SEGMENTAL BP  RIGHT (mmHg)  Brachial: 133; Index  Ankle (PT):  Ankle (DP):  Digit:     LEFT (mmHg)  Brachial: 135  Ankle (PT): 128; Index 0.95  Ankle (DP): 135; Index 1.00  Digit: 191; Index 1.41     WAVEFORMS: Slight blunting of left lower extremity waveforms.     ARTERIAL DUPLEX VELOCITIES (cm/s):  LEFT   EIA: 147  CFA: 164  PFA:  SFA-Proximal:  SFA-Mid:  SFA-Distal:  Popliteal:  PTA: 31  SENIA: 65  DPA: 36     BYPASS GRAFT VELOCITIES (cm/s):  LEFT   Proximal Native: 163  Proximal Anastomosis: 120  Proximal: 103  Mid: 45  Distal: 45  Distal Anastomosis: 65  Distal Native: 65     WAVEFORMS/FINDINGS: Monophasic left external iliac artery waveform. Left  superficial femoral to anterior tibial artery bypass graft appears widely patent. Poststenotic posterior tibial artery waveform. Normal DP waveform.                                                                      IMPRESSION:  1.  RIGHT LOWER EXTREMITY: Postsurgical changes of below-knee amputation.  2.  LEFT LOWER EXTREMITY: OZZIE at rest is within normal limits measuring 1.00. TBI is within normal limits. Doppler arterial ultrasound demonstrates a patent left superficial femoral artery to anterior tibial artery bypass graft with normal distal SENIA/DP   waveforms. No significant stenosis observed.     Component      Latest Ref Rng 12/22/2023  9:03 AM   Sodium      135 - 145 mmol/L 137    Potassium      3.4 - 5.3 mmol/L 5.0    Carbon Dioxide (CO2)      22 - 29 mmol/L 25    Anion Gap      7 - 15 mmol/L 10    Urea Nitrogen      8.0 - 23.0 mg/dL 19.4    Creatinine      0.67 - 1.17 mg/dL 0.82    GFR Estimate      >60 mL/min/1.73m2 >90    Calcium      8.8 - 10.2 mg/dL 9.7    Chloride      98 - 107 mmol/L 102    Glucose      70 - 99 mg/dL 170 (H)    Alkaline Phosphatase      40 - 150 U/L 64    AST      0 - 45 U/L 28    ALT      0 - 70 U/L 19    Protein Total      6.4 - 8.3 g/dL 7.7    Albumin      3.5 - 5.2 g/dL 4.1    Bilirubin Total      <=1.2 mg/dL 0.4    Total Cholesterol      <=199 mg/dL 126    Triglycerides      30 - 149 mg/dL 313 (H)    HDL Cholesterol      40 - 59 mg/dL 28 (L)    LDL Cholesterol      <=129 mg/dL 35    HDL Size      >=8.9 nm 8.3 (L)    VLDL Size      <=46.7 nm 58.4 (H)    LDL Particle Size      >=20.7 nm 20.3 (L)    Lge HDL Particle number      >=4.2 umol/L <2.8 (L)    HDL Particle Number NMR      >=33.0 umol/L 21.3 (L)    Lge VLDL Part number      <=2.7 nmol/L 15.1 (H)    Small LDL Particle number      <=634 nmol/L 513    LDL Particle Number      <=1135 nmol/L 1044    EER LipoFit by NMR See Note    Lipoprotein (a)      <30 mg/dL 6    Hemoglobin A1C      <5.7 % 8.2 (H)    C-Reactive Protein High  Sensitivity 3.84       Legend:  (H) High  (L) Low     A/P;           (F17.200) Tobacco use disorder  Comment: I again advised him to quit smoking.  Plan: He again refuses to quit smoking.      (I73.9) PAD S/P Lt SFA stenting 3-9-2012, Lt fem-SENIA bypass w/ ISGSV 9-, S/P Rt AKA 2014, ligation of parasitic branch 9/19/2017  Comment: The bypass is patent. His left foot has a wound on it which is being managed at [facility]. The wound is persistent but scabbed over. There is no exposed base to the wound and he has no actionable vascular stenoses which could be addressed to facilitate complete healing.  Plan:  He is advised to RTC prn wound worsening.         Repeat imaging in 12/2024, RTC two weeks later.     (E78.5) Hyperlipidemia LDL goal <70  Comment:Advanced lipid testing of 12/22/2023 while on atorvastatin 80 mg daily revealed LDL-C of 35, LDL-P of 1044, cardiac CRP of 3.84, and Lp(a) of 6.   Plan:Stop Lipitor 80, Start Crestor 40 to attain lower LDL-P and cardiac CRP in setting of known ongoing tobacco use and clinical CV ds. Check advanced lipid testing 4/5/23, RTC two weeks later.             (I10) Hypertension goal BP (blood pressure) < 140/90  Comment: at goal  Plan: no med changes     (E11.8,  Z79.4) Type 2 diabetes mellitus with complication, with long-term current use of insulin (H)  Comment: not at a1c goal of < 7%, it has increased from 7.2 to 8.2% ;I increased Lantus from 56 to 64 units daily but split dosing to 32units BID when seen on 5/17/2022. He however instead took 42 units SQ at bedtime. He has frequent excursions > 200, and never has hypoglycemia. He has an endocrinologist, who changed lariglutide to semaglutide in August of 2023.  As a continuing to smoke diabetic with PAD, more optimal glycemic control is warranted.   Plan: He is instructed to change his Lantus form the 42 units SQ at bedtime he elected to dose on his own to 32 U SQ BID as I previously had stated. He is  furthermore instructed go in to see his endocrinologist for a face to face visit after he has been on 32 U SQ BID, which would be in 04/2024. Further diabetic med changes through his endocrinologist.        (C22.1) Cholangiocarcinoma (H)  (primary encounter diagnosis)  Comment: This is being addressed by Dr. Cardozo  Plan: Per Dr. Cardozo.      45 minutes total medical care on today's date.

## 2024-01-05 ENCOUNTER — OFFICE VISIT (OUTPATIENT)
Dept: OTHER | Facility: CLINIC | Age: 69
End: 2024-01-05
Attending: INTERNAL MEDICINE
Payer: MEDICARE

## 2024-01-05 VITALS — DIASTOLIC BLOOD PRESSURE: 79 MMHG | OXYGEN SATURATION: 97 % | SYSTOLIC BLOOD PRESSURE: 120 MMHG | HEART RATE: 69 BPM

## 2024-01-05 DIAGNOSIS — I73.9 PAD (PERIPHERAL ARTERY DISEASE) (H): Primary | ICD-10-CM

## 2024-01-05 DIAGNOSIS — I10 HYPERTENSION GOAL BP (BLOOD PRESSURE) < 140/90: ICD-10-CM

## 2024-01-05 DIAGNOSIS — K74.60 CIRRHOSIS OF LIVER WITHOUT ASCITES, UNSPECIFIED HEPATIC CIRRHOSIS TYPE (H): ICD-10-CM

## 2024-01-05 DIAGNOSIS — Z79.4 TYPE 2 DIABETES MELLITUS WITH COMPLICATION, WITH LONG-TERM CURRENT USE OF INSULIN (H): ICD-10-CM

## 2024-01-05 DIAGNOSIS — E78.5 HYPERLIPIDEMIA LDL GOAL <70: ICD-10-CM

## 2024-01-05 DIAGNOSIS — I70.422 ATHEROSCLEROSIS OF AUTOLOGOUS VEIN BYPASS GRAFT(S) OF THE EXTREMITIES WITH REST PAIN, LEFT LEG (H): ICD-10-CM

## 2024-01-05 DIAGNOSIS — F17.200 TOBACCO USE DISORDER: ICD-10-CM

## 2024-01-05 DIAGNOSIS — E11.8 TYPE 2 DIABETES MELLITUS WITH COMPLICATION, WITH LONG-TERM CURRENT USE OF INSULIN (H): ICD-10-CM

## 2024-01-05 PROCEDURE — 99215 OFFICE O/P EST HI 40 MIN: CPT | Performed by: INTERNAL MEDICINE

## 2024-01-05 PROCEDURE — G0463 HOSPITAL OUTPT CLINIC VISIT: HCPCS | Performed by: INTERNAL MEDICINE

## 2024-01-05 RX ORDER — ROSUVASTATIN CALCIUM 40 MG/1
40 TABLET, COATED ORAL DAILY
Qty: 90 TABLET | Refills: 3 | Status: SHIPPED | OUTPATIENT
Start: 2024-01-05

## 2024-01-05 NOTE — PROGRESS NOTES
Patient is here to discuss follow up    /79 (BP Location: Right arm, Patient Position: Chair, Cuff Size: Adult Regular)   Pulse 69   SpO2 97%     Questions patient would like addressed today are: N/A.    Refills are needed: No    [facility]    BEN GREGG

## 2024-01-18 DIAGNOSIS — I73.9 PAD (PERIPHERAL ARTERY DISEASE) (H): ICD-10-CM

## 2024-01-19 NOTE — TELEPHONE ENCOUNTER
metoprolol succinate ER (TOPROL XL) 50 MG 24 hr tablet       Last Written Prescription Date:  4/13/23  Last Fill Quantity: 135,   # refills: 2  Last Office Visit: 1/5/24  Future Office visit:       Routing refill request to provider for review/approval because:  Failed protocol

## 2024-01-22 RX ORDER — METOPROLOL SUCCINATE 50 MG/1
TABLET, EXTENDED RELEASE ORAL
Qty: 21 TABLET | Refills: 8 | Status: SHIPPED | OUTPATIENT
Start: 2024-01-22

## 2024-02-17 ENCOUNTER — HEALTH MAINTENANCE LETTER (OUTPATIENT)
Age: 69
End: 2024-02-17

## 2024-03-05 ENCOUNTER — TELEPHONE (OUTPATIENT)
Dept: ONCOLOGY | Facility: HOSPITAL | Age: 69
End: 2024-03-05
Payer: MEDICARE

## 2024-03-21 ENCOUNTER — LAB (OUTPATIENT)
Dept: LAB | Facility: CLINIC | Age: 69
End: 2024-03-21
Attending: INTERNAL MEDICINE
Payer: MEDICARE

## 2024-03-21 ENCOUNTER — OFFICE VISIT (OUTPATIENT)
Dept: GASTROENTEROLOGY | Facility: CLINIC | Age: 69
End: 2024-03-21
Attending: INTERNAL MEDICINE
Payer: MEDICARE

## 2024-03-21 VITALS — HEART RATE: 74 BPM | OXYGEN SATURATION: 96 % | DIASTOLIC BLOOD PRESSURE: 67 MMHG | SYSTOLIC BLOOD PRESSURE: 103 MMHG

## 2024-03-21 DIAGNOSIS — C22.1 CHOLANGIOCARCINOMA (H): ICD-10-CM

## 2024-03-21 DIAGNOSIS — K70.30 ALCOHOLIC CIRRHOSIS OF LIVER WITHOUT ASCITES (H): ICD-10-CM

## 2024-03-21 DIAGNOSIS — L97.529 DIABETIC ULCER OF LEFT FOOT ASSOCIATED WITH TYPE 2 DIABETES MELLITUS, UNSPECIFIED PART OF FOOT, UNSPECIFIED ULCER STAGE (H): ICD-10-CM

## 2024-03-21 DIAGNOSIS — K74.60 CIRRHOSIS OF LIVER WITHOUT ASCITES, UNSPECIFIED HEPATIC CIRRHOSIS TYPE (H): Primary | ICD-10-CM

## 2024-03-21 DIAGNOSIS — Z89.611 HX OF AKA (ABOVE KNEE AMPUTATION), RIGHT (H): ICD-10-CM

## 2024-03-21 DIAGNOSIS — E11.69 TYPE 2 DIABETES MELLITUS WITH OTHER SPECIFIED COMPLICATION, WITHOUT LONG-TERM CURRENT USE OF INSULIN (H): ICD-10-CM

## 2024-03-21 DIAGNOSIS — K74.60 CIRRHOSIS OF LIVER WITHOUT ASCITES, UNSPECIFIED HEPATIC CIRRHOSIS TYPE (H): ICD-10-CM

## 2024-03-21 DIAGNOSIS — E11.621 DIABETIC ULCER OF LEFT FOOT ASSOCIATED WITH TYPE 2 DIABETES MELLITUS, UNSPECIFIED PART OF FOOT, UNSPECIFIED ULCER STAGE (H): ICD-10-CM

## 2024-03-21 PROBLEM — C78.7 CHOLANGIOCARCINOMA METASTATIC TO LIVER (H): Status: ACTIVE | Noted: 2024-03-21

## 2024-03-21 LAB
AFP SERPL-MCNC: 3.3 NG/ML
ALBUMIN SERPL BCG-MCNC: 4.2 G/DL (ref 3.5–5.2)
ALP SERPL-CCNC: 79 U/L (ref 40–150)
ALT SERPL W P-5'-P-CCNC: 20 U/L (ref 0–70)
ANION GAP SERPL CALCULATED.3IONS-SCNC: 13 MMOL/L (ref 7–15)
AST SERPL W P-5'-P-CCNC: 29 U/L (ref 0–45)
BILIRUB DIRECT SERPL-MCNC: <0.2 MG/DL (ref 0–0.3)
BILIRUB SERPL-MCNC: 0.3 MG/DL
BUN SERPL-MCNC: 21 MG/DL (ref 8–23)
CALCIUM SERPL-MCNC: 8.7 MG/DL (ref 8.8–10.2)
CHLORIDE SERPL-SCNC: 100 MMOL/L (ref 98–107)
CREAT SERPL-MCNC: 0.93 MG/DL (ref 0.67–1.17)
DEPRECATED HCO3 PLAS-SCNC: 21 MMOL/L (ref 22–29)
EGFRCR SERPLBLD CKD-EPI 2021: 89 ML/MIN/1.73M2
ERYTHROCYTE [DISTWIDTH] IN BLOOD BY AUTOMATED COUNT: 14.6 % (ref 10–15)
GLUCOSE SERPL-MCNC: 252 MG/DL (ref 70–99)
HCT VFR BLD AUTO: 41.5 % (ref 40–53)
HGB BLD-MCNC: 13.8 G/DL (ref 13.3–17.7)
INR PPP: 1.44 (ref 0.85–1.15)
MCH RBC QN AUTO: 30.1 PG (ref 26.5–33)
MCHC RBC AUTO-ENTMCNC: 33.3 G/DL (ref 31.5–36.5)
MCV RBC AUTO: 91 FL (ref 78–100)
PLATELET # BLD AUTO: 123 10E3/UL (ref 150–450)
POTASSIUM SERPL-SCNC: 4.6 MMOL/L (ref 3.4–5.3)
PROT SERPL-MCNC: 7.7 G/DL (ref 6.4–8.3)
RBC # BLD AUTO: 4.58 10E6/UL (ref 4.4–5.9)
SODIUM SERPL-SCNC: 134 MMOL/L (ref 135–145)
WBC # BLD AUTO: 5.2 10E3/UL (ref 4–11)

## 2024-03-21 PROCEDURE — 85027 COMPLETE CBC AUTOMATED: CPT | Performed by: PATHOLOGY

## 2024-03-21 PROCEDURE — 82105 ALPHA-FETOPROTEIN SERUM: CPT | Performed by: INTERNAL MEDICINE

## 2024-03-21 PROCEDURE — 80053 COMPREHEN METABOLIC PANEL: CPT | Performed by: PATHOLOGY

## 2024-03-21 PROCEDURE — 36415 COLL VENOUS BLD VENIPUNCTURE: CPT | Performed by: PATHOLOGY

## 2024-03-21 PROCEDURE — 82248 BILIRUBIN DIRECT: CPT | Performed by: PATHOLOGY

## 2024-03-21 PROCEDURE — 99215 OFFICE O/P EST HI 40 MIN: CPT | Performed by: INTERNAL MEDICINE

## 2024-03-21 PROCEDURE — 85610 PROTHROMBIN TIME: CPT | Performed by: PATHOLOGY

## 2024-03-21 PROCEDURE — 99000 SPECIMEN HANDLING OFFICE-LAB: CPT | Performed by: PATHOLOGY

## 2024-03-21 PROCEDURE — G0463 HOSPITAL OUTPT CLINIC VISIT: HCPCS | Performed by: INTERNAL MEDICINE

## 2024-03-21 ASSESSMENT — PAIN SCALES - GENERAL: PAINLEVEL: NO PAIN (0)

## 2024-03-21 NOTE — LETTER
3/21/2024         RE: Alexandro Pool  [address]  [place] [address]        Dear Colleague,    Thank you for referring your patient, Alexandro Pool, to the [facility] [place]. Please see a copy of my visit note below.    Hepatology Follow-Up Visit:     HISTORY OF PRESENT ILLNESS:   I had the pleasure of seeing Alexandro Pool for followup in the Liver Clinic at the [facility] on March 21, 2024. Mr. 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  His disease was complicated by the development of a liver lesion which on biopsy was cholangiocarcinoma.  It was treated with ablation, which he tolerated well.     At present, he denies any abdominal pain, itching or skin rash, or fatigue.  He denies any increased abdominal girth or any lower extremity edema.  He has not had any gastrointestinal bleeding or any overt signs of hepatic encephalopathy.     He denies any fevers or chills, cough or shortness of breath.  He continues to smoke and is not interested in stopping.  He denies any nausea or vomiting or constipation.  He said he has had diarrhea for many, many years that has been stable.  He reports his appetite has been good and his weight has been stable.     There otherwise have been no other new events since he was last seen.     Medications:   Current Outpatient Medications   Medication     acetaminophen (TYLENOL) 325 MG tablet     albuterol (PROAIR HFA/PROVENTIL HFA/VENTOLIN HFA) 108 (90 Base) MCG/ACT inhaler     amLODIPine (NORVASC) 2.5 MG tablet     ammonium lactate (AMLACTIN) 12 % external cream     ASPIRIN EC PO     blood glucose (NO BRAND SPECIFIED) test strip     Continuous Blood Gluc  (DEXCOM G6 ) DORIS     empagliflozin (JARDIANCE) 25 MG TABS tablet     gabapentin (NEURONTIN) 100 MG capsule     gabapentin (NEURONTIN) 300 MG capsule     GLOBAL EASY GLIDE PEN NEEDLES 32G X 4 MM miscellaneous      insulin aspart (NOVOLOG FLEXPEN) 100 UNIT/ML pen     insulin glargine (LANTUS PEN) 100 UNIT/ML pen     liraglutide (VICTOZA) 18 MG/3ML solution     lisinopril (ZESTRIL) 2.5 MG tablet     metoprolol succinate ER (TOPROL XL) 50 MG 24 hr tablet     order for DME     rivaroxaban ANTICOAGULANT (XARELTO ANTICOAGULANT) 2.5 MG TABS tablet     rosuvastatin (CRESTOR) 40 MG tablet     senna-docusate (SENOKOT-S/PERICOLACE) 8.6-50 MG tablet     spironolactone (ALDACTONE) 25 MG tablet     insulin lispro (HUMALOG KWIKPEN) 100 UNIT/ML (1 unit dial) KWIKPEN     No current facility-administered medications for this visit.      Vitals:   /67   Pulse 74   SpO2 96%     Physical Exam:   In general he looks unchanged. HEENT exam shows no scleral icterus or temporal muscle wasting. Chest is clear. Abdominal exam shows no increase in girth. No masses or tenderness to palpation are present. Liver is 10 cm in span without left lobe enlargement. No spleen tip is palpable. Extremity exam shows no edema. Skin exam shows no stigmata of chronic liver disease. Neurologic exam shows no asterixis.     Labs:   Lab Results   Component Value Date     (L) 03/21/2024    POTASSIUM 4.6 03/21/2024    CHLORIDE 100 03/21/2024    ANIONGAP 13 03/21/2024    CO2 21 (L) 03/21/2024    BUN 21.0 03/21/2024    CR 0.93 03/21/2024    GFRESTIMATED 89 03/21/2024    BOGDAN 8.7 (L) 03/21/2024      Lab Results   Component Value Date    WBC 5.2 03/21/2024    HGB 13.8 03/21/2024    HCT 41.5 03/21/2024    MCV 91 03/21/2024    MCH 30.1 03/21/2024    MCHC 33.3 03/21/2024    RDW 14.6 03/21/2024     (L) 03/21/2024     Lab Results   Component Value Date    ALBUMIN 4.2 03/21/2024    ALKPHOS 79 03/21/2024    AST 29 03/21/2024    BILICONJ 0.0 04/03/2014     Lab Results   Component Value Date    INR 1.44 (H) 03/21/2024     MELD 3.0: 12 at 3/21/2024  9:30 AM  MELD-Na: 10 at 3/21/2024  9:30 AM  Calculated from:  Serum Creatinine: 0.93 mg/dL (Using min of 1 mg/dL) at  3/21/2024  9:30 AM  Serum Sodium: 134 mmol/L at 3/21/2024  9:30 AM  Total Bilirubin: 0.3 mg/dL (Using min of 1 mg/dL) at 3/21/2024  9:30 AM  Serum Albumin: 4.2 g/dL (Using max of 3.5 g/dL) at 3/21/2024  9:30 AM  INR(ratio): 1.44 at 3/21/2024  9:30 AM  Age at listing (hypothetical): 68 years  Sex: Male at 3/21/2024  9:30 AM    Imaging:   No images are attached to the encounter.     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  His disease is well compensated at this point in time, and his liver tests are completely normal.     He was diagnosed with an intrahepatic cholangiocarcinoma.  He was treated with ablation and he is going to get another MRI in 2 months, and I will see him back in 6 months for repeat imaging and blood work.     He is up to date for the most part with regard to vaccines.  He could get a COVID-19 vaccine.  I also recommend that he be vaccinated against influenza and RSV this fall.  He is otherwise up to date with regard to screening for cancer.  He is due for variceal screening which I have scheduled.     I did spend total of 40 minutes (on the date of the encounter), including 30 minutes of face-to-face clinic time including counseling. The rest of the time was spent in documentation and review of records    Thank you very much for allowing me to participate in the care of this patient.  If you have any questions regarding my recommendations, please do not hesitate to contact me.      Sincerely,       Nehemias Cevallos MD      Professor of Medicine  [employer]      Executive Medical Director, Solid Organ Transplant Program  [facility]

## 2024-03-21 NOTE — PROGRESS NOTES
Hepatology Follow-Up Visit:     HISTORY OF PRESENT ILLNESS:   I had the pleasure of seeing Alexandro Pool for followup in the Liver Clinic at the [facility] on March 21, 2024. Mr. 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  His disease was complicated by the development of a liver lesion which on biopsy was cholangiocarcinoma.  It was treated with ablation, which he tolerated well.     At present, he denies any abdominal pain, itching or skin rash, or fatigue.  He denies any increased abdominal girth or any lower extremity edema.  He has not had any gastrointestinal bleeding or any overt signs of hepatic encephalopathy.     He denies any fevers or chills, cough or shortness of breath.  He continues to smoke and is not interested in stopping.  He denies any nausea or vomiting or constipation.  He said he has had diarrhea for many, many years that has been stable.  He reports his appetite has been good and his weight has been stable.     There otherwise have been no other new events since he was last seen.     Medications:   Current Outpatient Medications   Medication    acetaminophen (TYLENOL) 325 MG tablet    albuterol (PROAIR HFA/PROVENTIL HFA/VENTOLIN HFA) 108 (90 Base) MCG/ACT inhaler    amLODIPine (NORVASC) 2.5 MG tablet    ammonium lactate (AMLACTIN) 12 % external cream    ASPIRIN EC PO    blood glucose (NO BRAND SPECIFIED) test strip    Continuous Blood Gluc  (DEXCOM G6 ) DORIS    empagliflozin (JARDIANCE) 25 MG TABS tablet    gabapentin (NEURONTIN) 100 MG capsule    gabapentin (NEURONTIN) 300 MG capsule    GLOBAL EASY GLIDE PEN NEEDLES 32G X 4 MM miscellaneous    insulin aspart (NOVOLOG FLEXPEN) 100 UNIT/ML pen    insulin glargine (LANTUS PEN) 100 UNIT/ML pen    liraglutide (VICTOZA) 18 MG/3ML solution    lisinopril (ZESTRIL) 2.5 MG tablet    metoprolol succinate ER (TOPROL XL) 50 MG 24 hr tablet    order for DME    rivaroxaban  ANTICOAGULANT (XARELTO ANTICOAGULANT) 2.5 MG TABS tablet    rosuvastatin (CRESTOR) 40 MG tablet    senna-docusate (SENOKOT-S/PERICOLACE) 8.6-50 MG tablet    spironolactone (ALDACTONE) 25 MG tablet    insulin lispro (HUMALOG KWIKPEN) 100 UNIT/ML (1 unit dial) KWIKPEN     No current facility-administered medications for this visit.      Vitals:   /67   Pulse 74   SpO2 96%     Physical Exam:   In general he looks unchanged. HEENT exam shows no scleral icterus or temporal muscle wasting. Chest is clear. Abdominal exam shows no increase in girth. No masses or tenderness to palpation are present. Liver is 10 cm in span without left lobe enlargement. No spleen tip is palpable. Extremity exam shows no edema. Skin exam shows no stigmata of chronic liver disease. Neurologic exam shows no asterixis.     Labs:   Lab Results   Component Value Date     (L) 03/21/2024    POTASSIUM 4.6 03/21/2024    CHLORIDE 100 03/21/2024    ANIONGAP 13 03/21/2024    CO2 21 (L) 03/21/2024    BUN 21.0 03/21/2024    CR 0.93 03/21/2024    GFRESTIMATED 89 03/21/2024    BOGDAN 8.7 (L) 03/21/2024      Lab Results   Component Value Date    WBC 5.2 03/21/2024    HGB 13.8 03/21/2024    HCT 41.5 03/21/2024    MCV 91 03/21/2024    MCH 30.1 03/21/2024    MCHC 33.3 03/21/2024    RDW 14.6 03/21/2024     (L) 03/21/2024     Lab Results   Component Value Date    ALBUMIN 4.2 03/21/2024    ALKPHOS 79 03/21/2024    AST 29 03/21/2024    BILICONJ 0.0 04/03/2014     Lab Results   Component Value Date    INR 1.44 (H) 03/21/2024     MELD 3.0: 12 at 3/21/2024  9:30 AM  MELD-Na: 10 at 3/21/2024  9:30 AM  Calculated from:  Serum Creatinine: 0.93 mg/dL (Using min of 1 mg/dL) at 3/21/2024  9:30 AM  Serum Sodium: 134 mmol/L at 3/21/2024  9:30 AM  Total Bilirubin: 0.3 mg/dL (Using min of 1 mg/dL) at 3/21/2024  9:30 AM  Serum Albumin: 4.2 g/dL (Using max of 3.5 g/dL) at 3/21/2024  9:30 AM  INR(ratio): 1.44 at 3/21/2024  9:30 AM  Age at listing (hypothetical): 68  years  Sex: Male at 3/21/2024  9:30 AM    Imaging:   No images are attached to the encounter.     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  His disease is well compensated at this point in time, and his liver tests are completely normal.     He was diagnosed with an intrahepatic cholangiocarcinoma.  He was treated with ablation and he is going to get another MRI in 2 months, and I will see him back in 6 months for repeat imaging and blood work.     He is up to date for the most part with regard to vaccines.  He could get a COVID-19 vaccine.  I also recommend that he be vaccinated against influenza and RSV this fall.  He is otherwise up to date with regard to screening for cancer.  He is due for variceal screening which I have scheduled.     I did spend total of 40 minutes (on the date of the encounter), including 30 minutes of face-to-face clinic time including counseling. The rest of the time was spent in documentation and review of records    Thank you very much for allowing me to participate in the care of this patient.  If you have any questions regarding my recommendations, please do not hesitate to contact me.      Sincerely,       Nehemias Cevallos MD      Professor of Medicine  [employer]      Executive Medical Director, Solid Organ Transplant Program  [facility]

## 2024-03-28 ENCOUNTER — TELEPHONE (OUTPATIENT)
Dept: GASTROENTEROLOGY | Facility: CLINIC | Age: 69
End: 2024-03-28
Payer: MEDICARE

## 2024-03-28 NOTE — TELEPHONE ENCOUNTER
Pre visit planning completed.      Procedure details:    Patient scheduled for Upper endoscopy (EGD) on 4/11/24.     Arrival time: 1000. Procedure time 1100    Facility location: [facility]; [address] [place] [address], 5th Floor, [place] [address]. Check in location: 5th Floor.    Sedation type: Conscious sedation     Pre op exam needed? N/A    Indication for procedure:   Cirrhosis of liver without ascites, unspecified hepatic cirrhosis type            Chart review:     Electronic implanted devices? No    Recent diagnosis of diverticulitis within the last 6 weeks? No    Diabetic? Yes. Diabetic medication HOLDING recommendations: Oral diabetic medications: Yes:  Jardiance (empagliflozin): HOLD  3 days before procedure.   Diabetic injectables: Yes- Victoza (Saxenda, Liraglutide,). Daily or BID dosing of medication.  Hold the day before and day of procedure.  Follow up with managing provider.   Insulin: Yes. Consult with managing provider       Medication review:    Anticoagulants? Yes Rivaroxaban (Xarelto): Recommended HOLD 2 days before procedure.  Consult with your managing provider.    NSAIDS? No    Other medication HOLDING recommendations:  N/A      Prep for procedure:     Prep instructions sent via [employer]         Lizbeth Gaona RN  Endoscopy Procedure Pre Assessment RN  [phone] option 4

## 2024-03-28 NOTE — TELEPHONE ENCOUNTER
"Endoscopy Scheduling Screen    Have you had a positive Covid test in the last 14 days?  No    What is your communication preference for Instructions and/or Bowel Prep?   MyChart    What insurance is in the chart?  Other:  MEDICARE    Ordering/Referring Provider: Nehemias Cevallos MD     (If ordering provider performs procedure, schedule with ordering provider unless otherwise instructed. )    BMI: Estimated body mass index is 30.1 kg/m  as calculated from the following:    Height as of 11/29/23: 1.702 m (5' 7\").    Weight as of 12/12/23: 87.2 kg (192 lb 3.2 oz).     Sedation Ordered  moderate sedation.   If patient BMI > 50 do not schedule in [facility].    If patient BMI > 45 do not schedule at [facility].    Are you taking methadone or Suboxone?  No    Have you had difficulties, pain, or discomfort during past endoscopy procedures?  No    Are you taking any prescription medications for pain 3 or more times per week?   NO, No RN review required.    Do you have a history of malignant hyperthermia?  No    (Females) Are you currently pregnant?   No     Have you been diagnosed or told you have pulmonary hypertension?   No    Do you have an LVAD?  No    Have you been told you have moderate to severe sleep apnea?  No    Have you been told you have COPD, asthma, or any other lung disease?  No    Do you have any heart conditions?  No     Have you ever had or are you waiting for an organ transplant?  No. Continue scheduling, no site restrictions.    Have you had a stroke or transient ischemic attack (TIA aka \"mini stroke\" in the last 6 months?   No    Have you been diagnosed with or been told you have cirrhosis of the liver?   Yes (RN Review required for scheduling unless scheduling in Hospital.)    Are you currently on dialysis?   No    Do you need assistance transferring?   No    BMI: Estimated body mass index is 30.1 kg/m  as calculated from the following:    Height as of 11/29/23: 1.702 m (5' 7\").    Weight as of 12/12/23: 87.2 kg (192 " lb 3.2 oz).     Is patients BMI > 40 and scheduling location [facility]?  No    Do you take an injectable medication for weight loss or diabetes (excluding insulin)?  No    Do you take the medication Naltrexone?  No    Do you take blood thinners?  Yes     Are you taking Effient/Prasugrel?  No, you must contact your prescribing provider for direction on holding or bridging with a different medication.       Prep   Are you currently on dialysis or do you have chronic kidney disease?  No    Do you have a diagnosis of diabetes?  Yes (Golytely Prep)    Do you have a diagnosis of cystic fibrosis (CF)?  No    On a regular basis do you go 3 -5 days between bowel movements?  No    BMI > 40?  No    Preferred Pharmacy:      [facility] - [place], [place] - [address] [place] [address]  Phone: [phone] Fax: [phone]      Final Scheduling Details     Procedure scheduled  Upper endoscopy (EGD)    Surgeon:  DEMARCUS     Date of procedure:  4/11     Pre-OP / PAC:   No - Not required for this site.    Location  [facility] - [facility] - Patient preference.    Sedation   Moderate Sedation - Per order.      Patient Reminders:   You will receive a call from a Nurse to review instructions and health history.  This assessment must be completed prior to your procedure.  Failure to complete the Nurse assessment may result in the procedure being cancelled.      On the day of your procedure, please designate an adult(s) who can drive you home stay with you for the next 24 hours. The medicines used in the exam will make you sleepy. You will not be able to drive.      You cannot take public transportation, ride share services, or non-medical taxi service without a responsible caregiver.  Medical transport services are allowed with the requirement that a responsible caregiver will receive you at your destination.  We require that drivers and caregivers are confirmed prior to your procedure.

## 2024-03-28 NOTE — TELEPHONE ENCOUNTER
Pre Assessment RN Review    Focused Assessments      Cirrhosis Assessment    Results in Past 90 Days  Result Component Current Result Ref Range Previous Result Ref Range   Hemoglobin 13.8 (3/21/2024) 13.3 - 17.7 g/dL Not in Time Range    INR 1.44 (H) (3/21/2024) 0.85 - 1.15 Not in Time Range    Platelet Count 123 (L) (3/21/2024) 150 - 450 10e3/uL Not in Time Range        MELD 3.0: 12 at 3/21/2024  9:30 AM  MELD-Na: 10 at 3/21/2024  9:30 AM  Calculated from:  Serum Creatinine: 0.93 mg/dL (Using min of 1 mg/dL) at 3/21/2024  9:30 AM  Serum Sodium: 134 mmol/L at 3/21/2024  9:30 AM  Total Bilirubin: 0.3 mg/dL (Using min of 1 mg/dL) at 3/21/2024  9:30 AM  Serum Albumin: 4.2 g/dL (Using max of 3.5 g/dL) at 3/21/2024  9:30 AM  INR(ratio): 1.44 at 3/21/2024  9:30 AM  Age at listing (hypothetical): 68 years  Sex: Male at 3/21/2024  9:30 AM      INR <= 2.0*  and  Hgb >= 9 g/dL  and  Plt >= 50 10^9/L INR > 2.0   OR  Hgb < 9 g/dL   OR  Plt < 50 10^9/L   No Scheduling Restrictions Hospital Only   *Exception for patients on anticoagulation therapy.    Hx of variceal bleeding? No. (no scheduling restrictions)    Paracentesis in the last month? No      Scheduling Status & Recommendations    Sedation: Moderate Sedation - Per order.  Location Type: No Scheduling Restrictions - Per RN assessment.

## 2024-03-29 NOTE — TELEPHONE ENCOUNTER
Pre assessment completed for upcoming procedure.   (Please see previous telephone encounter notes for complete details)    Procedure details:    Arrival time and facility location reviewed.    Pre op exam needed? N/A    Designated  policy reviewed. Instructed to have someone stay 6  hours post procedure.       Medication review:    Medications reviewed. Please see supporting documentation below. Holding recommendations discussed (if applicable).       Prep for procedure:     Procedure prep instructions reviewed.        Any additional information needed:  N/A      Patient  verbalized understanding and had no questions or concerns at this time.      Lizbeth Tracy RN  Endoscopy Procedure Pre Assessment RN  [phone] option 4

## 2024-04-10 ENCOUNTER — ANESTHESIA EVENT (OUTPATIENT)
Dept: SURGERY | Facility: AMBULATORY SURGERY CENTER | Age: 69
End: 2024-04-10
Payer: MEDICARE

## 2024-04-10 ASSESSMENT — LIFESTYLE VARIABLES: TOBACCO_USE: 1

## 2024-04-10 NOTE — ANESTHESIA PREPROCEDURE EVALUATION
Anesthesia Pre-Procedure Evaluation    Patient: Alexandro Pool   MRN: [id number] : 1955        Procedure : Procedure(s):  Esophagoscopy, gastroscopy, duodenoscopy (EGD), combined          Past Medical History:   Diagnosis Date     Acute kidney failure, unspecified (H24)      Blood transfusion      CAD (coronary artery disease)      CARDIOVASCULAR SCREENING; LDL GOAL LESS THAN 100      Cirrhosis (H)      Critical lower limb ischemia (H)      Diabetes mellitus (H) 10/2011     Hypertension      Hypertension goal BP (blood pressure) < 140/90      Ischemic cardiomyopathy      Lesion of liver      Lymphedema of left leg 2016     Peripheral arterial disease (H24)      PVD (peripheral vascular disease) (H24)      Right above knee amputation 2014     Type 2 diabetes, HbA1C goal < 8% (H)       Past Surgical History:   Procedure Laterality Date     AMPUTATE LEG ABOVE KNEE  2011    Procedure:AMPUTATE LEG ABOVE KNEE; Revise Right Below Knee Amputation To Above Knee Amputation; Surgeon:MADISON WELLS; Location:[facility]     AMPUTATE LEG BELOW KNEE  11/10/2011    Procedure:AMPUTATE LEG BELOW KNEE; Right Below Knee Amputation; Surgeon:MADISON WELLS; Location[facility]     BYPASS GRAFT FEMOROTIBIAL  2013    Procedure: BYPASS GRAFT FEMOROTIBIAL;  Left Femorotibial Bypass Graft Insitu ;  Surgeon: Marisol Suggs MD;  Location:  OR     CARDIAC SURGERY      cardiac stent     ESOPHAGOSCOPY, GASTROSCOPY, DUODENOSCOPY (EGD), COMBINED  10/1/2012    Procedure: COMBINED ESOPHAGOSCOPY, GASTROSCOPY, DUODENOSCOPY (EGD);;  Surgeon: Nehemias Cevallos MD;  Location:  GI     ESOPHAGOSCOPY, GASTROSCOPY, DUODENOSCOPY (EGD), COMBINED N/A 3/24/2016    Procedure: COMBINED ESOPHAGOSCOPY, GASTROSCOPY, DUODENOSCOPY (EGD);  Surgeon: Gildardo Marks MD;  Location:  GI     IR LIVER BIOPSY PERCUTANEOUS  10/5/2022     IR LOWER EXTREMITY ANGIOGRAM LEFT  2021     IR STENT VASCULAR LT  3/9/2012          IRRIGATION AND DEBRIDEMENT  "LOWER EXTREMITY, COMBINED  11/15/2011    Procedure:COMBINED IRRIGATION AND DEBRIDEMENT LOWER EXTREMITY; DRAINAGE OF ABSCESS AT BELOW KNEE AMPUTATION AND KNEE DISARTICULATION.; Surgeon:MADISON WELLS; Location:[facility]     NO HISTORY OF SURGERY  7/12/13    derm     VASCULAR REPAIR LOWER EXTREMITY Left 9/19/2017    Procedure: VASCULAR REPAIR LOWER EXTREMITY;  Ligation Parasitic Branch To Left Lower Extremity ;  Surgeon: Marisol Suggs MD;  Location: [facility] OR      Allergies   Allergen Reactions     Ciprofloxacin Rash      Social History     Tobacco Use     Smoking status: Every Day     Current packs/day: 2.00     Average packs/day: 2.0 packs/day for 40.0 years (80.0 ttl pk-yrs)     Types: Cigarettes     Smokeless tobacco: Never     Tobacco comments:     Is not working on quitting   Substance Use Topics     Alcohol use: Yes     Alcohol/week: 1.0 standard drink of alcohol     Types: 1 Standard drinks or equivalent per week     Comment: \"Once a year maybe\"      Wt Readings from Last 1 Encounters:   12/12/23 87.2 kg (192 lb 3.2 oz)        Anesthesia Evaluation   Pt has had prior anesthetic. Type: General.    No history of anesthetic complications       ROS/MED HX  ENT/Pulmonary:     (+)                tobacco use, Current use,   patient smoked within 24 hours,                    Neurologic:       Cardiovascular:     (+)  hypertension- -  CAD -  - -                                      METS/Exercise Tolerance: 1 - Eating, dressing    Hematologic: Comments: Thrombocytopenia 3/21/24 123  INR 1.44  On Xarelto      Musculoskeletal:       GI/Hepatic:     (+)           hepatitis type C, liver disease,       Renal/Genitourinary:       Endo:     (+)  type II DM,             Obesity,       Psychiatric/Substance Use:       Infectious Disease:       Malignancy:   (+) Malignancy, History of Other.Other CA Cholangiocarcinoma status post.    Other:            Physical Exam    Airway      Comment: bearded    Mallampati: II   TM distance: > 3 " FB   Neck ROM: full   Mouth opening: > 3 cm    Respiratory Devices and Support         Dental       (+) Edentulous      Cardiovascular   cardiovascular exam normal          Pulmonary   pulmonary exam normal            OUTSIDE LABS:  CBC:   Lab Results   Component Value Date    WBC 5.2 03/21/2024    WBC 6.1 09/19/2023    HGB 13.8 03/21/2024    HGB 15.1 09/19/2023    HCT 41.5 03/21/2024    HCT 45.8 09/19/2023     (L) 03/21/2024     (L) 09/19/2023     BMP:   Lab Results   Component Value Date     (L) 03/21/2024     12/22/2023    POTASSIUM 4.6 03/21/2024    POTASSIUM 5.0 12/22/2023    CHLORIDE 100 03/21/2024    CHLORIDE 102 12/22/2023    CO2 21 (L) 03/21/2024    CO2 25 12/22/2023    BUN 21.0 03/21/2024    BUN 19.4 12/22/2023    CR 0.93 03/21/2024    CR 0.82 12/22/2023     (H) 03/21/2024     (H) 12/22/2023     COAGS:   Lab Results   Component Value Date    PTT 30 12/02/2021    INR 1.44 (H) 03/21/2024    FIBR 405 10/14/2013     POC:   Lab Results   Component Value Date    BGM 92 09/19/2017     HEPATIC:   Lab Results   Component Value Date    ALBUMIN 4.2 03/21/2024    PROTTOTAL 7.7 03/21/2024    ALT 20 03/21/2024    AST 29 03/21/2024    ALKPHOS 79 03/21/2024    BILITOTAL 0.3 03/21/2024    KELLI 43 (H) 10/14/2013     OTHER:   Lab Results   Component Value Date    PH 7.49 (H) 10/16/2013    LACT 1.7 12/02/2021    A1C 8.2 (H) 12/22/2023    BOGDAN 8.7 (L) 03/21/2024    PHOS 3.9 10/16/2013    MAG 1.4 (L) 10/16/2013    LIPASE 201 10/14/2013    AMYLASE 75 10/14/2013    TSH 1.27 07/27/2020    T4 1.10 07/27/2020    CRP 5.5 05/02/2022       Anesthesia Plan    ASA Status:  3    NPO Status:  NPO Appropriate    Anesthesia Type: MAC.     - Reason for MAC: straight local not clinically adequate, immobility needed, chronic cardiopulmonary disease              Consents    Anesthesia Plan(s) and associated risks, benefits, and realistic alternatives discussed. Questions answered and  patient/representative(s) expressed understanding.     - Discussed: Risks, Benefits and Alternatives for BOTH SEDATION and the PROCEDURE were discussed     - Discussed with:  Patient      - Extended Intubation/Ventilatory Support Discussed: No.      - Patient is DNR/DNI Status: No     Use of blood products discussed: No .     Postoperative Care            Comments:               Dorcas Babin MD    I have reviewed the pertinent notes and labs in the chart from the past 30 days and (re)examined the patient.  Any updates or changes from those notes are reflected in this note.     # Hyponatremia: Lowest Na = 134 mmol/L in last 30 days, will monitor as appropriate        # Coagulation Defect: INR = 1.44 (Ref range: 0.85 - 1.15) and/or PTT = N/A, will monitor for bleeding  # Thrombocytopenia: Lowest platelets = 123 in last 30 days, will monitor for bleeding  # DMII: A1C = N/A within past 6 months

## 2024-04-11 ENCOUNTER — HOSPITAL ENCOUNTER (OUTPATIENT)
Facility: AMBULATORY SURGERY CENTER | Age: 69
Discharge: HOME | End: 2024-04-11
Attending: STUDENT IN AN ORGANIZED HEALTH CARE EDUCATION/TRAINING PROGRAM
Payer: MEDICARE

## 2024-04-11 ENCOUNTER — ANESTHESIA (OUTPATIENT)
Dept: SURGERY | Facility: AMBULATORY SURGERY CENTER | Age: 69
End: 2024-04-11
Payer: MEDICARE

## 2024-04-11 VITALS
HEIGHT: 67 IN | SYSTOLIC BLOOD PRESSURE: 130 MMHG | OXYGEN SATURATION: 100 % | HEART RATE: 70 BPM | TEMPERATURE: 98 F | WEIGHT: 198 LBS | DIASTOLIC BLOOD PRESSURE: 69 MMHG | RESPIRATION RATE: 16 BRPM | BODY MASS INDEX: 31.08 KG/M2

## 2024-04-11 VITALS — HEART RATE: 88 BPM

## 2024-04-11 DIAGNOSIS — K26.9 DUODENAL ULCER: Primary | ICD-10-CM

## 2024-04-11 LAB
GLUCOSE BLDC GLUCOMTR-MCNC: 157 MG/DL (ref 70–99)
UPPER GI ENDOSCOPY: NORMAL

## 2024-04-11 PROCEDURE — 88305 TISSUE EXAM BY PATHOLOGIST: CPT | Mod: TC | Performed by: STUDENT IN AN ORGANIZED HEALTH CARE EDUCATION/TRAINING PROGRAM

## 2024-04-11 PROCEDURE — 43239 EGD BIOPSY SINGLE/MULTIPLE: CPT | Performed by: ANESTHESIOLOGY

## 2024-04-11 PROCEDURE — 43239 EGD BIOPSY SINGLE/MULTIPLE: CPT | Performed by: NURSE ANESTHETIST, CERTIFIED REGISTERED

## 2024-04-11 PROCEDURE — 88305 TISSUE EXAM BY PATHOLOGIST: CPT | Mod: 26 | Performed by: PATHOLOGY

## 2024-04-11 PROCEDURE — 82962 GLUCOSE BLOOD TEST: CPT | Performed by: PATHOLOGY

## 2024-04-11 PROCEDURE — 88342 IMHCHEM/IMCYTCHM 1ST ANTB: CPT | Mod: 26 | Performed by: PATHOLOGY

## 2024-04-11 PROCEDURE — 43239 EGD BIOPSY SINGLE/MULTIPLE: CPT | Performed by: STUDENT IN AN ORGANIZED HEALTH CARE EDUCATION/TRAINING PROGRAM

## 2024-04-11 RX ORDER — ONDANSETRON 2 MG/ML
4 INJECTION INTRAMUSCULAR; INTRAVENOUS
Status: DISCONTINUED | OUTPATIENT
Start: 2024-04-11 | End: 2024-04-11 | Stop reason: HOSPADM

## 2024-04-11 RX ORDER — PROPOFOL 10 MG/ML
INJECTION, EMULSION INTRAVENOUS CONTINUOUS PRN
Status: DISCONTINUED | OUTPATIENT
Start: 2024-04-11 | End: 2024-04-11

## 2024-04-11 RX ORDER — PROCHLORPERAZINE MALEATE 5 MG
5 TABLET ORAL EVERY 6 HOURS PRN
Status: DISCONTINUED | OUTPATIENT
Start: 2024-04-11 | End: 2024-04-12 | Stop reason: HOSPADM

## 2024-04-11 RX ORDER — PROPOFOL 10 MG/ML
INJECTION, EMULSION INTRAVENOUS PRN
Status: DISCONTINUED | OUTPATIENT
Start: 2024-04-11 | End: 2024-04-11

## 2024-04-11 RX ORDER — FLUMAZENIL 0.1 MG/ML
0.2 INJECTION, SOLUTION INTRAVENOUS
Status: ACTIVE | OUTPATIENT
Start: 2024-04-11 | End: 2024-04-11

## 2024-04-11 RX ORDER — NALOXONE HYDROCHLORIDE 0.4 MG/ML
0.4 INJECTION, SOLUTION INTRAMUSCULAR; INTRAVENOUS; SUBCUTANEOUS
Status: DISCONTINUED | OUTPATIENT
Start: 2024-04-11 | End: 2024-04-12 | Stop reason: HOSPADM

## 2024-04-11 RX ORDER — SODIUM CHLORIDE, SODIUM LACTATE, POTASSIUM CHLORIDE, CALCIUM CHLORIDE 600; 310; 30; 20 MG/100ML; MG/100ML; MG/100ML; MG/100ML
INJECTION, SOLUTION INTRAVENOUS CONTINUOUS PRN
Status: DISCONTINUED | OUTPATIENT
Start: 2024-04-11 | End: 2024-04-11

## 2024-04-11 RX ORDER — LIDOCAINE 40 MG/G
CREAM TOPICAL
Status: DISCONTINUED | OUTPATIENT
Start: 2024-04-11 | End: 2024-04-11 | Stop reason: HOSPADM

## 2024-04-11 RX ORDER — NALOXONE HYDROCHLORIDE 0.4 MG/ML
0.2 INJECTION, SOLUTION INTRAMUSCULAR; INTRAVENOUS; SUBCUTANEOUS
Status: DISCONTINUED | OUTPATIENT
Start: 2024-04-11 | End: 2024-04-12 | Stop reason: HOSPADM

## 2024-04-11 RX ORDER — ONDANSETRON 4 MG/1
4 TABLET, ORALLY DISINTEGRATING ORAL EVERY 6 HOURS PRN
Status: DISCONTINUED | OUTPATIENT
Start: 2024-04-11 | End: 2024-04-12 | Stop reason: HOSPADM

## 2024-04-11 RX ORDER — LIDOCAINE HYDROCHLORIDE 20 MG/ML
INJECTION, SOLUTION INFILTRATION; PERINEURAL PRN
Status: DISCONTINUED | OUTPATIENT
Start: 2024-04-11 | End: 2024-04-11

## 2024-04-11 RX ORDER — ONDANSETRON 2 MG/ML
4 INJECTION INTRAMUSCULAR; INTRAVENOUS EVERY 6 HOURS PRN
Status: DISCONTINUED | OUTPATIENT
Start: 2024-04-11 | End: 2024-04-12 | Stop reason: HOSPADM

## 2024-04-11 RX ADMIN — PROPOFOL 70 MG: 10 INJECTION, EMULSION INTRAVENOUS at 10:55

## 2024-04-11 RX ADMIN — Medication 100 MCG: at 11:04

## 2024-04-11 RX ADMIN — SODIUM CHLORIDE, SODIUM LACTATE, POTASSIUM CHLORIDE, CALCIUM CHLORIDE: 600; 310; 30; 20 INJECTION, SOLUTION INTRAVENOUS at 10:17

## 2024-04-11 RX ADMIN — Medication 100 MCG: at 11:02

## 2024-04-11 RX ADMIN — LIDOCAINE HYDROCHLORIDE 60 MG: 20 INJECTION, SOLUTION INFILTRATION; PERINEURAL at 10:55

## 2024-04-11 RX ADMIN — PROPOFOL 50 MG: 10 INJECTION, EMULSION INTRAVENOUS at 11:10

## 2024-04-11 RX ADMIN — PROPOFOL 250 MCG/KG/MIN: 10 INJECTION, EMULSION INTRAVENOUS at 10:55

## 2024-04-11 NOTE — ANESTHESIA CARE TRANSFER NOTE
Patient: Alexandro Pool    Procedure: Procedure(s):  ESOPHAGOGASTRODUODENOSCOPY, WITH BANDING OF VARICES AND BIOPSY       Diagnosis: Cirrhosis of liver without ascites, unspecified hepatic cirrhosis type (H) [K74.60]  Diagnosis Additional Information: No value filed.    Anesthesia Type:   MAC     Note:    Oropharynx: spontaneously breathing  Level of Consciousness: drowsy  Oxygen Supplementation: room air    Independent Airway: airway patency satisfactory and stable  Dentition: dentition unchanged  Vital Signs Stable: post-procedure vital signs reviewed and stable  Report to RN Given: handoff report given  Patient transferred to: Phase II    Handoff Report: Identifed the Patient, Identified the Reponsible Provider, Reviewed the pertinent medical history, Discussed the surgical course, Reviewed Intra-OP anesthesia mangement and issues during anesthesia, Set expectations for post-procedure period and Allowed opportunity for questions and acknowledgement of understanding  Vitals:  Vitals Value Taken Time   BP     Temp     Pulse 88 04/11/24 1110   Resp     SpO2         Electronically Signed By: JOHAN Castro CRNA  April 11, 2024  11:16 AM

## 2024-04-11 NOTE — H&P
Alexandro Pool  [id number]  male  68 year old      Reason for procedure/surgery: EGD to screen for varices    Patient Active Problem List   Diagnosis    Advanced directives, counseling/discussion    Hypertension goal BP (blood pressure) < 140/90    Tobacco abuse    CARDIOVASCULAR SCREENING; LDL GOAL LESS THAN 100    Type 2 diabetes, HbA1C goal < 8% (H)    Peripheral arterial disease (H24)    Critical limb ischemia (left leg)    Cirrhosis (H)    Chronic hepatitis C virus infection (H)    Right above knee amputation    Cirrhosis of liver (H)    Lymphedema of left leg    PAD (peripheral artery disease) (H24)    Atherosclerosis of autologous vein bypass graft(s) of the extremities with rest pain, left leg (H)    Cholangiocarcinoma metastatic to liver (H)    Diabetic ulcer of left foot associated with type 2 diabetes mellitus, unspecified part of foot, unspecified ulcer stage (H)    Hx of AKA (above knee amputation), right (H)       Past Surgical History:    Past Surgical History:   Procedure Laterality Date    AMPUTATE LEG ABOVE KNEE  11/22/2011    Procedure:AMPUTATE LEG ABOVE KNEE; Revise Right Below Knee Amputation To Above Knee Amputation; Surgeon:MADISON WELLS; Location: OR    AMPUTATE LEG BELOW KNEE  11/10/2011    Procedure:AMPUTATE LEG BELOW KNEE; Right Below Knee Amputation; Surgeon:MADISON WELLS; Location: OR    BYPASS GRAFT FEMOROTIBIAL  9/19/2013    Procedure: BYPASS GRAFT FEMOROTIBIAL;  Left Femorotibial Bypass Graft Insitu ;  Surgeon: Marisol Suggs MD;  Location:  OR    CARDIAC SURGERY      cardiac stent    ESOPHAGOSCOPY, GASTROSCOPY, DUODENOSCOPY (EGD), COMBINED  10/1/2012    Procedure: COMBINED ESOPHAGOSCOPY, GASTROSCOPY, DUODENOSCOPY (EGD);;  Surgeon: Nehemias Cevallos MD;  Location:  GI    ESOPHAGOSCOPY, GASTROSCOPY, DUODENOSCOPY (EGD), COMBINED N/A 3/24/2016    Procedure: COMBINED ESOPHAGOSCOPY, GASTROSCOPY, DUODENOSCOPY (EGD);  Surgeon: Gildardo Marks MD;  Location:  GI    IR LIVER BIOPSY  "PERCUTANEOUS  10/5/2022    IR LOWER EXTREMITY ANGIOGRAM LEFT  12/2/2021    IR STENT VASCULAR LT  3/9/2012         IRRIGATION AND DEBRIDEMENT LOWER EXTREMITY, COMBINED  11/15/2011    Procedure:COMBINED IRRIGATION AND DEBRIDEMENT LOWER EXTREMITY; DRAINAGE OF ABSCESS AT BELOW KNEE AMPUTATION AND KNEE DISARTICULATION.; Surgeon:MADISON WELLS; Location[facility]    NO HISTORY OF SURGERY  7/12/13    derm    VASCULAR REPAIR LOWER EXTREMITY Left 9/19/2017    Procedure: VASCULAR REPAIR LOWER EXTREMITY;  Ligation Parasitic Branch To Left Lower Extremity ;  Surgeon: Marisol Suggs MD;  Location: [facility] OR       Past Medical History:   Past Medical History:   Diagnosis Date    Acute kidney failure, unspecified (H24)     Blood transfusion     CAD (coronary artery disease)     CARDIOVASCULAR SCREENING; LDL GOAL LESS THAN 100     Cirrhosis (H)     Critical lower limb ischemia (H)     Diabetes mellitus (H) 10/2011    Hypertension     Hypertension goal BP (blood pressure) < 140/90     Ischemic cardiomyopathy     Lesion of liver     Lymphedema of left leg 05/11/2016    Peripheral arterial disease (H24)     PVD (peripheral vascular disease) (H24)     Right above knee amputation 04/30/2014    Type 2 diabetes, HbA1C goal < 8% (H)        Social History:   Social History     Tobacco Use    Smoking status: Every Day     Current packs/day: 2.00     Average packs/day: 2.0 packs/day for 40.0 years (80.0 ttl pk-yrs)     Types: Cigarettes    Smokeless tobacco: Never    Tobacco comments:     Is not working on quitting   Substance Use Topics    Alcohol use: Yes     Alcohol/week: 1.0 standard drink of alcohol     Types: 1 Standard drinks or equivalent per week     Comment: \"Once a year maybe\"       Family History:   Family History   Problem Relation Age of Onset    Alcohol/Drug Mother         cirrhosis    Alcohol/Drug Father     C.A.D. No family hx of     Diabetes No family hx of     Cancer No family hx of     Anesthesia Reaction No family hx of     " Clotting Disorder No family hx of        Allergies:   Allergies   Allergen Reactions    Ciprofloxacin Rash       Active Medications:   Current Outpatient Medications   Medication Sig Dispense Refill    acetaminophen (TYLENOL) 325 MG tablet Take 1 tablet by mouth every 4 hours as needed. 250 tablet 0    albuterol (PROAIR HFA/PROVENTIL HFA/VENTOLIN HFA) 108 (90 Base) MCG/ACT inhaler Inhale 2 puffs into the lungs every 4 hours as needed for shortness of breath or wheezing      amLODIPine (NORVASC) 2.5 MG tablet Take 1 tablet (2.5 mg) by mouth 2 times daily 180 tablet 3    ASPIRIN EC PO Take 81 mg by mouth daily      empagliflozin (JARDIANCE) 25 MG TABS tablet Take 1 tablet (25 mg) by mouth daily 90 tablet 3    gabapentin (NEURONTIN) 100 MG capsule Take 1 capsule (100 mg) by mouth 3 times daily 90 capsule 0    insulin aspart (NOVOLOG FLEXPEN) 100 UNIT/ML pen Inject 19 Units Subcutaneous 2 times daily (with meals) AM and 4 PM      insulin glargine (LANTUS PEN) 100 UNIT/ML pen Inject 32 Units Subcutaneous 2 times daily 60 mL 3    lisinopril (ZESTRIL) 2.5 MG tablet TAKE 1 TABLET (2.5 MG) BY MOUTH 2 TIMES DAILY. 90 tablet 3    metoprolol succinate ER (TOPROL XL) 50 MG 24 hr tablet TAKE 1.5 TABLETS (75MG) BY MOUTH DAILY 21 tablet 8    rosuvastatin (CRESTOR) 40 MG tablet Take 1 tablet (40 mg) by mouth daily 90 tablet 3    senna-docusate (SENOKOT-S/PERICOLACE) 8.6-50 MG tablet Take 1 tablet by mouth At Bedtime      spironolactone (ALDACTONE) 25 MG tablet Take 12.5 mg by mouth daily      ammonium lactate (AMLACTIN) 12 % external cream Apply to affected area on both legs twice daily as needed for dry skin      blood glucose (NO BRAND SPECIFIED) test strip Use  to test 4 times a day.      Continuous Blood Gluc  (DEXCOM G6 ) DORIS Use to read blood sugars as per 's instructions.      gabapentin (NEURONTIN) 300 MG capsule Take 600 mg in AM   Take 300 mg in afternoon   Take 600 mg at bedtime  May also take  "an additional 300 mg if needed at bedtime for pain      GLOBAL EASY GLIDE PEN NEEDLES 32G X 4 MM miscellaneous USE 4X DAILY OR AS DIRECTED. 100 each 11    insulin lispro (HUMALOG KWIKPEN) 100 UNIT/ML (1 unit dial) KWIKPEN Inject 18 Units Subcutaneous 3 times daily (before meals) (Patient not taking: Reported on 3/21/2024) 50 mL 3    liraglutide (VICTOZA) 18 MG/3ML solution Inject 1.8 mg Subcutaneous daily      order for DME Equipment being ordered: FlexiTouch pneumatic compression device.  2-3 times per day to left lower extremity.  Current strength - L4 1 Units     rivaroxaban ANTICOAGULANT (XARELTO ANTICOAGULANT) 2.5 MG TABS tablet Take 1 tablet (2.5 mg) by mouth 2 times daily 180 tablet 3       Systemic Review:   CONSTITUTIONAL: NEGATIVE for fever, chills, change in weight  ENT/MOUTH: NEGATIVE for ear, mouth and throat problems  RESP: NEGATIVE for significant cough or SOB  CV: NEGATIVE for chest pain, palpitations or peripheral edema    Physical Examination:   Vital Signs: /69 (BP Location: Right arm)   Pulse 74   Temp 97.4  F (36.3  C) (Temporal)   Resp 18   Ht 1.702 m (5' 7\")   Wt 89.8 kg (198 lb)   SpO2 98%   BMI 31.01 kg/m    GENERAL: healthy, alert and no distress  NECK: no adenopathy, no asymmetry, masses, or scars  RESP: lungs clear to auscultation - no rales, rhonchi or wheezes  CV: regular rate and rhythm, normal S1 S2, no S3 or S4, no murmur, click or rub, no peripheral edema and peripheral pulses strong  ABDOMEN: soft, nontender, no hepatosplenomegaly, no masses and bowel sounds normal  MS: no gross musculoskeletal defects noted, no edema      Plan: Appropriate to proceed as scheduled.      Kaycee Marrufo MD  4/11/2024    PCP:  Tricia Ramirez    "

## 2024-04-11 NOTE — ANESTHESIA POSTPROCEDURE EVALUATION
Patient: Alexandro Pool    Procedure: Procedure(s):  ESOPHAGOGASTRODUODENOSCOPY, WITH BIOPSY       Anesthesia Type:  MAC    Note:  Disposition: Outpatient   Postop Pain Control: Uneventful            Sign Out: Well controlled pain   PONV: No   Neuro/Psych: Uneventful            Sign Out: Acceptable/Baseline neuro status   Airway/Respiratory: Uneventful            Sign Out: Acceptable/Baseline resp. status   CV/Hemodynamics: Uneventful            Sign Out: Acceptable CV status; No obvious hypovolemia; No obvious fluid overload   Other NRE: NONE   DID A NON-ROUTINE EVENT OCCUR? No    Event details/Postop Comments:  NOTE: patient did have profound hypotension after his egd in phase 2 recovery. 1 liter of fluid was given.       Last vitals:  Vitals Value Taken Time   BP     Temp 36  C (96.8  F) 04/11/24 1119   Pulse 88 04/11/24 1119   Resp 16 04/11/24 1119   SpO2 95 % 04/11/24 1119       Electronically Signed By: Dorcas Babin MD  April 11, 2024  11:32 AM

## 2024-04-13 ENCOUNTER — TELEPHONE (OUTPATIENT)
Dept: GASTROENTEROLOGY | Facility: CLINIC | Age: 69
End: 2024-04-13
Payer: MEDICARE

## 2024-04-13 DIAGNOSIS — K26.9 DUODENAL ULCER: ICD-10-CM

## 2024-04-13 NOTE — TELEPHONE ENCOUNTER
Note: Due to record-high volumes, our turn-around time is taking longer than usual . We are currently 10 business days behind in the pools.   We are working diligently to submit all requests in a timely manner and in the order they are received. Please only flag TRUE URGENT requests as high priority to the pool at this time.   If you have questions - please send a note/message in the active PA encounter and send back to the [facility] PA pool [[id number]].    If you have more specific questions about our process please reach out to our supervisor Bella Epperson.   Thank you!   RPPA (Retail Pharmacy Prior Authorization) team

## 2024-04-16 LAB
PATH REPORT.ADDENDUM SPEC: NORMAL
PATH REPORT.COMMENTS IMP SPEC: NORMAL
PATH REPORT.COMMENTS IMP SPEC: NORMAL
PATH REPORT.FINAL DX SPEC: NORMAL
PATH REPORT.GROSS SPEC: NORMAL
PATH REPORT.MICROSCOPIC SPEC OTHER STN: NORMAL
PATH REPORT.RELEVANT HX SPEC: NORMAL
PHOTO IMAGE: NORMAL

## 2024-04-16 NOTE — TELEPHONE ENCOUNTER
PA Initiation    Medication: OMEPRAZOLE 20 MG PO CPDR  Insurance Company: [employer] Non-Specialty PA's - Phone [phone] Fax [phone]  Pharmacy Filling the Rx: [facility] - [place], [place] - [address] [place] [address]  Filling Pharmacy Phone: [phone]  Filling Pharmacy Fax: [phone]  Start Date: 4/16/2024

## 2024-04-17 NOTE — TELEPHONE ENCOUNTER
PRIOR AUTHORIZATION DENIED    Medication: OMEPRAZOLE 20 MG PO CPDR    Insurance Company: [employer] Non-Specialty PA's - Phone [phone] Fax [phone]    Denial Date: 4/16/2024    Denial Reason(s): Patient needs to try and fail the 40mg capsule.  Plan covers up to 1 per day.     Appeal Information:

## 2024-04-18 RX ORDER — OMEPRAZOLE 40 MG/1
40 CAPSULE, DELAYED RELEASE ORAL DAILY
Qty: 90 CAPSULE | Refills: 3 | Status: SHIPPED | OUTPATIENT
Start: 2024-04-18

## 2024-04-26 ENCOUNTER — OFFICE VISIT (OUTPATIENT)
Dept: FAMILY MEDICINE | Facility: CLINIC | Age: 69
End: 2024-04-26
Payer: MEDICARE

## 2024-04-26 VITALS
BODY MASS INDEX: 28.3 KG/M2 | OXYGEN SATURATION: 99 % | SYSTOLIC BLOOD PRESSURE: 134 MMHG | DIASTOLIC BLOOD PRESSURE: 84 MMHG | TEMPERATURE: 97.8 F | HEIGHT: 67 IN | HEART RATE: 77 BPM | RESPIRATION RATE: 17 BRPM | WEIGHT: 180.3 LBS

## 2024-04-26 DIAGNOSIS — K26.9 DUODENAL ULCER: ICD-10-CM

## 2024-04-26 DIAGNOSIS — I10 HYPERTENSION GOAL BP (BLOOD PRESSURE) < 140/90: ICD-10-CM

## 2024-04-26 DIAGNOSIS — C78.7 CHOLANGIOCARCINOMA METASTATIC TO LIVER (H): ICD-10-CM

## 2024-04-26 DIAGNOSIS — I73.9 PERIPHERAL ARTERIAL DISEASE (H): ICD-10-CM

## 2024-04-26 DIAGNOSIS — Z72.0 TOBACCO ABUSE: ICD-10-CM

## 2024-04-26 DIAGNOSIS — E11.8 TYPE 2 DIABETES MELLITUS WITH COMPLICATION, WITH LONG-TERM CURRENT USE OF INSULIN (H): ICD-10-CM

## 2024-04-26 DIAGNOSIS — E11.621 DIABETIC ULCER OF LEFT FOOT ASSOCIATED WITH TYPE 2 DIABETES MELLITUS, UNSPECIFIED PART OF FOOT, UNSPECIFIED ULCER STAGE (H): Primary | ICD-10-CM

## 2024-04-26 DIAGNOSIS — L97.529 DIABETIC ULCER OF LEFT FOOT ASSOCIATED WITH TYPE 2 DIABETES MELLITUS, UNSPECIFIED PART OF FOOT, UNSPECIFIED ULCER STAGE (H): Primary | ICD-10-CM

## 2024-04-26 DIAGNOSIS — Z76.89 ENCOUNTER TO ESTABLISH CARE: ICD-10-CM

## 2024-04-26 DIAGNOSIS — C22.1 CHOLANGIOCARCINOMA METASTATIC TO LIVER (H): ICD-10-CM

## 2024-04-26 DIAGNOSIS — Z79.4 TYPE 2 DIABETES MELLITUS WITH COMPLICATION, WITH LONG-TERM CURRENT USE OF INSULIN (H): ICD-10-CM

## 2024-04-26 LAB
ANION GAP SERPL CALCULATED.3IONS-SCNC: 13 MMOL/L (ref 7–15)
BUN SERPL-MCNC: 19.1 MG/DL (ref 8–23)
CALCIUM SERPL-MCNC: 9.4 MG/DL (ref 8.8–10.2)
CHLORIDE SERPL-SCNC: 103 MMOL/L (ref 98–107)
CHOLEST SERPL-MCNC: 120 MG/DL
CREAT SERPL-MCNC: 0.89 MG/DL (ref 0.67–1.17)
CREAT UR-MCNC: 15.2 MG/DL
DEPRECATED HCO3 PLAS-SCNC: 19 MMOL/L (ref 22–29)
EGFRCR SERPLBLD CKD-EPI 2021: >90 ML/MIN/1.73M2
FASTING STATUS PATIENT QL REPORTED: YES
GLUCOSE SERPL-MCNC: 129 MG/DL (ref 70–99)
HDLC SERPL-MCNC: 31 MG/DL
LDLC SERPL CALC-MCNC: 27 MG/DL
MICROALBUMIN UR-MCNC: 21.9 MG/L
MICROALBUMIN/CREAT UR: 144.08 MG/G CR (ref 0–17)
NONHDLC SERPL-MCNC: 89 MG/DL
POTASSIUM SERPL-SCNC: 4.7 MMOL/L (ref 3.4–5.3)
SODIUM SERPL-SCNC: 135 MMOL/L (ref 135–145)
TRIGL SERPL-MCNC: 309 MG/DL

## 2024-04-26 PROCEDURE — 80061 LIPID PANEL: CPT | Performed by: NURSE PRACTITIONER

## 2024-04-26 PROCEDURE — 82570 ASSAY OF URINE CREATININE: CPT | Performed by: NURSE PRACTITIONER

## 2024-04-26 PROCEDURE — 82043 UR ALBUMIN QUANTITATIVE: CPT | Performed by: NURSE PRACTITIONER

## 2024-04-26 PROCEDURE — 36415 COLL VENOUS BLD VENIPUNCTURE: CPT | Performed by: NURSE PRACTITIONER

## 2024-04-26 PROCEDURE — 80048 BASIC METABOLIC PNL TOTAL CA: CPT | Performed by: NURSE PRACTITIONER

## 2024-04-26 PROCEDURE — 99204 OFFICE O/P NEW MOD 45 MIN: CPT | Performed by: NURSE PRACTITIONER

## 2024-04-26 RX ORDER — ISOPROPYL ALCOHOL 0.7 ML/1
SWAB TOPICAL
COMMUNITY

## 2024-04-26 RX ORDER — RESPIRATORY SYNCYTIAL VIRUS VACCINE 120MCG/0.5
0.5 KIT INTRAMUSCULAR ONCE
Qty: 1 EACH | Refills: 0 | Status: CANCELLED | OUTPATIENT
Start: 2024-04-26 | End: 2024-04-26

## 2024-04-26 RX ORDER — LIDOCAINE 50 MG/G
OINTMENT TOPICAL 2 TIMES DAILY
COMMUNITY

## 2024-04-26 RX ORDER — ASPIRIN 81 MG/1
TABLET, COATED ORAL
COMMUNITY
Start: 2024-04-19

## 2024-04-26 ASSESSMENT — PAIN SCALES - GENERAL: PAINLEVEL: NO PAIN (0)

## 2024-04-26 NOTE — LETTER
May 23, 2024      Alexandro Pool  [address]    [place] [address]        Dear ,    We are writing to inform you of your test results.    The urine test shows some protein leaking - indicating the kidneys are starting to not work as good. We should continue to monitor and repeat test in 3 months.   Continue working on controlling diabetes and blood pressure to prevent further damage to the kidneys.   Also the triglycerides are high and HDL low (good cholesterol). Continue a heart healthy/ diabetic diet and your medications as prescribed.     Resulted Orders   Albumin Random Urine Quantitative with Creat Ratio   Result Value Ref Range    Creatinine Urine mg/dL 15.2 mg/dL      Comment:      The reference ranges have not been established in urine creatinine. The results should be integrated into the clinical context for interpretation.    Albumin Urine mg/L 21.9 mg/L      Comment:      The reference ranges have not been established in urine albumin. The results should be integrated into the clinical context for interpretation.    Albumin Urine mg/g Cr 144.08 (H) 0.00 - 17.00 mg/g Cr      Comment:      Microalbuminuria is defined as an albumin:creatinine ratio of 17 to 299 for males and 25 to 299 for females. A ratio of albumin:creatinine of 300 or higher is indicative of overt proteinuria.  Due to biologic variability, positive results should be confirmed by a second, first-morning random or 24-hour timed urine specimen. If there is discrepancy, a third specimen is recommended. When 2 out of 3 results are in the microalbuminuria range, this is evidence for incipient nephropathy and warrants increased efforts at glucose control, blood pressure control, and institution of therapy with an angiotensin-converting-enzyme (ACE) inhibitor (if the patient can tolerate it).     Lipid panel reflex to direct LDL Non-fasting   Result Value Ref Range    Cholesterol 120 <200 mg/dL    Triglycerides 309 (H) <150  mg/dL    Direct Measure HDL 31 (L) >=40 mg/dL    LDL Cholesterol Calculated 27 <=100 mg/dL    Non HDL Cholesterol 89 <130 mg/dL    Patient Fasting > 8hrs? Yes     Narrative    Cholesterol  Desirable:  <200 mg/dL    Triglycerides  Normal:  Less than 150 mg/dL  Borderline High:  150-199 mg/dL  High:  200-499 mg/dL  Very High:  Greater than or equal to 500 mg/dL    Direct Measure HDL  Female:  Greater than or equal to 50 mg/dL   Male:  Greater than or equal to 40 mg/dL    LDL Cholesterol  Desirable:  <100mg/dL  Above Desirable:  100-129 mg/dL   Borderline High:  130-159 mg/dL   High:  160-189 mg/dL   Very High:  >= 190 mg/dL    Non HDL Cholesterol  Desirable:  130 mg/dL  Above Desirable:  130-159 mg/dL  Borderline High:  160-189 mg/dL  High:  190-219 mg/dL  Very High:  Greater than or equal to 220 mg/dL   Basic metabolic panel  (Ca, Cl, CO2, Creat, Gluc, K, Na, BUN)   Result Value Ref Range    Sodium 135 135 - 145 mmol/L      Comment:      Reference intervals for this test were updated on 09/26/2023 to more accurately reflect our healthy population. There may be differences in the flagging of prior results with similar values performed with this method. Interpretation of those prior results can be made in the context of the updated reference intervals.     Potassium 4.7 3.4 - 5.3 mmol/L    Chloride 103 98 - 107 mmol/L    Carbon Dioxide (CO2) 19 (L) 22 - 29 mmol/L    Anion Gap 13 7 - 15 mmol/L    Urea Nitrogen 19.1 8.0 - 23.0 mg/dL    Creatinine 0.89 0.67 - 1.17 mg/dL    GFR Estimate >90 >60 mL/min/1.73m2    Calcium 9.4 8.8 - 10.2 mg/dL    Glucose 129 (H) 70 - 99 mg/dL       If you have any questions or concerns, please call the clinic at the number listed above.       Sincerely,      JOHAN Palomino CNP

## 2024-04-26 NOTE — PROGRESS NOTES
Assessment & Plan     Diabetic ulcer of left foot associated with type 2 diabetes mellitus, unspecified part of foot, unspecified ulcer stage (H)  Followed by vascular     Cholangiocarcinoma metastatic to liver (H)  Comment: This is being addressed by Dr. Cardozo  Plan: Per Dr. Cardozo.     Hypertension goal BP (blood pressure) < 140/90  At goal /84  Continue current treatment plan    Tobacco abuse  Advised on tobacco cessation - not ready to quit    Type 2 diabetes mellitus with complication, with long-term current use of insulin (H)  Followed by endocrinology  Insulin dependent  - Albumin Random Urine Quantitative with Creat Ratio  - Lipid panel reflex to direct LDL Non-fasting  - Albumin Random Urine Quantitative with Creat Ratio  - Lipid panel reflex to direct LDL Non-fasting  -on Rosuvastatin, Aspirin, Jardiance,  victoza, lantus insulin, and novalog  Duodenal ulcer  Cirrhosis  Chronic Hepatitis C infection- previously treated for this  Drinking alcohol moderately -advised on strict cessation   Followed by GI  - omeprazole (PRILOSEC) 20 MG DR capsule  Dispense: 1 capsule; Refill: 0  - Basic metabolic panel  (Ca, Cl, CO2, Creat, Gluc, K, Na, BUN)  - Basic metabolic panel  (Ca, Cl, CO2, Creat, Gluc, K, Na, BUN)    Encounter to establish care  Met with patient - reviewed current medical problems, past medical and surgical history, social history, allergies, and medication list. He is pleasant. Currently living at assisted living facility.     Peripheral arterial disease (H24)  status post right above-the-knee amputation in 2012 and left femoral to tibial bypass graft  Followed by vascular - on vascular dose xarelto - he reports no bleeding issues.              Nicotine/Tobacco Cessation  He reports that he has been smoking cigarettes. He has a 80 pack-year smoking history. He has never used smokeless tobacco.  Nicotine/Tobacco Cessation Plan  Information offered: Patient not interested at this  "time      BMI  Estimated body mass index is 28.23 kg/m  as calculated from the following:    Height as of this encounter: 1.702 m (5' 7.01\").    Weight as of this encounter: 81.8 kg (180 lb 4.8 oz).             Luca Mc is a 68 year old, presenting for the following health issues:  Establish Care (Pt needs a Primary for [facility]. )    Alexandro Pool is a 68 year old male with a past medical history of current  smoking which he refuses to stop, peripheral arterial disease, status post right above-the-knee amputation in 2012 and left femoral to tibial bypass graft, hyperlipidemia, hypertension, type 2 diabetes, coronary artery disease S/P DESing. He presents to establish [facility].     He lives at Assisted Living Facility- has own apartment. Medications are administered by nursing staff.    Follows with GI  Follows Vascular     C22.1) Cholangiocarcinoma (H)  (primary encounter diagnosis)  Comment: This is being addressed by Dr. Cardozo  Plan: Per Dr. Cardozo.         4/26/2024     9:40 AM   Additional Questions   Roomed by Kimberly     Via the Health Maintenance questionnaire, the patient has reported the following services have been completed -Eye Exam, this information has been sent to the abstraction team.  History of Present Illness       Reason for visit:  Find a dr    He eats 0-1 servings of fruits and vegetables daily.He consumes 6 sweetened beverage(s) daily.He exercises with enough effort to increase his heart rate 9 or less minutes per day.  He exercises with enough effort to increase his heart rate 3 or less days per week.   He is taking medications regularly.                     Objective    /84 (BP Location: Left arm, Patient Position: Sitting, Cuff Size: Adult Regular)   Pulse 77   Temp 97.8  F (36.6  C) (Tympanic)   Resp 17   Ht 1.702 m (5' 7.01\")   Wt 81.8 kg (180 lb 4.8 oz)   SpO2 99%   BMI 28.23 kg/m    Body mass index is 28.23 kg/m .  Physical Exam   GENERAL: alert and no " distress. Pleasant and cooperative  NECK: no adenopathy, no asymmetry, masses, or scars  RESP: lungs clear to auscultation - no rales, rhonchi or wheezes  CV: regular rate and rhythm, normal S1 S2, no S3 or S4, no murmur, click or rub, no peripheral edema  ABDOMEN: soft, nontender, no hepatosplenomegaly, no masses and bowel sounds normal  MS: R AKA, on motorized W/C  SKIN: no suspicious lesions or rashes and left foot ulcer on top of foot, about a quarter size            Signed Electronically by: JOHAN Palomino CNP

## 2024-04-30 ENCOUNTER — LAB (OUTPATIENT)
Dept: LAB | Facility: CLINIC | Age: 69
End: 2024-04-30
Attending: INTERNAL MEDICINE
Payer: MEDICARE

## 2024-04-30 DIAGNOSIS — E78.5 HYPERLIPIDEMIA LDL GOAL <70: ICD-10-CM

## 2024-04-30 DIAGNOSIS — E11.8 TYPE 2 DIABETES MELLITUS WITH COMPLICATION, WITH LONG-TERM CURRENT USE OF INSULIN (H): ICD-10-CM

## 2024-04-30 DIAGNOSIS — Z79.4 TYPE 2 DIABETES MELLITUS WITH COMPLICATION, WITH LONG-TERM CURRENT USE OF INSULIN (H): ICD-10-CM

## 2024-04-30 DIAGNOSIS — I73.9 PAD (PERIPHERAL ARTERY DISEASE) (H): ICD-10-CM

## 2024-04-30 LAB
ALBUMIN SERPL BCG-MCNC: 4.2 G/DL (ref 3.5–5.2)
ALP SERPL-CCNC: 56 U/L (ref 40–150)
ALT SERPL W P-5'-P-CCNC: 15 U/L (ref 0–70)
ANION GAP SERPL CALCULATED.3IONS-SCNC: 11 MMOL/L (ref 7–15)
APO A-I SERPL-MCNC: 8 MG/DL
AST SERPL W P-5'-P-CCNC: 25 U/L (ref 0–45)
BILIRUB SERPL-MCNC: 0.2 MG/DL
BUN SERPL-MCNC: 17.2 MG/DL (ref 8–23)
CALCIUM SERPL-MCNC: 9.5 MG/DL (ref 8.8–10.2)
CHLORIDE SERPL-SCNC: 104 MMOL/L (ref 98–107)
CREAT SERPL-MCNC: 0.89 MG/DL (ref 0.67–1.17)
CRP SERPL HS-MCNC: 3.85 MG/L
DEPRECATED HCO3 PLAS-SCNC: 24 MMOL/L (ref 22–29)
EGFRCR SERPLBLD CKD-EPI 2021: >90 ML/MIN/1.73M2
GLUCOSE SERPL-MCNC: 132 MG/DL (ref 70–99)
HBA1C MFR BLD: 7.2 % (ref 0–5.6)
POTASSIUM SERPL-SCNC: 4.3 MMOL/L (ref 3.4–5.3)
PROT SERPL-MCNC: 7.5 G/DL (ref 6.4–8.3)
SODIUM SERPL-SCNC: 139 MMOL/L (ref 135–145)

## 2024-04-30 PROCEDURE — 99000 SPECIMEN HANDLING OFFICE-LAB: CPT

## 2024-04-30 PROCEDURE — 86141 C-REACTIVE PROTEIN HS: CPT

## 2024-04-30 PROCEDURE — 36415 COLL VENOUS BLD VENIPUNCTURE: CPT

## 2024-04-30 PROCEDURE — 83036 HEMOGLOBIN GLYCOSYLATED A1C: CPT

## 2024-04-30 PROCEDURE — 83695 ASSAY OF LIPOPROTEIN(A): CPT

## 2024-04-30 PROCEDURE — 80053 COMPREHEN METABOLIC PANEL: CPT

## 2024-04-30 PROCEDURE — 83704 LIPOPROTEIN BLD QUAN PART: CPT | Mod: 90

## 2024-05-04 LAB
CHOLEST SERPL-MCNC: 109 MG/DL
HDL SERPL QN: 8.3 NM
HDL SERPL-SCNC: 22.3 UMOL/L
HDLC SERPL-MCNC: 27 MG/DL
HLD.LARGE SERPL-SCNC: <2.8 UMOL/L
LDL SERPL QN: 20.5 NM
LDL SERPL-SCNC: 832 NMOL/L
LDL SMALL SERPL-SCNC: 345 NMOL/L
LDLC SERPL CALC-MCNC: 19 MG/DL
PATHOLOGY STUDY: ABNORMAL
TRIGL SERPL-MCNC: 316 MG/DL
VLDL LARGE SERPL-SCNC: 10.7 NMOL/L
VLDL SERPL QN: 53.6 NM

## 2024-05-13 NOTE — PROGRESS NOTES
VASCULAR MEDICINE FOLLOW UP VISIT        A/P:        (F17.200) Tobacco use disorder  Comment: I again advised him to quit smoking.  Plan: He again refuses to quit smoking.      (I73.9) PAD S/P Lt SFA stenting 3-9-2012, Lt fem-SENIA bypass w/ ISGSV 9-, S/P Rt AKA 2014, ligation of parasitic branch 9/19/2017  Comment: The bypass is patent. His left foot has a wound on it which is being managed at [facility] in [place]. The wound is persistent but scabbed over. There is no exposed base to the wound and he has no actionable vascular stenoses which could be addressed to facilitate complete healing.  Plan:  He is advised to RTC prn wound worsening.         Repeat imaging in 08/2024, through IR.     (E78.5) Hyperlipidemia LDL goal <70  Comment: Advanced lipid testing of 4/30/2024 while on Crestor 40 mg daily revealed LDL-C of 19, LDL-P of 832, cardiac CRP of 3.85, and Lp(a) of 8.    Plan: Continue  Crestor 40 to attain lower LDL-P and cardiac CRP in setting of known ongoing tobacco use and clinical CV ds. Check advanced lipid testing 11/5/23, RTC two weeks later.             (I10) Hypertension goal BP (blood pressure) < 130/80  Comment: at goal  Plan: no med changes     (E11.8,  Z79.4) Type 2 diabetes mellitus with complication, with long-term current use of insulin (H)  Comment: not at a1c goal of < 7%, it has decreased with the below to  7.2  ;I increased Lantus from 56 to 64 units daily but split dosing to 32units BID when seen on 5/17/2022. He however instead took 42 units SQ at bedtime. He has frequent excursions > 200, and never has hypoglycemia. He has an endocrinologist, who changed lariglutide to semaglutide in August of 2023.  As a continuing to smoke diabetic with PAD, more optimal glycemic control is warranted.   Plan: He is instructed go in to see his endocrinologist for a face to face visit after he has been on 32 U SQ BID, which would be in 04/2024. Further diabetic med changes through his  endocrinologist.        (C22.1) Cholangiocarcinoma (H)  (primary encounter diagnosis)  Comment: This is being addressed by Dr. Cardozo  Plan: Per Dr. Cardozo.      49 minutes total medical care on today's date.          Alexandro Pool is a 68 year old male who is presenting at the current time to discuss his diagnosi(es) of            Tobacco use disorder  PAD (peripheral artery disease) (H)  Hyperlipidemia LDL goal <70  Hypertension goal BP (blood pressure) < 140/90  Type 2 diabetes mellitus with complication, with long-term current use of insulin (H)  Cholangiocarcinoma (H)     .           HPI:  Alexandro Pool is a 68 year old male with a past medical history of current  smoking which he refuses to stop, peripheral arterial disease, status post right above-the-knee amputation in 2012 and left femoral to tibial bypass graft, hyperlipidemia, hypertension, type 2 diabetes, coronary artery disease S/P DESing.  The patient has had a slow healing left foot dorsal wound which is being managed twice a week by [facility] with slow improvement in healing. He had previous vascular imaging with decreased ABIs and ultrasound flow noted in the left graft.  He underwent a left lower extremity angiogram on 12/2/2021 at [facility].  There was a significant stenosis due to eccentric plaque in the left common femoral artery at the level of the proximal anastomosis of the left femoral anterior tibial artery bypass graft.  There were significant stenoses in the right common internal and external iliac arteries.  An angioplasty was performed to the left common femoral artery. Follow-up angiogram showed significant improvement in the luminal diameter and improved flow.  Post angiography imaging showed a filling defect in the distal aspect of the femoral to anterior tibial artery bypass graft.  This was due to embolized calcified plaque from the site of the angioplasty. Successful suction  thrombectomy as well as snare retrieval was done to remove the embolus.  Due to periprocedural blood loss the patient was kept overnight for further monitoring.  He was then discharged the next day. Today, the patient states his wound is improved. He is on vascular dosed Xarelto with no reports of unusual bleeding. We discussed the results of his ABIs ultrasound that were performed in August.  The left superficial femoral to anterior tibial artery bypass is patent with no evidence of stenosis or occlusion. His OZZIE was normal.  He is on a statin and has an LDL-C < 70, but LDL-P, and cardiac CRP elevations. He is on max dose Jardiance, Victoza, and is also on a LA/SA insulin combination. His A1C is 8.2%.     On 10/05/22 he had a liver biopsy which was ordered by his hepatologist Dr. Cevallos. The pathology was positive for cholangiocarcinoma.                 Review Of Systems  Skin: negative  Eyes: negative  Ears/Nose/Throat: negative  Respiratory: No shortness of breath, dyspnea on exertion, cough, or hemoptysis  Cardiovascular: negative  Gastrointestinal: negative  Genitourinary: negative  Musculoskeletal: negative  Neurologic: negative  Psychiatric: negative  Hematologic/Lymphatic/Immunologic: negative  Endocrine: negative           PAST MEDICAL HISTORY:                  Past Medical History           Past Medical History:   Diagnosis Date    Acute kidney failure, unspecified (H)      Blood transfusion      CAD (coronary artery disease)      CARDIOVASCULAR SCREENING; LDL GOAL LESS THAN 100      Cirrhosis (H)      Critical lower limb ischemia (H)      Diabetes mellitus (H) 10/2011    Hypertension      Hypertension goal BP (blood pressure) < 140/90      Ischemic cardiomyopathy      Lymphedema of left leg 5/11/2016    Peripheral arterial disease (H)      PVD (peripheral vascular disease) (H)      Right above knee amputation 4/30/2014    Type 2 diabetes, HbA1C goal < 8% (H)              PAST SURGICAL HISTORY:                   Past Surgical History             Past Surgical History:   Procedure Laterality Date    AMPUTATE LEG ABOVE KNEE   11/22/2011     Procedure:AMPUTATE LEG ABOVE KNEE; Revise Right Below Knee Amputation To Above Knee Amputation; Surgeon:MADISON WELLS; Location:[facility] OR    AMPUTATE LEG BELOW KNEE   11/10/2011     Procedure:AMPUTATE LEG BELOW KNEE; Right Below Knee Amputation; Surgeon:MADISON WELLS; Location:[facility] OR    BYPASS GRAFT FEMOROTIBIAL   9/19/2013     Procedure: BYPASS GRAFT FEMOROTIBIAL;  Left Femorotibial Bypass Graft Insitu ;  Surgeon: Marisol Suggs MD;  Location: [facility] OR    CARDIAC SURGERY         cardiac stent    ESOPHAGOSCOPY, GASTROSCOPY, DUODENOSCOPY (EGD), COMBINED   10/1/2012     Procedure: COMBINED ESOPHAGOSCOPY, GASTROSCOPY, DUODENOSCOPY (EGD);;  Surgeon: Nehemias Cevallos MD;  Location:  GI    ESOPHAGOSCOPY, GASTROSCOPY, DUODENOSCOPY (EGD), COMBINED N/A 3/24/2016     Procedure: COMBINED ESOPHAGOSCOPY, GASTROSCOPY, DUODENOSCOPY (EGD);  Surgeon: Gildardo Marks MD;  Location:  GI    IR LOWER EXTREMITY ANGIOGRAM LEFT   12/2/2021    IR STENT VASCULAR LT   3/9/2012          IRRIGATION AND DEBRIDEMENT LOWER EXTREMITY, COMBINED   11/15/2011     Procedure:COMBINED IRRIGATION AND DEBRIDEMENT LOWER EXTREMITY; DRAINAGE OF ABSCESS AT BELOW KNEE AMPUTATION AND KNEE DISARTICULATION.; Surgeon:MADISON WELLS; Location:[facility] OR    NO HISTORY OF SURGERY   7/12/13     derm    VASCULAR REPAIR LOWER EXTREMITY Left 9/19/2017     Procedure: VASCULAR REPAIR LOWER EXTREMITY;  Ligation Parasitic Branch To Left Lower Extremity ;  Surgeon: Marisol Suggs MD;  Location: [facility] OR            CURRENT MEDICATIONS:                  Current Outpatient Prescriptions               Current Outpatient Medications   Medication Sig Dispense Refill    acetaminophen (TYLENOL) 325 MG tablet Take 1 tablet by mouth every 4 hours as needed. 250 tablet 0    amLODIPine (NORVASC) 2.5 MG tablet Take 1 tablet (2.5 mg) by mouth 2 times daily 180 tablet 3  "   ammonium lactate (AMLACTIN) 12 % cream Apply topically 2 times daily as needed for dry skin apply to both legs twice daily as needed   5    ASPIRIN EC PO Take 81 mg by mouth daily        atorvastatin (LIPITOR) 80 MG tablet Take 1 tablet (80 mg) by mouth At Bedtime 90 tablet 3    bisacodyl (DULCOLAX) 5 MG EC tablet Take 1 tablet (5 mg) by mouth See Admin Instructions Refer to \"Getting Ready for a Colonoscopy\" instruction handout 4 tablet 0    gabapentin (NEURONTIN) 100 MG capsule Take 1 capsule (100 mg) by mouth 3 times daily 90 capsule 0    hydrocortisone 1 % cream Apply topically 2 times daily PRN        insulin aspart (NOVOLOG FLEXPEN) 100 UNIT/ML pen Inject 19 Units Subcutaneous 2 times daily (with meals)        insulin glargine (LANTUS PEN) 100 UNIT/ML pen Inject 28 Units Subcutaneous 2 times daily 60 mL 3    insulin lispro (HUMALOG KWIKPEN) 100 UNIT/ML (1 unit dial) KWIKPEN Inject 18 Units Subcutaneous 3 times daily (before meals) 50 mL 3    insulin pen needle (BD KWABENA U/F) 32G X 4 MM miscellaneous Use 4x  daily or as directed. 120 each 11    JARDIANCE 25 MG TABS tablet TAKE 1 TABLET (25 MG) BY MOUTH DAILY 90 tablet 3    liraglutide (VICTOZA) 18 MG/3ML solution Inject 1.8 mg Subcutaneous daily 27 mL 3    lisinopril (ZESTRIL) 2.5 MG tablet TAKE 1 TABLET (2.5 MG) BY MOUTH 2 TIMES DAILY 180 tablet 3    metoprolol succinate ER (TOPROL-XL) 50 MG 24 hr tablet Take 1.5 tablets (75 mg) by mouth daily 135 tablet 3    morphine (MS CONTIN) 15 MG 12 hr tablet Take 3 tablets (45 mg) by mouth 2 times daily 60 tablet 0    order for DME Equipment being ordered: FlexiTouch pneumatic compression device.  2-3 times per day to left lower extremity.  Current strength - L4 1 Units      polyethylene glycol (GOLYTELY) 236 g suspension Take 4,000 mLs by mouth See Admin Instructions Refer to \"Getting Ready for a Colonoscopy\" instruction handout 4000 mL 0    rivaroxaban ANTICOAGULANT (XARELTO ANTICOAGULANT) 2.5 MG TABS tablet Take 1 " "tablet (2.5 mg) by mouth 2 times daily 180 tablet 3    SENNA PO 1 Tab , bedtime                ALLERGIES:                          Allergies   Allergen Reactions    Ciprofloxacin Rash         SOCIAL HISTORY:                  Social History   Social History                Socioeconomic History    Marital status:        Spouse name: Not on file    Number of children: 0    Years of education: Not on file    Highest education level: Not on file   Occupational History       Employer: UNKNOWN   Tobacco Use    Smoking status: Current Every Day Smoker       Packs/day: 2.00       Years: 40.00       Pack years: 80.00       Types: Cigarettes    Smokeless tobacco: Never Used    Tobacco comment: Is not working on quitting   Substance and Sexual Activity    Alcohol use: Yes       Alcohol/week: 1.0 standard drink       Types: 1 Standard drinks or equivalent per week       Comment: \"Once a year maybe\"    Drug use: No    Sexual activity: Not Currently   Other Topics Concern    Parent/sibling w/ CABG, MI or angioplasty before 65F 55M? No     Service Not Asked    Blood Transfusions Not Asked    Caffeine Concern Yes    Occupational Exposure Not Asked    Hobby Hazards Not Asked    Sleep Concern Not Asked    Stress Concern Not Asked    Weight Concern Not Asked    Special Diet Not Asked    Back Care Not Asked    Exercise No    Bike Helmet Not Asked    Seat Belt Not Asked    Self-Exams Not Asked   Social History Narrative    Not on file      Social Determinants of Health      Financial Resource Strain: Not on file   Food Insecurity: Not on file   Transportation Needs: Not on file   Physical Activity: Not on file   Stress: Not on file   Social Connections: Not on file   Intimate Partner Violence: Not on file   Housing Stability: Not on file            FAMILY HISTORY:                   Family History             Family History   Problem Relation Age of Onset    Alcohol/Drug Mother           cirrhosis    Alcohol/Drug Father   "    C.A.D. No family hx of      Diabetes No family hx of      Cancer No family hx of                       Physical exam Reveals:     O/P: WNL  HEENT: WNL  NECK: No JVD, thyromegaly, or lymphadenopathy  HEART: RRR, no murmurs, gallops, or rubs  LUNGS: CTA bilaterally without rales, wheezes, or rhonchi  GI: NABS, nondistended, nontender, soft  EXT:without cyanosis, clubbing, or edema, S/P Rt AKA; Darkened gaiter are on remaining LLE.   NEURO: nonfocal  : no flank tenderness     Component      Latest Ref Rng & Units 12/2/2021 5/2/2022   Sodium      133 - 144 mmol/L   135   Potassium      3.4 - 5.3 mmol/L   4.9   Chloride      94 - 109 mmol/L   104   Carbon Dioxide      20 - 32 mmol/L   25   Anion Gap      3 - 14 mmol/L   6   Urea Nitrogen      7 - 30 mg/dL   15   Creatinine      0.66 - 1.25 mg/dL   0.96   Calcium      8.5 - 10.1 mg/dL   9.0   Glucose      70 - 99 mg/dL   134 (H)   Alkaline Phosphatase      40 - 150 U/L   66   AST      0 - 45 U/L   25   ALT      0 - 70 U/L   23   Protein Total      6.8 - 8.8 g/dL   7.6   Albumin      3.4 - 5.0 g/dL   3.5   Bilirubin Total      0.2 - 1.3 mg/dL   0.4   GFR Estimate      >60 mL/min/1.73m2   87   Total Cholesterol      <=199 mg/dL   121   Triglycerides      30 - 149 mg/dL 292 (H) 305 (H)   HDL Cholesterol      40 - 59 mg/dL   26 (L)   LDL Cholesterol      <=129 mg/dL   34   HDL Size      >=8.9 nm   8.6 (L)   VLDL Size      <=46.7 nm   55.7 (H)   LDL Particle Size      >=20.7 nm   20.4 (L)   Lge HDL Particle number      >=4.2 umol/L   <2.8 (L)   HDL Particle Number NMR      >=33.0 umol/L   20.4 (L)   Lge VLDL Part number      <=2.7 nmol/L   12.3 (H)   Small LDL Particle number      <=634 nmol/L   315   LDL Particle Number      <=1135 nmol/L   854   EER LipoFit by NMR         See Note   Cholesterol      <200 mg/dL 94     HDL Cholesterol      >=40 mg/dL 23 (L)     LDL Cholesterol Calculated      <=100 mg/dL 13     Non HDL Cholesterol      <130 mg/dL 71     Patient Fasting >  8hrs?       Yes     C-Reactive Protein High Sensitivity      mg/L   5.5   Lipoprotein (a)      <=29 mg/dL   6   Hemoglobin A1C      0.0 - 5.6 %   7.4 (H)          US OZZIE DOPPLER NO EXERCISE, 1-2 LEVELS, BILATERAL   8/2/2022 11:26 AM      HISTORY: History of left SA to anterior tibial bypass, with  angioplasty done on 12/2. Comparison study done 1/24/2022, 6-month  followup; PAD (peripheral artery disease) (H).     COMPARISON: None.     FINDINGS:  Right OZZIE:   Patient has history of right above knee amputation.     Left OZZIE:   PT: 110, index of 0.82, previously 0.81.  DP: 130, index of 0.97, previously 0.93      Left Digital Brachial index: 125, index of 0.93, previously 0.67     Waveforms: Distal posterior tibial arterial waveform is monophasic and  dorsalis pedis waveform is biphasic/triphasic. Digital waveform  appears intact in morphology and amplitude.                                                                      IMPRESSION:   Normal OZZIE examination left lower extremity as well as toe brachial  index, similar to previous exam.     OZZIE CRITERIA:  >1.4 NC  0.95-1.4 Normal  0.90 - 0.94 Mild  0.5 - 0.89 Moderate  0.2 - 0.49 Severe  <0.2 Critical     JILLIAN TELLEZ MD      ULTRASOUND LEFT LOWER EXTREMITY ARTERIAL DUPLEX  8/2/2022 11:27 AM     HISTORY:  66-year-old patient with history of peripheral arterial  disease and left femoral to anterior tibial surgical arterial bypass  graft performed in 2013. Patient had angiogram on December 2, 2021.  Nonocclusive embolus was noted within the distal graft at that time,  though retrieved successfully.     COMPARISON: January 24, 2022.     TECHNIQUE: Color Doppler and spectral waveform analysis obtained  throughout the left lower extremity surgical arterial bypass graft and  adjacent native arteries.     FINDINGS: Left lower extremity surgical arterial bypass graft is  patent. Proximal segment is somewhat aneurysmal measuring up to 8-10  mm. Velocities otherwise  intact throughout the bypass graft. No  obvious visible filling defect.                                                                      IMPRESSION: Patent left lower extremity femoral to anterior tibial  surgical bypass graft.     JILLIAN TELLEZ MD         Component      Latest Ref Rng & Units 9/27/2022   Total Cholesterol      <=199 mg/dL 124   Triglycerides      30 - 149 mg/dL 329 (H)   HDL Cholesterol      40 - 59 mg/dL 26 (L)   LDL Cholesterol      <=129 mg/dL 32   HDL Size      >=8.9 nm 8.6 (L)   VLDL Size      <=46.7 nm 58.5 (H)   LDL Particle Size      >=20.7 nm 20.7   Lge HDL Particle number      >=4.2 umol/L <2.8 (L)   HDL Particle Number NMR      >=33.0 umol/L 19.1 (L)   Lge VLDL Part number      <=2.7 nmol/L 13.1 (H)   Small LDL Particle number      <=634 nmol/L 217   LDL Particle Number      <=1135 nmol/L 818   EER LipoFit by NMR       See Note   Hemoglobin A1C      0.0 - 5.6 % 7.2 (H)   Lipoprotein (a)      <30 mg/dL 10   C-Reactive Protein High Sensitivity       6.01       LOCATION: [facility]  DATE: 11/28/2023     1. EXAM: RESTING ANKLE-BRACHIAL INDICES (ABIs)   2. DUPLEX ARTERIAL ULTRASOUND OF THE LEFT LOWER LOWER EXTREMITY  11/28/2023 9:42 AM CST     INDICATION: Known peripheral arterial disease, follow-up.  TECHNIQUE: Duplex imaging is performed utilizing gray-scale, two-dimensional images and color-flow imaging. Doppler waveform analysis and spectral Doppler imaging is also performed.  COMPARISON: 08/11/2023.     FINDINGS:   SEGMENTAL BP  RIGHT (mmHg)  Brachial: 133; Index  Ankle (PT):  Ankle (DP):  Digit:     LEFT (mmHg)  Brachial: 135  Ankle (PT): 128; Index 0.95  Ankle (DP): 135; Index 1.00  Digit: 191; Index 1.41     WAVEFORMS: Slight blunting of left lower extremity waveforms.     ARTERIAL DUPLEX VELOCITIES (cm/s):  LEFT   EIA: 147  CFA: 164  PFA:  SFA-Proximal:  SFA-Mid:  SFA-Distal:  Popliteal:  PTA: 31  SENIA: 65  DPA: 36     BYPASS GRAFT VELOCITIES  (cm/s):  LEFT   Proximal Native: 163  Proximal Anastomosis: 120  Proximal: 103  Mid: 45  Distal: 45  Distal Anastomosis: 65  Distal Native: 65     WAVEFORMS/FINDINGS: Monophasic left external iliac artery waveform. Left superficial femoral to anterior tibial artery bypass graft appears widely patent. Poststenotic posterior tibial artery waveform. Normal DP waveform.                                                                      IMPRESSION:  1.  RIGHT LOWER EXTREMITY: Postsurgical changes of below-knee amputation.  2.  LEFT LOWER EXTREMITY: OZZIE at rest is within normal limits measuring 1.00. TBI is within normal limits. Doppler arterial ultrasound demonstrates a patent left superficial femoral artery to anterior tibial artery bypass graft with normal distal SENIA/DP   waveforms. No significant stenosis observed.     Component      Latest Ref Rng 12/22/2023  9:03 AM   Sodium      135 - 145 mmol/L 137    Potassium      3.4 - 5.3 mmol/L 5.0    Carbon Dioxide (CO2)      22 - 29 mmol/L 25    Anion Gap      7 - 15 mmol/L 10    Urea Nitrogen      8.0 - 23.0 mg/dL 19.4    Creatinine      0.67 - 1.17 mg/dL 0.82    GFR Estimate      >60 mL/min/1.73m2 >90    Calcium      8.8 - 10.2 mg/dL 9.7    Chloride      98 - 107 mmol/L 102    Glucose      70 - 99 mg/dL 170 (H)    Alkaline Phosphatase      40 - 150 U/L 64    AST      0 - 45 U/L 28    ALT      0 - 70 U/L 19    Protein Total      6.4 - 8.3 g/dL 7.7    Albumin      3.5 - 5.2 g/dL 4.1    Bilirubin Total      <=1.2 mg/dL 0.4    Total Cholesterol      <=199 mg/dL 126    Triglycerides      30 - 149 mg/dL 313 (H)    HDL Cholesterol      40 - 59 mg/dL 28 (L)    LDL Cholesterol      <=129 mg/dL 35    HDL Size      >=8.9 nm 8.3 (L)    VLDL Size      <=46.7 nm 58.4 (H)    LDL Particle Size      >=20.7 nm 20.3 (L)    Lge HDL Particle number      >=4.2 umol/L <2.8 (L)    HDL Particle Number NMR      >=33.0 umol/L 21.3 (L)    Lge VLDL Part number      <=2.7 nmol/L 15.1 (H)    Small  LDL Particle number      <=634 nmol/L 513    LDL Particle Number      <=1135 nmol/L 1044    EER LipoFit by NMR See Note    Lipoprotein (a)      <30 mg/dL 6    Hemoglobin A1C      <5.7 % 8.2 (H)    C-Reactive Protein High Sensitivity 3.84       Legend:  (H) High  (L) Low     Component      Latest Ref Rng 4/30/2024  9:30 AM   Sodium      135 - 145 mmol/L 139    Potassium      3.4 - 5.3 mmol/L 4.3    Carbon Dioxide (CO2)      22 - 29 mmol/L 24    Anion Gap      7 - 15 mmol/L 11    Urea Nitrogen      8.0 - 23.0 mg/dL 17.2    Creatinine      0.67 - 1.17 mg/dL 0.89    GFR Estimate      >60 mL/min/1.73m2 >90    Calcium      8.8 - 10.2 mg/dL 9.5    Chloride      98 - 107 mmol/L 104    Glucose      70 - 99 mg/dL 132 (H)    Alkaline Phosphatase      40 - 150 U/L 56    AST      0 - 45 U/L 25    ALT      0 - 70 U/L 15    Protein Total      6.4 - 8.3 g/dL 7.5    Albumin      3.5 - 5.2 g/dL 4.2    Bilirubin Total      <=1.2 mg/dL 0.2    Total Cholesterol      <=199 mg/dL 109    Triglycerides      30 - 149 mg/dL 316 (H)    HDL Cholesterol      40 - 59 mg/dL 27 (L)    LDL Cholesterol      <=129 mg/dL 19    HDL Size      >=8.9 nm 8.3 (L)    VLDL Size      <=46.7 nm 53.6 (H)    LDL Particle Size      >=20.7 nm 20.5 (L)    Lge HDL Particle number      >=4.2 umol/L <2.8 (L)    HDL Particle Number NMR      >=33.0 umol/L 22.3 (L)    Lge VLDL Part number      <=2.7 nmol/L 10.7 (H)    Small LDL Particle number      <=634 nmol/L 345    LDL Particle Number      <=1135 nmol/L 832    EER LipoFit by NMR See Note    Hemoglobin A1C      0.0 - 5.6 % 7.2 (H)    C-Reactive Protein High Sensitivity 3.85    Lipoprotein (a)      <30 mg/dL 8       Legend:  (H) High  (L) Low

## 2024-05-14 ENCOUNTER — OFFICE VISIT (OUTPATIENT)
Dept: OTHER | Facility: CLINIC | Age: 69
End: 2024-05-14
Attending: INTERNAL MEDICINE
Payer: MEDICARE

## 2024-05-14 VITALS
HEART RATE: 72 BPM | SYSTOLIC BLOOD PRESSURE: 128 MMHG | BODY MASS INDEX: 28.19 KG/M2 | WEIGHT: 180 LBS | OXYGEN SATURATION: 100 % | DIASTOLIC BLOOD PRESSURE: 76 MMHG

## 2024-05-14 DIAGNOSIS — E11.8 TYPE 2 DIABETES MELLITUS WITH COMPLICATION, WITH LONG-TERM CURRENT USE OF INSULIN (H): ICD-10-CM

## 2024-05-14 DIAGNOSIS — I73.9 PAD (PERIPHERAL ARTERY DISEASE) (H): ICD-10-CM

## 2024-05-14 DIAGNOSIS — Z79.4 TYPE 2 DIABETES MELLITUS WITH COMPLICATION, WITH LONG-TERM CURRENT USE OF INSULIN (H): ICD-10-CM

## 2024-05-14 DIAGNOSIS — I10 HYPERTENSION GOAL BP (BLOOD PRESSURE) < 140/90: ICD-10-CM

## 2024-05-14 DIAGNOSIS — K74.60 CIRRHOSIS OF LIVER WITHOUT ASCITES, UNSPECIFIED HEPATIC CIRRHOSIS TYPE (H): ICD-10-CM

## 2024-05-14 DIAGNOSIS — E78.5 HYPERLIPIDEMIA LDL GOAL <70: ICD-10-CM

## 2024-05-14 DIAGNOSIS — C22.1 CHOLANGIOCARCINOMA (H): ICD-10-CM

## 2024-05-14 DIAGNOSIS — F17.200 TOBACCO USE DISORDER: Primary | ICD-10-CM

## 2024-05-14 PROCEDURE — 99215 OFFICE O/P EST HI 40 MIN: CPT | Performed by: INTERNAL MEDICINE

## 2024-05-14 PROCEDURE — G2211 COMPLEX E/M VISIT ADD ON: HCPCS | Performed by: INTERNAL MEDICINE

## 2024-05-14 PROCEDURE — G0463 HOSPITAL OUTPT CLINIC VISIT: HCPCS | Mod: 25 | Performed by: INTERNAL MEDICINE

## 2024-05-14 PROCEDURE — 2894A PR SMOKING CESSATION COUNSELING, 3-10 MIN: CPT | Performed by: INTERNAL MEDICINE

## 2024-05-14 NOTE — PROGRESS NOTES
[facility]        Patient is here for a  follow up.    Pt is currently taking Aspirin and Statin.    /76 (BP Location: Left arm, Patient Position: Chair, Cuff Size: Adult Regular)   Pulse 72   Wt 180 lb (81.6 kg)   SpO2 100%   BMI 28.19 kg/m      The provider has been notified that the patient has no concerns.     Questions patient would like addressed today are: N/A.    Refills are needed: N/A    Has homecare services and agency name:  Nafisa Thorne MA

## 2024-05-20 DIAGNOSIS — C22.1 CHOLANGIOCARCINOMA METASTATIC TO LIVER (H): Primary | ICD-10-CM

## 2024-05-20 DIAGNOSIS — C78.7 CHOLANGIOCARCINOMA METASTATIC TO LIVER (H): Primary | ICD-10-CM

## 2024-05-24 ENCOUNTER — HOSPITAL ENCOUNTER (OUTPATIENT)
Dept: MRI IMAGING | Facility: HOSPITAL | Age: 69
Discharge: HOME | End: 2024-05-24
Attending: INTERNAL MEDICINE | Admitting: INTERNAL MEDICINE
Payer: MEDICARE

## 2024-05-24 DIAGNOSIS — C22.1 CHOLANGIOCARCINOMA (H): ICD-10-CM

## 2024-05-24 PROCEDURE — A9585 GADOBUTROL INJECTION: HCPCS | Performed by: INTERNAL MEDICINE

## 2024-05-24 PROCEDURE — 74183 MRI ABD W/O CNTR FLWD CNTR: CPT | Mod: MG

## 2024-05-24 PROCEDURE — 255N000002 HC RX 255 OP 636: Performed by: INTERNAL MEDICINE

## 2024-05-24 RX ORDER — GADOBUTROL 604.72 MG/ML
8 INJECTION INTRAVENOUS ONCE
Status: COMPLETED | OUTPATIENT
Start: 2024-05-24 | End: 2024-05-24

## 2024-05-24 RX ADMIN — GADOBUTROL 8 ML: 604.72 INJECTION INTRAVENOUS at 11:05

## 2024-05-29 ENCOUNTER — LAB (OUTPATIENT)
Dept: INFUSION THERAPY | Facility: HOSPITAL | Age: 69
End: 2024-05-29
Attending: INTERNAL MEDICINE
Payer: MEDICARE

## 2024-05-29 ENCOUNTER — ONCOLOGY VISIT (OUTPATIENT)
Dept: ONCOLOGY | Facility: HOSPITAL | Age: 69
End: 2024-05-29
Attending: INTERNAL MEDICINE
Payer: MEDICARE

## 2024-05-29 VITALS
RESPIRATION RATE: 18 BRPM | WEIGHT: 180 LBS | HEART RATE: 95 BPM | DIASTOLIC BLOOD PRESSURE: 60 MMHG | BODY MASS INDEX: 28.19 KG/M2 | TEMPERATURE: 97.6 F | SYSTOLIC BLOOD PRESSURE: 123 MMHG | OXYGEN SATURATION: 99 %

## 2024-05-29 DIAGNOSIS — C22.1 CHOLANGIOCARCINOMA METASTATIC TO LIVER (H): Primary | ICD-10-CM

## 2024-05-29 DIAGNOSIS — C22.1 CHOLANGIOCARCINOMA METASTATIC TO LIVER (H): ICD-10-CM

## 2024-05-29 DIAGNOSIS — D69.6 ACQUIRED THROMBOCYTOPENIA (H): ICD-10-CM

## 2024-05-29 DIAGNOSIS — C78.7 CHOLANGIOCARCINOMA METASTATIC TO LIVER (H): Primary | ICD-10-CM

## 2024-05-29 DIAGNOSIS — C78.7 CHOLANGIOCARCINOMA METASTATIC TO LIVER (H): ICD-10-CM

## 2024-05-29 LAB
ALBUMIN SERPL BCG-MCNC: 4.2 G/DL (ref 3.5–5.2)
ALP SERPL-CCNC: 67 U/L (ref 40–150)
ALT SERPL W P-5'-P-CCNC: 13 U/L (ref 0–70)
ANION GAP SERPL CALCULATED.3IONS-SCNC: 17 MMOL/L (ref 7–15)
AST SERPL W P-5'-P-CCNC: 23 U/L (ref 0–45)
BASOPHILS # BLD AUTO: 0.1 10E3/UL (ref 0–0.2)
BASOPHILS NFR BLD AUTO: 1 %
BILIRUB SERPL-MCNC: 0.4 MG/DL
BUN SERPL-MCNC: 34 MG/DL (ref 8–23)
CALCIUM SERPL-MCNC: 9.5 MG/DL (ref 8.8–10.2)
CHLORIDE SERPL-SCNC: 97 MMOL/L (ref 98–107)
CREAT SERPL-MCNC: 1.1 MG/DL (ref 0.67–1.17)
DEPRECATED HCO3 PLAS-SCNC: 20 MMOL/L (ref 22–29)
EGFRCR SERPLBLD CKD-EPI 2021: 73 ML/MIN/1.73M2
EOSINOPHIL # BLD AUTO: 0.2 10E3/UL (ref 0–0.7)
EOSINOPHIL NFR BLD AUTO: 4 %
ERYTHROCYTE [DISTWIDTH] IN BLOOD BY AUTOMATED COUNT: 14 % (ref 10–15)
GLUCOSE SERPL-MCNC: 140 MG/DL (ref 70–99)
HCT VFR BLD AUTO: 40.2 % (ref 40–53)
HGB BLD-MCNC: 13.3 G/DL (ref 13.3–17.7)
IMM GRANULOCYTES # BLD: 0 10E3/UL
IMM GRANULOCYTES NFR BLD: 1 %
LYMPHOCYTES # BLD AUTO: 0.8 10E3/UL (ref 0.8–5.3)
LYMPHOCYTES NFR BLD AUTO: 14 %
MCH RBC QN AUTO: 30.3 PG (ref 26.5–33)
MCHC RBC AUTO-ENTMCNC: 33.1 G/DL (ref 31.5–36.5)
MCV RBC AUTO: 92 FL (ref 78–100)
MONOCYTES # BLD AUTO: 0.7 10E3/UL (ref 0–1.3)
MONOCYTES NFR BLD AUTO: 12 %
NEUTROPHILS # BLD AUTO: 3.8 10E3/UL (ref 1.6–8.3)
NEUTROPHILS NFR BLD AUTO: 68 %
NRBC # BLD AUTO: 0 10E3/UL
NRBC BLD AUTO-RTO: 0 /100
PLATELET # BLD AUTO: 121 10E3/UL (ref 150–450)
POTASSIUM SERPL-SCNC: 4.5 MMOL/L (ref 3.4–5.3)
PROT SERPL-MCNC: 7.9 G/DL (ref 6.4–8.3)
RBC # BLD AUTO: 4.39 10E6/UL (ref 4.4–5.9)
SODIUM SERPL-SCNC: 134 MMOL/L (ref 135–145)
WBC # BLD AUTO: 5.6 10E3/UL (ref 4–11)

## 2024-05-29 PROCEDURE — 80053 COMPREHEN METABOLIC PANEL: CPT

## 2024-05-29 PROCEDURE — 85048 AUTOMATED LEUKOCYTE COUNT: CPT

## 2024-05-29 PROCEDURE — G0463 HOSPITAL OUTPT CLINIC VISIT: HCPCS | Performed by: INTERNAL MEDICINE

## 2024-05-29 PROCEDURE — G2211 COMPLEX E/M VISIT ADD ON: HCPCS | Performed by: INTERNAL MEDICINE

## 2024-05-29 PROCEDURE — 36415 COLL VENOUS BLD VENIPUNCTURE: CPT

## 2024-05-29 PROCEDURE — 99214 OFFICE O/P EST MOD 30 MIN: CPT | Performed by: INTERNAL MEDICINE

## 2024-05-29 ASSESSMENT — PAIN SCALES - GENERAL: PAINLEVEL: NO PAIN (0)

## 2024-05-29 NOTE — PROGRESS NOTES
"Oncology Rooming Note    May 29, 2024 9:36 AM   Alexandro Pool is a 68 year old male who presents for:    Chief Complaint   Patient presents with    Oncology Clinic Visit     Cholangiocarcinoma metastatic to liver (H)     Initial Vitals: /60   Pulse 95   Temp 97.6  F (36.4  C)   Resp 18   Wt 81.6 kg (180 lb)   SpO2 99%   BMI 28.19 kg/m   Estimated body mass index is 28.19 kg/m  as calculated from the following:    Height as of 4/26/24: 1.702 m (5' 7.01\").    Weight as of this encounter: 81.6 kg (180 lb). Body surface area is 1.96 meters squared.  No Pain (0) Comment: Data Unavailable   No LMP for male patient.  Allergies reviewed: Yes  Medications reviewed: Yes    Medications: Medication refills not needed today.  Pharmacy name entered into [employer]:    [facility] - [place], [place] - [id number] [place] [address]    Frailty Screening:   Is the patient here for a new oncology consult visit in cancer care? 2. No      Clinical concerns: None      Carley Bynum LPN             "

## 2024-05-29 NOTE — PROGRESS NOTES
[facility] Hematology and Oncology Progress Note    Patient: Alexandro Pool  MRN: [id number]  Date of Service: May 29, 2024        Assessment and Plan:    1.  Cholangiocarcinoma: Repeat MRI shows no evidence of recurrent disease.  I personally reviewed the MRI images today.  Stable area of treated cholangiocarcinoma.  Clinically he is doing well.  He is now about a year and a half out from his treatment.  Will continue to follow him every 6 months with MRI surveillance.    2.  Pancreatic cyst: Remains stable on imaging.  Likely IPMN's.  No high risk imaging characteristics.  Follow radiographically.    3.  Mild thrombocytopenia: Most likely secondary to cirrhosis.  Platelets are stable.  Follow.    Data review:  CMP from today is reviewed and shows a sodium of 134, potassium 4.5, BUN 34, creatinine 1.1 and alkaline phosphatase 67  CBC shows a white count of 5.6, hemoglobin 13.3, platelets 121,000.  Findings are stable.    ECOG Performance  1    Diagnosis:    1.  Cholangiocarcinoma: Diagnosed October 2022.    Treatment:    Percutaneous microwave ablation performed in segment 6 of the liver on November 23, 2022.    Interim History:    Alexandro comes in today for 6-month follow-up visit.  Continues to do well.  No acute complaints.  Denies any pain or change in bowel or bladder habits.  No nausea or vomiting.  Appetite is good.    Review of Systems:    As above in the history.     Review of Systems otherwise Negative for:  General: chills, fever or night sweats  Psychological: anxiety or depression  Ophthalmic: blurry vision, double vision or loss of vision, vision change  ENT: epistaxis, oral lesions, hearing changes  Hematological and Lymphatic: bleeding, bruising, jaundice, swollen lymph nodes  Endocrine: hot flashes, unexpected weight changes  Respiratory: cough, hemoptysis, orthopnea or shortness of breath/MARTÍNEZ  Cardiovascular: chest pain, edema, palpitations or PND  Gastrointestinal: abdominal pain, blood in  stools, change in bowel habits, constipation, diarrhea or nausea/vomiting  Genito-Urinary: change in urinary stream, incontinence, frequency/urgency  Musculoskeletal: joint pain, stiffness, swelling, muscle pain  Neurological: dizziness, headaches, numbness/tingling  Dermatological: lumps and rash    Past History:    Past Medical History:   Diagnosis Date    Acute kidney failure, unspecified (H24)     Blood transfusion     CAD (coronary artery disease)     CARDIOVASCULAR SCREENING; LDL GOAL LESS THAN 100     Cirrhosis (H)     Critical lower limb ischemia (H)     Diabetes mellitus (H) 10/2011    Hypertension     Hypertension goal BP (blood pressure) < 140/90     Ischemic cardiomyopathy     Lesion of liver     Lymphedema of left leg 05/11/2016    Peripheral arterial disease (H24)     PVD (peripheral vascular disease) (H24)     Right above knee amputation 04/30/2014    Type 2 diabetes, HbA1C goal < 8% (H)      Physical Exam:    /60   Pulse 95   Temp 97.6  F (36.4  C)   Resp 18   Wt 81.6 kg (180 lb)   SpO2 99%   BMI 28.19 kg/m      General: patient appears stated age of 68 year old. Nontoxic and in no distress.   HEENT: Head: atraumatic, normocephalic. Sclerae anicteric.  Chest:  Normal respiratory effort  Cardiac:  No edema.   Abdomen: abdomen is non-distended  Extremities: normal tone and muscle bulk.  Left above-the-knee amputation  Skin: no lesions or rash on visible skin. Warm and dry.   CNS: alert and oriented. Grossly non-focal.   Psychiatric: normal mood and affect.     Lab Results:    Recent Results (from the past 168 hour(s))   Comprehensive metabolic panel   Result Value Ref Range    Sodium 134 (L) 135 - 145 mmol/L    Potassium 4.5 3.4 - 5.3 mmol/L    Carbon Dioxide (CO2) 20 (L) 22 - 29 mmol/L    Anion Gap 17 (H) 7 - 15 mmol/L    Urea Nitrogen 34.0 (H) 8.0 - 23.0 mg/dL    Creatinine 1.10 0.67 - 1.17 mg/dL    GFR Estimate 73 >60 mL/min/1.73m2    Calcium 9.5 8.8 - 10.2 mg/dL    Chloride 97 (L) 98 -  107 mmol/L    Glucose 140 (H) 70 - 99 mg/dL    Alkaline Phosphatase 67 40 - 150 U/L    AST 23 0 - 45 U/L    ALT 13 0 - 70 U/L    Protein Total 7.9 6.4 - 8.3 g/dL    Albumin 4.2 3.5 - 5.2 g/dL    Bilirubin Total 0.4 <=1.2 mg/dL   CBC with platelets and differential   Result Value Ref Range    WBC Count 5.6 4.0 - 11.0 10e3/uL    RBC Count 4.39 (L) 4.40 - 5.90 10e6/uL    Hemoglobin 13.3 13.3 - 17.7 g/dL    Hematocrit 40.2 40.0 - 53.0 %    MCV 92 78 - 100 fL    MCH 30.3 26.5 - 33.0 pg    MCHC 33.1 31.5 - 36.5 g/dL    RDW 14.0 10.0 - 15.0 %    Platelet Count 121 (L) 150 - 450 10e3/uL    % Neutrophils 68 %    % Lymphocytes 14 %    % Monocytes 12 %    % Eosinophils 4 %    % Basophils 1 %    % Immature Granulocytes 1 %    NRBCs per 100 WBC 0 <1 /100    Absolute Neutrophils 3.8 1.6 - 8.3 10e3/uL    Absolute Lymphocytes 0.8 0.8 - 5.3 10e3/uL    Absolute Monocytes 0.7 0.0 - 1.3 10e3/uL    Absolute Eosinophils 0.2 0.0 - 0.7 10e3/uL    Absolute Basophils 0.1 0.0 - 0.2 10e3/uL    Absolute Immature Granulocytes 0.0 <=0.4 10e3/uL    Absolute NRBCs 0.0 10e3/uL     Imaging:    MR Liver w/o & w Contrast    Result Date: 5/24/2024  EXAM: MR LIVER W/O and W CONTRAST LOCATION: [facility] DATE: 5/24/2024 INDICATION: Follow up treated cholangiocarcinoma. COMPARISON: MRI 11/13/2023. TECHNIQUE: Routine MRI liver protocol including T1 in/out phase, diffusion, multiplane T2, and dynamic T1 with IV contrast.  CONTRAST: 8 mL Gadavist. FINDINGS: LIVER: Stable 5.3 cm ablation zone changes segment VI of the liver with no concerning features. No enhancement. LR-TR nonviable. Cirrhotic appearance of the liver. Couple tiny cysts segment V. Stable subtle area of 1.6 cm arterial phase enhancement only segment IVb with no washout, no capsule, and no diffusion restriction. This should be considered benign. LI-RADS-2. No significant new findings. PANCREAS: Stable 4 mm cyst in the uncinate process of the pancreas most likely of  little significance. Tiny side branch IPMN in the differential. ADDITIONAL FINDINGS: No lymphadenopathy. Normal size spleen. No significant findings in the adrenal glands or kidneys.     IMPRESSION: 1.  Stable zone of ablation segment VI with no concerning features. LR-TR nonviable. 2.  Stable 1.6 cm subtle area of arterial phase enhancement only with no other features. This should be considered benign. LI-RADS-2 benign. 3.  Cirrhosis. 4.  No significant new findings in the liver. 5.  Stable 4 mm cyst in the uncinate process of the pancreas. Attention on follow-up surveillance only.       Signed by: Ashutosh Cardozo MD

## 2024-05-29 NOTE — LETTER
"    5/29/2024         RE: Alxeandro Pool  [address]  [place] [address]        Dear Colleague,    Thank you for referring your patient, Alexandro Pool, to the [facility]. Please see a copy of my visit note below.    Oncology Rooming Note    May 29, 2024 9:36 AM   Alexandro Pool is a 68 year old male who presents for:    Chief Complaint   Patient presents with     Oncology Clinic Visit     Cholangiocarcinoma metastatic to liver (H)     Initial Vitals: /60   Pulse 95   Temp 97.6  F (36.4  C)   Resp 18   Wt 81.6 kg (180 lb)   SpO2 99%   BMI 28.19 kg/m   Estimated body mass index is 28.19 kg/m  as calculated from the following:    Height as of 4/26/24: 1.702 m (5' 7.01\").    Weight as of this encounter: 81.6 kg (180 lb). Body surface area is 1.96 meters squared.  No Pain (0) Comment: Data Unavailable   No LMP for male patient.  Allergies reviewed: Yes  Medications reviewed: Yes    Medications: Medication refills not needed today.  Pharmacy name entered into [employer]:    [facility] - [place], [place] - [address] [facility] [facility] - [place], [place] - [address] [facility]     Frailty Screening:   Is the patient here for a new oncology consult visit in cancer care? 2. No      Clinical concerns: None      Carley Bynum LPN                 [facility] Hematology and Oncology Progress Note    Patient: Alexandro Pool  MRN: [id number]  Date of Service: May 29, 2024        Assessment and Plan:    1.  Cholangiocarcinoma: Repeat MRI shows no evidence of recurrent disease.  I personally reviewed the MRI images today.  Stable area of treated cholangiocarcinoma.  Clinically he is doing well.  He is now about a year and a half out from his treatment.  Will continue to follow him every 6 months with MRI surveillance.    2.  Pancreatic cyst: Remains stable on imaging.  Likely IPMN's.  No high risk imaging characteristics.  Follow " radiographically.    3.  Mild thrombocytopenia: Most likely secondary to cirrhosis.  Platelets are stable.  Follow.    Data review:  CMP from today is reviewed and shows a sodium of 134, potassium 4.5, BUN 34, creatinine 1.1 and alkaline phosphatase 67  CBC shows a white count of 5.6, hemoglobin 13.3, platelets 121,000.  Findings are stable.    ECOG Performance  1    Diagnosis:    1.  Cholangiocarcinoma: Diagnosed October 2022.    Treatment:    Percutaneous microwave ablation performed in segment 6 of the liver on November 23, 2022.    Interim History:    Alexandro comes in today for 6-month follow-up visit.  Continues to do well.  No acute complaints.  Denies any pain or change in bowel or bladder habits.  No nausea or vomiting.  Appetite is good.    Review of Systems:    As above in the history.     Review of Systems otherwise Negative for:  General: chills, fever or night sweats  Psychological: anxiety or depression  Ophthalmic: blurry vision, double vision or loss of vision, vision change  ENT: epistaxis, oral lesions, hearing changes  Hematological and Lymphatic: bleeding, bruising, jaundice, swollen lymph nodes  Endocrine: hot flashes, unexpected weight changes  Respiratory: cough, hemoptysis, orthopnea or shortness of breath/MARTÍNEZ  Cardiovascular: chest pain, edema, palpitations or PND  Gastrointestinal: abdominal pain, blood in stools, change in bowel habits, constipation, diarrhea or nausea/vomiting  Genito-Urinary: change in urinary stream, incontinence, frequency/urgency  Musculoskeletal: joint pain, stiffness, swelling, muscle pain  Neurological: dizziness, headaches, numbness/tingling  Dermatological: lumps and rash    Past History:    Past Medical History:   Diagnosis Date     Acute kidney failure, unspecified (H24)      Blood transfusion      CAD (coronary artery disease)      CARDIOVASCULAR SCREENING; LDL GOAL LESS THAN 100      Cirrhosis (H)      Critical lower limb ischemia (H)      Diabetes mellitus (H)  10/2011     Hypertension      Hypertension goal BP (blood pressure) < 140/90      Ischemic cardiomyopathy      Lesion of liver      Lymphedema of left leg 05/11/2016     Peripheral arterial disease (H24)      PVD (peripheral vascular disease) (H24)      Right above knee amputation 04/30/2014     Type 2 diabetes, HbA1C goal < 8% (H)      Physical Exam:    /60   Pulse 95   Temp 97.6  F (36.4  C)   Resp 18   Wt 81.6 kg (180 lb)   SpO2 99%   BMI 28.19 kg/m      General: patient appears stated age of 68 year old. Nontoxic and in no distress.   HEENT: Head: atraumatic, normocephalic. Sclerae anicteric.  Chest:  Normal respiratory effort  Cardiac:  No edema.   Abdomen: abdomen is non-distended  Extremities: normal tone and muscle bulk.  Left above-the-knee amputation  Skin: no lesions or rash on visible skin. Warm and dry.   CNS: alert and oriented. Grossly non-focal.   Psychiatric: normal mood and affect.     Lab Results:    Recent Results (from the past 168 hour(s))   Comprehensive metabolic panel   Result Value Ref Range    Sodium 134 (L) 135 - 145 mmol/L    Potassium 4.5 3.4 - 5.3 mmol/L    Carbon Dioxide (CO2) 20 (L) 22 - 29 mmol/L    Anion Gap 17 (H) 7 - 15 mmol/L    Urea Nitrogen 34.0 (H) 8.0 - 23.0 mg/dL    Creatinine 1.10 0.67 - 1.17 mg/dL    GFR Estimate 73 >60 mL/min/1.73m2    Calcium 9.5 8.8 - 10.2 mg/dL    Chloride 97 (L) 98 - 107 mmol/L    Glucose 140 (H) 70 - 99 mg/dL    Alkaline Phosphatase 67 40 - 150 U/L    AST 23 0 - 45 U/L    ALT 13 0 - 70 U/L    Protein Total 7.9 6.4 - 8.3 g/dL    Albumin 4.2 3.5 - 5.2 g/dL    Bilirubin Total 0.4 <=1.2 mg/dL   CBC with platelets and differential   Result Value Ref Range    WBC Count 5.6 4.0 - 11.0 10e3/uL    RBC Count 4.39 (L) 4.40 - 5.90 10e6/uL    Hemoglobin 13.3 13.3 - 17.7 g/dL    Hematocrit 40.2 40.0 - 53.0 %    MCV 92 78 - 100 fL    MCH 30.3 26.5 - 33.0 pg    MCHC 33.1 31.5 - 36.5 g/dL    RDW 14.0 10.0 - 15.0 %    Platelet Count 121 (L) 150 - 450  10e3/uL    % Neutrophils 68 %    % Lymphocytes 14 %    % Monocytes 12 %    % Eosinophils 4 %    % Basophils 1 %    % Immature Granulocytes 1 %    NRBCs per 100 WBC 0 <1 /100    Absolute Neutrophils 3.8 1.6 - 8.3 10e3/uL    Absolute Lymphocytes 0.8 0.8 - 5.3 10e3/uL    Absolute Monocytes 0.7 0.0 - 1.3 10e3/uL    Absolute Eosinophils 0.2 0.0 - 0.7 10e3/uL    Absolute Basophils 0.1 0.0 - 0.2 10e3/uL    Absolute Immature Granulocytes 0.0 <=0.4 10e3/uL    Absolute NRBCs 0.0 10e3/uL     Imaging:    MR Liver w/o & w Contrast    Result Date: 5/24/2024  EXAM: MR LIVER W/O and W CONTRAST LOCATION: [facility] DATE: 5/24/2024 INDICATION: Follow up treated cholangiocarcinoma. COMPARISON: MRI 11/13/2023. TECHNIQUE: Routine MRI liver protocol including T1 in/out phase, diffusion, multiplane T2, and dynamic T1 with IV contrast.  CONTRAST: 8 mL Gadavist. FINDINGS: LIVER: Stable 5.3 cm ablation zone changes segment VI of the liver with no concerning features. No enhancement. LR-TR nonviable. Cirrhotic appearance of the liver. Couple tiny cysts segment V. Stable subtle area of 1.6 cm arterial phase enhancement only segment IVb with no washout, no capsule, and no diffusion restriction. This should be considered benign. LI-RADS-2. No significant new findings. PANCREAS: Stable 4 mm cyst in the uncinate process of the pancreas most likely of little significance. Tiny side branch IPMN in the differential. ADDITIONAL FINDINGS: No lymphadenopathy. Normal size spleen. No significant findings in the adrenal glands or kidneys.     IMPRESSION: 1.  Stable zone of ablation segment VI with no concerning features. LR-TR nonviable. 2.  Stable 1.6 cm subtle area of arterial phase enhancement only with no other features. This should be considered benign. LI-RADS-2 benign. 3.  Cirrhosis. 4.  No significant new findings in the liver. 5.  Stable 4 mm cyst in the uncinate process of the pancreas. Attention on follow-up surveillance  only.       Signed by: Ashutosh Cardozo MD      Again, thank you for allowing me to participate in the care of your patient.        Sincerely,        Ashutosh Cardozo MD

## 2024-08-13 DIAGNOSIS — E11.8 TYPE 2 DIABETES MELLITUS WITH COMPLICATION, WITH LONG-TERM CURRENT USE OF INSULIN (H): ICD-10-CM

## 2024-08-13 DIAGNOSIS — Z79.4 TYPE 2 DIABETES MELLITUS WITH COMPLICATION, WITH LONG-TERM CURRENT USE OF INSULIN (H): ICD-10-CM

## 2024-08-13 RX ORDER — PEN NEEDLE, DIABETIC 32GX 5/32"
NEEDLE, DISPOSABLE MISCELLANEOUS
Qty: 100 EACH | Refills: 8 | Status: SHIPPED | OUTPATIENT
Start: 2024-08-13

## 2024-08-13 NOTE — TELEPHONE ENCOUNTER
Last Written Prescription Date:  7/22/24  Last Fill Quantity: 100,  # refills: 11   Last office visit: 5/14/2024 ; last virtual visit: Visit date not found with prescribing provider:  Dr. Olson   Future Office Visit:  none    Requested Prescriptions   Pending Prescriptions Disp Refills    BD KWABENA U/F 32G X 4 MM insulin pen needle [Pharmacy Med Name: BD PEN NEEDLE KWABENA 4MM 32G X 4 MM Miscellaneous] 100 each 8     Sig: USE 4 TIME DAILY OR AS DIRECTED.       Diabetic Supplies Protocol Passed - 8/13/2024  8:50 AM        Passed - Medication is active on med list        Passed - Recent (12 month) or future (90 days) visit with authorizing provider s specialty     The patient must have completed an in-person or virtual visit within the past 12 months or has a future visit scheduled within the next 90 days with the authorizing provider s specialty.  Urgent care and e-visits do not quality as an office visit for this protocol.          Passed - Medication indicated for associated diagnosis        Passed - Patient is 18 years of age or older            Prescription approved per [facility] Refill Protocol.      Radha Barnes RN on 8/13/2024 at 12:57 PM

## 2024-08-19 ENCOUNTER — TELEPHONE (OUTPATIENT)
Dept: OTHER | Facility: CLINIC | Age: 69
End: 2024-08-19
Payer: MEDICARE

## 2024-08-19 DIAGNOSIS — E11.8 TYPE 2 DIABETES MELLITUS WITH COMPLICATION, WITH LONG-TERM CURRENT USE OF INSULIN (H): Primary | ICD-10-CM

## 2024-08-19 DIAGNOSIS — Z79.4 TYPE 2 DIABETES MELLITUS WITH COMPLICATION, WITH LONG-TERM CURRENT USE OF INSULIN (H): Primary | ICD-10-CM

## 2024-08-19 NOTE — TELEPHONE ENCOUNTER
Dr. Olson,    Pt's insurance will not cover the pen needles that is usually ordered  Brand that they will cover is [employer]    Med and pharmacy pended    Thank you  Radha Barnes RN on 8/19/2024 at 3:13 PM

## 2024-09-24 ENCOUNTER — LAB (OUTPATIENT)
Dept: LAB | Facility: CLINIC | Age: 69
End: 2024-09-24
Attending: INTERNAL MEDICINE
Payer: MEDICARE

## 2024-09-24 ENCOUNTER — OFFICE VISIT (OUTPATIENT)
Dept: GASTROENTEROLOGY | Facility: CLINIC | Age: 69
End: 2024-09-24
Attending: INTERNAL MEDICINE
Payer: MEDICARE

## 2024-09-24 VITALS
OXYGEN SATURATION: 98 % | HEART RATE: 67 BPM | DIASTOLIC BLOOD PRESSURE: 71 MMHG | TEMPERATURE: 97.6 F | SYSTOLIC BLOOD PRESSURE: 113 MMHG | WEIGHT: 178.3 LBS | HEIGHT: 67 IN | RESPIRATION RATE: 22 BRPM | BODY MASS INDEX: 27.99 KG/M2

## 2024-09-24 DIAGNOSIS — K70.30 ALCOHOLIC CIRRHOSIS OF LIVER WITHOUT ASCITES (H): Primary | ICD-10-CM

## 2024-09-24 DIAGNOSIS — K70.30 ALCOHOLIC CIRRHOSIS OF LIVER WITHOUT ASCITES (H): ICD-10-CM

## 2024-09-24 PROBLEM — F10.20 ALCOHOL DEPENDENCE (H): Status: ACTIVE | Noted: 2024-09-24

## 2024-09-24 LAB
AFP SERPL-MCNC: 3 NG/ML
ALBUMIN SERPL BCG-MCNC: 3.9 G/DL (ref 3.5–5.2)
ALP SERPL-CCNC: 68 U/L (ref 40–150)
ALT SERPL W P-5'-P-CCNC: 21 U/L (ref 0–70)
ANION GAP SERPL CALCULATED.3IONS-SCNC: 13 MMOL/L (ref 7–15)
AST SERPL W P-5'-P-CCNC: 32 U/L (ref 0–45)
BILIRUB DIRECT SERPL-MCNC: <0.2 MG/DL (ref 0–0.3)
BILIRUB SERPL-MCNC: 0.2 MG/DL
BUN SERPL-MCNC: 18.6 MG/DL (ref 8–23)
CALCIUM SERPL-MCNC: 9.2 MG/DL (ref 8.8–10.4)
CHLORIDE SERPL-SCNC: 101 MMOL/L (ref 98–107)
CREAT SERPL-MCNC: 1.09 MG/DL (ref 0.67–1.17)
EGFRCR SERPLBLD CKD-EPI 2021: 73 ML/MIN/1.73M2
ERYTHROCYTE [DISTWIDTH] IN BLOOD BY AUTOMATED COUNT: 14.5 % (ref 10–15)
GLUCOSE SERPL-MCNC: 294 MG/DL (ref 70–99)
HCO3 SERPL-SCNC: 21 MMOL/L (ref 22–29)
HCT VFR BLD AUTO: 37.1 % (ref 40–53)
HGB BLD-MCNC: 12.3 G/DL (ref 13.3–17.7)
INR PPP: 1.31 (ref 0.85–1.15)
MCH RBC QN AUTO: 29.1 PG (ref 26.5–33)
MCHC RBC AUTO-ENTMCNC: 33.2 G/DL (ref 31.5–36.5)
MCV RBC AUTO: 88 FL (ref 78–100)
PLATELET # BLD AUTO: 95 10E3/UL (ref 150–450)
POTASSIUM SERPL-SCNC: 4.2 MMOL/L (ref 3.4–5.3)
PROT SERPL-MCNC: 7.3 G/DL (ref 6.4–8.3)
RBC # BLD AUTO: 4.22 10E6/UL (ref 4.4–5.9)
SODIUM SERPL-SCNC: 135 MMOL/L (ref 135–145)
WBC # BLD AUTO: 3.3 10E3/UL (ref 4–11)

## 2024-09-24 PROCEDURE — 85027 COMPLETE CBC AUTOMATED: CPT | Performed by: PATHOLOGY

## 2024-09-24 PROCEDURE — G0463 HOSPITAL OUTPT CLINIC VISIT: HCPCS | Performed by: INTERNAL MEDICINE

## 2024-09-24 PROCEDURE — 82105 ALPHA-FETOPROTEIN SERUM: CPT | Performed by: INTERNAL MEDICINE

## 2024-09-24 PROCEDURE — 82248 BILIRUBIN DIRECT: CPT | Performed by: PATHOLOGY

## 2024-09-24 PROCEDURE — 99000 SPECIMEN HANDLING OFFICE-LAB: CPT | Performed by: PATHOLOGY

## 2024-09-24 PROCEDURE — 85610 PROTHROMBIN TIME: CPT | Performed by: PATHOLOGY

## 2024-09-24 PROCEDURE — 80053 COMPREHEN METABOLIC PANEL: CPT | Performed by: PATHOLOGY

## 2024-09-24 PROCEDURE — 36415 COLL VENOUS BLD VENIPUNCTURE: CPT | Performed by: PATHOLOGY

## 2024-09-24 PROCEDURE — 99215 OFFICE O/P EST HI 40 MIN: CPT | Performed by: INTERNAL MEDICINE

## 2024-09-24 ASSESSMENT — PAIN SCALES - GENERAL: PAINLEVEL: NO PAIN (0)

## 2024-09-24 NOTE — LETTER
9/24/2024      Alexandro oPol  [address]  [place] [address]      Dear Colleague,    Thank you for referring your patient, Alexandro Pool, to the [facility] [place]. Please see a copy of my visit note below.    Hepatology Follow-Up Visit:     HISTORY OF PRESENT ILLNESS:   I had the pleasure of seeing Alexandro Pool for followup in the Liver Clinic at the [facility] on September 24, 2024. Mr. 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  His disease was complicated by the development of a liver lesion which on biopsy was cholangiocarcinoma.  It was treated with ablation, which he tolerated well.     At present, he denies any abdominal pain, itching or skin rash, or fatigue.  He denies any increased abdominal girth or any lower extremity edema.  He has not had any gastrointestinal bleeding or any overt signs of hepatic encephalopathy.     He denies any fevers or chills, cough or shortness of breath.  He continues to smoke and is not interested in stopping.  He denies any nausea or vomiting or constipation.  He said he has had diarrhea for many, many years that has been stable.  He reports his appetite has been good and his weight has been stable.  He continues to drink about 24 beers per week and is not interested in stopping.     There otherwise have been no other new events since he was last seen.    Medications:   Current Outpatient Medications   Medication Sig Dispense Refill     acetaminophen (TYLENOL) 325 MG tablet Take 1 tablet by mouth every 4 hours as needed. 250 tablet 0     Alcohol Swabs (ALCOHOL PREP PAD) 70 % PADS        amLODIPine (NORVASC) 2.5 MG tablet Take 1 tablet (2.5 mg) by mouth 2 times daily 180 tablet 3     ammonium lactate (AMLACTIN) 12 % external cream Apply to affected area on both legs twice daily as needed for dry skin       ASPIRIN LOW DOSE 81 MG EC tablet        BD KWABENA U/F  32G X 4 MM insulin pen needle USE 4 TIME DAILY OR AS DIRECTED. 100 each 8     blood glucose (NO BRAND SPECIFIED) test strip Use  to test 4 times a day.       Continuous Blood Gluc  (DEXCOM G6 ) DORIS Use to read blood sugars as per 's instructions.       empagliflozin (JARDIANCE) 25 MG TABS tablet Take 1 tablet (25 mg) by mouth daily 90 tablet 3     gabapentin (NEURONTIN) 300 MG capsule Take 600 mg in AM   Take 300 mg in afternoon   Take 600 mg at bedtime  May also take an additional 300 mg if needed at bedtime for pain       insulin aspart (NOVOLOG FLEXPEN) 100 UNIT/ML pen Inject 32 Units Subcutaneous 3 times daily (before meals) At 6:30am 12pm and 8pm       insulin glargine (LANTUS PEN) 100 UNIT/ML pen Inject 50 Units Subcutaneous at bedtime 60 mL 3     insulin pen needle (32G X 6 MM) 32G X 6 MM miscellaneous Use 1 pen needles daily or as directed. 90 each 3     liraglutide (VICTOZA) 18 MG/3ML solution Inject 1.8 mg Subcutaneous daily       lisinopril (ZESTRIL) 2.5 MG tablet TAKE 1 TABLET (2.5 MG) BY MOUTH 2 TIMES DAILY. 90 tablet 3     metoprolol succinate ER (TOPROL XL) 50 MG 24 hr tablet TAKE 1.5 TABLETS (75MG) BY MOUTH DAILY 21 tablet 8     omeprazole (PRILOSEC) 40 MG DR capsule Take 1 capsule (40 mg) by mouth daily 90 capsule 3     order for DME Equipment being ordered: FlexiTouch pneumatic compression device.  2-3 times per day to left lower extremity.  Current strength - L4 1 Units      rivaroxaban ANTICOAGULANT (XARELTO ANTICOAGULANT) 2.5 MG TABS tablet Take 1 tablet (2.5 mg) by mouth 2 times daily 180 tablet 3     rosuvastatin (CRESTOR) 40 MG tablet Take 1 tablet (40 mg) by mouth daily 90 tablet 3     senna-docusate (SENOKOT-S/PERICOLACE) 8.6-50 MG tablet Take 1 tablet by mouth At Bedtime       spironolactone (ALDACTONE) 25 MG tablet Take 12.5 mg by mouth daily       albuterol (PROAIR HFA/PROVENTIL HFA/VENTOLIN HFA) 108 (90 Base) MCG/ACT inhaler Inhale 2 puffs into the lungs  "every 4 hours as needed for shortness of breath or wheezing (Patient not taking: Reported on 9/24/2024)       lidocaine (XYLOCAINE) 5 % external ointment Apply topically 2 times daily Apply 2 - 3g to right residual limb as needed (Patient not taking: Reported on 9/24/2024)       omeprazole (PRILOSEC) 20 MG DR capsule Take 1 capsule (20 mg) by mouth 2 times daily (Patient not taking: Reported on 5/29/2024) 1 capsule 0     No current facility-administered medications for this visit.      Vitals:   /71 (BP Location: Left arm, Patient Position: Sitting, Cuff Size: Adult Regular)   Pulse 67   Temp 97.6  F (36.4  C) (Oral)   Resp 22   Ht 1.702 m (5' 7.01\")   Wt 80.9 kg (178 lb 4.8 oz)   SpO2 98%   BMI 27.92 kg/m      Physical Exam:   In general he looks quite well. HEENT exam shows no scleral icterus or temporal muscle wasting. Chest is clear. Abdominal exam shows no increase in girth. No masses or tenderness to palpation are present. Liver is 10 cm in span without left lobe enlargement. No spleen tip is palpable. 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  Skin exam shows no stigmata of chronic liver disease. Neurologic exam shows no asterixis.     Labs:   Lab Results   Component Value Date     09/24/2024    POTASSIUM 4.2 09/24/2024    CHLORIDE 101 09/24/2024    ANIONGAP 13 09/24/2024    CO2 21 (L) 09/24/2024    BUN 18.6 09/24/2024    CR 1.09 09/24/2024    GFRESTIMATED 73 09/24/2024    BOGDAN 9.2 09/24/2024      Lab Results   Component Value Date    WBC 3.3 (L) 09/24/2024    HGB 12.3 (L) 09/24/2024    HCT 37.1 (L) 09/24/2024    MCV 88 09/24/2024    MCH 29.1 09/24/2024    MCHC 33.2 09/24/2024    RDW 14.5 09/24/2024    PLT 95 (L) 09/24/2024     Lab Results   Component Value Date    ALBUMIN 3.9 09/24/2024    ALKPHOS 68 09/24/2024    AST 32 09/24/2024    BILICONJ 0.0 04/03/2014     Lab Results   Component Value Date    INR 1.31 (H) 09/24/2024     MELD 3.0: 11 at 9/24/2024  8:59 AM  MELD-Na: " 10 at 9/24/2024  8:59 AM  Calculated from:  Serum Creatinine: 1.09 mg/dL at 9/24/2024  8:59 AM  Serum Sodium: 135 mmol/L at 9/24/2024  8:59 AM  Total Bilirubin: 0.2 mg/dL (Using min of 1 mg/dL) at 9/24/2024  8:59 AM  Serum Albumin: 3.9 g/dL (Using max of 3.5 g/dL) at 9/24/2024  8:59 AM  INR(ratio): 1.31 at 9/24/2024  8:59 AM  Age at listing (hypothetical): 69 years  Sex: Male at 9/24/2024  8:59 AM    Imaging:   EXAM: MR LIVER W/O and W CONTRAST  LOCATION: [facility]  DATE: 5/24/2024     INDICATION: Follow up treated cholangiocarcinoma.  COMPARISON: MRI 11/13/2023.  TECHNIQUE: Routine MRI liver protocol including T1 in/out phase, diffusion, multiplane T2, and dynamic T1 with IV contrast.    CONTRAST: 8 mL Gadavist.     FINDINGS:      LIVER: Stable 5.3 cm ablation zone changes segment VI of the liver with no concerning features. No enhancement. LR-TR nonviable.     Cirrhotic appearance of the liver. Couple tiny cysts segment V. Stable subtle area of 1.6 cm arterial phase enhancement only segment IVb with no washout, no capsule, and no diffusion restriction. This should be considered benign. LI-RADS-2.     No significant new findings.     PANCREAS: Stable 4 mm cyst in the uncinate process of the pancreas most likely of little significance. Tiny side branch IPMN in the differential.     ADDITIONAL FINDINGS: No lymphadenopathy. Normal size spleen. No significant findings in the adrenal glands or kidneys.                                                                  IMPRESSION:  1.  Stable zone of ablation segment VI with no concerning features. LR-TR nonviable.     2.  Stable 1.6 cm subtle area of arterial phase enhancement only with no other features. This should be considered benign. LI-RADS-2 benign.     3.  Cirrhosis.    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  His disease is well compensated at this point in time, and his liver  tests are completely normal.     He was diagnosed with an intrahepatic cholangiocarcinoma.  He was treated with ablation and he is going to get another MRI in 2 months, and I will see him back in 6 months for repeat imaging and blood work.     He is up to date for the most part with regard to vaccines.  He could get a COVID-19 vaccine.  I also recommend that he be vaccinated against influenza and RSV this fall.  He declined all vaccinations.  He is otherwise up to date with regard to screening for cancer.  He is due for variceal screening which I have scheduled.     I did spend total of 40 minutes (on the date of the encounter), including 30 minutes of face-to-face clinic time including counseling. The rest of the time was spent in documentation and review of records    Thank you very much for allowing me to participate in the care of this patient.  If you have any questions regarding my recommendations, please do not hesitate to contact me.      Sincerely,       Nehemias Cevallos MD      Professor of Medicine  [employer]      Executive Medical Director, Solid Organ Transplant Program  [facility]        Again, thank you for allowing me to participate in the care of your patient.        Sincerely,        Nehemias Cevallos MD

## 2024-09-24 NOTE — NURSING NOTE
"Chief Complaint   Patient presents with    RECHECK     Cirrhosis      Vital signs:  Temp: 97.6  F (36.4  C) Temp src: Oral BP: 113/71 Pulse: 67   Resp: 22 SpO2: 98 %     Height: 170.2 cm (5' 7.01\") Weight: 80.9 kg (178 lb 4.8 oz)  Estimated body mass index is 27.92 kg/m  as calculated from the following:    Height as of this encounter: 1.702 m (5' 7.01\").    Weight as of this encounter: 80.9 kg (178 lb 4.8 oz).      Michell Lara, [facility]  9/24/2024 9:30 AM    "

## 2024-09-24 NOTE — PROGRESS NOTES
Hepatology Follow-Up Visit:     HISTORY OF PRESENT ILLNESS:   I had the pleasure of seeing Alexandro Pool for followup in the Liver Clinic at the [facility] on September 24, 2024. Mr. 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  His disease was complicated by the development of a liver lesion which on biopsy was cholangiocarcinoma.  It was treated with ablation, which he tolerated well.     At present, he denies any abdominal pain, itching or skin rash, or fatigue.  He denies any increased abdominal girth or any lower extremity edema.  He has not had any gastrointestinal bleeding or any overt signs of hepatic encephalopathy.     He denies any fevers or chills, cough or shortness of breath.  He continues to smoke and is not interested in stopping.  He denies any nausea or vomiting or constipation.  He said he has had diarrhea for many, many years that has been stable.  He reports his appetite has been good and his weight has been stable.  He continues to drink about 24 beers per week and is not interested in stopping.     There otherwise have been no other new events since he was last seen.    Medications:   Current Outpatient Medications   Medication Sig Dispense Refill    acetaminophen (TYLENOL) 325 MG tablet Take 1 tablet by mouth every 4 hours as needed. 250 tablet 0    Alcohol Swabs (ALCOHOL PREP PAD) 70 % PADS       amLODIPine (NORVASC) 2.5 MG tablet Take 1 tablet (2.5 mg) by mouth 2 times daily 180 tablet 3    ammonium lactate (AMLACTIN) 12 % external cream Apply to affected area on both legs twice daily as needed for dry skin      ASPIRIN LOW DOSE 81 MG EC tablet       BD KWABENA U/F 32G X 4 MM insulin pen needle USE 4 TIME DAILY OR AS DIRECTED. 100 each 8    blood glucose (NO BRAND SPECIFIED) test strip Use  to test 4 times a day.      Continuous Blood Gluc  (DEXCOM G6 ) ODRIS Use to read blood sugars as per 's  instructions.      empagliflozin (JARDIANCE) 25 MG TABS tablet Take 1 tablet (25 mg) by mouth daily 90 tablet 3    gabapentin (NEURONTIN) 300 MG capsule Take 600 mg in AM   Take 300 mg in afternoon   Take 600 mg at bedtime  May also take an additional 300 mg if needed at bedtime for pain      insulin aspart (NOVOLOG FLEXPEN) 100 UNIT/ML pen Inject 32 Units Subcutaneous 3 times daily (before meals) At 6:30am 12pm and 8pm      insulin glargine (LANTUS PEN) 100 UNIT/ML pen Inject 50 Units Subcutaneous at bedtime 60 mL 3    insulin pen needle (32G X 6 MM) 32G X 6 MM miscellaneous Use 1 pen needles daily or as directed. 90 each 3    liraglutide (VICTOZA) 18 MG/3ML solution Inject 1.8 mg Subcutaneous daily      lisinopril (ZESTRIL) 2.5 MG tablet TAKE 1 TABLET (2.5 MG) BY MOUTH 2 TIMES DAILY. 90 tablet 3    metoprolol succinate ER (TOPROL XL) 50 MG 24 hr tablet TAKE 1.5 TABLETS (75MG) BY MOUTH DAILY 21 tablet 8    omeprazole (PRILOSEC) 40 MG DR capsule Take 1 capsule (40 mg) by mouth daily 90 capsule 3    order for DME Equipment being ordered: FlexiTouch pneumatic compression device.  2-3 times per day to left lower extremity.  Current strength - L4 1 Units     rivaroxaban ANTICOAGULANT (XARELTO ANTICOAGULANT) 2.5 MG TABS tablet Take 1 tablet (2.5 mg) by mouth 2 times daily 180 tablet 3    rosuvastatin (CRESTOR) 40 MG tablet Take 1 tablet (40 mg) by mouth daily 90 tablet 3    senna-docusate (SENOKOT-S/PERICOLACE) 8.6-50 MG tablet Take 1 tablet by mouth At Bedtime      spironolactone (ALDACTONE) 25 MG tablet Take 12.5 mg by mouth daily      albuterol (PROAIR HFA/PROVENTIL HFA/VENTOLIN HFA) 108 (90 Base) MCG/ACT inhaler Inhale 2 puffs into the lungs every 4 hours as needed for shortness of breath or wheezing (Patient not taking: Reported on 9/24/2024)      lidocaine (XYLOCAINE) 5 % external ointment Apply topically 2 times daily Apply 2 - 3g to right residual limb as needed (Patient not taking: Reported on 9/24/2024)       "omeprazole (PRILOSEC) 20 MG DR capsule Take 1 capsule (20 mg) by mouth 2 times daily (Patient not taking: Reported on 5/29/2024) 1 capsule 0     No current facility-administered medications for this visit.      Vitals:   /71 (BP Location: Left arm, Patient Position: Sitting, Cuff Size: Adult Regular)   Pulse 67   Temp 97.6  F (36.4  C) (Oral)   Resp 22   Ht 1.702 m (5' 7.01\")   Wt 80.9 kg (178 lb 4.8 oz)   SpO2 98%   BMI 27.92 kg/m      Physical Exam:   In general he looks quite well. HEENT exam shows no scleral icterus or temporal muscle wasting. Chest is clear. Abdominal exam shows no increase in girth. No masses or tenderness to palpation are present. Liver is 10 cm in span without left lobe enlargement. No spleen tip is palpable. 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  Skin exam shows no stigmata of chronic liver disease. Neurologic exam shows no asterixis.     Labs:   Lab Results   Component Value Date     09/24/2024    POTASSIUM 4.2 09/24/2024    CHLORIDE 101 09/24/2024    ANIONGAP 13 09/24/2024    CO2 21 (L) 09/24/2024    BUN 18.6 09/24/2024    CR 1.09 09/24/2024    GFRESTIMATED 73 09/24/2024    BOGDAN 9.2 09/24/2024      Lab Results   Component Value Date    WBC 3.3 (L) 09/24/2024    HGB 12.3 (L) 09/24/2024    HCT 37.1 (L) 09/24/2024    MCV 88 09/24/2024    MCH 29.1 09/24/2024    MCHC 33.2 09/24/2024    RDW 14.5 09/24/2024    PLT 95 (L) 09/24/2024     Lab Results   Component Value Date    ALBUMIN 3.9 09/24/2024    ALKPHOS 68 09/24/2024    AST 32 09/24/2024    BILICONJ 0.0 04/03/2014     Lab Results   Component Value Date    INR 1.31 (H) 09/24/2024     MELD 3.0: 11 at 9/24/2024  8:59 AM  MELD-Na: 10 at 9/24/2024  8:59 AM  Calculated from:  Serum Creatinine: 1.09 mg/dL at 9/24/2024  8:59 AM  Serum Sodium: 135 mmol/L at 9/24/2024  8:59 AM  Total Bilirubin: 0.2 mg/dL (Using min of 1 mg/dL) at 9/24/2024  8:59 AM  Serum Albumin: 3.9 g/dL (Using max of 3.5 g/dL) at 9/24/2024  " 8:59 AM  INR(ratio): 1.31 at 9/24/2024  8:59 AM  Age at listing (hypothetical): 69 years  Sex: Male at 9/24/2024  8:59 AM    Imaging:   EXAM: MR LIVER W/O and W CONTRAST  LOCATION: [facility]  DATE: 5/24/2024     INDICATION: Follow up treated cholangiocarcinoma.  COMPARISON: MRI 11/13/2023.  TECHNIQUE: Routine MRI liver protocol including T1 in/out phase, diffusion, multiplane T2, and dynamic T1 with IV contrast.    CONTRAST: 8 mL Gadavist.     FINDINGS:      LIVER: Stable 5.3 cm ablation zone changes segment VI of the liver with no concerning features. No enhancement. LR-TR nonviable.     Cirrhotic appearance of the liver. Couple tiny cysts segment V. Stable subtle area of 1.6 cm arterial phase enhancement only segment IVb with no washout, no capsule, and no diffusion restriction. This should be considered benign. LI-RADS-2.     No significant new findings.     PANCREAS: Stable 4 mm cyst in the uncinate process of the pancreas most likely of little significance. Tiny side branch IPMN in the differential.     ADDITIONAL FINDINGS: No lymphadenopathy. Normal size spleen. No significant findings in the adrenal glands or kidneys.                                                                  IMPRESSION:  1.  Stable zone of ablation segment VI with no concerning features. LR-TR nonviable.     2.  Stable 1.6 cm subtle area of arterial phase enhancement only with no other features. This should be considered benign. LI-RADS-2 benign.     3.  Cirrhosis.    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  His disease is well compensated at this point in time, and his liver tests are completely normal.     He was diagnosed with an intrahepatic cholangiocarcinoma.  He was treated with ablation and he is going to get another MRI in 2 months, and I will see him back in 6 months for repeat imaging and blood work.     He is up to date for the most part  with regard to vaccines.  He could get a COVID-19 vaccine.  I also recommend that he be vaccinated against influenza and RSV this fall.  He declined all vaccinations.  He is otherwise up to date with regard to screening for cancer.  He is due for variceal screening which I have scheduled.     I did spend total of 40 minutes (on the date of the encounter), including 30 minutes of face-to-face clinic time including counseling. The rest of the time was spent in documentation and review of records    Thank you very much for allowing me to participate in the care of this patient.  If you have any questions regarding my recommendations, please do not hesitate to contact me.      Sincerely,       Nehemias Cevallos MD      Professor of Medicine  [employer]      Executive Medical Director, Solid Organ Transplant Program  [facility]

## 2024-11-18 DIAGNOSIS — E11.8 TYPE 2 DIABETES MELLITUS WITH COMPLICATION, WITH LONG-TERM CURRENT USE OF INSULIN (H): ICD-10-CM

## 2024-11-18 DIAGNOSIS — Z79.4 TYPE 2 DIABETES MELLITUS WITH COMPLICATION, WITH LONG-TERM CURRENT USE OF INSULIN (H): ICD-10-CM

## 2024-11-18 DIAGNOSIS — I73.9 PAD (PERIPHERAL ARTERY DISEASE) (H): ICD-10-CM

## 2024-11-19 RX ORDER — EMPAGLIFLOZIN 25 MG/1
25 TABLET, FILM COATED ORAL DAILY
Qty: 30 TABLET | Refills: 5 | Status: SHIPPED | OUTPATIENT
Start: 2024-11-19

## 2024-11-19 RX ORDER — RIVAROXABAN 2.5 MG/1
2.5 TABLET, FILM COATED ORAL 2 TIMES DAILY
Qty: 60 TABLET | Refills: 5 | Status: SHIPPED | OUTPATIENT
Start: 2024-11-19

## 2024-11-19 NOTE — TELEPHONE ENCOUNTER
Unable to fill per [facility] protocol.  Medication and pharmacy loaded.    Adrianna UNDERWOOD, RN    [facility]  Office: [phone]  Fax: [phone]

## 2024-11-23 ENCOUNTER — HEALTH MAINTENANCE LETTER (OUTPATIENT)
Age: 69
End: 2024-11-23

## 2024-11-24 ENCOUNTER — ANCILLARY PROCEDURE (OUTPATIENT)
Dept: MRI IMAGING | Facility: CLINIC | Age: 69
End: 2024-11-24
Attending: INTERNAL MEDICINE
Payer: MEDICARE

## 2024-11-24 DIAGNOSIS — K70.30 ALCOHOLIC CIRRHOSIS OF LIVER WITHOUT ASCITES (H): ICD-10-CM

## 2024-11-24 PROCEDURE — 74183 MRI ABD W/O CNTR FLWD CNTR: CPT | Mod: MG | Performed by: RADIOLOGY

## 2024-11-24 PROCEDURE — A9585 GADOBUTROL INJECTION: HCPCS | Performed by: RADIOLOGY

## 2024-11-24 PROCEDURE — G1010 CDSM STANSON: HCPCS | Performed by: RADIOLOGY

## 2024-11-24 RX ORDER — GADOBUTROL 604.72 MG/ML
10 INJECTION INTRAVENOUS ONCE
Status: COMPLETED | OUTPATIENT
Start: 2024-11-24 | End: 2024-11-24

## 2024-11-24 RX ADMIN — GADOBUTROL 8 ML: 604.72 INJECTION INTRAVENOUS at 07:59

## 2024-12-20 ENCOUNTER — TELEPHONE (OUTPATIENT)
Dept: OTHER | Facility: CLINIC | Age: 69
End: 2024-12-20
Payer: MEDICARE

## 2024-12-20 NOTE — TELEPHONE ENCOUNTER
[facility] VASCULAR [facility]    Who is the name of the provider? Dr Olson     What is the location you see this provider at/preferred location? [place]    Person calling / Facility: Alexandro    Phone number: [phone]    Nurse call back needed:     Reason for call: Pt called to schedule his follow up imaging/labs with Dr Olson. In the appt notes it says 2 Ultrasounds but there aren't any orders anymore. Pt does not remember if he did or didn't do the Ultrasounds. Please review and send back to scheduling.       Pharmacy location: N/A    Outside Imaging: N/A    Can we leave a detailed message on this number? Y    Additional Info:

## 2024-12-23 NOTE — TELEPHONE ENCOUNTER
Writer reviewed EPIC, PACS, and Care Everywhere - no ultrasounds completed that was ordered by Dr. Olson in 2024.    Follow up to 05/14/2024 OV with Dr. Olson      Routing to scheduling to coordinate the following:  Fasting labs  US OZZIE w/ exercise  US Left lower extremity arterial  RETURN VASCULAR PATIENT consult with Dr. Olson  Please schedule this at next available      Appt note:  Follow up to 05/14/2024 OV with Dr. Olson

## 2024-12-27 ENCOUNTER — LAB (OUTPATIENT)
Dept: LAB | Facility: CLINIC | Age: 69
End: 2024-12-27
Attending: INTERNAL MEDICINE
Payer: MEDICARE

## 2024-12-27 DIAGNOSIS — E11.8 TYPE 2 DIABETES MELLITUS WITH COMPLICATION, WITH LONG-TERM CURRENT USE OF INSULIN (H): ICD-10-CM

## 2024-12-27 DIAGNOSIS — Z79.4 TYPE 2 DIABETES MELLITUS WITH COMPLICATION, WITH LONG-TERM CURRENT USE OF INSULIN (H): ICD-10-CM

## 2024-12-27 DIAGNOSIS — E78.5 HYPERLIPIDEMIA LDL GOAL <70: ICD-10-CM

## 2024-12-27 DIAGNOSIS — I10 HYPERTENSION GOAL BP (BLOOD PRESSURE) < 140/90: ICD-10-CM

## 2024-12-27 LAB
ALBUMIN SERPL BCG-MCNC: 4 G/DL (ref 3.5–5.2)
ALP SERPL-CCNC: 64 U/L (ref 40–150)
ALT SERPL W P-5'-P-CCNC: 11 U/L (ref 0–70)
ANION GAP SERPL CALCULATED.3IONS-SCNC: 11 MMOL/L (ref 7–15)
APO A-I SERPL-MCNC: 17 MG/DL
AST SERPL W P-5'-P-CCNC: 22 U/L (ref 0–45)
BILIRUB SERPL-MCNC: 0.2 MG/DL
BUN SERPL-MCNC: 19.8 MG/DL (ref 8–23)
CALCIUM SERPL-MCNC: 8.9 MG/DL (ref 8.8–10.4)
CHLORIDE SERPL-SCNC: 101 MMOL/L (ref 98–107)
CREAT SERPL-MCNC: 1.05 MG/DL (ref 0.67–1.17)
CRP SERPL HS-MCNC: 15.8 MG/L
EGFRCR SERPLBLD CKD-EPI 2021: 76 ML/MIN/1.73M2
EST. AVERAGE GLUCOSE BLD GHB EST-MCNC: 214 MG/DL
GLUCOSE SERPL-MCNC: 184 MG/DL (ref 70–99)
HBA1C MFR BLD: 9.1 %
HCO3 SERPL-SCNC: 23 MMOL/L (ref 22–29)
POTASSIUM SERPL-SCNC: 4.2 MMOL/L (ref 3.4–5.3)
PROT SERPL-MCNC: 7.8 G/DL (ref 6.4–8.3)
SODIUM SERPL-SCNC: 135 MMOL/L (ref 135–145)

## 2024-12-27 PROCEDURE — 80053 COMPREHEN METABOLIC PANEL: CPT | Performed by: PATHOLOGY

## 2024-12-27 PROCEDURE — 80061 LIPID PANEL: CPT | Mod: 90 | Performed by: PATHOLOGY

## 2024-12-27 PROCEDURE — 99000 SPECIMEN HANDLING OFFICE-LAB: CPT | Performed by: PATHOLOGY

## 2024-12-27 PROCEDURE — 83036 HEMOGLOBIN GLYCOSYLATED A1C: CPT | Performed by: INTERNAL MEDICINE

## 2024-12-27 PROCEDURE — 86141 C-REACTIVE PROTEIN HS: CPT | Performed by: INTERNAL MEDICINE

## 2024-12-27 PROCEDURE — 83695 ASSAY OF LIPOPROTEIN(A): CPT | Performed by: INTERNAL MEDICINE

## 2024-12-27 PROCEDURE — 36415 COLL VENOUS BLD VENIPUNCTURE: CPT | Performed by: PATHOLOGY

## 2024-12-30 LAB
CHOLEST SERPL-MCNC: 100 MG/DL
HDL SERPL QN: 8.5 NM
HDL SERPL-SCNC: 24.2 UMOL/L
HDLC SERPL-MCNC: 29 MG/DL
HLD.LARGE SERPL-SCNC: <2.8 UMOL/L
LDL SERPL QN: 20.5 NM
LDL SERPL-SCNC: 779 NMOL/L
LDL SMALL SERPL-SCNC: 445 NMOL/L
LDLC SERPL CALC-MCNC: 31 MG/DL
PATHOLOGY STUDY: ABNORMAL
TRIGL SERPL-MCNC: 202 MG/DL
VLDL LARGE SERPL-SCNC: 8 NMOL/L
VLDL SERPL QN: 50.4 NM

## 2025-01-08 ENCOUNTER — OFFICE VISIT (OUTPATIENT)
Dept: OTHER | Facility: CLINIC | Age: 70
End: 2025-01-08
Attending: INTERNAL MEDICINE
Payer: MEDICARE

## 2025-01-08 ENCOUNTER — HOSPITAL ENCOUNTER (OUTPATIENT)
Dept: ULTRASOUND IMAGING | Facility: CLINIC | Age: 70
Discharge: HOME | End: 2025-01-08
Attending: INTERNAL MEDICINE
Payer: MEDICARE

## 2025-01-08 VITALS — HEART RATE: 85 BPM | SYSTOLIC BLOOD PRESSURE: 134 MMHG | OXYGEN SATURATION: 97 % | DIASTOLIC BLOOD PRESSURE: 82 MMHG

## 2025-01-08 DIAGNOSIS — E11.8 TYPE 2 DIABETES MELLITUS WITH COMPLICATION, WITH LONG-TERM CURRENT USE OF INSULIN (H): ICD-10-CM

## 2025-01-08 DIAGNOSIS — F17.200 TOBACCO USE DISORDER: ICD-10-CM

## 2025-01-08 DIAGNOSIS — K74.60 CIRRHOSIS OF LIVER WITHOUT ASCITES, UNSPECIFIED HEPATIC CIRRHOSIS TYPE (H): ICD-10-CM

## 2025-01-08 DIAGNOSIS — I73.9 PAD (PERIPHERAL ARTERY DISEASE): ICD-10-CM

## 2025-01-08 DIAGNOSIS — Z79.4 TYPE 2 DIABETES MELLITUS WITH COMPLICATION, WITH LONG-TERM CURRENT USE OF INSULIN (H): ICD-10-CM

## 2025-01-08 DIAGNOSIS — Z53.9 NO SHOW: Primary | ICD-10-CM

## 2025-01-08 DIAGNOSIS — I10 HYPERTENSION GOAL BP (BLOOD PRESSURE) < 140/90: ICD-10-CM

## 2025-01-08 DIAGNOSIS — C22.1 CHOLANGIOCARCINOMA (H): ICD-10-CM

## 2025-01-08 DIAGNOSIS — E78.5 HYPERLIPIDEMIA LDL GOAL <70: ICD-10-CM

## 2025-01-08 PROCEDURE — 93922 UPR/L XTREMITY ART 2 LEVELS: CPT

## 2025-01-08 PROCEDURE — 93926 LOWER EXTREMITY STUDY: CPT | Mod: LT

## 2025-01-08 RX ORDER — LISINOPRIL 2.5 MG/1
TABLET ORAL
Qty: 90 TABLET | Refills: 3 | Status: CANCELLED | OUTPATIENT
Start: 2025-01-08

## 2025-01-08 RX ORDER — AMLODIPINE BESYLATE 2.5 MG/1
2.5 TABLET ORAL 2 TIMES DAILY
Qty: 180 TABLET | Refills: 3 | Status: CANCELLED | OUTPATIENT
Start: 2025-01-08

## 2025-01-08 RX ORDER — ROSUVASTATIN CALCIUM 40 MG/1
40 TABLET, COATED ORAL DAILY
Qty: 90 TABLET | Refills: 3 | Status: CANCELLED | OUTPATIENT
Start: 2025-01-08

## 2025-01-08 RX ORDER — METOPROLOL SUCCINATE 50 MG/1
75 TABLET, EXTENDED RELEASE ORAL DAILY
Qty: 135 TABLET | Refills: 3 | Status: CANCELLED | OUTPATIENT
Start: 2025-01-08

## 2025-01-08 NOTE — PROGRESS NOTES
The patient was drinking beer in his exam room. He was advised by our administrative staff he could either pour out the beer, or not be seen. He elected, after his entire plan was created in precharting to leave without being seen. The below visit will be saved in case he cares to reschedule. 45 minutes were spent by myself determining the plan of care but he left the office seconds before I was about to see him due to the above.       VASCULAR MEDICINE FOLLOW UP VISIT         A/P:        (F17.200) Tobacco use disorder  Comment: I again advised him to quit smoking.  Plan: He again refuses to quit smoking.      (I73.9) PAD S/P Lt SFA stenting 3-9-2012, Lt fem-SENIA bypass w/ ISGSV 9-, S/P Rt AKA 2014, ligation of parasitic branch 9/19/2017  Comment: The bypass is patent. His left foot has a wound on it which is being managed at [facility] in [place]. The wound is persistent%%% but scabbed over. There is no exposed base to the wound and he has no actionable vascular stenoses which could be addressed to facilitate complete healing.%%%  Plan:  He is advised to RTC prn wound worsening.         Repeat imaging in 07/2025.     (E78.5) Hyperlipidemia LDL goal <70  Comment: Advanced lipid testing of 12/27/2024 while on Crestor 40 mg daily revealed LDL-C of 31, LDL-P of 779, cardiac CRP of 15.80 (false positive due to %%%), and Lp(a) of 17.    Plan: Continue  Crestor 40 to attain lower LDL-P and cardiac CRP in setting of known ongoing tobacco use and clinical CV ds. Check advanced lipid testing 7/8/25, RTC two weeks later.             (I10) Hypertension goal BP (blood pressure) < 130/80  Comment: at goal%%%  Plan: no med changes     (E11.8,  Z79.4) Type 2 diabetes mellitus with complication, with long-term current use of insulin (H)  Comment: not at a1c goal of < 7%, currently 9.1; it had decreased with the below to  7.2  ;I increased Lantus from 56 to 64 units daily but split dosing to 32units BID when seen on  5/17/2022. He however instead took 42 units SQ at bedtime. He has frequent excursions > 200, and never has hypoglycemia. He has an endocrinologist, who changed lariglutide to semaglutide in August of 2023.  As a continuing to smoke diabetic with PAD, more optimal glycemic control is warranted.   Plan: He is instructed go in to see his endocrinologist for a face to face visit. Further diabetic med changes through his endocrinologist.        (C22.1) Cholangiocarcinoma (H)  (primary encounter diagnosis)  Comment: This is being addressed by Dr. Cardozo  Plan: Per Dr. Cardozo.      49 minutes total medical care on today's date.             HPI:  Alexandro Pool is a 69 year old male with a past medical history of current  smoking which he refuses to stop, peripheral arterial disease, status post right above-the-knee amputation in 2012 and left femoral to tibial bypass graft, hyperlipidemia, hypertension, type 2 diabetes, coronary artery disease S/P DESing.  The patient has had a slow healing left foot dorsal wound which is being managed twice a week by [facility] with slow improvement in healing. He had previous vascular imaging with decreased ABIs and ultrasound flow noted in the left graft.  He underwent a left lower extremity angiogram on 12/2/2021 at [facility].  There was a significant stenosis due to eccentric plaque in the left common femoral artery at the level of the proximal anastomosis of the left femoral anterior tibial artery bypass graft.  There were significant stenoses in the right common internal and external iliac arteries.  An angioplasty was performed to the left common femoral artery. Follow-up angiogram showed significant improvement in the luminal diameter and improved flow.  Post angiography imaging showed a filling defect in the distal aspect of the femoral to anterior tibial artery bypass graft.  This was due to embolized calcified plaque from the site of the  angioplasty. Successful suction thrombectomy as well as snare retrieval was done to remove the embolus.  Due to periprocedural blood loss the patient was kept overnight for further monitoring.  He was then discharged the next day. Today, the patient states his wound is improved. He is on vascular dosed Xarelto with no reports of unusual bleeding. We discussed the results of his ABIs ultrasound that were performed in August.  The left superficial femoral to anterior tibial artery bypass is patent with no evidence of stenosis or occlusion. His OZZIE was normal.  He is on a statin and has an LDL-C < 70, but LDL-P, and cardiac CRP elevations. He is on max dose Jardiance, Victoza, and is also on a LA/SA insulin combination. His A1C is 8.2%.     On 10/05/22 he had a liver biopsy which was ordered by his hepatologist Dr. Cevallos. The pathology was positive for cholangiocarcinoma.         Review Of Systems  Skin: negative  Eyes: negative  Ears/Nose/Throat: negative  Respiratory: No shortness of breath, dyspnea on exertion, cough, or hemoptysis  Cardiovascular: negative  Gastrointestinal: negative  Genitourinary: negative  Musculoskeletal: negative  Neurologic: negative  Psychiatric: negative  Hematologic/Lymphatic/Immunologic: negative  Endocrine: negative      PAST MEDICAL HISTORY:                  Past Medical History:   Diagnosis Date    Acute kidney failure, unspecified     Blood transfusion     CAD (coronary artery disease)     CARDIOVASCULAR SCREENING; LDL GOAL LESS THAN 100     Cirrhosis (H)     Critical lower limb ischemia (H)     Diabetes mellitus (H) 10/2011    Hypertension     Hypertension goal BP (blood pressure) < 140/90     Ischemic cardiomyopathy     Lesion of liver     Lymphedema of left leg 05/11/2016    Peripheral arterial disease     PVD (peripheral vascular disease)     Right above knee amputation 04/30/2014    Type 2 diabetes, HbA1C goal < 8% (H)        PAST SURGICAL HISTORY:                  Past Surgical  History:   Procedure Laterality Date    AMPUTATE LEG ABOVE KNEE  11/22/2011    Procedure:AMPUTATE LEG ABOVE KNEE; Revise Right Below Knee Amputation To Above Knee Amputation; Surgeon:MADISON WELLS; Location:[facility] OR    AMPUTATE LEG BELOW KNEE  11/10/2011    Procedure:AMPUTATE LEG BELOW KNEE; Right Below Knee Amputation; Surgeon:MADISON WELLS; Location:[facility] OR    BYPASS GRAFT FEMOROTIBIAL  9/19/2013    Procedure: BYPASS GRAFT FEMOROTIBIAL;  Left Femorotibial Bypass Graft Insitu ;  Surgeon: Marisol Suggs MD;  Location: [facility] OR    CARDIAC SURGERY      cardiac stent    ESOPHAGOSCOPY, GASTROSCOPY, DUODENOSCOPY (EGD), COMBINED  10/1/2012    Procedure: COMBINED ESOPHAGOSCOPY, GASTROSCOPY, DUODENOSCOPY (EGD);;  Surgeon: Nehemias Cevallos MD;  Location:  GI    ESOPHAGOSCOPY, GASTROSCOPY, DUODENOSCOPY (EGD), COMBINED N/A 3/24/2016    Procedure: COMBINED ESOPHAGOSCOPY, GASTROSCOPY, DUODENOSCOPY (EGD);  Surgeon: Gildardo Marks MD;  Location:  GI    ESOPHAGOSCOPY, GASTROSCOPY, DUODENOSCOPY (EGD), COMBINED N/A 4/11/2024    Procedure: ESOPHAGOGASTRODUODENOSCOPY, WITH BIOPSY;  Surgeon: Kaycee Marrufo MD;  Location: [facility] OR    IR LIVER BIOPSY PERCUTANEOUS  10/5/2022    IR LOWER EXTREMITY ANGIOGRAM LEFT  12/2/2021    IR STENT VASCULAR LT  3/9/2012         IRRIGATION AND DEBRIDEMENT LOWER EXTREMITY, COMBINED  11/15/2011    Procedure:COMBINED IRRIGATION AND DEBRIDEMENT LOWER EXTREMITY; DRAINAGE OF ABSCESS AT BELOW KNEE AMPUTATION AND KNEE DISARTICULATION.; Surgeon:MADISON WELLS; Location:[facility] OR    NO HISTORY OF SURGERY  7/12/13    derm    VASCULAR REPAIR LOWER EXTREMITY Left 9/19/2017    Procedure: VASCULAR REPAIR LOWER EXTREMITY;  Ligation Parasitic Branch To Left Lower Extremity ;  Surgeon: Marisol Suggs MD;  Location: [facility] OR       CURRENT MEDICATIONS:                  Current Outpatient Medications   Medication Sig Dispense Refill    acetaminophen (TYLENOL) 325 MG tablet Take 1 tablet by mouth every 4 hours as needed. 250 tablet 0     albuterol (PROAIR HFA/PROVENTIL HFA/VENTOLIN HFA) 108 (90 Base) MCG/ACT inhaler Inhale 2 puffs into the lungs every 4 hours as needed for shortness of breath or wheezing (Patient not taking: Reported on 9/24/2024)      Alcohol Swabs (ALCOHOL PREP PAD) 70 % PADS       amLODIPine (NORVASC) 2.5 MG tablet Take 1 tablet (2.5 mg) by mouth 2 times daily 180 tablet 3    ammonium lactate (AMLACTIN) 12 % external cream Apply to affected area on both legs twice daily as needed for dry skin      ASPIRIN LOW DOSE 81 MG EC tablet       BD KWABENA U/F 32G X 4 MM insulin pen needle USE 4 TIME DAILY OR AS DIRECTED. 100 each 8    blood glucose (NO BRAND SPECIFIED) test strip Use  to test 4 times a day.      Continuous Blood Gluc  (DEXCOM G6 ) DORIS Use to read blood sugars as per 's instructions.      gabapentin (NEURONTIN) 300 MG capsule Take 600 mg in AM   Take 300 mg in afternoon   Take 600 mg at bedtime  May also take an additional 300 mg if needed at bedtime for pain      insulin aspart (NOVOLOG FLEXPEN) 100 UNIT/ML pen Inject 32 Units Subcutaneous 3 times daily (before meals) At 6:30am 12pm and 8pm      insulin glargine (LANTUS PEN) 100 UNIT/ML pen Inject 50 Units Subcutaneous at bedtime 60 mL 3    insulin pen needle (32G X 6 MM) 32G X 6 MM miscellaneous Use 1 pen needles daily or as directed. 90 each 3    JARDIANCE 25 MG TABS tablet TAKE 1 TABLET (25 MG) BY MOUTH DAILY 30 tablet 5    lidocaine (XYLOCAINE) 5 % external ointment Apply topically 2 times daily Apply 2 - 3g to right residual limb as needed (Patient not taking: Reported on 9/24/2024)      liraglutide (VICTOZA) 18 MG/3ML solution Inject 1.8 mg Subcutaneous daily      lisinopril (ZESTRIL) 2.5 MG tablet TAKE 1 TABLET (2.5 MG) BY MOUTH 2 TIMES DAILY. 90 tablet 3    metoprolol succinate ER (TOPROL XL) 50 MG 24 hr tablet TAKE 1.5 TABLETS (75MG) BY MOUTH DAILY 21 tablet 8    omeprazole (PRILOSEC) 20 MG DR capsule Take 1 capsule (20 mg) by mouth 2  times daily (Patient not taking: Reported on 5/29/2024) 1 capsule 0    omeprazole (PRILOSEC) 40 MG DR capsule Take 1 capsule (40 mg) by mouth daily 90 capsule 3    order for DME Equipment being ordered: FlexiTouch pneumatic compression device.  2-3 times per day to left lower extremity.  Current strength - L4 1 Units     rosuvastatin (CRESTOR) 40 MG tablet Take 1 tablet (40 mg) by mouth daily 90 tablet 3    senna-docusate (SENOKOT-S/PERICOLACE) 8.6-50 MG tablet Take 1 tablet by mouth At Bedtime      spironolactone (ALDACTONE) 25 MG tablet Take 12.5 mg by mouth daily      XARELTO ANTICOAGULANT 2.5 MG TABS tablet TAKE 1 TABLET (2.5 MG) BY MOUTH 2 TIMES DAILY 60 tablet 5       ALLERGIES:                  Allergies   Allergen Reactions    Ciprofloxacin Rash       SOCIAL HISTORY:                  Social History     Socioeconomic History    Marital status:      Spouse name: Not on file    Number of children: 0    Years of education: Not on file    Highest education level: Not on file   Occupational History     Employer: UNKNOWN   Tobacco Use    Smoking status: Every Day     Current packs/day: 2.00     Average packs/day: 2.0 packs/day for 40.0 years (80.0 ttl pk-yrs)     Types: Cigarettes    Smokeless tobacco: Never    Tobacco comments:     Is not working on quitting     Drinks alcohol   Vaping Use    Vaping status: Never Used   Substance and Sexual Activity    Alcohol use: Yes     Alcohol/week: 24.0 standard drinks of alcohol     Types: 24 Cans of beer per week    Drug use: No    Sexual activity: Not Currently   Other Topics Concern    Parent/sibling w/ CABG, MI or angioplasty before 65F 55M? No     Service Not Asked    Blood Transfusions Not Asked    Caffeine Concern Yes    Occupational Exposure Not Asked    Hobby Hazards Not Asked    Sleep Concern Not Asked    Stress Concern Not Asked    Weight Concern Not Asked    Special Diet Not Asked    Back Care Not Asked    Exercise No    Bike Helmet Not Asked     Seat Belt Not Asked    Self-Exams Not Asked   Social History Narrative    Not on file     Social Drivers of Health     Financial Resource Strain: Low Risk  (4/26/2024)    Financial Resource Strain     Within the past 12 months, have you or your family members you live with been unable to get utilities (heat, electricity) when it was really needed?: No   Food Insecurity: Low Risk  (4/26/2024)    Food Insecurity     Within the past 12 months, did you worry that your food would run out before you got money to buy more?: No     Within the past 12 months, did the food you bought just not last and you didn t have money to get more?: No   Transportation Needs: Low Risk  (4/26/2024)    Transportation Needs     Within the past 12 months, has lack of transportation kept you from medical appointments, getting your medicines, non-medical meetings or appointments, work, or from getting things that you need?: No   Physical Activity: Not on file   Stress: Not on file   Social Connections: Not on file   Interpersonal Safety: Low Risk  (4/26/2024)    Interpersonal Safety     Do you feel physically and emotionally safe where you currently live?: Yes     Within the past 12 months, have you been hit, slapped, kicked or otherwise physically hurt by someone?: No     Within the past 12 months, have you been humiliated or emotionally abused in other ways by your partner or ex-partner?: No   Housing Stability: Low Risk  (4/26/2024)    Housing Stability     Do you have housing? : Yes     Are you worried about losing your housing?: No       FAMILY HISTORY:                   Family History   Problem Relation Age of Onset    Alcohol/Drug Mother         cirrhosis    Alcohol/Drug Father     C.A.D. No family hx of     Diabetes No family hx of     Cancer No family hx of     Anesthesia Reaction No family hx of     Clotting Disorder No family hx of            Physical exam Reveals:    O/P: WNL  HEENT: WNL  NECK: No JVD, thyromegaly, or  lymphadenopathy  HEART: RRR, no murmurs, gallops, or rubs  LUNGS: CTA bilaterally without rales, wheezes, or rhonchi  GI: NABS, nondistended, nontender, soft  EXT:without cyanosis, clubbing, or edema  NEURO: nonfocal  : no flank tenderness         All relevant labs and imaging reviewed by myself on today's date.

## 2025-01-08 NOTE — PROGRESS NOTES
[facility]        Patient is here for a follow up.    Pt is currently taking Aspirin, Statin, and Xarelto.    There were no vitals taken for this visit.    The provider has been notified that the patient has no concerns.     Questions patient would like addressed today are: N/A.    Refills are needed: Yes:  pend    Has homecare services and agency name:  Yes: [facility]       Cheli Townsend MA

## 2025-01-15 ENCOUNTER — VIRTUAL VISIT (OUTPATIENT)
Dept: OTHER | Facility: CLINIC | Age: 70
End: 2025-01-15
Attending: INTERNAL MEDICINE
Payer: MEDICARE

## 2025-01-15 DIAGNOSIS — I70.422 ATHEROSCLEROSIS OF AUTOLOGOUS VEIN BYPASS GRAFT(S) OF THE EXTREMITIES WITH REST PAIN, LEFT LEG (H): ICD-10-CM

## 2025-01-15 DIAGNOSIS — K74.60 CIRRHOSIS OF LIVER WITHOUT ASCITES, UNSPECIFIED HEPATIC CIRRHOSIS TYPE (H): ICD-10-CM

## 2025-01-15 DIAGNOSIS — C22.1 CHOLANGIOCARCINOMA (H): ICD-10-CM

## 2025-01-15 DIAGNOSIS — I10 HYPERTENSION GOAL BP (BLOOD PRESSURE) < 140/90: ICD-10-CM

## 2025-01-15 DIAGNOSIS — I73.9 PAD (PERIPHERAL ARTERY DISEASE): ICD-10-CM

## 2025-01-15 DIAGNOSIS — E11.8 TYPE 2 DIABETES MELLITUS WITH COMPLICATION, WITH LONG-TERM CURRENT USE OF INSULIN (H): ICD-10-CM

## 2025-01-15 DIAGNOSIS — F17.200 TOBACCO USE DISORDER: ICD-10-CM

## 2025-01-15 DIAGNOSIS — I73.9 PAD (PERIPHERAL ARTERY DISEASE): Primary | ICD-10-CM

## 2025-01-15 DIAGNOSIS — E78.5 HYPERLIPIDEMIA LDL GOAL <70: ICD-10-CM

## 2025-01-15 DIAGNOSIS — Z79.4 TYPE 2 DIABETES MELLITUS WITH COMPLICATION, WITH LONG-TERM CURRENT USE OF INSULIN (H): ICD-10-CM

## 2025-01-15 PROCEDURE — 98007 SYNCH AUDIO-VIDEO EST HI 40: CPT | Performed by: INTERNAL MEDICINE

## 2025-01-15 PROCEDURE — G2211 COMPLEX E/M VISIT ADD ON: HCPCS | Performed by: INTERNAL MEDICINE

## 2025-01-15 RX ORDER — ROSUVASTATIN CALCIUM 40 MG/1
40 TABLET, COATED ORAL DAILY
Qty: 90 TABLET | Refills: 3 | Status: SHIPPED | OUTPATIENT
Start: 2025-01-15

## 2025-01-15 RX ORDER — METOPROLOL SUCCINATE 50 MG/1
75 TABLET, EXTENDED RELEASE ORAL DAILY
Qty: 135 TABLET | Refills: 3 | Status: SHIPPED | OUTPATIENT
Start: 2025-01-15

## 2025-01-15 RX ORDER — LISINOPRIL 2.5 MG/1
TABLET ORAL
Qty: 90 TABLET | Refills: 3 | Status: SHIPPED | OUTPATIENT
Start: 2025-01-15

## 2025-01-15 RX ORDER — SPIRONOLACTONE 25 MG/1
12.5 TABLET ORAL DAILY
Qty: 45 TABLET | Refills: 3 | Status: SHIPPED | OUTPATIENT
Start: 2025-01-15

## 2025-01-15 RX ORDER — AMLODIPINE BESYLATE 2.5 MG/1
2.5 TABLET ORAL 2 TIMES DAILY
Qty: 180 TABLET | Refills: 3 | Status: SHIPPED | OUTPATIENT
Start: 2025-01-15

## 2025-01-15 NOTE — PROGRESS NOTES
VASCULAR MEDICINE FOLLOW UP VISIT   A/P:        (F17.200) Tobacco use disorder  Comment: I again advised him to quit smoking.  Plan: He again refuses to quit smoking.      (I73.9) PAD S/P Lt SFA stenting 3-9-2012, Lt fem-SENIA bypass w/ ISGSV 9-, S/P Rt AKA 2014, ligation of parasitic branch 9/19/2017  Comment: The bypass is patent.   Plan: Repeat imaging in 01/2026.     (E78.5) Hyperlipidemia LDL goal <70  Comment: At goal  Plan: Continue  Crestor 40 to attain lower LDL-P and cardiac CRP in setting of known ongoing tobacco use and clinical CV ds. Check advanced lipid testing 01/2026, RTC two weeks later.             (I10) Hypertension goal BP (blood pressure) < 130/80  Comment: at goal  Plan: no med changes     (E11.8,  Z79.4) Type 2 diabetes mellitus with complication, with long-term current use of insulin (H)  Comment: not at a1c goal of < 7%, it had decreased with the below to  7.2 but is now 9.1  ;I increased Lantus from 56 to 64 units daily but split dosing to 32 units BID when seen on 5/17/2022. He however instead took 42 units SQ at bedtime. He has frequent excursions > 200, and never has hypoglycemia. He has an endocrinologist, who changed lariglutide to semaglutide in August of 2023.  As a continuing to smoke diabetic with PAD, more optimal glycemic control is warranted.   Plan: He is instructed go in to see his endocrinologist for a face to face visit to achieve better glycemic control. Further diabetic med changes through his endocrinologist.        (C22.1) Cholangiocarcinoma (H)  (primary encounter diagnosis)  Comment: This is being addressed by Dr. Cardozo  Plan: Per Dr. Cardozo.      49 minutes total medical care on today's date.    MD location: office  Pt location: home    Phone call start: 15:49  Phone call end: 16:38  Doximity        HPI:    Alexandro Pool is a 69 year old male with a past medical history of current  smoking which he refuses to stop, peripheral arterial disease, status post  right above-the-knee amputation in 2012 and left femoral to tibial bypass graft, hyperlipidemia, hypertension, type 2 diabetes, coronary artery disease S/P DESing.  The patient has had a slow healing left foot dorsal wound which is being managed twice a week by [facility] in [place] with slow improvement in healing. He had previous vascular imaging with decreased ABIs and ultrasound flow noted in the left graft.  He underwent a left lower extremity angiogram on 12/2/2021 at [facility].  There was a significant stenosis due to eccentric plaque in the left common femoral artery at the level of the proximal anastomosis of the left femoral anterior tibial artery bypass graft.  There were significant stenoses in the right common internal and external iliac arteries.  An angioplasty was performed to the left common femoral artery. Follow-up angiogram showed significant improvement in the luminal diameter and improved flow.  Post angiography imaging showed a filling defect in the distal aspect of the femoral to anterior tibial artery bypass graft.  This was due to embolized calcified plaque from the site of the angioplasty. Successful suction thrombectomy as well as snare retrieval was done to remove the embolus.  Due to periprocedural blood loss the patient was kept overnight for further monitoring.  He was then discharged the next day. Today, the patient states his wound is improved. He is on vascular dosed Xarelto with no reports of unusual bleeding. We discussed the results of his ABIs ultrasound that were performed in August.  The left superficial femoral to anterior tibial artery bypass is patent with no evidence of stenosis or occlusion. His OZZIE was normal.  He is on a statin and has an LDL-C < 70, but LDL-P, and cardiac CRP elevations. He is on max dose Jardiance, Victoza, and is also on a LA/SA insulin combination. His A1C is 8.2%.     On 10/05/22 he had a liver biopsy which was ordered by  his hepatologist Dr. Cevallos. The pathology was positive for cholangiocarcinoma.      Review Of Systems  Skin: negative  Eyes: negative  Ears/Nose/Throat: negative  Respiratory: No shortness of breath, dyspnea on exertion, cough, or hemoptysis  Cardiovascular: negative  Gastrointestinal: negative  Genitourinary: negative  Musculoskeletal: negative  Neurologic: negative  Psychiatric: negative  Hematologic/Lymphatic/Immunologic: negative  Endocrine: negative        PAST MEDICAL HISTORY:                  Past Medical History:   Diagnosis Date    Acute kidney failure, unspecified     Blood transfusion     CAD (coronary artery disease)     CARDIOVASCULAR SCREENING; LDL GOAL LESS THAN 100     Cirrhosis (H)     Critical lower limb ischemia (H)     Diabetes mellitus (H) 10/2011    Hypertension     Hypertension goal BP (blood pressure) < 140/90     Ischemic cardiomyopathy     Lesion of liver     Lymphedema of left leg 05/11/2016    Peripheral arterial disease     PVD (peripheral vascular disease)     Right above knee amputation 04/30/2014    Type 2 diabetes, HbA1C goal < 8% (H)        PAST SURGICAL HISTORY:                  Past Surgical History:   Procedure Laterality Date    AMPUTATE LEG ABOVE KNEE  11/22/2011    Procedure:AMPUTATE LEG ABOVE KNEE; Revise Right Below Knee Amputation To Above Knee Amputation; Surgeon:MADISON WELLS; Location: OR    AMPUTATE LEG BELOW KNEE  11/10/2011    Procedure:AMPUTATE LEG BELOW KNEE; Right Below Knee Amputation; Surgeon:MADISON WELLS; Location: OR    BYPASS GRAFT FEMOROTIBIAL  9/19/2013    Procedure: BYPASS GRAFT FEMOROTIBIAL;  Left Femorotibial Bypass Graft Insitu ;  Surgeon: Marisol Suggs MD;  Location:  OR    CARDIAC SURGERY      cardiac stent    ESOPHAGOSCOPY, GASTROSCOPY, DUODENOSCOPY (EGD), COMBINED  10/1/2012    Procedure: COMBINED ESOPHAGOSCOPY, GASTROSCOPY, DUODENOSCOPY (EGD);;  Surgeon: Nehemias Cevallos MD;  Location:  GI    ESOPHAGOSCOPY, GASTROSCOPY, DUODENOSCOPY  (EGD), COMBINED N/A 3/24/2016    Procedure: COMBINED ESOPHAGOSCOPY, GASTROSCOPY, DUODENOSCOPY (EGD);  Surgeon: Gildardo Marks MD;  Location: [facility] GI    ESOPHAGOSCOPY, GASTROSCOPY, DUODENOSCOPY (EGD), COMBINED N/A 4/11/2024    Procedure: ESOPHAGOGASTRODUODENOSCOPY, WITH BIOPSY;  Surgeon: Kaycee Marrufo MD;  Location: [facility] OR    IR LIVER BIOPSY PERCUTANEOUS  10/5/2022    IR LOWER EXTREMITY ANGIOGRAM LEFT  12/2/2021    IR STENT VASCULAR LT  3/9/2012         IRRIGATION AND DEBRIDEMENT LOWER EXTREMITY, COMBINED  11/15/2011    Procedure:COMBINED IRRIGATION AND DEBRIDEMENT LOWER EXTREMITY; DRAINAGE OF ABSCESS AT BELOW KNEE AMPUTATION AND KNEE DISARTICULATION.; Surgeon:MADISON WELLS; Location:[facility] OR    NO HISTORY OF SURGERY  7/12/13    derm    VASCULAR REPAIR LOWER EXTREMITY Left 9/19/2017    Procedure: VASCULAR REPAIR LOWER EXTREMITY;  Ligation Parasitic Branch To Left Lower Extremity ;  Surgeon: Marisol Suggs MD;  Location: [facility] OR       CURRENT MEDICATIONS:                  Current Outpatient Medications   Medication Sig Dispense Refill    acetaminophen (TYLENOL) 325 MG tablet Take 1 tablet by mouth every 4 hours as needed. 250 tablet 0    Alcohol Swabs (ALCOHOL PREP PAD) 70 % PADS       amLODIPine (NORVASC) 2.5 MG tablet Take 1 tablet (2.5 mg) by mouth 2 times daily 180 tablet 3    ammonium lactate (AMLACTIN) 12 % external cream Apply to affected area on both legs twice daily as needed for dry skin      ASPIRIN LOW DOSE 81 MG EC tablet       BD KWABENA U/F 32G X 4 MM insulin pen needle USE 4 TIME DAILY OR AS DIRECTED. 100 each 8    blood glucose (NO BRAND SPECIFIED) test strip Use  to test 4 times a day.      Continuous Blood Gluc  (DEXCOM G6 ) DORIS Use to read blood sugars as per 's instructions.      gabapentin (NEURONTIN) 300 MG capsule Take 600 mg in AM   Take 300 mg in afternoon   Take 600 mg at bedtime  May also take an additional 300 mg if needed at bedtime for pain      insulin aspart  (NOVOLOG FLEXPEN) 100 UNIT/ML pen Inject 32 Units Subcutaneous 3 times daily (before meals) At 6:30am 12pm and 8pm      insulin glargine (LANTUS PEN) 100 UNIT/ML pen Inject 50 Units Subcutaneous at bedtime 60 mL 3    insulin pen needle (32G X 6 MM) 32G X 6 MM miscellaneous Use 1 pen needles daily or as directed. 90 each 3    JARDIANCE 25 MG TABS tablet TAKE 1 TABLET (25 MG) BY MOUTH DAILY 30 tablet 5    liraglutide (VICTOZA) 18 MG/3ML solution Inject 1.8 mg Subcutaneous daily      lisinopril (ZESTRIL) 2.5 MG tablet TAKE 1 TABLET (2.5 MG) BY MOUTH 2 TIMES DAILY. 90 tablet 3    metoprolol succinate ER (TOPROL XL) 50 MG 24 hr tablet TAKE 1.5 TABLETS (75MG) BY MOUTH DAILY 21 tablet 8    omeprazole (PRILOSEC) 40 MG DR capsule Take 1 capsule (40 mg) by mouth daily 90 capsule 3    order for DME Equipment being ordered: FlexiTouch pneumatic compression device.  2-3 times per day to left lower extremity.  Current strength - L4 1 Units     rosuvastatin (CRESTOR) 40 MG tablet Take 1 tablet (40 mg) by mouth daily 90 tablet 3    senna-docusate (SENOKOT-S/PERICOLACE) 8.6-50 MG tablet Take 1 tablet by mouth At Bedtime      spironolactone (ALDACTONE) 25 MG tablet Take 12.5 mg by mouth daily      Tirzepatide 2.5 MG/0.5ML SOAJ 2.5 mg.      XARELTO ANTICOAGULANT 2.5 MG TABS tablet TAKE 1 TABLET (2.5 MG) BY MOUTH 2 TIMES DAILY 60 tablet 5       ALLERGIES:                  Allergies   Allergen Reactions    Ciprofloxacin Rash       SOCIAL HISTORY:                  Social History     Socioeconomic History    Marital status:      Spouse name: Not on file    Number of children: 0    Years of education: Not on file    Highest education level: Not on file   Occupational History     Employer: UNKNOWN   Tobacco Use    Smoking status: Every Day     Current packs/day: 2.00     Average packs/day: 2.0 packs/day for 40.0 years (80.0 ttl pk-yrs)     Types: Cigarettes    Smokeless tobacco: Never    Tobacco comments:     Is not working on  "quitting     Drinks alcohol   Vaping Use    Vaping status: Never Used   Substance and Sexual Activity    Alcohol use: Yes     Alcohol/week: 24.0 standard drinks of alcohol     Types: 24 Cans of beer per week     Comment: \"as often as possible\"    Drug use: No    Sexual activity: Not Currently   Other Topics Concern    Parent/sibling w/ CABG, MI or angioplasty before 65F 55M? No     Service Not Asked    Blood Transfusions Not Asked    Caffeine Concern Yes    Occupational Exposure Not Asked    Hobby Hazards Not Asked    Sleep Concern Not Asked    Stress Concern Not Asked    Weight Concern Not Asked    Special Diet Not Asked    Back Care Not Asked    Exercise No    Bike Helmet Not Asked    Seat Belt Not Asked    Self-Exams Not Asked   Social History Narrative    Not on file     Social Drivers of Health     Financial Resource Strain: Low Risk  (4/26/2024)    Financial Resource Strain     Within the past 12 months, have you or your family members you live with been unable to get utilities (heat, electricity) when it was really needed?: No   Food Insecurity: Low Risk  (4/26/2024)    Food Insecurity     Within the past 12 months, did you worry that your food would run out before you got money to buy more?: No     Within the past 12 months, did the food you bought just not last and you didn t have money to get more?: No   Transportation Needs: Low Risk  (4/26/2024)    Transportation Needs     Within the past 12 months, has lack of transportation kept you from medical appointments, getting your medicines, non-medical meetings or appointments, work, or from getting things that you need?: No   Physical Activity: Not on file   Stress: Not on file   Social Connections: Not on file   Interpersonal Safety: Low Risk  (4/26/2024)    Interpersonal Safety     Do you feel physically and emotionally safe where you currently live?: Yes     Within the past 12 months, have you been hit, slapped, kicked or otherwise physically hurt " by someone?: No     Within the past 12 months, have you been humiliated or emotionally abused in other ways by your partner or ex-partner?: No   Housing Stability: Low Risk  (4/26/2024)    Housing Stability     Do you have housing? : Yes     Are you worried about losing your housing?: No       FAMILY HISTORY:                   Family History   Problem Relation Age of Onset    Alcohol/Drug Mother         cirrhosis    Alcohol/Drug Father     C.A.D. No family hx of     Diabetes No family hx of     Cancer No family hx of     Anesthesia Reaction No family hx of     Clotting Disorder No family hx of              Physical exam was not undertaken as this was a billable video visit.          All relevant labs and imaging reviewed by myself on today's date.

## 2025-01-15 NOTE — PROGRESS NOTES
Alexandro is a 69 year old who is being evaluated via a billable video visit.    What phone number would you like to be contacted at? [phone]  How would you like to obtain your AVS? Cortney Townsend MA

## 2025-03-08 ENCOUNTER — HEALTH MAINTENANCE LETTER (OUTPATIENT)
Age: 70
End: 2025-03-08

## 2025-03-28 ENCOUNTER — OFFICE VISIT (OUTPATIENT)
Dept: GASTROENTEROLOGY | Facility: CLINIC | Age: 70
End: 2025-03-28
Attending: INTERNAL MEDICINE
Payer: MEDICARE

## 2025-03-28 VITALS
HEART RATE: 70 BPM | DIASTOLIC BLOOD PRESSURE: 81 MMHG | OXYGEN SATURATION: 98 % | TEMPERATURE: 97.9 F | SYSTOLIC BLOOD PRESSURE: 123 MMHG

## 2025-03-28 DIAGNOSIS — K70.30 ALCOHOLIC CIRRHOSIS OF LIVER WITHOUT ASCITES (H): Primary | ICD-10-CM

## 2025-03-28 DIAGNOSIS — C22.1 CHOLANGIOCARCINOMA (H): ICD-10-CM

## 2025-03-28 PROCEDURE — 3079F DIAST BP 80-89 MM HG: CPT | Performed by: INTERNAL MEDICINE

## 2025-03-28 PROCEDURE — G0463 HOSPITAL OUTPT CLINIC VISIT: HCPCS | Performed by: INTERNAL MEDICINE

## 2025-03-28 PROCEDURE — 82105 ALPHA-FETOPROTEIN SERUM: CPT | Performed by: INTERNAL MEDICINE

## 2025-03-28 PROCEDURE — 99000 SPECIMEN HANDLING OFFICE-LAB: CPT | Performed by: PATHOLOGY

## 2025-03-28 PROCEDURE — 3074F SYST BP LT 130 MM HG: CPT | Performed by: INTERNAL MEDICINE

## 2025-03-28 PROCEDURE — 99214 OFFICE O/P EST MOD 30 MIN: CPT | Performed by: INTERNAL MEDICINE

## 2025-03-28 NOTE — LETTER
3/28/2025      Alexandro Pool  [address]  [place] [address]      Dear Colleague,    Thank you for referring your patient, Alexandro Pool, to the [facility] [place]. Please see a copy of my visit note below.    Hepatology Follow-Up Visit:     HISTORY OF PRESENT ILLNESS:   I had the pleasure of seeing Alexandro Pool for followup in the Liver Clinic at the [facility] on March 28, 2025. Mr. 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  His disease was complicated by the development of a liver lesion which on biopsy was cholangiocarcinoma.  It was treated with ablation, which he tolerated well.     At present, he denies any abdominal pain, itching or skin rash, or fatigue.  He denies any increased abdominal girth or any lower extremity edema.  He has not had any gastrointestinal bleeding or any overt signs of hepatic encephalopathy.     He denies any fevers or chills, cough or shortness of breath.  He continues to smoke and is not interested in stopping.  He denies any nausea or vomiting or constipation.  He said he has had diarrhea for many, many years that has been stable.  He reports his appetite has been good and his weight has been stable.  He continues to drink about 24 beers per week and is not interested in stopping.     There otherwise have been no other new events since he was last seen.    Medications:   Current Outpatient Medications   Medication Sig Dispense Refill     acetaminophen (TYLENOL) 325 MG tablet Take 1 tablet by mouth every 4 hours as needed. 250 tablet 0     Alcohol Swabs (ALCOHOL PREP PAD) 70 % PADS        amLODIPine (NORVASC) 2.5 MG tablet Take 1 tablet (2.5 mg) by mouth 2 times daily. 180 tablet 3     ammonium lactate (AMLACTIN) 12 % external cream Apply to affected area on both legs twice daily as needed for dry skin       ASPIRIN LOW DOSE 81 MG EC tablet        BD KWABENA U/F 32G X  4 MM insulin pen needle USE 4 TIME DAILY OR AS DIRECTED. 100 each 8     blood glucose (NO BRAND SPECIFIED) test strip Use  to test 4 times a day.       Continuous Blood Gluc  (DEXCOM G6 ) DORIS Use to read blood sugars as per 's instructions.       gabapentin (NEURONTIN) 300 MG capsule Take 600 mg in AM   Take 300 mg in afternoon   Take 600 mg at bedtime  May also take an additional 300 mg if needed at bedtime for pain       insulin aspart (NOVOLOG FLEXPEN) 100 UNIT/ML pen Inject 32 Units Subcutaneous 3 times daily (before meals) At 6:30am 12pm and 8pm       insulin glargine (LANTUS PEN) 100 UNIT/ML pen Inject 50 Units Subcutaneous at bedtime 60 mL 3     insulin pen needle (32G X 6 MM) 32G X 6 MM miscellaneous Use 1 pen needles daily or as directed. 90 each 3     JARDIANCE 25 MG TABS tablet TAKE 1 TABLET (25 MG) BY MOUTH DAILY 30 tablet 5     liraglutide (VICTOZA) 18 MG/3ML solution Inject 1.8 mg Subcutaneous daily       lisinopril (ZESTRIL) 2.5 MG tablet TAKE 1 TABLET (2.5 MG) BY MOUTH 2 TIMES DAILY. 90 tablet 3     metoprolol succinate ER (TOPROL XL) 50 MG 24 hr tablet Take 1.5 tablets (75 mg) by mouth daily. 135 tablet 3     omeprazole (PRILOSEC) 40 MG DR capsule Take 1 capsule (40 mg) by mouth daily. 90 capsule 3     order for DME Equipment being ordered: FlexiTouch pneumatic compression device.  2-3 times per day to left lower extremity.  Current strength - L4 1 Units      rivaroxaban ANTICOAGULANT (XARELTO ANTICOAGULANT) 2.5 MG TABS tablet Take 1 tablet (2.5 mg) by mouth 2 times daily. 180 tablet 3     rosuvastatin (CRESTOR) 40 MG tablet Take 1 tablet (40 mg) by mouth daily. 90 tablet 3     senna-docusate (SENOKOT-S/PERICOLACE) 8.6-50 MG tablet Take 1 tablet by mouth At Bedtime       spironolactone (ALDACTONE) 25 MG tablet Take 0.5 tablets (12.5 mg) by mouth daily. 45 tablet 3     Tirzepatide 2.5 MG/0.5ML SOAJ 2.5 mg.       No current facility-administered medications for this visit.       Vitals:   /81   Pulse 70   Temp 97.9  F (36.6  C) (Oral)   SpO2 98%     Physical Exam:   In general he looks quite well. HEENT exam shows no scleral icterus or temporal muscle wasting. Chest is clear. Abdominal exam shows no increase in girth. No masses or tenderness to palpation are present. Liver is 10 cm in span without left lobe enlargement. No spleen tip is palpable. Extremity exam shows no edema. Skin exam shows no stigmata of chronic liver disease. Neurologic exam shows no asterixis.     Labs:   Lab Results   Component Value Date     03/28/2025    POTASSIUM 5.3 03/28/2025    CHLORIDE 103 03/28/2025    ANIONGAP 12 03/28/2025    CO2 21 (L) 03/28/2025    BUN 19.2 03/28/2025    CR 0.96 03/28/2025    GFRESTIMATED 85 03/28/2025    BOGDAN 9.5 03/28/2025      Lab Results   Component Value Date    WBC 4.5 03/28/2025    HGB 11.8 (L) 03/28/2025    HCT 36.7 (L) 03/28/2025    MCV 85 03/28/2025    MCH 27.4 03/28/2025    MCHC 32.2 03/28/2025    RDW 15.9 (H) 03/28/2025     (L) 03/28/2025     Lab Results   Component Value Date    ALBUMIN 4.1 03/28/2025    ALKPHOS 66 03/28/2025    AST 28 03/28/2025    BILICONJ 0.0 04/03/2014     Lab Results   Component Value Date    INR 1.35 (H) 03/28/2025     MELD 3.0: 10 at 3/28/2025  9:03 AM  MELD-Na: 10 at 3/28/2025  9:03 AM  Calculated from:  Serum Creatinine: 0.96 mg/dL (Using min of 1 mg/dL) at 3/28/2025  9:03 AM  Serum Sodium: 136 mmol/L at 3/28/2025  9:03 AM  Total Bilirubin: 0.2 mg/dL (Using min of 1 mg/dL) at 3/28/2025  9:03 AM  Serum Albumin: 4.1 g/dL (Using max of 3.5 g/dL) at 3/28/2025  9:03 AM  INR(ratio): 1.35 at 3/28/2025  9:03 AM  Age at listing (hypothetical): 69 years  Sex: Male at 3/28/2025  9:03 AM    Imaging: none    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  His disease is well compensated at this point in time, and his liver tests are completely normal.     He was diagnosed with an  intrahepatic cholangiocarcinoma.  He was treated with ablation and he is going to get another MRI in 2 months, and I will see him back in 6 months for repeat imaging and blood work.     He is up to date for the most part with regard to vaccines.  He could get a COVID-19 vaccine.  I also recommend that he be vaccinated against RSV.   He is otherwise up to date with regard to screening for cancer.  He is due for variceal screening which I have scheduled.     I did spend total of 30 minutes (on the date of the encounter), including 20 minutes of face-to-face clinic time including counseling. The rest of the time was spent in documentation and review of records     Thank you very much for allowing me to participate in the care of this patient.  If you have any questions regarding my recommendations, please do not hesitate to contact me.      Sincerely,       Nehemias Cevallos MD      Professor of Medicine  [employer]      Executive Medical Director, Solid Organ Transplant Program  [facility]        Again, thank you for allowing me to participate in the care of your patient.        Sincerely,        Nehemias Cevallos MD    Electronically signed

## 2025-03-28 NOTE — PROGRESS NOTES
Hepatology Follow-Up Visit:     HISTORY OF PRESENT ILLNESS:   I had the pleasure of seeing Alexandro Pool for followup in the Liver Clinic at the [facility] on March 28, 2025. Mr. 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  His disease was complicated by the development of a liver lesion which on biopsy was cholangiocarcinoma.  It was treated with ablation, which he tolerated well.     At present, he denies any abdominal pain, itching or skin rash, or fatigue.  He denies any increased abdominal girth or any lower extremity edema.  He has not had any gastrointestinal bleeding or any overt signs of hepatic encephalopathy.     He denies any fevers or chills, cough or shortness of breath.  He continues to smoke and is not interested in stopping.  He denies any nausea or vomiting or constipation.  He said he has had diarrhea for many, many years that has been stable.  He reports his appetite has been good and his weight has been stable.  He continues to drink about 24 beers per week and is not interested in stopping.     There otherwise have been no other new events since he was last seen.    Medications:   Current Outpatient Medications   Medication Sig Dispense Refill    acetaminophen (TYLENOL) 325 MG tablet Take 1 tablet by mouth every 4 hours as needed. 250 tablet 0    Alcohol Swabs (ALCOHOL PREP PAD) 70 % PADS       amLODIPine (NORVASC) 2.5 MG tablet Take 1 tablet (2.5 mg) by mouth 2 times daily. 180 tablet 3    ammonium lactate (AMLACTIN) 12 % external cream Apply to affected area on both legs twice daily as needed for dry skin      ASPIRIN LOW DOSE 81 MG EC tablet       BD KWABENA U/F 32G X 4 MM insulin pen needle USE 4 TIME DAILY OR AS DIRECTED. 100 each 8    blood glucose (NO BRAND SPECIFIED) test strip Use  to test 4 times a day.      Continuous Blood Gluc  (DEXCOM G6 ) DORIS Use to read blood sugars as per 's  instructions.      gabapentin (NEURONTIN) 300 MG capsule Take 600 mg in AM   Take 300 mg in afternoon   Take 600 mg at bedtime  May also take an additional 300 mg if needed at bedtime for pain      insulin aspart (NOVOLOG FLEXPEN) 100 UNIT/ML pen Inject 32 Units Subcutaneous 3 times daily (before meals) At 6:30am 12pm and 8pm      insulin glargine (LANTUS PEN) 100 UNIT/ML pen Inject 50 Units Subcutaneous at bedtime 60 mL 3    insulin pen needle (32G X 6 MM) 32G X 6 MM miscellaneous Use 1 pen needles daily or as directed. 90 each 3    JARDIANCE 25 MG TABS tablet TAKE 1 TABLET (25 MG) BY MOUTH DAILY 30 tablet 5    liraglutide (VICTOZA) 18 MG/3ML solution Inject 1.8 mg Subcutaneous daily      lisinopril (ZESTRIL) 2.5 MG tablet TAKE 1 TABLET (2.5 MG) BY MOUTH 2 TIMES DAILY. 90 tablet 3    metoprolol succinate ER (TOPROL XL) 50 MG 24 hr tablet Take 1.5 tablets (75 mg) by mouth daily. 135 tablet 3    omeprazole (PRILOSEC) 40 MG DR capsule Take 1 capsule (40 mg) by mouth daily. 90 capsule 3    order for DME Equipment being ordered: FlexiTouch pneumatic compression device.  2-3 times per day to left lower extremity.  Current strength - L4 1 Units     rivaroxaban ANTICOAGULANT (XARELTO ANTICOAGULANT) 2.5 MG TABS tablet Take 1 tablet (2.5 mg) by mouth 2 times daily. 180 tablet 3    rosuvastatin (CRESTOR) 40 MG tablet Take 1 tablet (40 mg) by mouth daily. 90 tablet 3    senna-docusate (SENOKOT-S/PERICOLACE) 8.6-50 MG tablet Take 1 tablet by mouth At Bedtime      spironolactone (ALDACTONE) 25 MG tablet Take 0.5 tablets (12.5 mg) by mouth daily. 45 tablet 3    Tirzepatide 2.5 MG/0.5ML SOAJ 2.5 mg.       No current facility-administered medications for this visit.      Vitals:   /81   Pulse 70   Temp 97.9  F (36.6  C) (Oral)   SpO2 98%     Physical Exam:   In general he looks quite well. HEENT exam shows no scleral icterus or temporal muscle wasting. Chest is clear. Abdominal exam shows no increase in girth. No masses or  tenderness to palpation are present. Liver is 10 cm in span without left lobe enlargement. No spleen tip is palpable. Extremity exam shows no edema. Skin exam shows no stigmata of chronic liver disease. Neurologic exam shows no asterixis.     Labs:   Lab Results   Component Value Date     03/28/2025    POTASSIUM 5.3 03/28/2025    CHLORIDE 103 03/28/2025    ANIONGAP 12 03/28/2025    CO2 21 (L) 03/28/2025    BUN 19.2 03/28/2025    CR 0.96 03/28/2025    GFRESTIMATED 85 03/28/2025    BOGDAN 9.5 03/28/2025      Lab Results   Component Value Date    WBC 4.5 03/28/2025    HGB 11.8 (L) 03/28/2025    HCT 36.7 (L) 03/28/2025    MCV 85 03/28/2025    MCH 27.4 03/28/2025    MCHC 32.2 03/28/2025    RDW 15.9 (H) 03/28/2025     (L) 03/28/2025     Lab Results   Component Value Date    ALBUMIN 4.1 03/28/2025    ALKPHOS 66 03/28/2025    AST 28 03/28/2025    BILICONJ 0.0 04/03/2014     Lab Results   Component Value Date    INR 1.35 (H) 03/28/2025     MELD 3.0: 10 at 3/28/2025  9:03 AM  MELD-Na: 10 at 3/28/2025  9:03 AM  Calculated from:  Serum Creatinine: 0.96 mg/dL (Using min of 1 mg/dL) at 3/28/2025  9:03 AM  Serum Sodium: 136 mmol/L at 3/28/2025  9:03 AM  Total Bilirubin: 0.2 mg/dL (Using min of 1 mg/dL) at 3/28/2025  9:03 AM  Serum Albumin: 4.1 g/dL (Using max of 3.5 g/dL) at 3/28/2025  9:03 AM  INR(ratio): 1.35 at 3/28/2025  9:03 AM  Age at listing (hypothetical): 69 years  Sex: Male at 3/28/2025  9:03 AM    Imaging: none    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  His disease is well compensated at this point in time, and his liver tests are completely normal.     He was diagnosed with an intrahepatic cholangiocarcinoma.  He was treated with ablation and he is going to get another MRI in 2 months, and I will see him back in 6 months for repeat imaging and blood work.     He is up to date for the most part with regard to vaccines.  He could get a COVID-19  vaccine.  I also recommend that he be vaccinated against RSV.   He is otherwise up to date with regard to screening for cancer.  He is due for variceal screening which I have scheduled.     I did spend total of 30 minutes (on the date of the encounter), including 20 minutes of face-to-face clinic time including counseling. The rest of the time was spent in documentation and review of records     Thank you very much for allowing me to participate in the care of this patient.  If you have any questions regarding my recommendations, please do not hesitate to contact me.      Sincerely,       Nehemias Cevallos MD      Professor of Medicine  [employer]      Executive Medical Director, Solid Organ Transplant Program  [facility]

## 2025-05-16 DIAGNOSIS — E11.8 TYPE 2 DIABETES MELLITUS WITH COMPLICATION, WITH LONG-TERM CURRENT USE OF INSULIN (H): ICD-10-CM

## 2025-05-16 DIAGNOSIS — Z79.4 TYPE 2 DIABETES MELLITUS WITH COMPLICATION, WITH LONG-TERM CURRENT USE OF INSULIN (H): ICD-10-CM

## 2025-05-16 DIAGNOSIS — I73.9 PAD (PERIPHERAL ARTERY DISEASE): ICD-10-CM

## 2025-05-16 NOTE — TELEPHONE ENCOUNTER
JARDIANCE 25 MG TABS tablet     Medication did not pass [facility] protocol.    Prescription and pharmacy loaded and routed to provider.    Michell Diaz RN

## 2025-05-19 RX ORDER — EMPAGLIFLOZIN 25 MG/1
25 TABLET, FILM COATED ORAL DAILY
Qty: 28 TABLET | Refills: 6 | Status: SHIPPED | OUTPATIENT
Start: 2025-05-19

## 2025-05-29 ENCOUNTER — RESULTS FOLLOW-UP (OUTPATIENT)
Dept: GASTROENTEROLOGY | Facility: CLINIC | Age: 70
End: 2025-05-29

## 2025-07-13 ENCOUNTER — HEALTH MAINTENANCE LETTER (OUTPATIENT)
Age: 70
End: 2025-07-13

## (undated) DEVICE — LIGATOR SPEEDBAND SUPERVIEW 7 BAND LATEX FREE 4225

## (undated) DEVICE — BNDG ELASTIC 4"X5YDS STERILE 6611-4S

## (undated) DEVICE — GLOVE EXAM NITRILE LG PF LATEX FREE 5064

## (undated) DEVICE — Device

## (undated) DEVICE — SU SILK 2-0 TIE 12X30" A305H

## (undated) DEVICE — SUCTION MANIFOLD NEPTUNE 2 SYS 1 PORT 702-025-000

## (undated) DEVICE — WIPES FOLEY CARE SURESTEP PROVON DFC100

## (undated) DEVICE — SYR 30ML SLIP TIP W/O NDL 302833

## (undated) DEVICE — SUCTION TIP YANKAUER STR K87

## (undated) DEVICE — SPONGE RAY-TEC 4X8" 7318

## (undated) DEVICE — KIT ENDO TURNOVER/PROCEDURE CARRY-ON 101822

## (undated) DEVICE — SOL NACL 0.9% IRRIG 1000ML BOTTLE 2F7124

## (undated) DEVICE — LINEN TOWEL PACK X30 5481

## (undated) DEVICE — SU SILK 0 TIE 6X30" A306H

## (undated) DEVICE — SPONGE SURGIFOAM 100 1974

## (undated) DEVICE — SU VICRYL 3-0 SH 27" J316H

## (undated) DEVICE — SU DERMABOND ADVANCED .7ML DNX12

## (undated) DEVICE — BLADE KNIFE SURG 10 371110

## (undated) DEVICE — BLADE KNIFE SURG 15 371115

## (undated) DEVICE — LINEN TOWEL PACK X6 WHITE 5487

## (undated) DEVICE — SU VICRYL 4-0 PS-2 18" UND J496H

## (undated) DEVICE — DRAPE LAP PEDS DISP 29492

## (undated) DEVICE — GEL ULTRASOUND AQUASONIC 20GM 01-01

## (undated) DEVICE — NDL ANGIOCATH 18GA 1 3/4" 4054

## (undated) DEVICE — GOWN IMPERVIOUS 2XL BLUE

## (undated) DEVICE — SU SILK 4-0 TIE 12X30" A303H

## (undated) DEVICE — DRAPE SHEET MED 44X70" 9355

## (undated) DEVICE — GOWN IMPERVIOUS BREATHABLE SMART XLG 89045

## (undated) DEVICE — TUBING SUCTION MEDI-VAC 1/4"X20' N620A

## (undated) DEVICE — SU SILK 3-0 TIE 12X30" A304H

## (undated) DEVICE — SOL WATER IRRIG 500ML BOTTLE 2F7113

## (undated) DEVICE — SU PROLENE 5-0 RB-1DA 36"  8556H

## (undated) DEVICE — ESU ELEC BLADE 2.75" COATED/INSULATED E1455

## (undated) DEVICE — SUCTION CATH AIRLIFE TRI-FLO W/CONTROL PORT 14FR  T60C

## (undated) DEVICE — SU PROLENE 6-0 RB-2DA 30" 8711H

## (undated) DEVICE — SUCTION MANIFOLD DORNOCH ULTRA CART UL-CL500

## (undated) DEVICE — PITCHER STERILE 1000ML  SSK9004A

## (undated) DEVICE — ENDO FORCEP BX CAPTURA PRO SPIKE G50696

## (undated) DEVICE — DRAPE SHEET REV FOLD 3/4 9349

## (undated) DEVICE — BLADE KNIFE SURG 11 371111

## (undated) DEVICE — SPECIMEN CONTAINER 3OZ W/FORMALIN 59901

## (undated) DEVICE — NDL 30GA 0.5" 305106

## (undated) DEVICE — SOL WATER IRRIG 1000ML BOTTLE 2F7114

## (undated) DEVICE — DRAPE IOBAN INCISE 23X17" 6650EZ

## (undated) DEVICE — SPONGE LAP 18X18" X8435

## (undated) DEVICE — SOL NACL 0.9% 10ML VIAL 0409-4888-02

## (undated) DEVICE — ENDO BITE BLOCK ADULT OMNI-BLOC

## (undated) DEVICE — LIGHT HANDLE X1 31140133

## (undated) DEVICE — BLADE CLIPPER SGL USE 9680

## (undated) DEVICE — DRSG KERLIX 4 1/2"X4YDS ROLL 6715

## (undated) DEVICE — SU PROLENE 7-0 BV-1DA 30" 8703H

## (undated) RX ORDER — FENTANYL CITRATE 50 UG/ML
INJECTION, SOLUTION INTRAMUSCULAR; INTRAVENOUS
Status: DISPENSED
Start: 2022-11-23

## (undated) RX ORDER — FENTANYL CITRATE 50 UG/ML
INJECTION, SOLUTION INTRAMUSCULAR; INTRAVENOUS
Status: DISPENSED
Start: 2022-10-05

## (undated) RX ORDER — PROPOFOL 10 MG/ML
INJECTION, EMULSION INTRAVENOUS
Status: DISPENSED
Start: 2022-11-23

## (undated) RX ORDER — DEXAMETHASONE SODIUM PHOSPHATE 4 MG/ML
INJECTION, SOLUTION INTRA-ARTICULAR; INTRALESIONAL; INTRAMUSCULAR; INTRAVENOUS; SOFT TISSUE
Status: DISPENSED
Start: 2022-11-23

## (undated) RX ORDER — LIDOCAINE HYDROCHLORIDE 10 MG/ML
INJECTION, SOLUTION EPIDURAL; INFILTRATION; INTRACAUDAL; PERINEURAL
Status: DISPENSED
Start: 2022-11-23

## (undated) RX ORDER — LIDOCAINE HYDROCHLORIDE 10 MG/ML
INJECTION, SOLUTION EPIDURAL; INFILTRATION; INTRACAUDAL; PERINEURAL
Status: DISPENSED
Start: 2022-10-05

## (undated) RX ORDER — ESMOLOL HYDROCHLORIDE 10 MG/ML
INJECTION INTRAVENOUS
Status: DISPENSED
Start: 2022-11-23

## (undated) RX ORDER — GABAPENTIN 300 MG/1
CAPSULE ORAL
Status: DISPENSED
Start: 2017-09-19

## (undated) RX ORDER — FENTANYL CITRATE 50 UG/ML
INJECTION, SOLUTION INTRAMUSCULAR; INTRAVENOUS
Status: DISPENSED
Start: 2017-10-12

## (undated) RX ORDER — PROPOFOL 10 MG/ML
INJECTION, EMULSION INTRAVENOUS
Status: DISPENSED
Start: 2017-10-12

## (undated) RX ORDER — ACETAMINOPHEN 500 MG
TABLET ORAL
Status: DISPENSED
Start: 2017-07-20

## (undated) RX ORDER — EPHEDRINE SULFATE 50 MG/ML
INJECTION, SOLUTION INTRAMUSCULAR; INTRAVENOUS; SUBCUTANEOUS
Status: DISPENSED
Start: 2017-10-12

## (undated) RX ORDER — FENTANYL CITRATE 50 UG/ML
INJECTION, SOLUTION INTRAMUSCULAR; INTRAVENOUS
Status: DISPENSED
Start: 2017-07-20

## (undated) RX ORDER — FENTANYL CITRATE 50 UG/ML
INJECTION, SOLUTION INTRAMUSCULAR; INTRAVENOUS
Status: DISPENSED
Start: 2021-12-02

## (undated) RX ORDER — LIDOCAINE HYDROCHLORIDE 20 MG/ML
INJECTION, SOLUTION EPIDURAL; INFILTRATION; INTRACAUDAL; PERINEURAL
Status: DISPENSED
Start: 2017-10-12

## (undated) RX ORDER — DIPHENHYDRAMINE HYDROCHLORIDE 50 MG/ML
INJECTION INTRAMUSCULAR; INTRAVENOUS
Status: DISPENSED
Start: 2022-11-23

## (undated) RX ORDER — EPHEDRINE SULFATE 50 MG/ML
INJECTION, SOLUTION INTRAMUSCULAR; INTRAVENOUS; SUBCUTANEOUS
Status: DISPENSED
Start: 2022-11-23

## (undated) RX ORDER — LIDOCAINE HYDROCHLORIDE 10 MG/ML
INJECTION, SOLUTION INFILTRATION; PERINEURAL
Status: DISPENSED
Start: 2021-12-02

## (undated) RX ORDER — ONDANSETRON 2 MG/ML
INJECTION INTRAMUSCULAR; INTRAVENOUS
Status: DISPENSED
Start: 2022-11-23

## (undated) RX ORDER — ACETAMINOPHEN 325 MG/1
TABLET ORAL
Status: DISPENSED
Start: 2017-09-19

## (undated) RX ORDER — HEPARIN SODIUM 200 [USP'U]/100ML
INJECTION, SOLUTION INTRAVENOUS
Status: DISPENSED
Start: 2021-12-02

## (undated) RX ORDER — LIDOCAINE HYDROCHLORIDE 10 MG/ML
INJECTION, SOLUTION EPIDURAL; INFILTRATION; INTRACAUDAL; PERINEURAL
Status: DISPENSED
Start: 2017-07-20

## (undated) RX ORDER — HEPARIN SODIUM 1000 [USP'U]/ML
INJECTION, SOLUTION INTRAVENOUS; SUBCUTANEOUS
Status: DISPENSED
Start: 2021-12-02

## (undated) RX ORDER — GLYCOPYRROLATE 0.2 MG/ML
INJECTION, SOLUTION INTRAMUSCULAR; INTRAVENOUS
Status: DISPENSED
Start: 2017-10-12

## (undated) RX ORDER — CEFAZOLIN SODIUM/WATER 2 G/20 ML
SYRINGE (ML) INTRAVENOUS
Status: DISPENSED
Start: 2022-11-23

## (undated) RX ORDER — SODIUM CHLORIDE 9 MG/ML
INJECTION, SOLUTION INTRAVENOUS
Status: DISPENSED
Start: 2017-07-20

## (undated) RX ORDER — SODIUM CHLORIDE 9 MG/ML
INJECTION, SOLUTION INTRAVENOUS
Status: DISPENSED
Start: 2022-10-05

## (undated) RX ORDER — HYDROMORPHONE HCL IN WATER/PF 6 MG/30 ML
PATIENT CONTROLLED ANALGESIA SYRINGE INTRAVENOUS
Status: DISPENSED
Start: 2022-11-23

## (undated) RX ORDER — PHENYLEPHRINE HCL IN 0.9% NACL 1 MG/10 ML
SYRINGE (ML) INTRAVENOUS
Status: DISPENSED
Start: 2017-10-12